# Patient Record
Sex: MALE | Race: WHITE | NOT HISPANIC OR LATINO | Employment: OTHER | URBAN - METROPOLITAN AREA
[De-identification: names, ages, dates, MRNs, and addresses within clinical notes are randomized per-mention and may not be internally consistent; named-entity substitution may affect disease eponyms.]

---

## 2017-05-12 ENCOUNTER — TELEPHONE (OUTPATIENT)
Dept: DERMATOLOGY | Facility: CLINIC | Age: 72
End: 2017-05-12

## 2017-05-12 NOTE — TELEPHONE ENCOUNTER
----- Message from Nola Ariza sent at 5/12/2017  2:03 PM CDT -----  Contact: PT  PT has a spot on his ear that he really needs to get checked out.  It's swollen and irritated.  PT has had this over a week.    PT callback: [phone]

## 2017-05-12 NOTE — TELEPHONE ENCOUNTER
Spoke to  Thomas himself and he stated he has already scheduled an appt outside [facility] to be seen for a spot on his ear.

## 2018-10-25 ENCOUNTER — TELEPHONE (OUTPATIENT)
Dept: FAMILY MEDICINE | Facility: CLINIC | Age: 73
End: 2018-10-25

## 2018-10-25 ENCOUNTER — OFFICE VISIT (OUTPATIENT)
Dept: FAMILY MEDICINE | Facility: CLINIC | Age: 73
End: 2018-10-25
Payer: MEDICARE

## 2018-10-25 VITALS
SYSTOLIC BLOOD PRESSURE: 125 MMHG | TEMPERATURE: 99 F | HEIGHT: 77 IN | WEIGHT: 206 LBS | RESPIRATION RATE: 20 BRPM | DIASTOLIC BLOOD PRESSURE: 65 MMHG | BODY MASS INDEX: 24.32 KG/M2 | HEART RATE: 86 BPM | OXYGEN SATURATION: 99 %

## 2018-10-25 DIAGNOSIS — I10 HYPERTENSION, UNSPECIFIED TYPE: ICD-10-CM

## 2018-10-25 DIAGNOSIS — K59.00 CONSTIPATION, UNSPECIFIED CONSTIPATION TYPE: ICD-10-CM

## 2018-10-25 DIAGNOSIS — Z85.038 PERSONAL HISTORY OF COLON CANCER, STAGE I: ICD-10-CM

## 2018-10-25 DIAGNOSIS — Z09 FOLLOW-UP EXAM: Primary | ICD-10-CM

## 2018-10-25 PROCEDURE — 99999 PR PBB SHADOW E&M-EST. PATIENT-LVL III: CPT | Mod: PBBFAC,,, | Performed by: NURSE PRACTITIONER

## 2018-10-25 PROCEDURE — 99213 OFFICE O/P EST LOW 20 MIN: CPT | Mod: PBBFAC,PN | Performed by: NURSE PRACTITIONER

## 2018-10-25 PROCEDURE — 99203 OFFICE O/P NEW LOW 30 MIN: CPT | Mod: S$PBB,,, | Performed by: NURSE PRACTITIONER

## 2018-10-25 NOTE — TELEPHONE ENCOUNTER
3rd attempt to call pt to reschedule due to provider being out, no answer, calls goes straight to voicemail, did leave message to call office to reschedule appointment.      2nd attempt to call pt to reschedule, no answer, left message provider is out, pt needs to reschedule.      Attempted to call pt to reschedule appointment scheduled for today (provider out), no answer, left message to call office to reschedule with call back number.

## 2018-10-25 NOTE — PROGRESS NOTES
"Subjective:       Patient ID: Akil Guzman is a 73 y.o. male.    Chief Complaint: Follow-up and Abdominal Pain    Mr. Becerra is 73 year old male who presents to the clinic today for follow up exam. He was seen on Tuesday morning @ urgent care in [place], [place] for constipation, severe abdominal cramps, slight fever, and achy joints. He reports they did an XRAY of his abdomen. He reports he took medication that they prescribed, which he is unsure of, and it has allow him to have multiple bowel movements. He reports he was also dehydrated Tuesday, but has been drinking at least 8 glasses of water daily. His PCP is Dr Hurley and he reports is making an appt for 11-17-18 for medicare wellness. He reports hx of colon cancer, and due for colonoscopy.              Review of Systems   Constitutional: Negative for appetite change, chills, diaphoresis, fatigue and fever.   HENT: Negative for congestion, ear discharge, ear pain, hearing loss, postnasal drip, sinus pressure, sinus pain, sneezing and sore throat.    Eyes: Negative for photophobia, pain, discharge, redness, itching and visual disturbance.   Respiratory: Negative for apnea, cough, choking, chest tightness, shortness of breath, wheezing and stridor.    Cardiovascular: Negative for chest pain, palpitations and leg swelling.   Gastrointestinal: Negative for abdominal distention, abdominal pain, constipation, diarrhea, nausea and vomiting.   Genitourinary: Negative for dysuria, flank pain, frequency and urgency.   Musculoskeletal: Negative for arthralgias, joint swelling, myalgias, neck pain and neck stiffness.   Skin: Negative for color change, pallor, rash and wound.   Neurological: Negative for dizziness, tremors, seizures, syncope, weakness, light-headedness, numbness and headaches.   Psychiatric/Behavioral: Positive for sleep disturbance (Patient reports "up all night using the restroom"). Negative for confusion, decreased concentration, hallucinations and " "suicidal ideas. The patient is not nervous/anxious.          Reviewed family, medical, surgical, and social history.    Objective:      /65 (BP Location: Left arm, Patient Position: Sitting)   Pulse 86   Temp 98.6 °F (37 °C)   Resp 20   Ht 6' 5" (1.956 m)   Wt 93.4 kg (206 lb)   SpO2 99%   BMI 24.43 kg/m²   Physical Exam   Constitutional: He is oriented to person, place, and time. He appears well-developed and well-nourished. No distress. He is not intubated.   HENT:   Head: Normocephalic.   Right Ear: Hearing, tympanic membrane, external ear and ear canal normal. Tympanic membrane is not erythematous.   Left Ear: Hearing, external ear and ear canal normal. Tympanic membrane is not erythematous.   Nose: Nose normal. No sinus tenderness. Right sinus exhibits no maxillary sinus tenderness and no frontal sinus tenderness. Left sinus exhibits no maxillary sinus tenderness and no frontal sinus tenderness.   Mouth/Throat: Uvula is midline, oropharynx is clear and moist and mucous membranes are normal. No uvula swelling. No oropharyngeal exudate.   Eyes: Conjunctivae and EOM are normal. Pupils are equal, round, and reactive to light.   Neck: Trachea normal and normal range of motion. Carotid bruit is not present. No neck rigidity. No edema and no erythema present.   Cardiovascular: Normal rate, regular rhythm, S1 normal, S2 normal, normal heart sounds and intact distal pulses. Exam reveals no gallop and no friction rub.   No murmur heard.  Pulmonary/Chest: Effort normal and breath sounds normal. No accessory muscle usage or stridor. No apnea, no tachypnea and no bradypnea. He is not intubated. No respiratory distress. He has no wheezes. He has no rales. He exhibits no tenderness.   Abdominal: Soft. Bowel sounds are normal. He exhibits no shifting dullness, no distension, no pulsatile liver, no abdominal bruit, no ascites and no mass. There is no tenderness. There is no rebound and no guarding. No hernia. "   Musculoskeletal: Normal range of motion.   Lymphadenopathy:        Head (right side): No submandibular adenopathy present.        Head (left side): No submandibular adenopathy present.     He has no cervical adenopathy.   Neurological: He is alert and oriented to person, place, and time. No cranial nerve deficit or sensory deficit.   Skin: Skin is warm and intact. Capillary refill takes less than 2 seconds. He is not diaphoretic.   Psychiatric: He has a normal mood and affect. His speech is normal and behavior is normal. Judgment and thought content normal.       Assessment:       1. Follow-up exam    2. Constipation, unspecified constipation type    3. Hypertension, unspecified type    4. Personal history of colon cancer, stage I        Plan:       Follow-up exam    Constipation, unspecified constipation type    Hypertension, unspecified type    Personal history of colon cancer, stage I          PLAN:  - Plan of care discussed with patient; patient reports feeling better with no symptoms present  - Hydration encouraged   - Hand washing discussed and encouraged  - Medications reviewed. Medication side effects discussed. Patient has no questions or concerns at this time. Informed patient to notify me regarding any concerns.   - Continue monitoring and documenting in log book   - Informed patient to please notify me with any questions or concerns at anytime  - Follow up ordered for Dr uHrley on 11-17-18 for wellness exam        Risks, benefits, and side effects were discussed with the patient. All questions were answered to the fullest satisfaction of the patient, and pt verbalized understanding and agreement to treatment plan. Pt was to call with any new or worsening symptoms, or present to the ER.

## 2018-10-26 DIAGNOSIS — N40.0 BPH (BENIGN PROSTATIC HYPERPLASIA): ICD-10-CM

## 2018-10-26 RX ORDER — FINASTERIDE 5 MG/1
5 TABLET, FILM COATED ORAL DAILY
Qty: 30 TABLET | Refills: 6 | Status: SHIPPED | OUTPATIENT
Start: 2018-10-26 | End: 2019-02-07 | Stop reason: SDUPTHER

## 2018-10-26 NOTE — TELEPHONE ENCOUNTER
----- Message from Phu Rushing sent at 10/26/2018 11:03 AM CDT -----  Contact: patient  Type:  RX Refill Request    Who Called:  patient  Refill or New Rx:  refill  RX Name and Strength:  finasteride (PROSCAR) 5 mg tablet  How is the patient currently taking it? (ex. 1XDay):    Is this a 30 day or 90 day RX:    Preferred Pharmacy with phone number:  [facility]  Local or Mail Order:  Local  Ordering Provider:    Best Call Back Number:  [phone]  Additional Information:  Requesting a call back when script has been sent

## 2018-10-31 ENCOUNTER — OFFICE VISIT (OUTPATIENT)
Dept: FAMILY MEDICINE | Facility: CLINIC | Age: 73
End: 2018-10-31
Payer: MEDICARE

## 2018-10-31 VITALS
HEART RATE: 83 BPM | RESPIRATION RATE: 18 BRPM | DIASTOLIC BLOOD PRESSURE: 79 MMHG | OXYGEN SATURATION: 98 % | BODY MASS INDEX: 24.49 KG/M2 | WEIGHT: 207.38 LBS | HEIGHT: 77 IN | SYSTOLIC BLOOD PRESSURE: 132 MMHG

## 2018-10-31 DIAGNOSIS — Z13.6 ISCHEMIC HEART DISEASE SCREEN: ICD-10-CM

## 2018-10-31 DIAGNOSIS — Z00.00 ANNUAL PHYSICAL EXAM: Primary | ICD-10-CM

## 2018-10-31 DIAGNOSIS — Z12.5 SCREENING FOR PROSTATE CANCER: ICD-10-CM

## 2018-10-31 PROCEDURE — 90714 TD VACC NO PRESV 7 YRS+ IM: CPT | Mod: PBBFAC,PN

## 2018-10-31 PROCEDURE — 99397 PER PM REEVAL EST PAT 65+ YR: CPT | Mod: S$PBB,,, | Performed by: FAMILY MEDICINE

## 2018-10-31 PROCEDURE — 99214 OFFICE O/P EST MOD 30 MIN: CPT | Mod: PBBFAC,PN | Performed by: FAMILY MEDICINE

## 2018-10-31 PROCEDURE — 99999 PR PBB SHADOW E&M-EST. PATIENT-LVL IV: CPT | Mod: PBBFAC,,, | Performed by: FAMILY MEDICINE

## 2018-10-31 NOTE — PROGRESS NOTES
"Subjective:       Patient ID: Akil Guzman is a 73 y.o. male.    Chief Complaint: Annual Exam (Wellness Visit)    Wellness Exam    Patient reports they are feeling well without complaints today. Pt reports adherence to generally healthy diet, exercise plan, and good sleep schedule. Anticipatory guidance was provided. Wellness labs and procedures for age were discussed.        Review of Systems   Constitutional: Negative for appetite change, chills, diaphoresis, fatigue and fever.   HENT: Negative for congestion, ear discharge, ear pain, hearing loss, postnasal drip, sinus pressure, sinus pain, sneezing and sore throat.    Eyes: Negative for photophobia, pain, discharge, redness, itching and visual disturbance.   Respiratory: Negative for apnea, cough, choking, chest tightness, shortness of breath, wheezing and stridor.    Cardiovascular: Negative for chest pain, palpitations and leg swelling.   Gastrointestinal: Negative for abdominal distention, abdominal pain, constipation, diarrhea, nausea and vomiting.   Genitourinary: Negative for dysuria, flank pain, frequency and urgency.   Musculoskeletal: Negative for arthralgias, joint swelling, myalgias, neck pain and neck stiffness.   Skin: Negative for color change, pallor, rash and wound.   Neurological: Negative for dizziness, tremors, seizures, syncope, weakness, light-headedness, numbness and headaches.   Psychiatric/Behavioral: Negative for confusion, decreased concentration, hallucinations, sleep disturbance (Patient reports "up all night using the restroom") and suicidal ideas. The patient is not nervous/anxious.          Reviewed family, medical, surgical, and social history.    Objective:      BP (!) 160/85 (BP Location: Right arm, Patient Position: Sitting, BP Method: Medium (Automatic))   Pulse 83   Resp 18   Ht 6' 5" (1.956 m)   Wt 94.1 kg (207 lb 6.4 oz)   SpO2 98%   BMI 24.59 kg/m²   Physical Exam   Constitutional: He is oriented to person, place, " and time. He appears well-developed and well-nourished. No distress.   HENT:   Head: Normocephalic and atraumatic.   Nose: Nose normal.   Mouth/Throat: Oropharynx is clear and moist. No oropharyngeal exudate.   Eyes: Conjunctivae and EOM are normal. Pupils are equal, round, and reactive to light.   Neck: Normal range of motion. No thyromegaly present.   Cardiovascular: Normal rate, regular rhythm, normal heart sounds and intact distal pulses. Exam reveals no gallop and no friction rub.   No murmur heard.  Pulmonary/Chest: Effort normal and breath sounds normal. No respiratory distress. He has no wheezes. He has no rales.   Abdominal: Soft. He exhibits no distension. There is no tenderness. There is no guarding.   Musculoskeletal: Normal range of motion. He exhibits no edema, tenderness or deformity.   Neurological: He is alert and oriented to person, place, and time. He displays normal reflexes. No cranial nerve deficit or sensory deficit. He exhibits normal muscle tone. Coordination normal.   Skin: Skin is warm and dry. No rash noted. He is not diaphoretic. No erythema. No pallor.   Psychiatric: He has a normal mood and affect. His behavior is normal. Judgment and thought content normal.   Nursing note and vitals reviewed.      Assessment:       1. Annual physical exam    2. Screening for prostate cancer    3. Ischemic heart disease screen        Plan:       Annual physical exam  -     Comprehensive metabolic panel; Future; Expected date: 10/31/2018  -     CBC auto differential; Future; Expected date: 10/31/2018  -     Lipid panel; Future; Expected date: 10/31/2018  -     PSA, Screening; Future; Expected date: 10/31/2018  -     (In Office Administered) Td Vaccine    Screening for prostate cancer  -     Comprehensive metabolic panel; Future; Expected date: 10/31/2018  -     CBC auto differential; Future; Expected date: 10/31/2018  -     Lipid panel; Future; Expected date: 10/31/2018  -     PSA, Screening; Future;  Expected date: 10/31/2018    Ischemic heart disease screen  -     Comprehensive metabolic panel; Future; Expected date: 10/31/2018  -     CBC auto differential; Future; Expected date: 10/31/2018  -     Lipid panel; Future; Expected date: 10/31/2018  -     PSA, Screening; Future; Expected date: 10/31/2018            Risks, benefits, and side effects were discussed with the patient. All questions were answered to the fullest satisfaction of the patient, and pt verbalized understanding and agreement to treatment plan. Pt was to call with any new or worsening symptoms, or present to the ER.

## 2018-11-02 ENCOUNTER — LAB VISIT (OUTPATIENT)
Dept: LAB | Facility: HOSPITAL | Age: 73
End: 2018-11-02
Attending: FAMILY MEDICINE
Payer: MEDICARE

## 2018-11-02 DIAGNOSIS — Z00.00 ANNUAL PHYSICAL EXAM: ICD-10-CM

## 2018-11-02 DIAGNOSIS — Z13.6 ISCHEMIC HEART DISEASE SCREEN: ICD-10-CM

## 2018-11-02 DIAGNOSIS — Z12.5 SCREENING FOR PROSTATE CANCER: ICD-10-CM

## 2018-11-02 LAB
ALBUMIN SERPL BCP-MCNC: 3.8 G/DL
ALP SERPL-CCNC: 58 U/L
ALT SERPL W/O P-5'-P-CCNC: 24 U/L
ANION GAP SERPL CALC-SCNC: 7 MMOL/L
AST SERPL-CCNC: 26 U/L
BASOPHILS # BLD AUTO: 0.04 K/UL
BASOPHILS NFR BLD: 0.7 %
BILIRUB SERPL-MCNC: 0.3 MG/DL
BUN SERPL-MCNC: 17 MG/DL
CALCIUM SERPL-MCNC: 9.8 MG/DL
CHLORIDE SERPL-SCNC: 102 MMOL/L
CHOLEST SERPL-MCNC: 118 MG/DL
CHOLEST/HDLC SERPL: 2.4 {RATIO}
CO2 SERPL-SCNC: 26 MMOL/L
COMPLEXED PSA SERPL-MCNC: 0.71 NG/ML
CREAT SERPL-MCNC: 0.9 MG/DL
DIFFERENTIAL METHOD: ABNORMAL
EOSINOPHIL # BLD AUTO: 0.2 K/UL
EOSINOPHIL NFR BLD: 3.2 %
ERYTHROCYTE [DISTWIDTH] IN BLOOD BY AUTOMATED COUNT: 11.9 %
EST. GFR  (AFRICAN AMERICAN): >60 ML/MIN/1.73 M^2
EST. GFR  (NON AFRICAN AMERICAN): >60 ML/MIN/1.73 M^2
GLUCOSE SERPL-MCNC: 84 MG/DL
HCT VFR BLD AUTO: 34.6 %
HDLC SERPL-MCNC: 49 MG/DL
HDLC SERPL: 41.5 %
HGB BLD-MCNC: 11.9 G/DL
IMM GRANULOCYTES # BLD AUTO: 0.06 K/UL
IMM GRANULOCYTES NFR BLD AUTO: 1 %
LDLC SERPL CALC-MCNC: 48.4 MG/DL
LYMPHOCYTES # BLD AUTO: 2.1 K/UL
LYMPHOCYTES NFR BLD: 36 %
MCH RBC QN AUTO: 33.4 PG
MCHC RBC AUTO-ENTMCNC: 34.4 G/DL
MCV RBC AUTO: 97 FL
MONOCYTES # BLD AUTO: 0.7 K/UL
MONOCYTES NFR BLD: 11.7 %
NEUTROPHILS # BLD AUTO: 2.8 K/UL
NEUTROPHILS NFR BLD: 47.4 %
NONHDLC SERPL-MCNC: 69 MG/DL
NRBC BLD-RTO: 0 /100 WBC
PLATELET # BLD AUTO: 238 K/UL
PMV BLD AUTO: 9.7 FL
POTASSIUM SERPL-SCNC: 4.2 MMOL/L
PROT SERPL-MCNC: 7.1 G/DL
RBC # BLD AUTO: 3.56 M/UL
SODIUM SERPL-SCNC: 135 MMOL/L
TRIGL SERPL-MCNC: 103 MG/DL
WBC # BLD AUTO: 5.92 K/UL

## 2018-11-02 PROCEDURE — 80061 LIPID PANEL: CPT

## 2018-11-02 PROCEDURE — 85025 COMPLETE CBC W/AUTO DIFF WBC: CPT

## 2018-11-02 PROCEDURE — 80053 COMPREHEN METABOLIC PANEL: CPT

## 2018-11-02 PROCEDURE — 84153 ASSAY OF PSA TOTAL: CPT

## 2018-11-02 PROCEDURE — 36415 COLL VENOUS BLD VENIPUNCTURE: CPT

## 2018-12-10 RX ORDER — SILDENAFIL CITRATE 20 MG/1
TABLET ORAL
Qty: 50 TABLET | Refills: 5 | Status: SHIPPED | OUTPATIENT
Start: 2018-12-10 | End: 2019-02-27

## 2018-12-10 NOTE — TELEPHONE ENCOUNTER
----- Message from Marilee Krueger sent at 12/10/2018 11:07 AM CST -----  Type:  RX Refill Request    Who Called: Paulette  Refill or New Rx:  refill  RX Name and Strength:  Sildenasol 20mg  How is the patient currently taking it? (ex. 1XDay):  As needed  Is this a 30 day or 90 day RX:  30  Preferred Pharmacy with phone number:    [facility]  Local or Mail Order:local  Ordering Provider:  Xavi  Best Call Back Number:  [phone]  Additional Information:

## 2019-02-06 ENCOUNTER — TELEPHONE (OUTPATIENT)
Dept: FAMILY MEDICINE | Facility: CLINIC | Age: 74
End: 2019-02-06

## 2019-02-06 NOTE — TELEPHONE ENCOUNTER
----- Message from Rhoda Reddy sent at 2/5/2019  5:11 PM CST -----  Contact: Patient   Patient needs to reschedule his appointment for something later than 02/06/2019 but before march    Callback: [phone]     Thank you

## 2019-02-07 ENCOUNTER — TELEPHONE (OUTPATIENT)
Dept: FAMILY MEDICINE | Facility: CLINIC | Age: 74
End: 2019-02-07

## 2019-02-07 DIAGNOSIS — N40.0 BPH (BENIGN PROSTATIC HYPERPLASIA): ICD-10-CM

## 2019-02-07 DIAGNOSIS — E78.5 HYPERLIPIDEMIA: ICD-10-CM

## 2019-02-07 RX ORDER — LISINOPRIL 40 MG/1
40 TABLET ORAL DAILY
Qty: 90 TABLET | Refills: 3 | Status: SHIPPED | OUTPATIENT
Start: 2019-02-07 | End: 2020-01-28

## 2019-02-07 RX ORDER — SIMVASTATIN 40 MG/1
40 TABLET, FILM COATED ORAL NIGHTLY
Qty: 90 TABLET | Refills: 3 | Status: SHIPPED | OUTPATIENT
Start: 2019-02-07 | End: 2020-02-11 | Stop reason: SDUPTHER

## 2019-02-07 RX ORDER — FINASTERIDE 5 MG/1
5 TABLET, FILM COATED ORAL DAILY
Qty: 30 TABLET | Refills: 6 | Status: SHIPPED | OUTPATIENT
Start: 2019-02-07 | End: 2019-02-27

## 2019-02-15 ENCOUNTER — LAB VISIT (OUTPATIENT)
Dept: LAB | Facility: HOSPITAL | Age: 74
End: 2019-02-15
Attending: FAMILY MEDICINE
Payer: MEDICARE

## 2019-02-15 ENCOUNTER — OFFICE VISIT (OUTPATIENT)
Dept: FAMILY MEDICINE | Facility: CLINIC | Age: 74
End: 2019-02-15
Payer: MEDICARE

## 2019-02-15 VITALS
BODY MASS INDEX: 24.21 KG/M2 | SYSTOLIC BLOOD PRESSURE: 128 MMHG | WEIGHT: 205 LBS | DIASTOLIC BLOOD PRESSURE: 75 MMHG | RESPIRATION RATE: 20 BRPM | OXYGEN SATURATION: 98 % | HEIGHT: 77 IN | HEART RATE: 71 BPM

## 2019-02-15 DIAGNOSIS — J30.1 NON-SEASONAL ALLERGIC RHINITIS DUE TO POLLEN: Primary | ICD-10-CM

## 2019-02-15 DIAGNOSIS — E78.5 HYPERLIPIDEMIA, UNSPECIFIED HYPERLIPIDEMIA TYPE: ICD-10-CM

## 2019-02-15 DIAGNOSIS — B18.2 CHRONIC HEPATITIS C WITHOUT HEPATIC COMA: ICD-10-CM

## 2019-02-15 DIAGNOSIS — I10 HYPERTENSION, UNSPECIFIED TYPE: ICD-10-CM

## 2019-02-15 DIAGNOSIS — M79.671 RIGHT FOOT PAIN: ICD-10-CM

## 2019-02-15 PROCEDURE — 99999 PR PBB SHADOW E&M-EST. PATIENT-LVL IV: CPT | Mod: PBBFAC,,, | Performed by: FAMILY MEDICINE

## 2019-02-15 PROCEDURE — 99999 PR PBB SHADOW E&M-EST. PATIENT-LVL IV: ICD-10-PCS | Mod: PBBFAC,,, | Performed by: FAMILY MEDICINE

## 2019-02-15 PROCEDURE — 99214 PR OFFICE/OUTPT VISIT, EST, LEVL IV, 30-39 MIN: ICD-10-PCS | Mod: S$PBB,,, | Performed by: FAMILY MEDICINE

## 2019-02-15 PROCEDURE — 36415 COLL VENOUS BLD VENIPUNCTURE: CPT

## 2019-02-15 PROCEDURE — 99214 OFFICE O/P EST MOD 30 MIN: CPT | Mod: S$PBB,,, | Performed by: FAMILY MEDICINE

## 2019-02-15 PROCEDURE — 99214 OFFICE O/P EST MOD 30 MIN: CPT | Mod: PBBFAC,PN | Performed by: FAMILY MEDICINE

## 2019-02-15 PROCEDURE — 87522 HEPATITIS C REVRS TRNSCRPJ: CPT

## 2019-02-15 RX ORDER — MONTELUKAST SODIUM 10 MG/1
10 TABLET ORAL NIGHTLY
Qty: 30 TABLET | Refills: 2 | Status: SHIPPED | OUTPATIENT
Start: 2019-02-15 | End: 2019-03-17

## 2019-02-15 NOTE — PROGRESS NOTES
"Subjective:       Patient ID: Akil Guzman is a 73 y.o. male.    Chief Complaint: Follow-up    Follow up    In regards to the patient's hypertension, patient denies chest pain/sob, and has been compliant with the medication regimen.     In regards to the patient's dyslipidemia, patient reports has been compliant with the medication regimen  without side effects.        Review of Systems   Constitutional: Negative for appetite change, chills, diaphoresis, fatigue and fever.   HENT: Negative for congestion, ear discharge, ear pain, hearing loss, postnasal drip, sinus pressure, sinus pain, sneezing and sore throat.    Eyes: Negative for photophobia, pain, discharge, redness, itching and visual disturbance.   Respiratory: Negative for apnea, cough, choking, chest tightness, shortness of breath, wheezing and stridor.    Cardiovascular: Negative for chest pain, palpitations and leg swelling.   Gastrointestinal: Negative for abdominal distention, abdominal pain, constipation, diarrhea, nausea and vomiting.   Genitourinary: Negative for dysuria, flank pain, frequency and urgency.   Musculoskeletal: Negative for arthralgias, joint swelling, myalgias, neck pain and neck stiffness.   Skin: Negative for color change, pallor, rash and wound.   Neurological: Negative for dizziness, tremors, seizures, syncope, weakness, light-headedness, numbness and headaches.   Psychiatric/Behavioral: Negative for confusion, decreased concentration, hallucinations, sleep disturbance (Patient reports "up all night using the restroom") and suicidal ideas. The patient is not nervous/anxious.          Reviewed family, medical, surgical, and social history.    Objective:      /75 (BP Location: Left arm, Patient Position: Sitting, BP Method: Medium (Automatic))   Pulse 71   Resp 20   Ht 6' 5" (1.956 m)   Wt 93 kg (205 lb)   SpO2 98%   BMI 24.31 kg/m²   Physical Exam   Constitutional: He is oriented to person, place, and time. He appears " well-developed and well-nourished. No distress.   HENT:   Head: Normocephalic and atraumatic.   Nose: Nose normal.   Mouth/Throat: Oropharynx is clear and moist. No oropharyngeal exudate.   Eyes: Conjunctivae and EOM are normal. Pupils are equal, round, and reactive to light.   Neck: Normal range of motion. No thyromegaly present.   Cardiovascular: Normal rate, regular rhythm, normal heart sounds and intact distal pulses. Exam reveals no gallop and no friction rub.   No murmur heard.  Pulmonary/Chest: Effort normal and breath sounds normal. No respiratory distress. He has no wheezes. He has no rales.   Abdominal: Soft. He exhibits no distension. There is no tenderness. There is no guarding.   Musculoskeletal: Normal range of motion. He exhibits no edema, tenderness or deformity.   Neurological: He is alert and oriented to person, place, and time. He displays normal reflexes. No cranial nerve deficit or sensory deficit. He exhibits normal muscle tone. Coordination normal.   Skin: Skin is warm and dry. No rash noted. He is not diaphoretic. No erythema. No pallor.   Psychiatric: He has a normal mood and affect. His behavior is normal. Judgment and thought content normal.   Nursing note and vitals reviewed.      Assessment:       1. Non-seasonal allergic rhinitis due to pollen    2. Right foot pain    3. Chronic hepatitis C without hepatic coma        Plan:       Non-seasonal allergic rhinitis due to pollen  -     montelukast (SINGULAIR) 10 mg tablet; Take 1 tablet (10 mg total) by mouth every evening.  Dispense: 30 tablet; Refill: 2    Right foot pain  -     Ambulatory referral to Podiatry    Chronic hepatitis C without hepatic coma  -     Hepatitis C RNA, quantitative, PCR; Future; Expected date: 02/15/2019            Risks, benefits, and side effects were discussed with the patient. All questions were answered to the fullest satisfaction of the patient, and pt verbalized understanding and agreement to treatment plan.  Pt was to call with any new or worsening symptoms, or present to the ER.

## 2019-02-18 LAB
HCV RNA SERPL NAA+PROBE-LOG IU: <1.08 LOG (10) IU/ML
HCV RNA SERPL QL NAA+PROBE: NOT DETECTED IU/ML
HCV RNA SPEC NAA+PROBE-ACNC: <12 IU/ML

## 2019-02-27 ENCOUNTER — OFFICE VISIT (OUTPATIENT)
Dept: PODIATRY | Facility: CLINIC | Age: 74
End: 2019-02-27
Payer: MEDICARE

## 2019-02-27 VITALS
TEMPERATURE: 98 F | BODY MASS INDEX: 23.62 KG/M2 | HEART RATE: 73 BPM | WEIGHT: 200 LBS | SYSTOLIC BLOOD PRESSURE: 115 MMHG | HEIGHT: 77 IN | DIASTOLIC BLOOD PRESSURE: 64 MMHG

## 2019-02-27 DIAGNOSIS — L03.031 PARONYCHIA OF THIRD TOE OF RIGHT FOOT: Primary | ICD-10-CM

## 2019-02-27 PROCEDURE — 99999 PR PBB SHADOW E&M-EST. PATIENT-LVL III: ICD-10-PCS | Mod: PBBFAC,,, | Performed by: PODIATRIST

## 2019-02-27 PROCEDURE — 99202 OFFICE O/P NEW SF 15 MIN: CPT | Mod: S$PBB,,, | Performed by: PODIATRIST

## 2019-02-27 PROCEDURE — 99999 PR PBB SHADOW E&M-EST. PATIENT-LVL III: CPT | Mod: PBBFAC,,, | Performed by: PODIATRIST

## 2019-02-27 PROCEDURE — 99202 PR OFFICE/OUTPT VISIT, NEW, LEVL II, 15-29 MIN: ICD-10-PCS | Mod: S$PBB,,, | Performed by: PODIATRIST

## 2019-02-27 PROCEDURE — 99213 OFFICE O/P EST LOW 20 MIN: CPT | Mod: PBBFAC | Performed by: PODIATRIST

## 2019-02-27 RX ORDER — FINASTERIDE 5 MG/1
5 TABLET, FILM COATED ORAL DAILY
COMMUNITY
End: 2019-12-30

## 2019-02-27 NOTE — LETTER
March 2, 2019      Iftikhar Hurley MD  [address]  [place] [address]           [facility]  [address] [place] [address]  Phone: [phone]  Fax: [phone]          Patient: Akil Guzman   MR Number: [id number]   YOB: 1945   Date of Visit: 2/27/2019       Dear Dr. Iftikhar Hurley:    Thank you for referring Akil Guzman to me for evaluation. Attached you will find relevant portions of my assessment and plan of care.    If you have questions, please do not hesitate to call me. I look forward to following Akil Guzman along with you.    Sincerely,    aGldino Borden, DPNICOLE    Enclosure  CC:  No Recipients    If you would like to receive this communication electronically, please contact [email or web address] or [phone] to request more information on [employer] access.    For providers and/or their staff who would like to refer a patient to [facility], please contact us through our one-stop-shop provider referral line, Critical access hospitalierge, at [phone].    If you feel you have received this communication in error or would no longer like to receive these types of communications, please e-mail [email or web address]

## 2019-03-02 PROBLEM — L03.031 PARONYCHIA OF THIRD TOE OF RIGHT FOOT: Status: ACTIVE | Noted: 2019-03-02

## 2019-03-03 NOTE — PROGRESS NOTES
Subjective:       Patient ID: Akil Guzman is a 73 y.o. male.    Chief Complaint: Foot Problem (3rd digit right foot ) and Nail Problem    HPI patient presents today with complaint of an infected ingrown toenail 3rd digit right foot.  Patient states this has been bothering him for about a year it is very painful he states he tried to changes shoes it did not help.  Review of Systems   All other systems reviewed and are negative.      Objective:      Physical Exam   Constitutional: He appears well-developed and well-nourished.   Cardiovascular:   Pulses:       Dorsalis pedis pulses are 1+ on the right side, and 1+ on the left side.        Posterior tibial pulses are 1+ on the right side, and 1+ on the left side.   Pulmonary/Chest: Effort normal.   Musculoskeletal: Normal range of motion. He exhibits tenderness.   Feet:   Right Foot:   Protective Sensation: 4 sites tested. 4 sites sensed.   Skin Integrity: Positive for erythema.   Left Foot:   Protective Sensation: 4 sites tested. 4 sites sensed.   Neurological: He is alert.   Skin: Capillary refill takes 2 to 3 seconds. There is erythema.   Psychiatric: He has a normal mood and affect. His behavior is normal. Judgment and thought content normal.       Assessment:       1. Paronychia of third toe of right foot        Plan:       Patient presents today with a complaint of an infected ingrown toenail 3rd digit right foot he states he has been bothering him for about a year on and off it has gotten progressively worse he states he tried to change shoes he is wearing to prevent rubbing this has not helped.  On evaluation I did not feel that the patient needed a nail avulsion today I was able to aggressively debride and remove the ingrowing toenail from the lateral border 3rd digit right.  The area was flushed and irrigated with copious amounts of sterile saline bacitracin ointment and a light dressing was applied patient advised he needs to monitor this carefully and I  have given him advice as to what he can do to prevent this from growing back the same way he is going to apply Vicks vapor rub to his toenails twice a day every day to help combat the fungal infection which the definitely is a contributing factor to the ingrown toenail I have advised him he needs to do this for 4-6 months I have also shown how to trim this patient did not need a nail avulsion at this time bacitracin ointment and a light dressing applied follow-up will be as needed.

## 2019-04-08 ENCOUNTER — TELEPHONE (OUTPATIENT)
Dept: FAMILY MEDICINE | Facility: CLINIC | Age: 74
End: 2019-04-08

## 2019-04-08 RX ORDER — IPRATROPIUM BROMIDE 42 UG/1
2 SPRAY, METERED NASAL 3 TIMES DAILY
Qty: 15 ML | Refills: 5 | Status: SHIPPED | OUTPATIENT
Start: 2019-04-08 | End: 2019-10-25 | Stop reason: SDUPTHER

## 2019-05-17 ENCOUNTER — OFFICE VISIT (OUTPATIENT)
Dept: FAMILY MEDICINE | Facility: CLINIC | Age: 74
End: 2019-05-17
Payer: MEDICARE

## 2019-05-17 VITALS
BODY MASS INDEX: 24.28 KG/M2 | DIASTOLIC BLOOD PRESSURE: 75 MMHG | WEIGHT: 205.63 LBS | OXYGEN SATURATION: 97 % | HEART RATE: 80 BPM | SYSTOLIC BLOOD PRESSURE: 137 MMHG | RESPIRATION RATE: 20 BRPM | HEIGHT: 77 IN

## 2019-05-17 DIAGNOSIS — E78.41 ELEVATED LIPOPROTEIN(A): Primary | ICD-10-CM

## 2019-05-17 PROCEDURE — 99999 PR PBB SHADOW E&M-EST. PATIENT-LVL III: ICD-10-PCS | Mod: PBBFAC,,, | Performed by: FAMILY MEDICINE

## 2019-05-17 PROCEDURE — 99213 PR OFFICE/OUTPT VISIT, EST, LEVL III, 20-29 MIN: ICD-10-PCS | Mod: S$PBB,,, | Performed by: FAMILY MEDICINE

## 2019-05-17 PROCEDURE — 99213 OFFICE O/P EST LOW 20 MIN: CPT | Mod: PBBFAC,PN | Performed by: FAMILY MEDICINE

## 2019-05-17 PROCEDURE — 99999 PR PBB SHADOW E&M-EST. PATIENT-LVL III: CPT | Mod: PBBFAC,,, | Performed by: FAMILY MEDICINE

## 2019-05-17 PROCEDURE — 99213 OFFICE O/P EST LOW 20 MIN: CPT | Mod: S$PBB,,, | Performed by: FAMILY MEDICINE

## 2019-05-17 RX ORDER — MONTELUKAST SODIUM 10 MG/1
TABLET ORAL
COMMUNITY
Start: 2019-03-22 | End: 2019-08-07 | Stop reason: SDUPTHER

## 2019-05-17 NOTE — PROGRESS NOTES
"Subjective:       Patient ID: Akil Guzman is a 73 y.o. male.    Chief Complaint: Follow-up (3 months)    Follow up    In regards to the patient's dyslipidemia, patient reports has been compliant with the medication regimen  without side effects.    Review of Systems   Constitutional: Negative for activity change, appetite change, fatigue and fever.   HENT: Negative for congestion, dental problem, ear discharge, hearing loss, postnasal drip, sinus pain, sneezing and trouble swallowing.    Eyes: Negative for photophobia and discharge.   Respiratory: Negative for apnea, cough and shortness of breath.    Cardiovascular: Negative for chest pain.   Gastrointestinal: Negative for abdominal distention, abdominal pain, blood in stool, diarrhea, nausea and vomiting.   Endocrine: Negative for cold intolerance.   Genitourinary: Negative for difficulty urinating, flank pain, frequency, hematuria and testicular pain.   Musculoskeletal: Negative for arthralgias and myalgias.   Skin: Negative for color change.   Allergic/Immunologic: Negative for environmental allergies, food allergies and immunocompromised state.   Neurological: Negative for dizziness, syncope, numbness and headaches.   Hematological: Negative for adenopathy. Does not bruise/bleed easily.   Psychiatric/Behavioral: Negative for agitation, confusion, decreased concentration, hallucinations, self-injury and sleep disturbance.   All other systems reviewed and are negative.        Reviewed family, medical, surgical, and social history.    Objective:      /75 (BP Location: Left arm, Patient Position: Sitting, BP Method: Medium (Automatic))   Pulse 80   Resp 20   Ht 6' 5" (1.956 m)   Wt 93.3 kg (205 lb 9.6 oz)   SpO2 97%   BMI 24.38 kg/m²   Physical Exam   Constitutional: He is oriented to person, place, and time. He appears well-developed and well-nourished. No distress.   HENT:   Head: Normocephalic and atraumatic.   Nose: Nose normal.   Mouth/Throat: " Oropharynx is clear and moist. No oropharyngeal exudate.   Eyes: Pupils are equal, round, and reactive to light. Conjunctivae and EOM are normal.   Neck: Normal range of motion. No thyromegaly present.   Cardiovascular: Normal rate, regular rhythm, normal heart sounds and intact distal pulses. Exam reveals no gallop and no friction rub.   No murmur heard.  Pulmonary/Chest: Effort normal and breath sounds normal. No respiratory distress. He has no wheezes. He has no rales.   Abdominal: Soft. He exhibits no distension. There is no tenderness. There is no guarding.   Musculoskeletal: Normal range of motion. He exhibits no edema, tenderness or deformity.   Neurological: He is alert and oriented to person, place, and time. He displays normal reflexes. No cranial nerve deficit or sensory deficit. He exhibits normal muscle tone. Coordination normal.   Skin: Skin is warm and dry. No rash noted. He is not diaphoretic. No erythema. No pallor.   Psychiatric: He has a normal mood and affect. His behavior is normal. Judgment and thought content normal.   Nursing note and vitals reviewed.      Assessment:       1. Elevated lipoprotein(a)        Plan:       Elevated lipoprotein(a)            Risks, benefits, and side effects were discussed with the patient. All questions were answered to the fullest satisfaction of the patient, and pt verbalized understanding and agreement to treatment plan. Pt was to call with any new or worsening symptoms, or present to the ER.

## 2019-08-07 RX ORDER — MONTELUKAST SODIUM 10 MG/1
10 TABLET ORAL NIGHTLY
Qty: 30 TABLET | Refills: 5 | Status: SHIPPED | OUTPATIENT
Start: 2019-08-07 | End: 2020-02-20

## 2019-10-25 RX ORDER — IPRATROPIUM BROMIDE 42 UG/1
2 SPRAY, METERED NASAL 3 TIMES DAILY
Qty: 15 ML | Refills: 5 | Status: SHIPPED | OUTPATIENT
Start: 2019-10-25 | End: 2021-01-25

## 2019-10-25 RX ORDER — IPRATROPIUM BROMIDE 42 UG/1
SPRAY, METERED NASAL
Qty: 15 ML | Refills: 2 | Status: SHIPPED | OUTPATIENT
Start: 2019-10-25 | End: 2020-06-24 | Stop reason: SDUPTHER

## 2019-12-30 RX ORDER — FINASTERIDE 5 MG/1
TABLET, FILM COATED ORAL
Qty: 90 TABLET | Refills: 3 | Status: SHIPPED | OUTPATIENT
Start: 2019-12-30 | End: 2020-07-29

## 2020-01-28 RX ORDER — LISINOPRIL 40 MG/1
TABLET ORAL
Qty: 90 TABLET | Refills: 3 | Status: SHIPPED | OUTPATIENT
Start: 2020-01-28 | End: 2022-09-07

## 2020-02-11 DIAGNOSIS — E78.5 HYPERLIPIDEMIA: ICD-10-CM

## 2020-02-11 RX ORDER — SIMVASTATIN 40 MG/1
40 TABLET, FILM COATED ORAL NIGHTLY
Qty: 90 TABLET | Refills: 3 | Status: SHIPPED | OUTPATIENT
Start: 2020-02-11 | End: 2020-10-30

## 2020-02-20 RX ORDER — MONTELUKAST SODIUM 10 MG/1
TABLET ORAL
Qty: 30 TABLET | Refills: 5 | Status: SHIPPED | OUTPATIENT
Start: 2020-02-20 | End: 2020-06-26

## 2020-03-02 ENCOUNTER — CLINICAL SUPPORT (OUTPATIENT)
Dept: FAMILY MEDICINE | Facility: CLINIC | Age: 75
End: 2020-03-02
Payer: MEDICARE

## 2020-03-02 PROCEDURE — 90662 IIV NO PRSV INCREASED AG IM: CPT | Mod: PBBFAC,PN

## 2020-03-02 NOTE — PROGRESS NOTES
Flu vaccine administered IM in the right deltoid. Pt tolerated well.  No other concerns at this time.

## 2020-05-05 ENCOUNTER — PATIENT MESSAGE (OUTPATIENT)
Dept: ADMINISTRATIVE | Facility: HOSPITAL | Age: 75
End: 2020-05-05

## 2020-05-20 ENCOUNTER — PATIENT MESSAGE (OUTPATIENT)
Dept: ADMINISTRATIVE | Facility: HOSPITAL | Age: 75
End: 2020-05-20

## 2020-06-15 ENCOUNTER — PATIENT MESSAGE (OUTPATIENT)
Dept: FAMILY MEDICINE | Facility: CLINIC | Age: 75
End: 2020-06-15

## 2020-06-15 RX ORDER — SILDENAFIL CITRATE 20 MG/1
20 TABLET ORAL
OUTPATIENT
Start: 2020-06-15 | End: 2021-06-15

## 2020-06-18 DIAGNOSIS — I10 HTN (HYPERTENSION): ICD-10-CM

## 2020-06-25 RX ORDER — IPRATROPIUM BROMIDE 42 UG/1
1 SPRAY, METERED NASAL 2 TIMES DAILY
Qty: 15 ML | Refills: 2 | Status: SHIPPED | OUTPATIENT
Start: 2020-06-25 | End: 2020-07-22 | Stop reason: SDUPTHER

## 2020-06-26 RX ORDER — MONTELUKAST SODIUM 10 MG/1
TABLET ORAL
Qty: 90 TABLET | Refills: 3 | Status: SHIPPED | OUTPATIENT
Start: 2020-06-26 | End: 2022-09-07

## 2020-07-15 ENCOUNTER — PATIENT OUTREACH (OUTPATIENT)
Dept: ADMINISTRATIVE | Facility: HOSPITAL | Age: 75
End: 2020-07-15

## 2020-07-22 RX ORDER — IPRATROPIUM BROMIDE 42 UG/1
1 SPRAY, METERED NASAL 2 TIMES DAILY
Qty: 15 ML | Refills: 2 | Status: SHIPPED | OUTPATIENT
Start: 2020-07-22 | End: 2020-07-29 | Stop reason: SDUPTHER

## 2020-07-29 ENCOUNTER — OFFICE VISIT (OUTPATIENT)
Dept: FAMILY MEDICINE | Facility: CLINIC | Age: 75
End: 2020-07-29
Payer: MEDICARE

## 2020-07-29 VITALS
RESPIRATION RATE: 18 BRPM | OXYGEN SATURATION: 97 % | HEART RATE: 83 BPM | DIASTOLIC BLOOD PRESSURE: 71 MMHG | HEIGHT: 77 IN | SYSTOLIC BLOOD PRESSURE: 130 MMHG | WEIGHT: 198.81 LBS | BODY MASS INDEX: 23.47 KG/M2 | TEMPERATURE: 99 F

## 2020-07-29 DIAGNOSIS — Z00.00 ANNUAL PHYSICAL EXAM: Primary | ICD-10-CM

## 2020-07-29 DIAGNOSIS — E78.41 ELEVATED LIPOPROTEIN(A): ICD-10-CM

## 2020-07-29 DIAGNOSIS — Z13.1 DIABETES MELLITUS SCREENING: ICD-10-CM

## 2020-07-29 DIAGNOSIS — Z13.6 ISCHEMIC HEART DISEASE SCREEN: ICD-10-CM

## 2020-07-29 DIAGNOSIS — Z12.11 COLON CANCER SCREENING: ICD-10-CM

## 2020-07-29 DIAGNOSIS — Z12.5 PROSTATE CANCER SCREENING: ICD-10-CM

## 2020-07-29 PROCEDURE — 99214 OFFICE O/P EST MOD 30 MIN: CPT | Mod: PBBFAC,PN | Performed by: FAMILY MEDICINE

## 2020-07-29 PROCEDURE — 99213 OFFICE O/P EST LOW 20 MIN: CPT | Mod: S$PBB,ICN,, | Performed by: FAMILY MEDICINE

## 2020-07-29 PROCEDURE — 99999 PR PBB SHADOW E&M-EST. PATIENT-LVL IV: ICD-10-PCS | Mod: PBBFAC,,, | Performed by: FAMILY MEDICINE

## 2020-07-29 PROCEDURE — 99213 PR OFFICE/OUTPT VISIT, EST, LEVL III, 20-29 MIN: ICD-10-PCS | Mod: S$PBB,ICN,, | Performed by: FAMILY MEDICINE

## 2020-07-29 PROCEDURE — 99999 PR PBB SHADOW E&M-EST. PATIENT-LVL IV: CPT | Mod: PBBFAC,,, | Performed by: FAMILY MEDICINE

## 2020-07-29 NOTE — PROGRESS NOTES
"Subjective:       Patient ID: Akil Guzman is a 75 y.o. male.    Chief Complaint: Annual Exam (Pt would like colonsocopy)    Patient reports they are feeling well without complaints today. Pt reports adherence to generally healthy diet, exercise plan, and good sleep schedule. Anticipatory guidance was provided. Wellness labs and procedures for age were discussed.      Review of Systems   Constitutional: Negative for activity change, appetite change, fatigue and fever.   HENT: Negative for congestion, dental problem, ear discharge, hearing loss, postnasal drip, sinus pain, sneezing and trouble swallowing.    Eyes: Negative for photophobia and discharge.   Respiratory: Negative for apnea, cough and shortness of breath.    Cardiovascular: Negative for chest pain.   Gastrointestinal: Negative for abdominal distention, abdominal pain, blood in stool, diarrhea, nausea and vomiting.   Endocrine: Negative for cold intolerance.   Genitourinary: Negative for difficulty urinating, flank pain, frequency, hematuria and testicular pain.   Musculoskeletal: Negative for arthralgias and myalgias.   Skin: Negative for color change.   Allergic/Immunologic: Negative for environmental allergies, food allergies and immunocompromised state.   Neurological: Negative for dizziness, syncope, numbness and headaches.   Hematological: Negative for adenopathy. Does not bruise/bleed easily.   Psychiatric/Behavioral: Negative for agitation, confusion, decreased concentration, hallucinations, self-injury and sleep disturbance.   All other systems reviewed and are negative.        Reviewed family, medical, surgical, and social history.    Objective:      /71 (BP Location: Left arm, Patient Position: Sitting, BP Method: Medium (Automatic))   Pulse 83   Temp 98.6 °F (37 °C) (Oral)   Resp 18   Ht 6' 5" (1.956 m)   Wt 90.2 kg (198 lb 12.8 oz)   SpO2 97%   BMI 23.57 kg/m²   Physical Exam  Vitals signs and nursing note reviewed. "   Constitutional:       General: He is not in acute distress.     Appearance: He is well-developed. He is not diaphoretic.   HENT:      Head: Normocephalic and atraumatic.      Nose: Nose normal.      Mouth/Throat:      Pharynx: No oropharyngeal exudate.   Eyes:      Conjunctiva/sclera: Conjunctivae normal.      Pupils: Pupils are equal, round, and reactive to light.   Neck:      Musculoskeletal: Normal range of motion.      Thyroid: No thyromegaly.   Cardiovascular:      Rate and Rhythm: Normal rate and regular rhythm.      Heart sounds: Normal heart sounds. No murmur. No friction rub. No gallop.    Pulmonary:      Effort: Pulmonary effort is normal. No respiratory distress.      Breath sounds: Normal breath sounds. No wheezing or rales.   Abdominal:      General: There is no distension.      Palpations: Abdomen is soft.      Tenderness: There is no abdominal tenderness. There is no guarding.   Musculoskeletal: Normal range of motion.         General: No tenderness or deformity.   Skin:     General: Skin is warm and dry.      Coloration: Skin is not pale.      Findings: No erythema or rash.   Neurological:      Mental Status: He is alert and oriented to person, place, and time.      Cranial Nerves: No cranial nerve deficit.      Sensory: No sensory deficit.      Motor: No abnormal muscle tone.      Coordination: Coordination normal.      Deep Tendon Reflexes: Reflexes normal.   Psychiatric:         Behavior: Behavior normal.         Thought Content: Thought content normal.         Judgment: Judgment normal.         Assessment:       1. Annual physical exam    2. Colon cancer screening    3. Diabetes mellitus screening    4. Ischemic heart disease screen    5. Prostate cancer screening    6. Elevated lipoprotein(a)        Plan:       Annual physical exam  -     Glucose, fasting; Future; Expected date: 07/29/2020  -     Lipid Panel; Future; Expected date: 07/29/2020  -     PSA, Screening; Future; Expected date:  07/29/2020  -     Comprehensive metabolic panel; Future; Expected date: 07/29/2020  -     CBC auto differential; Future; Expected date: 07/29/2020    Colon cancer screening  -     Ambulatory referral/consult to General Surgery; Future; Expected date: 08/05/2020  -     PSA, Screening; Future; Expected date: 07/29/2020  -     Comprehensive metabolic panel; Future; Expected date: 07/29/2020  -     CBC auto differential; Future; Expected date: 07/29/2020    Diabetes mellitus screening  -     Glucose, fasting; Future; Expected date: 07/29/2020  -     PSA, Screening; Future; Expected date: 07/29/2020  -     Comprehensive metabolic panel; Future; Expected date: 07/29/2020  -     CBC auto differential; Future; Expected date: 07/29/2020    Ischemic heart disease screen  -     Lipid Panel; Future; Expected date: 07/29/2020  -     PSA, Screening; Future; Expected date: 07/29/2020  -     Comprehensive metabolic panel; Future; Expected date: 07/29/2020  -     CBC auto differential; Future; Expected date: 07/29/2020    Prostate cancer screening  -     PSA, Screening; Future; Expected date: 07/29/2020  -     Comprehensive metabolic panel; Future; Expected date: 07/29/2020  -     CBC auto differential; Future; Expected date: 07/29/2020    Elevated lipoprotein(a)  -     PSA, Screening; Future; Expected date: 07/29/2020  -     Comprehensive metabolic panel; Future; Expected date: 07/29/2020  -     CBC auto differential; Future; Expected date: 07/29/2020            Risks, benefits, and side effects were discussed with the patient. All questions were answered to the fullest satisfaction of the patient, and pt verbalized understanding and agreement to treatment plan. Pt was to call with any new or worsening symptoms, or present to the ER.

## 2020-08-10 ENCOUNTER — LAB VISIT (OUTPATIENT)
Dept: LAB | Facility: HOSPITAL | Age: 75
End: 2020-08-10
Attending: FAMILY MEDICINE
Payer: MEDICARE

## 2020-08-10 DIAGNOSIS — Z12.11 COLON CANCER SCREENING: ICD-10-CM

## 2020-08-10 DIAGNOSIS — Z12.5 PROSTATE CANCER SCREENING: ICD-10-CM

## 2020-08-10 DIAGNOSIS — Z13.6 ISCHEMIC HEART DISEASE SCREEN: ICD-10-CM

## 2020-08-10 DIAGNOSIS — E78.41 ELEVATED LIPOPROTEIN(A): ICD-10-CM

## 2020-08-10 DIAGNOSIS — Z00.00 ANNUAL PHYSICAL EXAM: ICD-10-CM

## 2020-08-10 DIAGNOSIS — Z13.1 DIABETES MELLITUS SCREENING: ICD-10-CM

## 2020-08-10 LAB
ALBUMIN SERPL BCP-MCNC: 4.2 G/DL (ref 3.5–5.2)
ALP SERPL-CCNC: 51 U/L (ref 55–135)
ALT SERPL W/O P-5'-P-CCNC: 52 U/L (ref 10–44)
ANION GAP SERPL CALC-SCNC: 10 MMOL/L (ref 8–16)
AST SERPL-CCNC: 67 U/L (ref 10–40)
BASOPHILS # BLD AUTO: 0.02 K/UL (ref 0–0.2)
BASOPHILS NFR BLD: 0.4 % (ref 0–1.9)
BILIRUB SERPL-MCNC: 0.7 MG/DL (ref 0.1–1)
BUN SERPL-MCNC: 14 MG/DL (ref 8–23)
CALCIUM SERPL-MCNC: 9.6 MG/DL (ref 8.7–10.5)
CHLORIDE SERPL-SCNC: 94 MMOL/L (ref 95–110)
CHOLEST SERPL-MCNC: 167 MG/DL (ref 120–199)
CHOLEST/HDLC SERPL: 1.3 {RATIO} (ref 2–5)
CO2 SERPL-SCNC: 23 MMOL/L (ref 23–29)
COMPLEXED PSA SERPL-MCNC: 1.3 NG/ML (ref 0–4)
CREAT SERPL-MCNC: 1 MG/DL (ref 0.5–1.4)
DIFFERENTIAL METHOD: ABNORMAL
EOSINOPHIL # BLD AUTO: 0 K/UL (ref 0–0.5)
EOSINOPHIL NFR BLD: 0.4 % (ref 0–8)
ERYTHROCYTE [DISTWIDTH] IN BLOOD BY AUTOMATED COUNT: 12.8 % (ref 11.5–14.5)
EST. GFR  (AFRICAN AMERICAN): >60 ML/MIN/1.73 M^2
EST. GFR  (NON AFRICAN AMERICAN): >60 ML/MIN/1.73 M^2
GLUCOSE SERPL-MCNC: 69 MG/DL (ref 70–110)
GLUCOSE SERPL-MCNC: 69 MG/DL (ref 70–110)
HCT VFR BLD AUTO: 34.5 % (ref 40–54)
HDLC SERPL-MCNC: 131 MG/DL (ref 40–75)
HDLC SERPL: 78.4 % (ref 20–50)
HGB BLD-MCNC: 12.3 G/DL (ref 14–18)
IMM GRANULOCYTES # BLD AUTO: 0.01 K/UL (ref 0–0.04)
IMM GRANULOCYTES NFR BLD AUTO: 0.2 % (ref 0–0.5)
LDLC SERPL CALC-MCNC: 32 MG/DL (ref 63–159)
LYMPHOCYTES # BLD AUTO: 1.2 K/UL (ref 1–4.8)
LYMPHOCYTES NFR BLD: 27.3 % (ref 18–48)
MCH RBC QN AUTO: 36 PG (ref 27–31)
MCHC RBC AUTO-ENTMCNC: 35.7 G/DL (ref 32–36)
MCV RBC AUTO: 101 FL (ref 82–98)
MONOCYTES # BLD AUTO: 0.6 K/UL (ref 0.3–1)
MONOCYTES NFR BLD: 13.8 % (ref 4–15)
NEUTROPHILS # BLD AUTO: 2.6 K/UL (ref 1.8–7.7)
NEUTROPHILS NFR BLD: 57.9 % (ref 38–73)
NONHDLC SERPL-MCNC: 36 MG/DL
NRBC BLD-RTO: 0 /100 WBC
PLATELET # BLD AUTO: 170 K/UL (ref 150–350)
PMV BLD AUTO: 10 FL (ref 9.2–12.9)
POTASSIUM SERPL-SCNC: 4.7 MMOL/L (ref 3.5–5.1)
PROT SERPL-MCNC: 7.1 G/DL (ref 6–8.4)
RBC # BLD AUTO: 3.42 M/UL (ref 4.6–6.2)
SODIUM SERPL-SCNC: 127 MMOL/L (ref 136–145)
TRIGL SERPL-MCNC: 20 MG/DL (ref 30–150)
WBC # BLD AUTO: 4.55 K/UL (ref 3.9–12.7)

## 2020-08-10 PROCEDURE — 85025 COMPLETE CBC W/AUTO DIFF WBC: CPT

## 2020-08-10 PROCEDURE — 80053 COMPREHEN METABOLIC PANEL: CPT

## 2020-08-10 PROCEDURE — 84153 ASSAY OF PSA TOTAL: CPT

## 2020-08-10 PROCEDURE — 82947 ASSAY GLUCOSE BLOOD QUANT: CPT | Mod: 91

## 2020-08-10 PROCEDURE — 80061 LIPID PANEL: CPT

## 2020-08-10 PROCEDURE — 36415 COLL VENOUS BLD VENIPUNCTURE: CPT

## 2020-08-17 ENCOUNTER — PATIENT OUTREACH (OUTPATIENT)
Dept: ADMINISTRATIVE | Facility: OTHER | Age: 75
End: 2020-08-17

## 2020-08-18 ENCOUNTER — OFFICE VISIT (OUTPATIENT)
Dept: SURGERY | Facility: CLINIC | Age: 75
End: 2020-08-18
Payer: MEDICARE

## 2020-08-18 VITALS
DIASTOLIC BLOOD PRESSURE: 75 MMHG | RESPIRATION RATE: 14 BRPM | OXYGEN SATURATION: 97 % | WEIGHT: 193.44 LBS | HEART RATE: 86 BPM | BODY MASS INDEX: 22.84 KG/M2 | SYSTOLIC BLOOD PRESSURE: 137 MMHG | HEIGHT: 77 IN | TEMPERATURE: 97 F

## 2020-08-18 DIAGNOSIS — Z12.11 COLON CANCER SCREENING: ICD-10-CM

## 2020-08-18 DIAGNOSIS — E53.8 B12 DEFICIENCY: ICD-10-CM

## 2020-08-18 DIAGNOSIS — Z85.038 HISTORY OF COLON CANCER: Primary | ICD-10-CM

## 2020-08-18 DIAGNOSIS — Z01.818 PRE-OP TESTING: ICD-10-CM

## 2020-08-18 DIAGNOSIS — D64.9 ANEMIA, UNSPECIFIED TYPE: ICD-10-CM

## 2020-08-18 PROCEDURE — 99203 OFFICE O/P NEW LOW 30 MIN: CPT | Mod: S$GLB,,, | Performed by: SURGERY

## 2020-08-18 PROCEDURE — 99203 PR OFFICE/OUTPT VISIT, NEW, LEVL III, 30-44 MIN: ICD-10-PCS | Mod: S$GLB,,, | Performed by: SURGERY

## 2020-08-18 RX ORDER — SODIUM CHLORIDE 9 MG/ML
INJECTION, SOLUTION INTRAVENOUS CONTINUOUS
Status: CANCELLED | OUTPATIENT
Start: 2020-08-18

## 2020-08-18 NOTE — LETTER
August 18, 2020      Iftikhar Hurley MD  [address]  [place] [address]           [facility] - General Surgery  [address] [facility] [place] [address]  Phone: [phone]  Fax: [phone]          Patient: Akil Guzman   MR Number: [id number]   YOB: 1945   Date of Visit: 8/18/2020       Dear Dr. Iftikhar Hurley:    Thank you for referring Akil Guzman to me for evaluation. Attached you will find relevant portions of my assessment and plan of care.    If you have questions, please do not hesitate to call me. I look forward to following Akil Guzman along with you.    Sincerely,    Ty Pollock MD    Enclosure  CC:  No Recipients    If you would like to receive this communication electronically, please contact [email or web address] or [phone] to request more information on [employer] access.    For providers and/or their staff who would like to refer a patient to [facility], please contact us through our one-stop-shop provider referral line, [facility], at [phone].    If you feel you have received this communication in error or would no longer like to receive these types of communications, please e-mail [email or web address]

## 2020-08-18 NOTE — H&P
Colonoscopy Operative Report  Brantley Dancer  1942  [id number]      Procedure Type:   Colonoscopy with polypectomy (snare cautery), Saline SQ injection     Indications:     Personal history of colon polyps (screening only)    Pre-operative Diagnosis: see indication above    Post-operative Diagnosis:  See findings below    : Elinor Opitz, MD    Referring Provider: Genevieve Muller    [facility] H&P Note    Subjective:       Patient ID: Akil Guzman is a 75 y.o. male.    Chief Complaint:  I need a colonoscopy  HPI:  Akil Guzman is a 75 y.o. male history of hyperlipidemia hypertension presents today as a new patient referral from Dr. Hurley for evaluation of a personal history of colon cancer.  Patient stated that he was diagnosed with early stage colon cancer December 1999 he underwent sigmoid colectomy with low colorectal anastomosis in early 2000.  Since that time he has done well.  No chemotherapy necessary.  Last colonoscopy 6 years ago.  Negative.  Repeat colonoscopy indicated 5 years later.  Patient now due.  No blood in the stool since last colonoscopy.  No unexplained weight loss or bowel habit changes.  No other family history of colon or rectal cancers.  Patient now presents today as a new patient referral for evaluation the above.    Past Medical History:   Diagnosis Date    Allergy     Cancer     Hx colon     Hyperlipidemia     Hypertension      Past Surgical History:   Procedure Laterality Date    COLON SURGERY  2000    1ft. sigmoid removed     Family History   Problem Relation Age of Onset    Cancer Mother     Cancer Brother     Macular degeneration Brother      Social History     Socioeconomic History    Marital status:      Spouse name: Not on file    Number of children: Not on file    Years of education: Not on file    Highest education level: Not on file   Occupational History    Not on file   Social Needs    Financial resource strain: Not on file    Food insecurity     Worry: Not on file     Inability: Not on file    Transportation needs     Medical: Not on file     Non-medical: Not on file   Tobacco Use    Smoking status: Former Smoker     Packs/day: 1.00    Smokeless tobacco: Never Used   Substance and Sexual Activity    Alcohol use: Yes     Frequency: Monthly or less     Comment: 2 mixed drinks daily    Drug use: No    Sexual  Disposition:  Home in the company of a  when able to ambulate. activity: Yes     Partners: Female   Lifestyle    Physical activity     Days per week: Not on file     Minutes per session: Not on file    Stress: Not on file   Relationships    Social connections     Talks on phone: Not on file     Gets together: Not on file     Attends Sikh service: Not on file     Active member of club or organization: Not on file     Attends meetings of clubs or organizations: Not on file     Relationship status: Not on file   Other Topics Concern    Not on file   Social History Narrative    Not on file       Current Outpatient Medications   Medication Sig Dispense Refill    CALCIUM CARBONATE (CALCIUM 500 ORAL) Take by mouth. 2  By mouth Every day      finasteride (PROSCAR) 5 mg tablet TAKE 1 TABLET BY MOUTH ONCE DAILY FOR PROSTATE 90 tablet 3    ipratropium (ATROVENT) 42 mcg (0.06 %) nasal spray 2 sprays by Nasal route 3 (three) times daily. 15 mL 5    lisinopril (PRINIVIL,ZESTRIL) 40 MG tablet TAKE 1 TABLET BY MOUTH ONCE DAILY FOR BLOOD PRESSURE 90 tablet 3    montelukast (SINGULAIR) 10 mg tablet TAKE 1 TABLET BY MOUTH ONCE DAILY 90 tablet 3    sildenafil citrate (SILDENAFIL ORAL) Take 20 mg by mouth.      simvastatin (ZOCOR) 40 MG tablet Take 1 tablet (40 mg total) by mouth every evening. 90 tablet 3     No current facility-administered medications for this visit.      Review of patient's allergies indicates:   Allergen Reactions    Penicillins      Other reaction(s): Rash       Review of Systems   Constitutional: Negative for appetite change, chills and fever.   HENT: Negative for congestion, dental problem and drooling.    Eyes: Negative for photophobia, discharge and itching.   Respiratory: Negative for apnea and chest tightness.    Cardiovascular: Negative for chest pain, palpitations and leg swelling.   Gastrointestinal: Negative for abdominal distention and abdominal pain.   Endocrine: Negative for cold intolerance and heat intolerance.   Genitourinary: Negative for  "difficulty urinating and dysuria.   Musculoskeletal: Negative for arthralgias and back pain.   Skin: Negative for color change and pallor.   Neurological: Negative for dizziness, facial asymmetry and headaches.   Hematological: Negative for adenopathy. Does not bruise/bleed easily.   Psychiatric/Behavioral: Negative for agitation, behavioral problems and confusion.       Objective:      Vitals:    08/18/20 1345   BP: 137/75   BP Location: Left arm   Patient Position: Sitting   BP Method: Large (Automatic)   Pulse: 86   Resp: 14   Temp: 97.4 °F (36.3 °C)   SpO2: 97%   Weight: 87.8 kg (193 lb 7.3 oz)   Height: 6' 5" (1.956 m)     Physical Exam  Constitutional:       Appearance: He is well-developed. He is not diaphoretic.   HENT:      Head: Normocephalic and atraumatic.   Eyes:      Pupils: Pupils are equal, round, and reactive to light.   Neck:      Musculoskeletal: Normal range of motion and neck supple.      Thyroid: No thyromegaly.   Cardiovascular:      Rate and Rhythm: Normal rate and regular rhythm.      Heart sounds: No murmur.   Pulmonary:      Effort: Pulmonary effort is normal. No respiratory distress.      Breath sounds: Normal breath sounds.   Abdominal:      General: Bowel sounds are normal. There is no distension.      Palpations: Abdomen is soft.      Tenderness: There is no abdominal tenderness.   Musculoskeletal: Normal range of motion.   Skin:     General: Skin is warm.      Capillary Refill: Capillary refill takes less than 2 seconds.      Findings: No erythema or rash.   Neurological:      Mental Status: He is alert and oriented to person, place, and time.      Cranial Nerves: No cranial nerve deficit.         Assessment:       1. History of colon cancer    2. Pre-op testing    3. Colon cancer screening    4. Anemia, unspecified type    5. B12 deficiency        Plan:   History of colon cancer  -     Case Request Operating Room: COLONOSCOPY    Pre-op testing    Colon cancer screening  -     " Ambulatory referral/consult to General Surgery    Anemia, unspecified type  -     Vitamin B12; Future; Expected date: 08/18/2020  -     Iron and TIBC; Future; Expected date: 09/18/2020    B12 deficiency  -     Vitamin B12; Future; Expected date: 08/18/2020  -     Iron and TIBC; Future; Expected date: 09/18/2020    Other orders  -     CBC auto differential; Future; Expected date: 11/18/2020  -     Comprehensive metabolic panel; Future; Expected date: 11/18/2020  -     EKG 12-lead; Future; Expected date: 11/18/2020  -     COVID-19 Routine Screening; Future; Expected date: 08/18/2020  -     Progressive Mobility Protocol (mobilize patient to their highest level of functioning at least twice daily); Standing  -     Insert peripheral IV; Standing  -     Full code; Standing        Medical Decision Making/Counseling:  Patient with history of colon cancer now due for repeat high risk screening colonoscopy.  Risk benefits of colonoscopy were discussed in detail with patient clinic today.  After risk benefits discussion, patient voiced understanding risk benefits wished proceed near future.    Will obtain preoperative lab test to include CBC, CMP and EKG for review by the Anesthesiologist the day of the procedure prior to induction of the anesthetic agent of choice.    Risks and benefits of endoscopy were discussed in depth in clinic.  From a procedural standpoint, we discuss the benefits of colonoscopy to be finding colon cancers at early stages, including polyps which can be endoscopically resectable, to finding early stage colon cancers which can be better treated with current medical and surgical therapies in order to give patients a longer survival, if found in these early stages.  From a standpoint of risks, the risk of bleeding and perforation of the colon were discussed.  I personally discussed that if complications of bleeding or perforation were to occur, the patient could need as little as a blood transfusion and as  much as possible hospital admission, repeat procedure, or even surgery.  During today's discussion of the procedure of colonoscopy with the patient, I personally juan the patient a picture to assist with counseling.  Total clinic time spent today 30 minutes with greater than half of the time spent in face to face counseling.    Patient instructed that best way to communicate with my office staff is for patient to get on the [facility] epic patient portal to expedite communication and communication issues that may occur.  Patient was given instructions on how to get on the portal.  I encouraged patient to obtain portal access as well.  Ultimately it is up to the patient to obtain access.  Patient voiced understanding.

## 2020-08-18 NOTE — PROGRESS NOTES
Health Maintenance Due   Topic Date Due    Shingles Vaccine (1 of 2) 06/25/1996    Pneumococcal Vaccine (65+ Low/Medium Risk) (2 of 2 - PCV13) 05/16/2015    Colorectal Cancer Screening  07/25/2019     Updates were requested from care everywhere.  Chart was reviewed for overdue Proactive [facility] Encounters ([facility]) topics (CRS, Breast Cancer Screening, Eye exam)  Health Maintenance has been updated.  LINKS immunization registry triggered.  Immunizations were reconciled.

## 2020-08-25 ENCOUNTER — PATIENT OUTREACH (OUTPATIENT)
Dept: ADMINISTRATIVE | Facility: OTHER | Age: 75
End: 2020-08-25

## 2020-08-25 NOTE — PROGRESS NOTES
Patient overdue for colorectal screening.   Patient agreed to completing colonoscopy.   Colonoscopy scheduled for 9/28/2020.

## 2020-09-25 ENCOUNTER — TELEPHONE (OUTPATIENT)
Dept: SURGERY | Facility: CLINIC | Age: 75
End: 2020-09-25

## 2020-09-25 ENCOUNTER — HOSPITAL ENCOUNTER (OUTPATIENT)
Dept: CARDIOLOGY | Facility: HOSPITAL | Age: 75
Discharge: HOME | End: 2020-09-25
Attending: SURGERY
Payer: MEDICARE

## 2020-09-25 PROCEDURE — 93005 ELECTROCARDIOGRAM TRACING: CPT

## 2020-09-25 NOTE — TELEPHONE ENCOUNTER
Returned call.  Patient given the OR number to receive the time of the procedure.    ----- Message from Jaya Ortiz sent at 9/25/2020 12:41 PM CDT -----  Regarding: advice  Contact: self  Type: Needs Medical Advice  Who Called:    Symptoms (please be specific):   How long has patient had these symptoms:    Pharmacy name and phone #:    Best Call Back Number: [phone]  Additional Information: Patient asking for the arrival time for scheduled procedure.

## 2020-09-28 ENCOUNTER — ANESTHESIA EVENT (OUTPATIENT)
Dept: SURGERY | Facility: HOSPITAL | Age: 75
End: 2020-09-28
Payer: MEDICARE

## 2020-09-28 ENCOUNTER — HOSPITAL ENCOUNTER (OUTPATIENT)
Facility: HOSPITAL | Age: 75
Discharge: HOME | End: 2020-09-28
Attending: SURGERY | Admitting: SURGERY
Payer: MEDICARE

## 2020-09-28 ENCOUNTER — ANESTHESIA (OUTPATIENT)
Dept: SURGERY | Facility: HOSPITAL | Age: 75
End: 2020-09-28
Payer: MEDICARE

## 2020-09-28 VITALS
HEIGHT: 77 IN | WEIGHT: 193 LBS | RESPIRATION RATE: 11 BRPM | OXYGEN SATURATION: 97 % | DIASTOLIC BLOOD PRESSURE: 63 MMHG | SYSTOLIC BLOOD PRESSURE: 120 MMHG | BODY MASS INDEX: 22.79 KG/M2 | HEART RATE: 97 BPM | TEMPERATURE: 98 F

## 2020-09-28 DIAGNOSIS — Z85.038 HISTORY OF COLON CANCER: ICD-10-CM

## 2020-09-28 PROCEDURE — 45385 COLONOSCOPY W/LESION REMOVAL: CPT | Performed by: SURGERY

## 2020-09-28 PROCEDURE — D9220A PRA ANESTHESIA: ICD-10-PCS | Mod: PT,CRNA,, | Performed by: NURSE ANESTHETIST, CERTIFIED REGISTERED

## 2020-09-28 PROCEDURE — 45384 COLONOSCOPY W/LESION REMOVAL: CPT | Mod: 59,,, | Performed by: SURGERY

## 2020-09-28 PROCEDURE — 88305 TISSUE EXAM BY PATHOLOGIST: ICD-10-PCS | Mod: 26,,, | Performed by: PATHOLOGY

## 2020-09-28 PROCEDURE — 37000009 HC ANESTHESIA EA ADD 15 MINS: Performed by: SURGERY

## 2020-09-28 PROCEDURE — 45384 COLONOSCOPY W/LESION REMOVAL: CPT | Performed by: SURGERY

## 2020-09-28 PROCEDURE — D9220A PRA ANESTHESIA: Mod: PT,CRNA,, | Performed by: NURSE ANESTHETIST, CERTIFIED REGISTERED

## 2020-09-28 PROCEDURE — 25000003 PHARM REV CODE 250: Performed by: SURGERY

## 2020-09-28 PROCEDURE — 27201423 OPTIME MED/SURG SUP & DEVICES STERILE SUPPLY: Performed by: SURGERY

## 2020-09-28 PROCEDURE — 45384 PR COLONOSCOPY,REMV LESN,FORCEP/CAUTERY: ICD-10-PCS | Mod: 59,,, | Performed by: SURGERY

## 2020-09-28 PROCEDURE — 88305 TISSUE EXAM BY PATHOLOGIST: CPT | Mod: 26,,, | Performed by: PATHOLOGY

## 2020-09-28 PROCEDURE — 45385 COLONOSCOPY W/LESION REMOVAL: CPT | Mod: PT,,, | Performed by: SURGERY

## 2020-09-28 PROCEDURE — 88305 TISSUE EXAM BY PATHOLOGIST: CPT | Performed by: PATHOLOGY

## 2020-09-28 PROCEDURE — 37000008 HC ANESTHESIA 1ST 15 MINUTES: Performed by: SURGERY

## 2020-09-28 PROCEDURE — 25000003 PHARM REV CODE 250: Performed by: NURSE ANESTHETIST, CERTIFIED REGISTERED

## 2020-09-28 PROCEDURE — 45385 PR COLONOSCOPY,REMV LESN,SNARE: ICD-10-PCS | Mod: PT,,, | Performed by: SURGERY

## 2020-09-28 PROCEDURE — 63600175 PHARM REV CODE 636 W HCPCS: Performed by: NURSE ANESTHETIST, CERTIFIED REGISTERED

## 2020-09-28 PROCEDURE — D9220A PRA ANESTHESIA: Mod: PT,ANES,, | Performed by: ANESTHESIOLOGY

## 2020-09-28 PROCEDURE — D9220A PRA ANESTHESIA: ICD-10-PCS | Mod: PT,ANES,, | Performed by: ANESTHESIOLOGY

## 2020-09-28 RX ORDER — ONDANSETRON 2 MG/ML
INJECTION INTRAMUSCULAR; INTRAVENOUS
Status: DISCONTINUED | OUTPATIENT
Start: 2020-09-28 | End: 2020-09-28

## 2020-09-28 RX ORDER — SODIUM CHLORIDE 9 MG/ML
INJECTION, SOLUTION INTRAVENOUS CONTINUOUS
Status: DISCONTINUED | OUTPATIENT
Start: 2020-09-28 | End: 2020-09-28 | Stop reason: HOSPADM

## 2020-09-28 RX ORDER — LIDOCAINE HYDROCHLORIDE 10 MG/ML
1 INJECTION, SOLUTION EPIDURAL; INFILTRATION; INTRACAUDAL; PERINEURAL ONCE
Status: CANCELLED | OUTPATIENT
Start: 2020-09-28 | End: 2020-09-28

## 2020-09-28 RX ORDER — SODIUM CHLORIDE, SODIUM LACTATE, POTASSIUM CHLORIDE, CALCIUM CHLORIDE 600; 310; 30; 20 MG/100ML; MG/100ML; MG/100ML; MG/100ML
125 INJECTION, SOLUTION INTRAVENOUS CONTINUOUS
Status: DISCONTINUED | OUTPATIENT
Start: 2020-09-28 | End: 2020-09-28 | Stop reason: HOSPADM

## 2020-09-28 RX ORDER — LIDOCAINE HYDROCHLORIDE 20 MG/ML
INJECTION, SOLUTION EPIDURAL; INFILTRATION; INTRACAUDAL; PERINEURAL
Status: DISCONTINUED | OUTPATIENT
Start: 2020-09-28 | End: 2020-09-28

## 2020-09-28 RX ORDER — PROPOFOL 10 MG/ML
VIAL (ML) INTRAVENOUS
Status: DISCONTINUED | OUTPATIENT
Start: 2020-09-28 | End: 2020-09-28

## 2020-09-28 RX ORDER — SODIUM CHLORIDE, SODIUM LACTATE, POTASSIUM CHLORIDE, CALCIUM CHLORIDE 600; 310; 30; 20 MG/100ML; MG/100ML; MG/100ML; MG/100ML
INJECTION, SOLUTION INTRAVENOUS CONTINUOUS
Status: CANCELLED | OUTPATIENT
Start: 2020-09-28

## 2020-09-28 RX ADMIN — PROPOFOL 30 MG: 10 INJECTION, EMULSION INTRAVENOUS at 08:09

## 2020-09-28 RX ADMIN — PROPOFOL 20 MG: 10 INJECTION, EMULSION INTRAVENOUS at 08:09

## 2020-09-28 RX ADMIN — SODIUM CHLORIDE: 0.9 INJECTION, SOLUTION INTRAVENOUS at 09:09

## 2020-09-28 RX ADMIN — SODIUM CHLORIDE: 0.9 INJECTION, SOLUTION INTRAVENOUS at 08:09

## 2020-09-28 RX ADMIN — LIDOCAINE HYDROCHLORIDE 50 MG: 20 INJECTION, SOLUTION EPIDURAL; INFILTRATION; INTRACAUDAL; PERINEURAL at 08:09

## 2020-09-28 RX ADMIN — PROPOFOL 20 MG: 10 INJECTION, EMULSION INTRAVENOUS at 09:09

## 2020-09-28 RX ADMIN — ONDANSETRON 4 MG: 2 INJECTION INTRAMUSCULAR; INTRAVENOUS at 09:09

## 2020-09-28 NOTE — TRANSFER OF CARE
"Anesthesia Transfer of Care Note    Patient: Akil Guzman    Procedure(s) Performed: Procedure(s) (LRB):  COLONOSCOPY (N/A)    Patient location: PACU    Anesthesia Type: general    Transport from OR: Transported from OR on room air with adequate spontaneous ventilation    Post pain: adequate analgesia    Post assessment: no apparent anesthetic complications and tolerated procedure well    Post vital signs: stable    Level of consciousness: awake, alert and oriented    Nausea/Vomiting: no nausea/vomiting    Complications: none    Transfer of care protocol was followed      Last vitals:   Visit Vitals  /67 (BP Location: Right arm, Patient Position: Lying)   Pulse 78   Temp 36.7 °C (98 °F) (Oral)   Resp 15   Ht 6' 5" (1.956 m)   Wt 87.5 kg (193 lb)   SpO2 98%   BMI 22.89 kg/m²     "

## 2020-09-28 NOTE — PLAN OF CARE
PACU monitors removed. Personal items removed. Changing clothing at bedside. Denies need for assistance. Called pts daughter and updated on pts status.

## 2020-09-28 NOTE — ANESTHESIA PREPROCEDURE EVALUATION
09/28/2020  Akil Guzman is a 75 y.o., male.    Anesthesia Evaluation    I have reviewed the Patient Summary Reports.    I have reviewed the Nursing Notes.    I have reviewed the Medications.     Review of Systems  Anesthesia Hx:  No problems with previous Anesthesia  Neg history of prior surgery. Denies Family Hx of Anesthesia complications.   Denies Personal Hx of Anesthesia complications.   Social:  Non-Smoker    Hematology/Oncology:  Hematology Normal   Oncology Normal     EENT/Dental:EENT/Dental Normal   Cardiovascular:   Hypertension, well controlled    Pulmonary:  Pulmonary Normal    Renal/:  Renal/ Normal     Hepatic/GI:  Hepatic/GI Normal    Musculoskeletal:  Musculoskeletal Normal    Neurological:  Neurology Normal    Endocrine:  Endocrine Normal    Dermatological:  Skin Normal    Psych:  Psychiatric Normal           Physical Exam  General:  Well nourished    Airway/Jaw/Neck:  Airway Findings: Mouth Opening: Normal Tongue: Normal  General Airway Assessment: Adult  Mallampati: II  TM Distance: 4 - 6 cm        Eyes/Ears/Nose:  EYES/EARS/NOSE FINDINGS: Normal   Dental:  DENTAL FINDINGS: Normal   Chest/Lungs:  Chest/Lungs Clear    Heart/Vascular:  Heart Findings: Normal Heart murmur: negative Vascular Findings: Normal    Abdomen:  Abdomen Findings: Normal    Musculoskeletal:  Musculoskeletal Findings: Normal   Skin:  Skin Findings: Normal    Mental Status:  Mental Status Findings: Normal        Anesthesia Plan  Type of Anesthesia, risks & benefits discussed:  Anesthesia Type:  general  Patient's Preference:   Intra-op Monitoring Plan: standard ASA monitors  Intra-op Monitoring Plan Comments:   Post Op Pain Control Plan:   Post Op Pain Control Plan Comments:   Induction:   IV  Beta Blocker:  Patient is not currently on a Beta-Blocker (No further documentation required).       Informed Consent:  Patient understands risks and agrees with Anesthesia plan.  Questions answered. Anesthesia consent signed with patient.  ASA Score: 2     Day of Surgery Review of History & Physical: I have interviewed and examined the patient. I have reviewed the patient's H&P dated:    H&P update referred to the provider.         Ready For Surgery From Anesthesia Perspective.

## 2020-09-28 NOTE — DISCHARGE SUMMARY
Discharge Note        SUMMARY     Admit Date: 9/28/2020    Attending Physician: Ty Pollock MD     Discharge Physician: Ty Pollock MD    Discharge Date: 9/28/2020 9:15 AM      Hospital Course: Patient tolerated procedure well.     Disposition: Home or Self Care    Patient Instructions:   Current Discharge Medication List      CONTINUE these medications which have NOT CHANGED    Details   CALCIUM CARBONATE (CALCIUM 500 ORAL) Take by mouth. 2  By mouth Every day      finasteride (PROSCAR) 5 mg tablet TAKE 1 TABLET BY MOUTH ONCE DAILY FOR PROSTATE  Qty: 90 tablet, Refills: 3      lisinopril (PRINIVIL,ZESTRIL) 40 MG tablet TAKE 1 TABLET BY MOUTH ONCE DAILY FOR BLOOD PRESSURE  Qty: 90 tablet, Refills: 3      montelukast (SINGULAIR) 10 mg tablet TAKE 1 TABLET BY MOUTH ONCE DAILY  Qty: 90 tablet, Refills: 3      simvastatin (ZOCOR) 40 MG tablet Take 1 tablet (40 mg total) by mouth every evening.  Qty: 90 tablet, Refills: 3    Associated Diagnoses: Hyperlipidemia      ipratropium (ATROVENT) 42 mcg (0.06 %) nasal spray 2 sprays by Nasal route 3 (three) times daily.  Qty: 15 mL, Refills: 5      sildenafil citrate (SILDENAFIL ORAL) Take 20 mg by mouth.             Discharge Procedure Orders (must include Diet, Follow-up, Activity):   Discharge Procedure Orders (must include Diet, Follow-up, Activity)   Diet general     Call MD for:  temperature >100.4     Call MD for:  persistent nausea and vomiting     Call MD for:  severe uncontrolled pain     Call MD for:  difficulty breathing, headache or visual disturbances     Call MD for:  redness, tenderness, or signs of infection (pain, swelling, redness, odor or green/yellow discharge around incision site)     Call MD for:  persistent dizziness or light-headedness        Follow Up:  Follow up as scheduled.  Resume routine diet.  Activity as tolerated.    No driving day of procedure.

## 2020-09-28 NOTE — H&P
[facility] H&P Note    Subjective:       Patient ID: Akil Guzman is a 75 y.o. male.    Chief Complaint:  I need a colonoscopy  HPI:  Akil Guzman is a 75 y.o. male history of hyperlipidemia hypertension presents today as a new patient referral from Dr. Hurley for evaluation of a personal history of colon cancer.  Patient stated that he was diagnosed with early stage colon cancer December 1999 he underwent sigmoid colectomy with low colorectal anastomosis in early 2000.  Since that time he has done well.  No chemotherapy necessary.  Last colonoscopy 6 years ago.  Negative.  Repeat colonoscopy indicated 5 years later.  Patient now due.  No blood in the stool since last colonoscopy.  No unexplained weight loss or bowel habit changes.  No other family history of colon or rectal cancers.  Patient now presents today as a new patient referral for evaluation the above.    Past Medical History:   Diagnosis Date    Allergy     Cancer     Hx colon     Hyperlipidemia     Hypertension      Past Surgical History:   Procedure Laterality Date    COLON SURGERY  2000    1ft. sigmoid removed     Family History   Problem Relation Age of Onset    Cancer Mother     Cancer Brother     Macular degeneration Brother      Social History     Socioeconomic History    Marital status:      Spouse name: Not on file    Number of children: Not on file    Years of education: Not on file    Highest education level: Not on file   Occupational History    Not on file   Social Needs    Financial resource strain: Not hard at all    Food insecurity     Worry: Never true     Inability: Never true    Transportation needs     Medical: No     Non-medical: No   Tobacco Use    Smoking status: Former Smoker     Packs/day: 1.00    Smokeless tobacco: Never Used   Substance and Sexual Activity    Alcohol use: Yes     Frequency: 2-3 times a week     Drinks per session: 1 or 2     Binge frequency: Never     Comment: 2 mixed  drinks daily    Drug use: No    Sexual activity: Yes     Partners: Female   Lifestyle    Physical activity     Days per week: 2 days     Minutes per session: 30 min    Stress: Not at all   Relationships    Social connections     Talks on phone: Three times a week     Gets together: Once a week     Attends Yazidism service: Not on file     Active member of club or organization: No     Attends meetings of clubs or organizations: Never     Relationship status: Living with partner   Other Topics Concern    Not on file   Social History Narrative    Not on file       Current Facility-Administered Medications   Medication Dose Route Frequency Provider Last Rate Last Dose    0.9%  NaCl infusion   Intravenous Continuous Ty Pollock MD         Review of patient's allergies indicates:   Allergen Reactions    Penicillins      Other reaction(s): Rash       Review of Systems   Constitutional: Negative for appetite change, chills and fever.   HENT: Negative for congestion, dental problem and drooling.    Eyes: Negative for photophobia, discharge and itching.   Respiratory: Negative for apnea and chest tightness.    Cardiovascular: Negative for chest pain, palpitations and leg swelling.   Gastrointestinal: Negative for abdominal distention and abdominal pain.   Endocrine: Negative for cold intolerance and heat intolerance.   Genitourinary: Negative for difficulty urinating and dysuria.   Musculoskeletal: Negative for arthralgias and back pain.   Skin: Negative for color change and pallor.   Neurological: Negative for dizziness, facial asymmetry and headaches.   Hematological: Negative for adenopathy. Does not bruise/bleed easily.   Psychiatric/Behavioral: Negative for agitation, behavioral problems and confusion.       Objective:      Vitals:    09/28/20 0705   BP: 120/67   BP Location: Right arm   Patient Position: Lying   Pulse: 78   Resp: 15   Temp: 98 °F (36.7 °C)   TempSrc: Oral   SpO2: 98%   Weight: 87.5  "kg (193 lb)   Height: 6' 5" (1.956 m)     Physical Exam  Constitutional:       Appearance: He is well-developed. He is not diaphoretic.   HENT:      Head: Normocephalic and atraumatic.   Eyes:      Pupils: Pupils are equal, round, and reactive to light.   Neck:      Musculoskeletal: Normal range of motion and neck supple.      Thyroid: No thyromegaly.   Cardiovascular:      Rate and Rhythm: Normal rate and regular rhythm.      Heart sounds: No murmur.   Pulmonary:      Effort: Pulmonary effort is normal. No respiratory distress.      Breath sounds: Normal breath sounds.   Abdominal:      General: Bowel sounds are normal. There is no distension.      Palpations: Abdomen is soft.      Tenderness: There is no abdominal tenderness.   Musculoskeletal: Normal range of motion.   Skin:     General: Skin is warm.      Capillary Refill: Capillary refill takes less than 2 seconds.      Findings: No erythema or rash.   Neurological:      Mental Status: He is alert and oriented to person, place, and time.      Cranial Nerves: No cranial nerve deficit.         Assessment:       1. History of colon cancer        Plan:   History of colon cancer  -     Case Request Operating Room: COLONOSCOPY    Other orders  -     Place in Outpatient; Standing  -     Diet NPO; Standing  -     0.9%  NaCl infusion  -     Notify Physician; Standing  -     Vital signs; Standing  -     Place sequential compression device; Standing  -     Place MANUEL hose; Standing        Medical Decision Making/Counseling:  Patient with history of colon cancer now due for repeat high risk screening colonoscopy.  Risk benefits of colonoscopy were discussed in detail with patient clinic today.  After risk benefits discussion, patient voiced understanding risk benefits wished proceed near future.    Will obtain preoperative lab test to include CBC, CMP and EKG for review by the Anesthesiologist the day of the procedure prior to induction of the anesthetic agent of " choice.    Risks and benefits of endoscopy were discussed in depth in clinic.  From a procedural standpoint, we discuss the benefits of colonoscopy to be finding colon cancers at early stages, including polyps which can be endoscopically resectable, to finding early stage colon cancers which can be better treated with current medical and surgical therapies in order to give patients a longer survival, if found in these early stages.  From a standpoint of risks, the risk of bleeding and perforation of the colon were discussed.  I personally discussed that if complications of bleeding or perforation were to occur, the patient could need as little as a blood transfusion and as much as possible hospital admission, repeat procedure, or even surgery.  During today's discussion of the procedure of colonoscopy with the patient, I personally juan the patient a picture to assist with counseling.  Total clinic time spent today 30 minutes with greater than half of the time spent in face to face counseling.    Patient instructed that best way to communicate with my office staff is for patient to get on the [employer] [facility] patient portal to expedite communication and communication issues that may occur.  Patient was given instructions on how to get on the portal.  I encouraged patient to obtain portal access as well.  Ultimately it is up to the patient to obtain access.  Patient voiced understanding.

## 2020-09-28 NOTE — PLAN OF CARE
Has met unit/department guidelines for discharge from each phase of the post procedure continuum. Leaving floor per w/c with RN. ALVIN x3. Resp even and unlabored room air. No distress noted. All personal belongings returned to pt. DC instructions explained to pts daughter. V/u. DC paperwork handed to pts daughter.

## 2020-09-28 NOTE — [employer] PATIENT INSTRUCTIONS
Discharge Summary/Instructions after an Endoscopic Procedure  Patient Name: Akil Guzman  Patient MRN: [id number]  Patient YOB: 1945 Monday, September 28, 2020  Ty Pollock MD  RESTRICTIONS:  During your procedure today, you received medications for sedation.  These   medications may affect your judgment, balance and coordination.  Therefore,   for 24 hours, you have the following restrictions:   - DO NOT drive a car, operate machinery, make legal/financial decisions,   sign important papers or drink alcohol.    ACTIVITY:  Today: no heavy lifting, straining or running due to procedural   sedation/anesthesia.  The following day: return to full activity including work.  DIET:  Eat and drink normally unless instructed otherwise.     TREATMENT FOR COMMON SIDE EFFECTS:  - Mild abdominal pain, nausea, belching, bloating or excessive gas:  rest,   eat lightly and use a heating pad.  - Sore Throat: treat with throat lozenges and/or gargle with warm salt   water.  - Because air was used during the procedure, expelling large amounts of air   from your rectum or belching is normal.  - If a bowel prep was taken, you may not have a bowel movement for 1-3 days.    This is normal.  SYMPTOMS TO WATCH FOR AND REPORT TO YOUR PHYSICIAN:  1. Abdominal pain or bloating, other than gas cramps.  2. Chest pain.  3. Back pain.  4. Signs of infection such as: chills or fever occurring within 24 hours   after the procedure.  5. Rectal bleeding, which would show as bright red, maroon, or black stools.   (A tablespoon of blood from the rectum is not serious, especially if   hemorrhoids are present.)  6. Vomiting.  7. Weakness or dizziness.  GO DIRECTLY TO THE NEAREST EMERGENCY ROOM IF YOU HAVE ANY OF THE FOLLOWING:      Difficulty breathing              Chills and/or fever over 101 F   Persistent vomiting and/or vomiting blood   Severe abdominal pain   Severe chest pain   Black, tarry stools   Bleeding- more than one  tablespoon   Any other symptom or condition that you feel may need urgent attention  Your doctor recommends these additional instructions:  If any biopsies were taken, your doctors clinic will contact you in 1 to 2   weeks with any results.  - Discharge patient to home (ambulatory).   - Resume previous diet.   - Continue present medications.   - Await pathology results.   - Repeat colonoscopy in 3 years for surveillance.   - Return to my office in 2 weeks.  For questions, problems or results please call your physician - Ty Pollock MD at Work:  [phone].  [facility] EMERGENCY ROOM PHONE NUMBER: [phone]  IF A COMPLICATION OR EMERGENCY SITUATION ARISES AND YOU ARE UNABLE TO REACH   YOUR PHYSICIAN - GO DIRECTLY TO THE EMERGENCY ROOM.  MD Ty Hurtado MD  9/28/2020 9:13:09 AM  This report has been verified and signed electronically.  [employer]

## 2020-09-28 NOTE — ANESTHESIA POSTPROCEDURE EVALUATION
Anesthesia Post Evaluation    Patient: Akil Guzman    Procedure(s) Performed: Procedure(s) (LRB):  COLONOSCOPY (N/A)    Final Anesthesia Type: general    Patient location during evaluation: PACU  Patient participation: Yes- Able to Participate  Level of consciousness: awake and awake and alert  Post-procedure vital signs: reviewed and stable  Pain management: adequate  Airway patency: patent    PONV status at discharge: No PONV  Anesthetic complications: no      Cardiovascular status: blood pressure returned to baseline  Respiratory status: unassisted and spontaneous ventilation  Hydration status: euvolemic  Follow-up not needed.          Vitals Value Taken Time   /63 09/28/20 0932   Temp 98 09/28/20 1523   Pulse 101 09/28/20 0940   Resp 11 09/28/20 0940   SpO2 97 % 09/28/20 0940   Vitals shown include unvalidated device data.      Event Time   Out of Recovery 09:35:00         Pain/Monisha Score: Monisha Score: 9 (9/28/2020  9:25 AM)  Modified Monisha Score: 19 (9/28/2020  9:52 AM)

## 2020-10-01 ENCOUNTER — PATIENT MESSAGE (OUTPATIENT)
Dept: OTHER | Facility: OTHER | Age: 75
End: 2020-10-01

## 2020-10-01 LAB
FINAL PATHOLOGIC DIAGNOSIS: NORMAL
GROSS: NORMAL

## 2020-10-12 ENCOUNTER — PATIENT OUTREACH (OUTPATIENT)
Dept: ADMINISTRATIVE | Facility: OTHER | Age: 75
End: 2020-10-12

## 2020-10-12 NOTE — PROGRESS NOTES
Chart was reviewed for overdue Proactive [facility] Encounters ([facility])  topics  Updates were requested from care everywhere  Health Maintenance has been updated  LINKS immunization registry triggered

## 2020-10-14 ENCOUNTER — OFFICE VISIT (OUTPATIENT)
Dept: SURGERY | Facility: CLINIC | Age: 75
End: 2020-10-14
Payer: MEDICARE

## 2020-10-14 VITALS
BODY MASS INDEX: 22.19 KG/M2 | SYSTOLIC BLOOD PRESSURE: 140 MMHG | HEART RATE: 94 BPM | DIASTOLIC BLOOD PRESSURE: 84 MMHG | HEIGHT: 77 IN | WEIGHT: 187.94 LBS | RESPIRATION RATE: 13 BRPM | OXYGEN SATURATION: 98 % | TEMPERATURE: 97 F

## 2020-10-14 DIAGNOSIS — D12.6 TUBULAR ADENOMA OF COLON: Primary | ICD-10-CM

## 2020-10-14 PROCEDURE — 99213 OFFICE O/P EST LOW 20 MIN: CPT | Mod: S$GLB,,, | Performed by: SURGERY

## 2020-10-14 PROCEDURE — 99213 PR OFFICE/OUTPT VISIT, EST, LEVL III, 20-29 MIN: ICD-10-PCS | Mod: S$GLB,,, | Performed by: SURGERY

## 2020-10-14 NOTE — LETTER
October 14, 2020      Iftikhar Hurley MD  [address]  [place] [address]           [facility]  [address] [place] [address]  Phone: [phone]  Fax: [phone]          Patient: Akil Guzman   MR Number: [id number]   YOB: 1945   Date of Visit: 10/14/2020       Dear Dr. Iftikhar Hurley:    Thank you for referring Akil Guzman to me for evaluation. Attached you will find relevant portions of my assessment and plan of care.    If you have questions, please do not hesitate to call me. I look forward to following Akil Guzman along with you.    Sincerely,    Ty Pollock MD    Enclosure  CC:  No Recipients    If you would like to receive this communication electronically, please contact [email or web address] or [phone] to request more information on [employer] access.    For providers and/or their staff who would like to refer a patient to [facility], please contact us through our one-stop-shop provider referral line, [facility] [place], at [phone].    If you feel you have received this communication in error or would no longer like to receive these types of communications, please e-mail [email or web address]

## 2020-10-14 NOTE — PROGRESS NOTES
"[facility]  Follow-up    Subjective:     Chief Complaint:  Follow-up after colonoscopy.    HPI:  Akil Guzman is a 75 y.o. male presents today for follow-up examination after colonoscopy.  Patient since colonoscopy is done very well.  No apparent post colonoscopic issues.  Patient with personal history of colon cancer.  Three colon polyps found during colonoscopy.  All resected.  All consistent with tubular adenomas no evidence of dysplasia significant.  No issues or complaints today.    Review of Systems   Constitutional: Negative for appetite change, chills and fever.   HENT: Negative for congestion, dental problem and drooling.    Eyes: Negative for photophobia, discharge and itching.   Respiratory: Negative for apnea and chest tightness.    Cardiovascular: Negative for chest pain, palpitations and leg swelling.   Gastrointestinal: Negative for abdominal distention and abdominal pain.   Endocrine: Negative for cold intolerance and heat intolerance.   Genitourinary: Negative for difficulty urinating and dysuria.   Musculoskeletal: Negative for arthralgias and back pain.   Skin: Negative for color change and pallor.   Neurological: Negative for dizziness, facial asymmetry and headaches.   Hematological: Negative for adenopathy. Does not bruise/bleed easily.   Psychiatric/Behavioral: Negative for agitation, behavioral problems and confusion.       Objective:      Vitals:    10/14/20 1103   BP: (!) 140/84   BP Location: Left arm   Patient Position: Sitting   BP Method: Large (Automatic)   Pulse: 94   Resp: 13   Temp: 96.9 °F (36.1 °C)   SpO2: 98%   Weight: 85.2 kg (187 lb 15.1 oz)   Height: 6' 5" (1.956 m)     Physical Exam  Constitutional:       Appearance: He is well-developed. He is not diaphoretic.   HENT:      Head: Normocephalic and atraumatic.   Eyes:      Pupils: Pupils are equal, round, and reactive to light.   Neck:      Musculoskeletal: Normal range of motion and neck supple.      " Thyroid: No thyromegaly.   Cardiovascular:      Rate and Rhythm: Normal rate and regular rhythm.      Heart sounds: No murmur.   Pulmonary:      Effort: Pulmonary effort is normal. No respiratory distress.      Breath sounds: Normal breath sounds.   Abdominal:      General: Bowel sounds are normal. There is no distension.      Palpations: Abdomen is soft.      Tenderness: There is no abdominal tenderness.   Musculoskeletal: Normal range of motion.   Skin:     General: Skin is warm.      Capillary Refill: Capillary refill takes less than 2 seconds.      Findings: No erythema or rash.   Neurological:      Mental Status: He is alert and oriented to person, place, and time.      Cranial Nerves: No cranial nerve deficit.          Assessment:       1. Tubular adenoma of colon        Plan:   Tubular adenoma of colon      Medical Decision Making/Counseling:  Patient with tubular adenoma colon x3.  No evidence of significant dysplasia.  Previous history of colon cancer.  Recommendation will be repeat colonoscopy in 2-3 years.  Patient voiced understanding, and agreement with plan of care.  The adenomatous nature of these polyps were discussed.  Pre cancers nature was discussed.  Patient voiced understanding.    Follow up:  2-3 years for repeat colonoscopy.    Patient instructed that best way to communicate with my office staff is for patient to get on the [facility] patient portal to expedite communication and communication issues that may occur.  Patient was given instructions on how to get on the portal.  I encouraged patient to obtain portal access as well.  Ultimately it is up to the patient to obtain access.  Patient voiced understanding.

## 2020-10-21 ENCOUNTER — PATIENT MESSAGE (OUTPATIENT)
Dept: FAMILY MEDICINE | Facility: CLINIC | Age: 75
End: 2020-10-21

## 2020-10-21 ENCOUNTER — CLINICAL SUPPORT (OUTPATIENT)
Dept: FAMILY MEDICINE | Facility: CLINIC | Age: 75
End: 2020-10-21
Payer: MEDICARE

## 2020-10-21 DIAGNOSIS — Z71.6 ENCOUNTER FOR SMOKING CESSATION COUNSELING: ICD-10-CM

## 2020-10-21 DIAGNOSIS — Z23 NEEDS FLU SHOT: Primary | ICD-10-CM

## 2020-10-21 PROCEDURE — 90694 VACC AIIV4 NO PRSRV 0.5ML IM: CPT | Mod: PBBFAC,PN

## 2020-10-21 PROCEDURE — G0008 ADMIN INFLUENZA VIRUS VAC: HCPCS | Mod: PBBFAC,PN

## 2020-10-22 RX ORDER — BUPROPION HYDROCHLORIDE 150 MG/1
TABLET ORAL
Qty: 60 TABLET | Refills: 2 | Status: SHIPPED | OUTPATIENT
Start: 2020-10-22 | End: 2022-09-07

## 2020-10-22 NOTE — PROGRESS NOTES
Patient presents to clinic for Flu Fair. Influenza Vaccine administered IM in the right deltoid. Pt tolerated well, no other concerns at this time.

## 2020-12-11 ENCOUNTER — PATIENT MESSAGE (OUTPATIENT)
Dept: OTHER | Facility: OTHER | Age: 75
End: 2020-12-11

## 2020-12-18 ENCOUNTER — OFFICE VISIT (OUTPATIENT)
Dept: DERMATOLOGY | Facility: CLINIC | Age: 75
End: 2020-12-18
Payer: MEDICARE

## 2020-12-18 ENCOUNTER — PATIENT MESSAGE (OUTPATIENT)
Dept: ADMINISTRATIVE | Facility: OTHER | Age: 75
End: 2020-12-18

## 2020-12-18 ENCOUNTER — PATIENT OUTREACH (OUTPATIENT)
Dept: ADMINISTRATIVE | Facility: OTHER | Age: 75
End: 2020-12-18

## 2020-12-18 DIAGNOSIS — D18.01 CHERRY ANGIOMA: ICD-10-CM

## 2020-12-18 DIAGNOSIS — L81.4 SOLAR LENTIGO: ICD-10-CM

## 2020-12-18 DIAGNOSIS — D22.9 MULTIPLE BENIGN NEVI: ICD-10-CM

## 2020-12-18 DIAGNOSIS — L57.0 ACTINIC KERATOSIS: ICD-10-CM

## 2020-12-18 DIAGNOSIS — D48.5 NEOPLASM OF UNCERTAIN BEHAVIOR OF SKIN: Primary | ICD-10-CM

## 2020-12-18 DIAGNOSIS — L82.1 SEBORRHEIC KERATOSES: ICD-10-CM

## 2020-12-18 PROCEDURE — 17000 DESTRUCT PREMALG LESION: CPT | Mod: 59,S$PBB,, | Performed by: DERMATOLOGY

## 2020-12-18 PROCEDURE — 88305 TISSUE EXAM BY PATHOLOGIST: CPT | Mod: 26,,, | Performed by: PATHOLOGY

## 2020-12-18 PROCEDURE — 88305 TISSUE EXAM BY PATHOLOGIST: ICD-10-PCS | Mod: 26,,, | Performed by: PATHOLOGY

## 2020-12-18 PROCEDURE — 11102 PR TANGENTIAL BIOPSY, SKIN, SINGLE LESION: ICD-10-PCS | Mod: S$PBB,,, | Performed by: DERMATOLOGY

## 2020-12-18 PROCEDURE — 99999 PR PBB SHADOW E&M-EST. PATIENT-LVL III: ICD-10-PCS | Mod: PBBFAC,,, | Performed by: DERMATOLOGY

## 2020-12-18 PROCEDURE — 17003 DESTRUCTION, PREMALIGNANT LESIONS; SECOND THROUGH 14 LESIONS: ICD-10-PCS | Mod: 59,S$PBB,, | Performed by: DERMATOLOGY

## 2020-12-18 PROCEDURE — 99999 PR PBB SHADOW E&M-EST. PATIENT-LVL III: CPT | Mod: PBBFAC,,, | Performed by: DERMATOLOGY

## 2020-12-18 PROCEDURE — 17003 DESTRUCT PREMALG LES 2-14: CPT | Mod: 59,PBBFAC,PO | Performed by: DERMATOLOGY

## 2020-12-18 PROCEDURE — 88305 TISSUE EXAM BY PATHOLOGIST: CPT | Performed by: PATHOLOGY

## 2020-12-18 PROCEDURE — 17000 PR DESTRUCTION(LASER SURGERY,CRYOSURGERY,CHEMOSURGERY),PREMALIGNANT LESIONS,FIRST LESION: ICD-10-PCS | Mod: 59,S$PBB,, | Performed by: DERMATOLOGY

## 2020-12-18 PROCEDURE — 17000 DESTRUCT PREMALG LESION: CPT | Mod: 59,PBBFAC,PO | Performed by: DERMATOLOGY

## 2020-12-18 PROCEDURE — 99202 OFFICE O/P NEW SF 15 MIN: CPT | Mod: 25,S$PBB,, | Performed by: DERMATOLOGY

## 2020-12-18 PROCEDURE — 99202 PR OFFICE/OUTPT VISIT, NEW, LEVL II, 15-29 MIN: ICD-10-PCS | Mod: 25,S$PBB,, | Performed by: DERMATOLOGY

## 2020-12-18 PROCEDURE — 99213 OFFICE O/P EST LOW 20 MIN: CPT | Mod: PBBFAC,PO | Performed by: DERMATOLOGY

## 2020-12-18 PROCEDURE — 17003 DESTRUCT PREMALG LES 2-14: CPT | Mod: 59,S$PBB,, | Performed by: DERMATOLOGY

## 2020-12-18 PROCEDURE — 11102 TANGNTL BX SKIN SINGLE LES: CPT | Mod: S$PBB,,, | Performed by: DERMATOLOGY

## 2020-12-18 PROCEDURE — 11102 TANGNTL BX SKIN SINGLE LES: CPT | Mod: PBBFAC,PO | Performed by: DERMATOLOGY

## 2020-12-18 NOTE — PROGRESS NOTES
"  Subjective:       Patient ID:  Akil Guzman is a 75 y.o. male who presents for   Chief Complaint   Patient presents with    Spot     L ear     New patient   Previously under care of Dr Hummel     Here today with c/o spot on L ear, scaly, site of previous nmsc ~ 2009  Denies flaking, itching, bleeding, etc., no tx    Derm hx  nmsc 2009 L ear, removed by physician in [place] "no stitches"  Denies fhx nmsc/mm  Avoids excessive sun exposure, denies usage of protective clothing/hats   H/o intense exposure in youth               Review of Systems   Constitutional: Negative for fever and chills.   Respiratory: Negative for cough and shortness of breath.    Skin: Negative for itching, rash, daily sunscreen use, activity-related sunscreen use and wears hat.   Hematologic/Lymphatic: Does not bruise/bleed easily.        Objective:    Physical Exam   Constitutional: He appears well-developed and well-nourished. No distress.   Neurological: He is alert and oriented to person, place, and time. He is not disoriented.   Psychiatric: He has a normal mood and affect.   Skin:   Areas Examined (abnormalities noted in diagram):   Head / Face Inspection Performed                   Diagram Legend     Erythematous scaling macule/papule c/w actinic keratosis       Vascular papule c/w angioma      Pigmented verrucoid papule/plaque c/w seborrheic keratosis      Yellow umbilicated papule c/w sebaceous hyperplasia      Irregularly shaped tan macule c/w lentigo     1-2 mm smooth white papules consistent with Milia      Movable subcutaneous cyst with punctum c/w epidermal inclusion cyst      Subcutaneous movable cyst c/w pilar cyst      Firm pink to brown papule c/w dermatofibroma      Pedunculated fleshy papule(s) c/w skin tag(s)      Evenly pigmented macule c/w junctional nevus     Mildly variegated pigmented, slightly irregular-bordered macule c/w mildly atypical nevus      Flesh colored to evenly pigmented papule c/w intradermal " nevus       Pink pearly papule/plaque c/w basal cell carcinoma      Erythematous hyperkeratotic cursted plaque c/w SCC      Surgical scar with no sign of skin cancer recurrence      Open and closed comedones      Inflammatory papules and pustules      Verrucoid papule consistent consistent with wart     Erythematous eczematous patches and plaques     Dystrophic onycholytic nail with subungual debris c/w onychomycosis     Umbilicated papule    Erythematous-base heme-crusted tan verrucoid plaque consistent with inflamed seborrheic keratosis     Erythematous Silvery Scaling Plaque c/w Psoriasis     See annotation          Assessment / Plan:      Pathology Orders:     Normal Orders This Visit    Specimen to Pathology, Dermatology     Questions:    Procedure Type: Dermatology and skin neoplasms    Number of Specimens: 1    ------------------------: -------------------------    Spec 1 Procedure: Biopsy    Spec 1 Clinical Impression: Erythematous scaly plaque, overlying potential surgical scar, r/o BCC vs SCC vs inflammatory    Spec 1 Source: left lobule        Neoplasm of uncertain behavior of skin  -     Specimen to Pathology, Dermatology  Shave biopsy procedure note:    Shave biopsy performed after verbal consent including risk of infection, scar, recurrence, need for additional treatment of site. Area prepped with alcohol, anesthetized with approximately 1.0cc of 1% lidocaine with epinephrine. Lesional tissue shaved with razor blade. Hemostasis achieved with application of aluminum chloride followed by hyfrecation. No complications. Dressing applied. Wound care explained.    Actinic keratoses  Cryosurgery Procedure Note    Verbal consent from the patient is obtained and the patient is aware of the precancerous quality and need for treatment of these lesions. Liquid nitrogen cryosurgery is applied to the 2 actinic keratoses, as detailed in the physical exam, to produce a freeze injury. The patient is aware that blisters  may form and is instructed on wound care with gentle cleansing and use of vaseline ointment to keep moist until healed. The patient is supplied a handout on cryosurgery and is instructed to call if lesions do not completely resolve.    Multiple benign nevi  Monitor for new mole or moles that are becoming bigger, darker, irritated, or developing irregular borders.     Solar lentigo  This is a benign hyperpigmented sun induced lesion. Daily sun protection will reduce the number of new lesions. Treatment of these benign lesions are considered cosmetic.    Seborrheic keratoses  These are benign inherited growths without a malignant potential. Reassurance given to patient. No treatment is necessary.     Cherry angioma  This is a benign vascular lesion. Reassurance given. No treatment required.     Patient instructed in importance in daily sun protection of at least spf 30. Mineral sunscreen ingredients preferred (Zinc +/- Titanium).   Recommend Elta MD for daily use on face and neck.  Patient encouraged to wear hat for all outdoor exposure.   Also discussed sun avoidance and use of protective clothing.           Follow up if symptoms worsen or fail to improve.

## 2020-12-18 NOTE — PROGRESS NOTES
Chart was reviewed for overdue Proactive [facility] Encounters ([facility])  topics  Updates were requested from care everywhere  Health Maintenance was unable to be updated  LINKS immunization registry triggered

## 2020-12-18 NOTE — PATIENT INSTRUCTIONS
Shave Biopsy Wound Care    Your doctor has performed a shave biopsy today.  A band aid and vaseline ointment has been placed over the site.  This should remain in place for 24 hours.  It is recommended that you keep the area dry for the first 24 hours.  After 24 hours, you may remove the band aid and wash the area with warm soap and water and apply Vaseline jelly.  Many patients prefer to use Neosporin or Bacitracin ointment.  This is acceptable; however, know that you can develop an allergy to this medication even if you have used it safely for years.  It is important to keep the area moist.  Letting it dry out and get air slows healing time, and will worsen the scar.  Band aid is optional after first 24 hours.      If you notice increasing redness, tenderness, pain, or yellow drainage at the biopsy site, please notify your doctor.  These are signs of an infection.    If your biopsy site is bleeding, apply firm pressure for 15 minutes straight.  Repeat for another 15 minutes, if it is still bleeding.   If the surgical site continues to bleed, then please contact your doctor.       [facility] DERMATOLOGY  [address] [place] [address], [address] [address]  Dept: [phone]       CRYOSURGERY      Your doctor has used a method called cryosurgery to treat your skin condition. Cryosurgery refers to the use of very cold substances to treat a variety of skin conditions such as warts, pre-skin cancers, molluscum contagiosum, sun spots, and several benign growths. The substance we use in cryosurgery is liquid nitrogen and is so cold (-195 degrees Celsius) that is burns when administered.     Following treatment in the office, the skin may immediately burn and become red. You may find the area around the lesion is affected as well. It is sometimes necessary to treat not only the lesion, but a small area of the surrounding normal skin to achieve a good response.     A blister, and even a blood  filled blister, may form after treatment.   This is a normal response. If the blister is painful, it is acceptable to sterilize a needle and with rubbing alcohol and gently pop the blister. It is important that you gently wash the area with soap and warm water as the blister fluid may contain wart virus if a wart was treated. Do no remove the roof of the blister.     The area treated can take anywhere from 1-3 weeks to heal. Healing time depends on the kind skin lesion treated, the location, and how aggressively the lesion was treated. It is recommended that the areas treated are covered with Vaseline or bacitracin ointment and a band-aid. If a band-aid is not practical, just ointment applied several times per day will do. Keeping these areas moist will speed the healing time.    Treatment with liquid nitrogen can leave a scar. In dark skin, it may be a light or dark scar, in light skin it may be a white or pink scar. These will generally fade with time, but may never go away completely.     If you have any concerns after your treatment, please feel free to call the office.         [facility] - DERMATOLOGY  [address] [place] [id number]  Dept: [phone]

## 2020-12-23 LAB
FINAL PATHOLOGIC DIAGNOSIS: NORMAL
GROSS: NORMAL
MICROSCOPIC EXAM: NORMAL

## 2020-12-30 ENCOUNTER — PATIENT MESSAGE (OUTPATIENT)
Dept: DERMATOLOGY | Facility: CLINIC | Age: 75
End: 2020-12-30

## 2021-01-04 DIAGNOSIS — L57.0 ACTINIC KERATOSIS: Primary | ICD-10-CM

## 2021-01-04 RX ORDER — FLUOROURACIL 50 MG/G
CREAM TOPICAL 2 TIMES DAILY
Qty: 40 G | Refills: 1 | Status: CANCELLED | OUTPATIENT
Start: 2021-01-04

## 2021-01-05 ENCOUNTER — PATIENT MESSAGE (OUTPATIENT)
Dept: DERMATOLOGY | Facility: CLINIC | Age: 76
End: 2021-01-05

## 2021-01-07 DIAGNOSIS — L57.0 ACTINIC KERATOSIS: Primary | ICD-10-CM

## 2021-01-07 RX ORDER — FLUOROURACIL 50 MG/G
CREAM TOPICAL 2 TIMES DAILY
Qty: 40 G | Refills: 2 | Status: SHIPPED | OUTPATIENT
Start: 2021-01-07 | End: 2021-01-12

## 2021-01-08 ENCOUNTER — PATIENT MESSAGE (OUTPATIENT)
Dept: DERMATOLOGY | Facility: CLINIC | Age: 76
End: 2021-01-08

## 2021-01-08 DIAGNOSIS — L57.0 ACTINIC KERATOSIS: Primary | ICD-10-CM

## 2021-01-12 RX ORDER — FLUOROURACIL 0.04 G/G
CREAM TOPICAL
Qty: 40 G | Refills: 1 | Status: SHIPPED | OUTPATIENT
Start: 2021-01-12 | End: 2022-10-06

## 2021-02-08 ENCOUNTER — PATIENT OUTREACH (OUTPATIENT)
Dept: ADMINISTRATIVE | Facility: OTHER | Age: 76
End: 2021-02-08

## 2021-02-09 ENCOUNTER — OFFICE VISIT (OUTPATIENT)
Dept: UROLOGY | Facility: CLINIC | Age: 76
End: 2021-02-09
Payer: MEDICARE

## 2021-02-09 VITALS
HEIGHT: 77 IN | SYSTOLIC BLOOD PRESSURE: 137 MMHG | HEART RATE: 87 BPM | DIASTOLIC BLOOD PRESSURE: 77 MMHG | BODY MASS INDEX: 22.08 KG/M2 | WEIGHT: 187 LBS

## 2021-02-09 DIAGNOSIS — N52.01 ERECTILE DYSFUNCTION DUE TO ARTERIAL INSUFFICIENCY: ICD-10-CM

## 2021-02-09 DIAGNOSIS — R39.198 SLOW URINARY STREAM: Primary | ICD-10-CM

## 2021-02-09 DIAGNOSIS — R35.0 URINARY FREQUENCY: ICD-10-CM

## 2021-02-09 LAB — POC RESIDUAL URINE VOLUME: 12 ML (ref 0–100)

## 2021-02-09 PROCEDURE — 99204 OFFICE O/P NEW MOD 45 MIN: CPT | Mod: 25,S$GLB,, | Performed by: NURSE PRACTITIONER

## 2021-02-09 PROCEDURE — 81002 URINALYSIS NONAUTO W/O SCOPE: CPT | Mod: S$GLB,,, | Performed by: NURSE PRACTITIONER

## 2021-02-09 PROCEDURE — 51798 POCT BLADDER SCAN: ICD-10-PCS | Mod: S$GLB,,, | Performed by: NURSE PRACTITIONER

## 2021-02-09 PROCEDURE — 81002 POCT URINE DIPSTICK WITHOUT MICROSCOPE: ICD-10-PCS | Mod: S$GLB,,, | Performed by: NURSE PRACTITIONER

## 2021-02-09 PROCEDURE — 99204 PR OFFICE/OUTPT VISIT, NEW, LEVL IV, 45-59 MIN: ICD-10-PCS | Mod: 25,S$GLB,, | Performed by: NURSE PRACTITIONER

## 2021-02-09 PROCEDURE — 51798 US URINE CAPACITY MEASURE: CPT | Mod: S$GLB,,, | Performed by: NURSE PRACTITIONER

## 2021-02-09 RX ORDER — SILDENAFIL 100 MG/1
100 TABLET, FILM COATED ORAL DAILY PRN
Qty: 30 TABLET | Refills: 11 | Status: SHIPPED | OUTPATIENT
Start: 2021-02-09 | End: 2022-03-09

## 2021-02-09 RX ORDER — TAMSULOSIN HYDROCHLORIDE 0.4 MG/1
0.4 CAPSULE ORAL DAILY
Qty: 30 CAPSULE | Refills: 11 | Status: SHIPPED | OUTPATIENT
Start: 2021-02-09 | End: 2022-03-14

## 2021-02-10 LAB
BILIRUB SERPL-MCNC: NORMAL MG/DL
BLOOD URINE, POC: NORMAL
CLARITY, POC UA: CLEAR
COLOR, POC UA: YELLOW
GLUCOSE UR QL STRIP: NORMAL
KETONES UR QL STRIP: NORMAL
LEUKOCYTE ESTERASE URINE, POC: NORMAL
NITRITE, POC UA: NORMAL
PH, POC UA: 5
PROTEIN, POC: NORMAL
SPECIFIC GRAVITY, POC UA: 1.02
UROBILINOGEN, POC UA: NORMAL

## 2021-03-01 ENCOUNTER — OFFICE VISIT (OUTPATIENT)
Dept: DERMATOLOGY | Facility: CLINIC | Age: 76
End: 2021-03-01
Payer: MEDICARE

## 2021-03-01 DIAGNOSIS — L57.0 ACTINIC KERATOSES: Primary | ICD-10-CM

## 2021-03-01 PROCEDURE — 99999 PR PBB SHADOW E&M-EST. PATIENT-LVL III: ICD-10-PCS | Mod: PBBFAC,,, | Performed by: DERMATOLOGY

## 2021-03-01 PROCEDURE — 99999 PR PBB SHADOW E&M-EST. PATIENT-LVL III: CPT | Mod: PBBFAC,,, | Performed by: DERMATOLOGY

## 2021-03-01 PROCEDURE — 99212 PR OFFICE/OUTPT VISIT, EST, LEVL II, 10-19 MIN: ICD-10-PCS | Mod: S$PBB,,, | Performed by: DERMATOLOGY

## 2021-03-01 PROCEDURE — 99213 OFFICE O/P EST LOW 20 MIN: CPT | Mod: PBBFAC,PO | Performed by: DERMATOLOGY

## 2021-03-01 PROCEDURE — 99212 OFFICE O/P EST SF 10 MIN: CPT | Mod: S$PBB,,, | Performed by: DERMATOLOGY

## 2021-05-31 ENCOUNTER — PATIENT OUTREACH (OUTPATIENT)
Dept: ADMINISTRATIVE | Facility: OTHER | Age: 76
End: 2021-05-31

## 2021-06-01 ENCOUNTER — OFFICE VISIT (OUTPATIENT)
Dept: DERMATOLOGY | Facility: CLINIC | Age: 76
End: 2021-06-01
Payer: MEDICARE

## 2021-06-01 DIAGNOSIS — D22.9 MULTIPLE BENIGN NEVI: ICD-10-CM

## 2021-06-01 DIAGNOSIS — L81.4 SOLAR LENTIGO: ICD-10-CM

## 2021-06-01 DIAGNOSIS — L82.1 SEBORRHEIC KERATOSES: Primary | ICD-10-CM

## 2021-06-01 DIAGNOSIS — L90.5 SCAR: ICD-10-CM

## 2021-06-01 DIAGNOSIS — D18.01 CHERRY ANGIOMA: ICD-10-CM

## 2021-06-01 DIAGNOSIS — Z85.828 HISTORY OF NONMELANOMA SKIN CANCER: ICD-10-CM

## 2021-06-01 DIAGNOSIS — L73.8 SEBACEOUS HYPERPLASIA OF FACE: ICD-10-CM

## 2021-06-01 PROCEDURE — 99213 OFFICE O/P EST LOW 20 MIN: CPT | Mod: S$PBB,,, | Performed by: DERMATOLOGY

## 2021-06-01 PROCEDURE — 99999 PR PBB SHADOW E&M-EST. PATIENT-LVL III: CPT | Mod: PBBFAC,,, | Performed by: DERMATOLOGY

## 2021-06-01 PROCEDURE — 99213 PR OFFICE/OUTPT VISIT, EST, LEVL III, 20-29 MIN: ICD-10-PCS | Mod: S$PBB,,, | Performed by: DERMATOLOGY

## 2021-06-01 PROCEDURE — 99999 PR PBB SHADOW E&M-EST. PATIENT-LVL III: ICD-10-PCS | Mod: PBBFAC,,, | Performed by: DERMATOLOGY

## 2021-06-01 PROCEDURE — 99213 OFFICE O/P EST LOW 20 MIN: CPT | Mod: PBBFAC,PO | Performed by: DERMATOLOGY

## 2021-06-02 RX ORDER — IPRATROPIUM BROMIDE 42 UG/1
SPRAY, METERED NASAL
Qty: 15 ML | Refills: 2 | Status: SHIPPED | OUTPATIENT
Start: 2021-06-02 | End: 2021-09-08

## 2021-08-23 ENCOUNTER — PATIENT MESSAGE (OUTPATIENT)
Dept: FAMILY MEDICINE | Facility: CLINIC | Age: 76
End: 2021-08-23

## 2021-10-14 DIAGNOSIS — I10 HTN (HYPERTENSION): ICD-10-CM

## 2021-10-26 ENCOUNTER — OFFICE VISIT (OUTPATIENT)
Dept: FAMILY MEDICINE | Facility: CLINIC | Age: 76
End: 2021-10-26
Payer: MEDICARE

## 2021-10-26 VITALS
OXYGEN SATURATION: 99 % | RESPIRATION RATE: 15 BRPM | HEART RATE: 78 BPM | SYSTOLIC BLOOD PRESSURE: 134 MMHG | BODY MASS INDEX: 24.62 KG/M2 | WEIGHT: 208.5 LBS | DIASTOLIC BLOOD PRESSURE: 68 MMHG | HEIGHT: 77 IN

## 2021-10-26 DIAGNOSIS — E78.41 ELEVATED LIPOPROTEIN(A): ICD-10-CM

## 2021-10-26 DIAGNOSIS — Z00.00 ENCOUNTER FOR MEDICARE ANNUAL WELLNESS EXAM: Primary | ICD-10-CM

## 2021-10-26 DIAGNOSIS — I10 HYPERTENSION, UNSPECIFIED TYPE: ICD-10-CM

## 2021-10-26 DIAGNOSIS — Z12.5 SCREENING FOR MALIGNANT NEOPLASM OF PROSTATE: ICD-10-CM

## 2021-10-26 DIAGNOSIS — Z79.899 ENCOUNTER FOR LONG-TERM CURRENT USE OF MEDICATION: ICD-10-CM

## 2021-10-26 PROCEDURE — 99397 PR PREVENTIVE VISIT,EST,65 & OVER: ICD-10-PCS | Mod: S$PBB,,, | Performed by: NURSE PRACTITIONER

## 2021-10-26 PROCEDURE — 99213 OFFICE O/P EST LOW 20 MIN: CPT | Mod: PBBFAC,PN | Performed by: NURSE PRACTITIONER

## 2021-10-26 PROCEDURE — 99999 PR PBB SHADOW E&M-EST. PATIENT-LVL III: ICD-10-PCS | Mod: PBBFAC,,, | Performed by: NURSE PRACTITIONER

## 2021-10-26 PROCEDURE — 99397 PER PM REEVAL EST PAT 65+ YR: CPT | Mod: S$PBB,,, | Performed by: NURSE PRACTITIONER

## 2021-10-26 PROCEDURE — G0008 ADMIN INFLUENZA VIRUS VAC: HCPCS | Mod: PBBFAC,PN

## 2021-10-26 PROCEDURE — 90694 VACC AIIV4 NO PRSRV 0.5ML IM: CPT | Mod: PBBFAC,PN

## 2021-10-26 PROCEDURE — 99999 PR PBB SHADOW E&M-EST. PATIENT-LVL III: CPT | Mod: PBBFAC,,, | Performed by: NURSE PRACTITIONER

## 2021-11-02 ENCOUNTER — TELEPHONE (OUTPATIENT)
Dept: FAMILY MEDICINE | Facility: CLINIC | Age: 76
End: 2021-11-02
Payer: COMMERCIAL

## 2021-11-02 ENCOUNTER — LAB VISIT (OUTPATIENT)
Dept: LAB | Facility: HOSPITAL | Age: 76
End: 2021-11-02
Attending: NURSE PRACTITIONER
Payer: MEDICARE

## 2021-11-02 DIAGNOSIS — I10 HYPERTENSION, UNSPECIFIED TYPE: ICD-10-CM

## 2021-11-02 DIAGNOSIS — Z00.00 ENCOUNTER FOR MEDICARE ANNUAL WELLNESS EXAM: ICD-10-CM

## 2021-11-02 DIAGNOSIS — E78.41 ELEVATED LIPOPROTEIN(A): ICD-10-CM

## 2021-11-02 DIAGNOSIS — Z12.5 SCREENING FOR MALIGNANT NEOPLASM OF PROSTATE: ICD-10-CM

## 2021-11-02 DIAGNOSIS — Z79.899 ENCOUNTER FOR LONG-TERM CURRENT USE OF MEDICATION: ICD-10-CM

## 2021-11-02 LAB
ALBUMIN SERPL BCP-MCNC: 4 G/DL (ref 3.5–5.2)
ALP SERPL-CCNC: 63 U/L (ref 55–135)
ALT SERPL W/O P-5'-P-CCNC: 19 U/L (ref 10–44)
ANION GAP SERPL CALC-SCNC: 11 MMOL/L (ref 8–16)
AST SERPL-CCNC: 25 U/L (ref 10–40)
BASOPHILS # BLD AUTO: 0.03 K/UL (ref 0–0.2)
BASOPHILS NFR BLD: 0.8 % (ref 0–1.9)
BILIRUB SERPL-MCNC: 0.4 MG/DL (ref 0.1–1)
BUN SERPL-MCNC: 23 MG/DL (ref 8–23)
CALCIUM SERPL-MCNC: 9.5 MG/DL (ref 8.7–10.5)
CHLORIDE SERPL-SCNC: 105 MMOL/L (ref 95–110)
CHOLEST SERPL-MCNC: 195 MG/DL (ref 120–199)
CHOLEST/HDLC SERPL: 2.4 {RATIO} (ref 2–5)
CO2 SERPL-SCNC: 23 MMOL/L (ref 23–29)
COMPLEXED PSA SERPL-MCNC: 1.2 NG/ML (ref 0–4)
CREAT SERPL-MCNC: 1.4 MG/DL (ref 0.5–1.4)
DIFFERENTIAL METHOD: ABNORMAL
EOSINOPHIL # BLD AUTO: 0.1 K/UL (ref 0–0.5)
EOSINOPHIL NFR BLD: 1.8 % (ref 0–8)
ERYTHROCYTE [DISTWIDTH] IN BLOOD BY AUTOMATED COUNT: 13.5 % (ref 11.5–14.5)
EST. GFR  (AFRICAN AMERICAN): 56.4 ML/MIN/1.73 M^2
EST. GFR  (NON AFRICAN AMERICAN): 48.8 ML/MIN/1.73 M^2
GLUCOSE SERPL-MCNC: 87 MG/DL (ref 70–110)
HCT VFR BLD AUTO: 36.1 % (ref 40–54)
HDLC SERPL-MCNC: 81 MG/DL (ref 40–75)
HDLC SERPL: 41.5 % (ref 20–50)
HGB BLD-MCNC: 12.2 G/DL (ref 14–18)
IMM GRANULOCYTES # BLD AUTO: 0.01 K/UL (ref 0–0.04)
IMM GRANULOCYTES NFR BLD AUTO: 0.3 % (ref 0–0.5)
LDLC SERPL CALC-MCNC: 100.4 MG/DL (ref 63–159)
LYMPHOCYTES # BLD AUTO: 1.6 K/UL (ref 1–4.8)
LYMPHOCYTES NFR BLD: 39.2 % (ref 18–48)
MCH RBC QN AUTO: 33.1 PG (ref 27–31)
MCHC RBC AUTO-ENTMCNC: 33.8 G/DL (ref 32–36)
MCV RBC AUTO: 98 FL (ref 82–98)
MONOCYTES # BLD AUTO: 0.5 K/UL (ref 0.3–1)
MONOCYTES NFR BLD: 11.4 % (ref 4–15)
NEUTROPHILS # BLD AUTO: 1.8 K/UL (ref 1.8–7.7)
NEUTROPHILS NFR BLD: 46.5 % (ref 38–73)
NONHDLC SERPL-MCNC: 114 MG/DL
NRBC BLD-RTO: 0 /100 WBC
PLATELET # BLD AUTO: 195 K/UL (ref 150–450)
PMV BLD AUTO: 9.4 FL (ref 9.2–12.9)
POTASSIUM SERPL-SCNC: 4.3 MMOL/L (ref 3.5–5.1)
PROT SERPL-MCNC: 7.2 G/DL (ref 6–8.4)
RBC # BLD AUTO: 3.69 M/UL (ref 4.6–6.2)
SODIUM SERPL-SCNC: 139 MMOL/L (ref 136–145)
TRIGL SERPL-MCNC: 68 MG/DL (ref 30–150)
TSH SERPL DL<=0.005 MIU/L-ACNC: 1.1 UIU/ML (ref 0.4–4)
WBC # BLD AUTO: 3.95 K/UL (ref 3.9–12.7)

## 2021-11-02 PROCEDURE — 80061 LIPID PANEL: CPT | Performed by: NURSE PRACTITIONER

## 2021-11-02 PROCEDURE — 36415 COLL VENOUS BLD VENIPUNCTURE: CPT | Performed by: NURSE PRACTITIONER

## 2021-11-02 PROCEDURE — 84443 ASSAY THYROID STIM HORMONE: CPT | Performed by: NURSE PRACTITIONER

## 2021-11-02 PROCEDURE — 85025 COMPLETE CBC W/AUTO DIFF WBC: CPT | Performed by: NURSE PRACTITIONER

## 2021-11-02 PROCEDURE — 84153 ASSAY OF PSA TOTAL: CPT | Performed by: NURSE PRACTITIONER

## 2021-11-02 PROCEDURE — 80053 COMPREHEN METABOLIC PANEL: CPT | Performed by: NURSE PRACTITIONER

## 2022-01-18 RX ORDER — IPRATROPIUM BROMIDE 42 UG/1
SPRAY, METERED NASAL
Qty: 15 ML | Refills: 11 | Status: SHIPPED | OUTPATIENT
Start: 2022-01-18 | End: 2023-02-07 | Stop reason: SDUPTHER

## 2022-01-19 ENCOUNTER — PATIENT MESSAGE (OUTPATIENT)
Dept: FAMILY MEDICINE | Facility: CLINIC | Age: 77
End: 2022-01-19
Payer: COMMERCIAL

## 2022-01-19 DIAGNOSIS — M25.562 ACUTE PAIN OF BOTH KNEES: Primary | ICD-10-CM

## 2022-01-19 DIAGNOSIS — M25.561 ACUTE PAIN OF BOTH KNEES: Primary | ICD-10-CM

## 2022-01-21 ENCOUNTER — HOSPITAL ENCOUNTER (OUTPATIENT)
Dept: RADIOLOGY | Facility: HOSPITAL | Age: 77
Discharge: HOME | End: 2022-01-21
Attending: FAMILY MEDICINE
Payer: MEDICARE

## 2022-01-21 DIAGNOSIS — M25.561 ACUTE PAIN OF BOTH KNEES: ICD-10-CM

## 2022-01-21 DIAGNOSIS — M25.562 ACUTE PAIN OF BOTH KNEES: ICD-10-CM

## 2022-01-21 PROCEDURE — 73562 X-RAY EXAM OF KNEE 3: CPT | Mod: TC,50,PN

## 2022-01-21 PROCEDURE — 73562 XR KNEE 3 VIEW BILATERAL: ICD-10-PCS | Mod: 26,50,, | Performed by: RADIOLOGY

## 2022-01-21 PROCEDURE — 73562 X-RAY EXAM OF KNEE 3: CPT | Mod: 26,50,, | Performed by: RADIOLOGY

## 2022-01-28 NOTE — PROGRESS NOTES
CC:   Left knee pain    76 y.o. Male who presents as a new patient to me. He is retired. Complaint is bilateral knee pain x 2 weeks, L>>R.  The left is really his primary symptomatic issue. He states that he was painting a mayen at home and the following morning he woke up with significant pain. Pain localizes over the medial joint line. He does feel  Intermittent catching symptoms over the medial joint line. Mild subjective swelling. Worse with getting up in the morning, walking, sitting for long periods of time and standing up. Better with rest. Treatment thus far has included rest, activity modifications and Ibuprofen BID. He does report that his pain has improved recently. He has had pain in the past.  Nothing to this extent.  Right knee pain present but not particularly significant. Here today to discuss diagnosis and treatment options.        No back or radicular symptoms.  No ipsilateral hip pain.    REVIEW OF SYSTEMS:   Constitution: Negative. Negative for chills, fever and night sweats.    Hematologic/Lymphatic: Negative for bleeding problem. Does not bruise/bleed easily.   Skin: Negative for dry skin, itching and rash.   Musculoskeletal: Negative for falls. Positive for left knee pain and muscle weakness.     All other review of symptoms were reviewed and found to be noncontributory.     PAST MEDICAL HISTORY:   Past Medical History:   Diagnosis Date    Allergy     Basal cell carcinoma 2009    L ear    Cancer     Hx colon     Hyperlipidemia     Hypertension        PAST SURGICAL HISTORY:   Past Surgical History:   Procedure Laterality Date    COLON SURGERY  2000    1ft. sigmoid removed    COLONOSCOPY N/A 9/28/2020    Procedure: COLONOSCOPY;  Surgeon: Ty Pollcok MD;  Location: [facility];  Service: General;  Laterality: N/A;       FAMILY HISTORY:   Family History   Problem Relation Age of Onset    Cancer Mother     Cancer Brother     Macular degeneration Brother     Melanoma Neg Hx         SOCIAL HISTORY:   Social History     Socioeconomic History    Marital status:    Tobacco Use    Smoking status: Former Smoker     Packs/day: 1.00    Smokeless tobacco: Never Used   Substance and Sexual Activity    Alcohol use: Yes     Comment: 2 mixed drinks daily    Drug use: No    Sexual activity: Yes     Partners: Female       MEDICATIONS:     Current Outpatient Medications:     blood pressure monitor ([employer] EASE) ST size, by Other route as needed for High Blood Pressure., Disp: 1 each, Rfl: 0    buPROPion (WELLBUTRIN XL) 150 MG TB24 tablet, One table po x 3days; One tab po BID thereafter for 12 weeks, Disp: 60 tablet, Rfl: 2    CALCIUM CARBONATE (CALCIUM 500 ORAL), Take by mouth. 2  By mouth Every day, Disp: , Rfl:     finasteride (PROSCAR) 5 mg tablet, TAKE 1 TABLET BY MOUTH ONCE DAILY FOR PROSTATE, Disp: 90 tablet, Rfl: 3    fluorouraciL (TOLAK) 4 % Crea, AAA daily for 4 weeks, Disp: 40 g, Rfl: 1    ipratropium (ATROVENT) 42 mcg (0.06 %) nasal spray, INSTILL 1 SPRAY IN EACH NOSTRIL TWICE DAILY, Disp: 15 mL, Rfl: 11    lisinopril (PRINIVIL,ZESTRIL) 40 MG tablet, TAKE 1 TABLET BY MOUTH ONCE DAILY FOR BLOOD PRESSURE, Disp: 90 tablet, Rfl: 3    montelukast (SINGULAIR) 10 mg tablet, TAKE 1 TABLET BY MOUTH ONCE DAILY, Disp: 90 tablet, Rfl: 3    sildenafiL (VIAGRA) 100 MG tablet, Take 1 tablet (100 mg total) by mouth daily as needed for Erectile Dysfunction., Disp: 30 tablet, Rfl: 11    sildenafil citrate (SILDENAFIL ORAL), Take 20 mg by mouth., Disp: , Rfl:     simvastatin (ZOCOR) 40 MG tablet, TAKE 1 TABLET BY MOUTH EACH EVENING FOR CHOLESTEROL , Disp: 90 tablet, Rfl: 3    tamsulosin (FLOMAX) 0.4 mg Cap, Take 1 capsule (0.4 mg total) by mouth once daily., Disp: 30 capsule, Rfl: 11    ALLERGIES:   Review of patient's allergies indicates:   Allergen Reactions    Penicillins      Other reaction(s): Rash        PHYSICAL EXAMINATION:  There were no vitals taken for this visit.  General:  Well-developed well-nourished 76 y.o. malein no acute distress   Cardiovascular: Regular rhythm by palpation of distal pulse, normal color and temperature, no concerning varicosities on symptomatic side   Lungs: No labored breathing or wheezing appreciated   Neuro: Alert and oriented ×3   Psychiatric: well oriented to person, place and time, demonstrates normal mood and affect   Skin: No rashes, lesions or ulcers, normal temperature, turgor, and texture on involved extremity    Ortho/SPM Exam  Examination of the left knee demonstrates intact extensor mechanism. No effusion or prepatellar swelling. Central patellar tracking. Patellar hypomobility. Full extension. Flexion to 130.   Pain medially with forced flexion and extension. Prominent tenderness along the medial joint line. Positive Jessica's for typical pain medially. Negative Lachman. Stable to varus/valgus stress testing at 0 and 30 deg. Negative posterior drawer. Ligamentously stable.    IMAGING:  X-rays including standing, weight bearing AP and flexion bilateral knees, LEFT knee lateral and sunrise views ordered and images reviewed by me show:     Mild degenerative changes.  Mostly in the patellofemoral compartment.  Well-maintained joint spaces otherwise.     ASSESSMENT:      ICD-10-CM ICD-9-CM   1. Primary osteoarthritis of left knee  M17.12 715.16   2. Chronic pain of left knee  M25.562 719.46    G89.29 338.29     PLAN:     -Findings and treatment options were discussed with the patient.   Symptomatic mild  DJD with likely a medial meniscus tear.  Initial conservative treatment recommended.  -Proceeded with left knee CSI.  -PT referral sent to [facility], [place] location.  -RTC 6 weeks PRN.  If pain is persistent or recurrent with mechanical complaints, next step would be MRI  -All questions answered    Large Joint Aspiration/Injection: L knee    Date/Time: 2/1/2022 1:30 PM  Performed by: OLGA LIDIA Schultz MD  Authorized by: OLGA LIDIA Schultz MD      Consent Done?:  Yes (Verbal)  Indications:  Pain  Site marked: the procedure site was marked    Timeout: prior to procedure the correct patient, procedure, and site was verified    Prep: patient was prepped and draped in usual sterile fashion      Local anesthesia used?: Yes    Local anesthetic:  Co-phenylcaine spray (0.2% Naropin)  Anesthetic total (ml):  4      Details:  Needle Size:  22 G  Ultrasonic Guidance for needle placement?: No    Approach:  Lateral  Location:  Knee  Site:  L knee  Medications:  40 mg triamcinolone acetonide 40 mg/mL  Patient tolerance:  Patient tolerated the procedure well with no immediate complications

## 2022-01-31 ENCOUNTER — PATIENT OUTREACH (OUTPATIENT)
Dept: ADMINISTRATIVE | Facility: OTHER | Age: 77
End: 2022-01-31
Payer: COMMERCIAL

## 2022-02-01 ENCOUNTER — HOSPITAL ENCOUNTER (OUTPATIENT)
Dept: RADIOLOGY | Facility: HOSPITAL | Age: 77
Discharge: HOME | End: 2022-02-01
Attending: ORTHOPAEDIC SURGERY
Payer: MEDICARE

## 2022-02-01 ENCOUNTER — OFFICE VISIT (OUTPATIENT)
Dept: SPORTS MEDICINE | Facility: CLINIC | Age: 77
End: 2022-02-01
Payer: MEDICARE

## 2022-02-01 VITALS
HEART RATE: 65 BPM | DIASTOLIC BLOOD PRESSURE: 73 MMHG | SYSTOLIC BLOOD PRESSURE: 136 MMHG | WEIGHT: 211 LBS | HEIGHT: 77 IN | BODY MASS INDEX: 24.91 KG/M2

## 2022-02-01 DIAGNOSIS — G89.29 CHRONIC PAIN OF LEFT KNEE: ICD-10-CM

## 2022-02-01 DIAGNOSIS — M25.561 PAIN IN BOTH KNEES, UNSPECIFIED CHRONICITY: ICD-10-CM

## 2022-02-01 DIAGNOSIS — M17.12 PRIMARY OSTEOARTHRITIS OF LEFT KNEE: Primary | ICD-10-CM

## 2022-02-01 DIAGNOSIS — M25.562 CHRONIC PAIN OF LEFT KNEE: ICD-10-CM

## 2022-02-01 DIAGNOSIS — M25.562 PAIN IN BOTH KNEES, UNSPECIFIED CHRONICITY: ICD-10-CM

## 2022-02-01 PROCEDURE — 99204 OFFICE O/P NEW MOD 45 MIN: CPT | Mod: 25,S$PBB,, | Performed by: ORTHOPAEDIC SURGERY

## 2022-02-01 PROCEDURE — 20610 LARGE JOINT ASPIRATION/INJECTION: L KNEE: ICD-10-PCS | Mod: S$PBB,LT,, | Performed by: ORTHOPAEDIC SURGERY

## 2022-02-01 PROCEDURE — 99999 PR PBB SHADOW E&M-EST. PATIENT-LVL III: ICD-10-PCS | Mod: PBBFAC,,, | Performed by: ORTHOPAEDIC SURGERY

## 2022-02-01 PROCEDURE — 99999 PR PBB SHADOW E&M-EST. PATIENT-LVL III: CPT | Mod: PBBFAC,,, | Performed by: ORTHOPAEDIC SURGERY

## 2022-02-01 PROCEDURE — 73564 X-RAY EXAM KNEE 4 OR MORE: CPT | Mod: TC,50

## 2022-02-01 PROCEDURE — 99213 OFFICE O/P EST LOW 20 MIN: CPT | Mod: PBBFAC,25 | Performed by: ORTHOPAEDIC SURGERY

## 2022-02-01 PROCEDURE — 73564 X-RAY EXAM KNEE 4 OR MORE: CPT | Mod: 26,50,, | Performed by: RADIOLOGY

## 2022-02-01 PROCEDURE — 99204 PR OFFICE/OUTPT VISIT, NEW, LEVL IV, 45-59 MIN: ICD-10-PCS | Mod: 25,S$PBB,, | Performed by: ORTHOPAEDIC SURGERY

## 2022-02-01 PROCEDURE — 20610 DRAIN/INJ JOINT/BURSA W/O US: CPT | Mod: PBBFAC | Performed by: ORTHOPAEDIC SURGERY

## 2022-02-01 PROCEDURE — 73564 XR KNEE ORTHO BILAT WITH FLEXION: ICD-10-PCS | Mod: 26,50,, | Performed by: RADIOLOGY

## 2022-02-01 RX ADMIN — TRIAMCINOLONE ACETONIDE 40 MG: 40 INJECTION, SUSPENSION INTRA-ARTICULAR; INTRAMUSCULAR at 01:02

## 2022-02-02 RX ORDER — TRIAMCINOLONE ACETONIDE 40 MG/ML
40 INJECTION, SUSPENSION INTRA-ARTICULAR; INTRAMUSCULAR
Status: DISCONTINUED | OUTPATIENT
Start: 2022-02-01 | End: 2022-02-02 | Stop reason: HOSPADM

## 2022-02-09 ENCOUNTER — PATIENT MESSAGE (OUTPATIENT)
Dept: SPORTS MEDICINE | Facility: CLINIC | Age: 77
End: 2022-02-09
Payer: COMMERCIAL

## 2022-02-09 DIAGNOSIS — M17.12 PRIMARY OSTEOARTHRITIS OF LEFT KNEE: Primary | ICD-10-CM

## 2022-02-10 ENCOUNTER — CLINICAL SUPPORT (OUTPATIENT)
Dept: REHABILITATION | Facility: HOSPITAL | Age: 77
End: 2022-02-10
Payer: MEDICARE

## 2022-02-10 DIAGNOSIS — M17.12 PRIMARY OSTEOARTHRITIS OF LEFT KNEE: ICD-10-CM

## 2022-02-10 PROCEDURE — 97161 PT EVAL LOW COMPLEX 20 MIN: CPT | Mod: PN

## 2022-02-10 NOTE — PLAN OF CARE
[facility]  Physical Therapy Initial Evaluation / Plan Of Care    Name: Akil Guzman  Clinic Number: [id number]    Therapy Diagnosis:   Encounter Diagnosis   Name Primary?    Primary osteoarthritis of left knee      Physician: OLGA LIDIA Schultz MD    Physician Orders: PT Eval and Treat   Medical Diagnosis: Primary OA left knee   Evaluation Date: 2/10/2022  Authorization period Expiration: 12/31/2022  Plan of Care Certification Period: 5/10/2022    Visit #: 1/ Visits authorized: 12  Time In: 1:50 pm   Time Out: 2:35 pm   Total Billable Time: 45 minutes    Precautions: Standard      Subjective   Date of onset: several weeks ago   Date of Surgery: n/a       Past Medical History:   Diagnosis Date    Allergy     Basal cell carcinoma 2009    L ear    Cancer     Hx colon     Hyperlipidemia     Hypertension      Akil Guzman  has a past surgical history that includes Colon surgery (2000) and Colonoscopy (N/A, 9/28/2020).    Akil has a current medication list which includes the following prescription(s): blood pressure monitor, bupropion, calcium carbonate, finasteride, tolak, ipratropium, lisinopril, montelukast, sildenafil citrate, simvastatin, tamsulosin, and [DISCONTINUED] dutasteride.    Review of patient's allergies indicates:   Allergen Reactions    Penicillins      Other reaction(s): Rash        Imaging, : Xray bilateral knees   FINDINGS:  There is no significant change from prior. No evidence for acute fracture or dislocation knees bilaterally.  Continued joint space loss medial femoral tibial compartments bilaterally. There is no significant genu deformity. No focal bony erosion. Continued left superior prepatellar paracentral calcification which may represent sequela of calcific tendinopathy.  Scattered vascular calcifications bilaterally.  No focal bony erosion or significant joint effusion.  Further evaluation as warranted clinically.       Prior Therapy: No physical therapy  for current condition  Social History: Patient lives in a single level home with 0 steps to enter ; lives with spouse;   Occupation: retired   Prior Level of Function: Independent; went to gym several times per week;   Current Level of Function: quit going to gym when knee started to hurt; Independent with ADL's as well as household activities; Independent ambulatory without use of  AD.     Pain:  Current 2/10, worst 5/10, best 0/10   Location: joint and knee  left  Description: Aching and Throbbing  Aggravating Factors: Walking  Easing Factors: rest      Onset/ROBERTO: insidious    Primary concern/ Chief complaints:  History of current condition:   76 y.o. Male who presents as a new patient to me. He is retired. Complaint is bilateral knee pain x 3 weeks, L>>R. Quit going to the gym when knee pain started.  The left is really his primary symptomatic issue. He states that he was painting a mayen at home, used a james to sit on - propelled it with his feet to manage painting the baseboards; and the following morning he woke up with significant pain. Pain localizes over the medial joint line. He does feel  Intermittent catching symptoms over the medial joint line. Mild subjective swelling. Initially the pain was worse with getting up in the morning, walking, sitting for long periods of time and standing up. Better with rest. Treatment thus far has included rest, activity modifications and Ibuprofen BID. He received a steroid injection left knee on 2/1/2022 at MD office visit. He does report that his pain has improved recently. He has had pain in the past.  Nothing to this extent.  Right knee pain present but not particularly significant.  Akil stated that he wants to learn what to do as far as his knees are concerned, then get started back at the gym.        Pts goals: To relieve his knee pain and go back to the gym     Objective     Observation: Akil ambulated without use of any AD into clinic; drove himself; He is  "in no acute distress, reports that knee pain has been better since receiving the steroid injection a couple of weeks ago.  Alin is alert/oriented x 4. He is very cooperative, pleasant with examination.     Posture: slight forward head, rounded shoulders; valgus knees; lumbar lordosis adequate     Gait: normal gait pattern noted; able to ascend/descend steps with right hand railing; one foot per step, no LOB     Edema: no edema noted; left knee larger at joint line than right secondary to arthritic changes.     Girth Measurement Joint line 5 cm below 10 cm above   Left 42 cm 34.5 cm 42.5 cm   Right 41.cm 32.5 cm 42 cm     Range of Motion:   Knee Left active Left Passive Right Active R passive    0- 143 0-143 0 - 140 0 - 141       Lower Extremity Strength   Left Right   Knee extension: 4+/5 5/5   Knee flexion: 4-/5 4+/5   Hip flexion: 5/5 5/5   Hip extension:  4+/5 5/5   Hip abduction: 4+/5 5/5   Hip adduction: 4+/5 4+/5   Hip IR 5/5 5/5   Hip ER 5/5 5/5   Ankle dorsiflexion: 5/5 5/5   Ankle plantarflexion: 5/5 5/5       Special Tests:   Left Right   Valgus Stress Test Negative Negative   Varus Stress test Negative    Negative      Lachman's test Negative    Negative      Posterior Lachman Negative    Negative      Julia's Test Positive    Negative        Step down test: from 6" step: hip abduction and valgus collapse on left noted;  Right knee - able to perform without compensation or pain.     Function:    - SLS R: 30 sec - fingertip on bar for balance  - SLS L: 30 sec - fingertip on bar for balance   - Squat: able to perform - poor technique initially with decreased hip hinge, knees over toes - following verbal/tactile cueing - improved ability to perform squat with better technique - no pain   - Sit <--> Stand: 13 reps in 30 secs   -Recumbent bike x 10 mins with level 5 resistance - no increase in knee pain.     Joint Mobility: Patellar restricted, - left   Tibiofemoral unrestricted,     Palpation: moderate " tenderness to palpation at left knee medial joint line       Sensation: intact to light touch     Flexibility:    90/90 SLR = R moderate restriction, L moderate restriction   Ely's test: R minimal restriction, L minimal restriction   Livia's test: R no restriction, L no restriction   Gabriel test: R minimal restriction, L minimal restriction    PT Evaluation Completed: Yes  Discussed Plan of Care with patient: Yes      Home Exercises and Patient Education Provided    Education provided re:   - progress towards goals   - role of therapy in multi - disciplinary team, goals for therapy  Pt educated on condition, POC, and expectations in therapy.  No spiritual or educational barriers to learning provided    Home exercises:  Pt will be provided HEP during course of treatment with progressions as appropriate. Pt was advised to perform these exercises free of pain, and to stop performing them if pain occurs.  Discussed return to gym with Alin. Advised recumbent bike, upper body circuit weights for now.  Will put together a HEP for home/gm at next visit.   Akil demonstrated good  understanding of the education provided.     Limitation/Restriction for FOTO Knee  Survey    Therapist reviewed FOTO scores for Akil Guzman on 2/10/2022.   FOTO documents entered into [employer] - see Media section.    Limitation Score: 46%         Assessment   Akil is a 76 y.o. male referred to outpatient physical therapy with a medical diagnosis of Primary OA left knee, and presents to PT with mild knee pain at present, valgus deformity with medial joint line pain, weakness in quads/hamstrings left versus right., mild lateral hip weakness. Patient demonstrates limitations as described in the problem list. Pt will benefit from physcial therapy services in order to maximize pain free and/or functional use of left LE. The following goals were discussed with the patient and patient is in agreement with them as to be addressed in the treatment plan.    Pt prognosis is Good.   Pt will benefit from skilled outpatient Physical Therapy to address the deficits stated above and in the chart below, provide pt/family education, and to maximize pt's level of independence.     Plan of care discussed with patient: Yes  Pt's spiritual, cultural and educational needs considered and pt agreeable to plan of care and goals as stated below:     Anticipated Barriers for therapy: none    Medical necessity is demonstrated by the following IMPAIRMENTS/PROBLEM LIST:    weakness, impaired functional mobility, decreased lower extremity function, pain and decreased ROM      Medical Necessity is demonstrated by the following  History  Co-morbidities and personal factors that may impact the plan of care Co-morbidities:   HTN and hx of skin cancer    Personal Factors:   no deficits  0= low, 1-2 = mod, 3+ = high   low   Examination  Body Structures and Functions, activity limitations and participation restrictions that may impact the plan of care Body Regions:   lower extremities    Body Systems:    ROM  strength  balance  gait    Participation Restrictions:   none    Activity limitations:   Mobility  walking    Self care  no deficits    Domestic Life  no deficits    Community and Social Life  community life  recreation and leisure  1-2 = low, 3+ = mod, 4+ = high       low   Clinical Presentation stable and uncomplicated  Stable/uncomplicated = low, evolving/changing = mod,  Unstable/unpredictable = high low   Decision Making/ Complexity Score: low           Goals     Short Term Goals: 3 weeks  Pain: decrease pain to no more than 2/10 at most with daily activities.   Demonstrate compliance with initial exercise program    Long Term Goals: 6 weeks    Pain: Decrease pain to 0/10 to allow for improved ability to perform daily/recreational activities   Strength: Improve strength in bilateral Knee to 5/5 for improved LE stability  ROM:  Maintain full AROM in bilateral knees   Functional scale:  Improve score on FOTO  to 28% limitation   Walking: Increase walking duration to 1/2 mile  without pain  Postures: Increase sitting and/or standing duration to > 1 hour  without pain   ADL's: able to perform usual household chores without pain/compensation   Exercise: demonstrate independence with home exercise program to maintain gains made in therapy.          Plan   Certification Period: 2/10/2022 to 03/11/2022.    Outpatient Physical Therapy 2 times weekly for 6 weeks to include the following interventions: patient education, Manual Therapy, Neuromuscular Re-ed, Patient Education and Therapeutic Exercise.   Pt may be seen by PTA as part of the rehabilitation team.     I certify the need for these services furnished under this plan of treatment and while under my care.    Stephanie Hameed, PT          Attestation:   I have seen the patient, reviewed the therapist's plan of care, and I agree with the plan of care.   I certify the need for these services furnished under this plan of treatment and while under my care.         _______________            ________                                               _____________________  Physician/Referring Practitioner                                                            Date of Signature

## 2022-02-10 NOTE — PLAN OF CARE
PT met face to face with Esau Walden PTA to discuss patient's treatment plan and progress towards established goals.  Treatment will be continued as described in initial report/eval and progress notes.  Patient will be seen by physical therapist every sixth visit and minimally once per month.    Additional information: Primarily left knee OA - medial joint line pain;  No ROM deficits; mild quad/hamstring weakness; wants to learn program to take back to gym

## 2022-02-14 ENCOUNTER — CLINICAL SUPPORT (OUTPATIENT)
Dept: REHABILITATION | Facility: HOSPITAL | Age: 77
End: 2022-02-14
Payer: MEDICARE

## 2022-02-14 DIAGNOSIS — M17.12 ARTHRITIS OF LEFT KNEE: Primary | ICD-10-CM

## 2022-02-14 DIAGNOSIS — M62.81 MUSCLE WEAKNESS: ICD-10-CM

## 2022-02-14 PROCEDURE — 97110 THERAPEUTIC EXERCISES: CPT | Mod: PN,CQ

## 2022-02-14 NOTE — PROGRESS NOTES
Physical Therapy Daily Treatment Note   Name: Akil Guzman 1945  MRN: [id number]    Visit Date: 2/14/2022  Visit #: 2 / 12  Authorization period Expiration: 12/31/22    Plan of Care Expiration: 5/10/22  Precautions: standard    Time In: 1:30 pm  Time Out: 2:15 pm  Total 1:1 Treatment Time: 45 min    Treatment Diagnosis:   Encounter Diagnoses   Name Primary?    Arthritis of left knee Yes    Muscle weakness      Physician: OLGA LIDIA Schultz MD    Subjective   Pt reports: doing fine today and is having no pain.  He said, he was sore after the evaluation.  He is compliant with home exercise program.     Pain Scale:  0/10 on VAS currently  Pain Location: left knee    Objective   Akil received therapeutic exercises to develop strength and range of motion for 40 minutes including:    R. Bike level 6 x 10 min    Mat    Hamstring stretch 3 x 30 sec each   Quad sets x 1 min each   SAQ over grey bolster 1 min each   SLR x 1 min each   Sideline bilateral hip straight leg raise 2 x 10    Para bars   Toe taps x 2 min   3 way hip 2 x 10 each    Quantum    Knee extension 40# 2 x 10   Hamstring curls 40# 2 x 10   Leg press 80# 2 x 10      Home Exercises and Education Provided     Education provided re:   - progress towards goals   - role of therapy in multi - disciplinary team, goals for therapy  Pt educated on condition, POC, and expectations in therapy.  No spiritual or educational barriers to learning provided    Home exercises:  Pt will be provided HEP during course of treatment with progressions as appropriate. Pt was advised to perform these exercises free of pain, and to stop performing them if pain occurs.   Akil demonstrated good understanding of the education provided.     Assessment   Akil is progressing well towards his goals and no updates to goals at this time.     Akil is a 76 y.o. male referred to outpatient physical therapy with a medical diagnosis of Primary OA left knee,  and presents to PT with mild knee pain at present, valgus deformity with medial joint line pain, weakness in quads/hamstrings left versus right., mild lateral hip weakness.    Pt prognosis is good. Pt will continue to benefit from skilled outpatient physical therapy to address the deficits listed in the problem list chart on initial evaluation, provide pt/family education and to maximize pt's level of independence in the home and community environment.     Medical necessity is demonstrated by the impairments and functional limitations listed on the Initial Evaluation.     Anticipated barriers to physical therapy: none  Pt's spiritual, cultural and educational needs considered and pt agreeable to plan of care and goals.    Goals   Short Term Goals: 3 weeks  Pain: decrease pain to no more than 2/10 at most with daily activities.   Demonstrate compliance with initial exercise program     Long Term Goals: 6 weeks     Pain: Decrease pain to 0/10 to allow for improved ability to perform daily/recreational activities   Strength: Improve strength in bilateral Knee to 5/5 for improved LE stability  ROM:  Maintain full AROM in bilateral knees   Functional scale: Improve score on FOTO  to 28% limitation   Walking: Increase walking duration to 1/2 mile  without pain  Postures: Increase sitting and/or standing duration to > 1 hour  without pain   ADL's: able to perform usual household chores without pain/compensation   Exercise: demonstrate independence with home exercise program to maintain gains made in therapy.        Plan   Continue with established Plan of Care towards Physical Therapy goals.   Discussed Plan of Care with patient: Allison Walden, PTA  2/14/2022

## 2022-02-17 ENCOUNTER — CLINICAL SUPPORT (OUTPATIENT)
Dept: REHABILITATION | Facility: HOSPITAL | Age: 77
End: 2022-02-17
Payer: MEDICARE

## 2022-02-17 DIAGNOSIS — M62.81 MUSCLE WEAKNESS: ICD-10-CM

## 2022-02-17 DIAGNOSIS — M17.12 ARTHRITIS OF LEFT KNEE: Primary | ICD-10-CM

## 2022-02-17 PROCEDURE — 97110 THERAPEUTIC EXERCISES: CPT | Mod: PN,CQ

## 2022-02-17 NOTE — PROGRESS NOTES
"                            Physical Therapy Daily Treatment Note   Name: Akil Guzman 1945  MRN: [id number]    Visit Date: 2/17/2022  Visit #: 3 / 12  Authorization period Expiration: 12/31/22    Plan of Care Expiration: 5/10/22  Precautions: standard    Time In: 1:15 PM  Time Out: 2:10 PM  Total 1:1 Treatment Time: 45 min    Treatment Diagnosis:   Encounter Diagnoses   Name Primary?    Arthritis of left knee Yes    Muscle weakness      Physician: OLGA LIDIA Schultz MD    Subjective   Pt reports: No new c/o's.   He is compliant with home exercise program.     Pain Scale:  1-2/10 on VAS currently  Pain Location: left knee    Objective   Akil received therapeutic exercises to develop strength and range of motion for 40 minutes including:    Recumbent Bike level 6 x 12 min  Mat    Hamstring stretch 3 x 30 sec each              Bridges x 15   Quad sets x 2 min each   SAQ over grey bolster 2 min    SLR 2 x 10   S/L hip abd 2 x 10  Para bars              Heel Raises x 20              Toe taps 6" step x 2 min              Step-ups x 15 on 6" step    3 way hip x 15 each               Heel Cord stretch on wedge 3 x 30 sec  Quantum    Knee extension 40# 2 x 10   Hamstring curls 40# 2 x 10   Leg press 80# 2 x 10      Home Exercises and Education Provided     Education provided re:   - progress towards goals   - role of therapy in multi - disciplinary team, goals for therapy  Pt educated on condition, POC, and expectations in therapy.  No spiritual or educational barriers to learning provided    Home exercises:  Pt will be provided HEP during course of treatment with progressions as appropriate. Pt was advised to perform these exercises free of pain, and to stop performing them if pain occurs.   Akil demonstrated good understanding of the education provided.     Assessment   Akil is progressing well towards his goals and no updates to goals at this time.     Akil is a 76 y.o. male referred to outpatient physical " therapy with a medical diagnosis of Primary OA left knee, and presents to PT with mild knee pain at present, valgus deformity with medial joint line pain, weakness in quads/hamstrings left versus right., mild lateral hip weakness.    Pt prognosis is good. Pt will continue to benefit from skilled outpatient physical therapy to address the deficits listed in the problem list chart on initial evaluation, provide pt/family education and to maximize pt's level of independence in the home and community environment.     Medical necessity is demonstrated by the impairments and functional limitations listed on the Initial Evaluation.     Anticipated barriers to physical therapy: none  Pt's spiritual, cultural and educational needs considered and pt agreeable to plan of care and goals.    Goals   Short Term Goals: 3 weeks  Pain: decrease pain to no more than 2/10 at most with daily activities.   Demonstrate compliance with initial exercise program     Long Term Goals: 6 weeks     Pain: Decrease pain to 0/10 to allow for improved ability to perform daily/recreational activities   Strength: Improve strength in bilateral Knee to 5/5 for improved LE stability  ROM:  Maintain full AROM in bilateral knees   Functional scale: Improve score on FOTO  to 28% limitation   Walking: Increase walking duration to 1/2 mile  without pain  Postures: Increase sitting and/or standing duration to > 1 hour  without pain   ADL's: able to perform usual household chores without pain/compensation   Exercise: demonstrate independence with home exercise program to maintain gains made in therapy.        Plan   Continue with established Plan of Care towards Physical Therapy goals.   Discussed Plan of Care with patient: Allison Harrellhan Favre, PTA  2/17/2022

## 2022-04-08 ENCOUNTER — TELEPHONE (OUTPATIENT)
Dept: UROLOGY | Facility: CLINIC | Age: 77
End: 2022-04-08
Payer: COMMERCIAL

## 2022-04-08 ENCOUNTER — PATIENT OUTREACH (OUTPATIENT)
Dept: ADMINISTRATIVE | Facility: OTHER | Age: 77
End: 2022-04-08
Payer: COMMERCIAL

## 2022-04-11 ENCOUNTER — OFFICE VISIT (OUTPATIENT)
Dept: UROLOGY | Facility: CLINIC | Age: 77
End: 2022-04-11
Payer: MEDICARE

## 2022-04-11 VITALS
BODY MASS INDEX: 24.7 KG/M2 | SYSTOLIC BLOOD PRESSURE: 130 MMHG | DIASTOLIC BLOOD PRESSURE: 58 MMHG | HEART RATE: 80 BPM | WEIGHT: 208.31 LBS

## 2022-04-11 DIAGNOSIS — N52.01 ERECTILE DYSFUNCTION DUE TO ARTERIAL INSUFFICIENCY: ICD-10-CM

## 2022-04-11 DIAGNOSIS — N40.1 BPH WITH OBSTRUCTION/LOWER URINARY TRACT SYMPTOMS: Primary | ICD-10-CM

## 2022-04-11 DIAGNOSIS — N13.8 BPH WITH OBSTRUCTION/LOWER URINARY TRACT SYMPTOMS: Primary | ICD-10-CM

## 2022-04-11 LAB
BILIRUB SERPL-MCNC: ABNORMAL MG/DL
BLOOD URINE, POC: ABNORMAL
CLARITY, POC UA: CLEAR
COLOR, POC UA: YELLOW
GLUCOSE UR QL STRIP: ABNORMAL
KETONES UR QL STRIP: ABNORMAL
LEUKOCYTE ESTERASE URINE, POC: ABNORMAL
NITRITE, POC UA: ABNORMAL
PH, POC UA: 5
PROTEIN, POC: ABNORMAL
SPECIFIC GRAVITY, POC UA: 1.02
UROBILINOGEN, POC UA: ABNORMAL

## 2022-04-11 PROCEDURE — 81002 POCT URINE DIPSTICK WITHOUT MICROSCOPE: ICD-10-PCS | Mod: S$GLB,,, | Performed by: NURSE PRACTITIONER

## 2022-04-11 PROCEDURE — 81002 URINALYSIS NONAUTO W/O SCOPE: CPT | Mod: S$GLB,,, | Performed by: NURSE PRACTITIONER

## 2022-04-11 PROCEDURE — 99213 OFFICE O/P EST LOW 20 MIN: CPT | Mod: S$GLB,,, | Performed by: NURSE PRACTITIONER

## 2022-04-11 PROCEDURE — 99213 PR OFFICE/OUTPT VISIT, EST, LEVL III, 20-29 MIN: ICD-10-PCS | Mod: S$GLB,,, | Performed by: NURSE PRACTITIONER

## 2022-04-11 RX ORDER — TAMSULOSIN HYDROCHLORIDE 0.4 MG/1
0.4 CAPSULE ORAL DAILY
Qty: 90 CAPSULE | Refills: 3 | Status: SHIPPED | OUTPATIENT
Start: 2022-04-11 | End: 2023-05-01 | Stop reason: SDUPTHER

## 2022-04-11 RX ORDER — SILDENAFIL 100 MG/1
100 TABLET, FILM COATED ORAL DAILY PRN
Qty: 30 TABLET | Refills: 11 | Status: SHIPPED | OUTPATIENT
Start: 2022-04-11 | End: 2023-12-28

## 2022-04-11 RX ORDER — FINASTERIDE 5 MG/1
5 TABLET, FILM COATED ORAL DAILY
Qty: 90 TABLET | Refills: 3 | Status: SHIPPED | OUTPATIENT
Start: 2022-04-11 | End: 2023-01-09

## 2022-04-11 NOTE — PROGRESS NOTES
Subjective:      Akil Guzman is a 76 y.o. male who returns today regarding his urinary symptoms and ED.      The patient has taken Proscar for years for his BPH. He presented to the clinic in February 2021 with slow stream, frequency and dribbling of urine.    Now on Flomax as well. This is working well. Denies adverse SE of the medication.     Denies a family history of prostate cancer.     Also with ED that is managed with viagra. Denies adverse SE. Not on nitro.     The following portions of the patient's history were reviewed and updated as appropriate: allergies, current medications, past family history, past medical history, past social history, past surgical history and problem list.    Review of Systems  Constitutional: no fever or chills  ENT: no nasal congestion or sore throat  Respiratory: no cough or shortness of breath  Cardiovascular: no chest pain or palpitations  Gastrointestinal: no nausea or vomiting, tolerating diet  Genitourinary: as per HPI  Hematologic/Lymphatic: no easy bruising or lymphadenopathy  Musculoskeletal: no arthralgias or myalgias  Neurological: no seizures or tremors  Behavioral/Psych: no auditory or visual hallucinations     Objective:   Vitals:   Vitals:    04/11/22 1426   BP: (!) 130/58   Pulse: 80     Physical Exam   General: alert and oriented, no acute distress  Head: normocephalic, atraumatic  Neck: supple, normal ROM  Respiratory: Symmetric expansion, non-labored breathing  Cardiovascular: regular rate and rhythm  Abdomen: soft, non tender, non distended  Genitourinary:   Prostate: normal for age, normal consistency, non tender, no specific nodules, size: 30 gm; seminal vesicles not palpated  Rectum: normal rectal tone, no rectal mass, normal perineum  Skin: normal coloration and turgor, no rashes, no suspicious skin lesions noted  Neuro: alert and oriented x3, no gross deficits  Psych: normal judgment and insight, normal mood/affect and non-anxious    Lab Review    Urinalysis demonstrates negative for all components  Lab Results   Component Value Date    WBC 3.95 11/02/2021    HGB 12.2 (L) 11/02/2021    HCT 36.1 (L) 11/02/2021    MCV 98 11/02/2021     11/02/2021     Lab Results   Component Value Date    CREATININE 1.4 11/02/2021    BUN 23 11/02/2021     Lab Results   Component Value Date    PSA 1.2 11/02/2021     Imaging   None    Assessment:   BPH with obstruction/lower urinary tract symptoms  Erectile dysfunction due to arterial insufficiency    Plan:   Akil was seen today for annual exam.    Diagnoses and all orders for this visit:    BPH with obstruction/lower urinary tract symptoms  -     POCT URINE DIPSTICK WITHOUT MICROSCOPE  -     tamsulosin (FLOMAX) 0.4 mg Cap; Take 1 capsule (0.4 mg total) by mouth once daily.  -     finasteride (PROSCAR) 5 mg tablet; Take 1 tablet (5 mg total) by mouth once daily.    Erectile dysfunction due to arterial insufficiency  -     sildenafiL (VIAGRA) 100 MG tablet; Take 1 tablet (100 mg total) by mouth daily as needed.    Plan:  --Continue flomax and proscar  --Viagra PRN  --Follow up annually for TAJ

## 2022-09-07 ENCOUNTER — OFFICE VISIT (OUTPATIENT)
Dept: FAMILY MEDICINE | Facility: CLINIC | Age: 77
End: 2022-09-07
Payer: MEDICARE

## 2022-09-07 ENCOUNTER — LAB VISIT (OUTPATIENT)
Dept: LAB | Facility: HOSPITAL | Age: 77
End: 2022-09-07
Attending: FAMILY MEDICINE
Payer: MEDICARE

## 2022-09-07 VITALS
HEIGHT: 76 IN | DIASTOLIC BLOOD PRESSURE: 72 MMHG | WEIGHT: 203 LBS | SYSTOLIC BLOOD PRESSURE: 110 MMHG | OXYGEN SATURATION: 97 % | HEART RATE: 68 BPM | RESPIRATION RATE: 18 BRPM | BODY MASS INDEX: 24.72 KG/M2

## 2022-09-07 DIAGNOSIS — Z01.818 PRE-OPERATIVE CLEARANCE: Primary | ICD-10-CM

## 2022-09-07 DIAGNOSIS — Z01.818 PRE-OPERATIVE CLEARANCE: ICD-10-CM

## 2022-09-07 LAB
ALBUMIN SERPL BCP-MCNC: 3.9 G/DL (ref 3.5–5.2)
ALP SERPL-CCNC: 69 U/L (ref 55–135)
ALT SERPL W/O P-5'-P-CCNC: 13 U/L (ref 10–44)
ANION GAP SERPL CALC-SCNC: 12 MMOL/L (ref 8–16)
AST SERPL-CCNC: 20 U/L (ref 10–40)
BASOPHILS # BLD AUTO: 0.04 K/UL (ref 0–0.2)
BASOPHILS NFR BLD: 0.8 % (ref 0–1.9)
BILIRUB SERPL-MCNC: 0.7 MG/DL (ref 0.1–1)
BUN SERPL-MCNC: 16 MG/DL (ref 8–23)
CALCIUM SERPL-MCNC: 9.8 MG/DL (ref 8.7–10.5)
CHLORIDE SERPL-SCNC: 102 MMOL/L (ref 95–110)
CO2 SERPL-SCNC: 25 MMOL/L (ref 23–29)
CREAT SERPL-MCNC: 1 MG/DL (ref 0.5–1.4)
DIFFERENTIAL METHOD: ABNORMAL
EOSINOPHIL # BLD AUTO: 0.2 K/UL (ref 0–0.5)
EOSINOPHIL NFR BLD: 3.3 % (ref 0–8)
ERYTHROCYTE [DISTWIDTH] IN BLOOD BY AUTOMATED COUNT: 13.2 % (ref 11.5–14.5)
EST. GFR  (NO RACE VARIABLE): >60 ML/MIN/1.73 M^2
GLUCOSE SERPL-MCNC: 106 MG/DL (ref 70–110)
HCT VFR BLD AUTO: 42.2 % (ref 40–54)
HGB BLD-MCNC: 14.4 G/DL (ref 14–18)
IMM GRANULOCYTES # BLD AUTO: 0.01 K/UL (ref 0–0.04)
IMM GRANULOCYTES NFR BLD AUTO: 0.2 % (ref 0–0.5)
LYMPHOCYTES # BLD AUTO: 1.4 K/UL (ref 1–4.8)
LYMPHOCYTES NFR BLD: 27.1 % (ref 18–48)
MCH RBC QN AUTO: 34 PG (ref 27–31)
MCHC RBC AUTO-ENTMCNC: 34.1 G/DL (ref 32–36)
MCV RBC AUTO: 100 FL (ref 82–98)
MONOCYTES # BLD AUTO: 0.7 K/UL (ref 0.3–1)
MONOCYTES NFR BLD: 12.9 % (ref 4–15)
NEUTROPHILS # BLD AUTO: 2.8 K/UL (ref 1.8–7.7)
NEUTROPHILS NFR BLD: 55.7 % (ref 38–73)
NRBC BLD-RTO: 0 /100 WBC
PLATELET # BLD AUTO: 217 K/UL (ref 150–450)
PMV BLD AUTO: 10.4 FL (ref 9.2–12.9)
POTASSIUM SERPL-SCNC: 4.6 MMOL/L (ref 3.5–5.1)
PROT SERPL-MCNC: 7.5 G/DL (ref 6–8.4)
RBC # BLD AUTO: 4.24 M/UL (ref 4.6–6.2)
SODIUM SERPL-SCNC: 139 MMOL/L (ref 136–145)
WBC # BLD AUTO: 5.1 K/UL (ref 3.9–12.7)

## 2022-09-07 PROCEDURE — 99213 OFFICE O/P EST LOW 20 MIN: CPT | Mod: PBBFAC,PN

## 2022-09-07 PROCEDURE — 99213 PR OFFICE/OUTPT VISIT, EST, LEVL III, 20-29 MIN: ICD-10-PCS | Mod: S$PBB,,,

## 2022-09-07 PROCEDURE — 80053 COMPREHEN METABOLIC PANEL: CPT

## 2022-09-07 PROCEDURE — 36415 COLL VENOUS BLD VENIPUNCTURE: CPT

## 2022-09-07 PROCEDURE — 85025 COMPLETE CBC W/AUTO DIFF WBC: CPT

## 2022-09-07 PROCEDURE — 93010 EKG 12-LEAD: ICD-10-PCS | Mod: S$PBB,,, | Performed by: INTERNAL MEDICINE

## 2022-09-07 PROCEDURE — 93010 ELECTROCARDIOGRAM REPORT: CPT | Mod: S$PBB,,, | Performed by: INTERNAL MEDICINE

## 2022-09-07 PROCEDURE — 99999 PR PBB SHADOW E&M-EST. PATIENT-LVL III: CPT | Mod: PBBFAC,,,

## 2022-09-07 PROCEDURE — 99213 OFFICE O/P EST LOW 20 MIN: CPT | Mod: S$PBB,,,

## 2022-09-07 PROCEDURE — 93005 ELECTROCARDIOGRAM TRACING: CPT | Mod: PBBFAC,PN | Performed by: INTERNAL MEDICINE

## 2022-09-07 PROCEDURE — 99999 PR PBB SHADOW E&M-EST. PATIENT-LVL III: ICD-10-PCS | Mod: PBBFAC,,,

## 2022-09-07 NOTE — PROGRESS NOTES
"[facility] - Surgical Clearance Note    Subjective:       Patient ID: Akil Guzman is a 77 y.o. male.    Chief Complaint: Procedure (Surgery clearance : Cataract Surgery- right eye by Dr. [place]) and surgery clearance      Akil Guzman is a 77 y.o. male who presents to the clinic today for surgical clearance. He is scheduled for Cataract extraction with intraocular lens placement to be performed by Dr. Valles under systemic on September 13. He has a history of HTN,  controlled wit diet, no longer on medications. Does not have any new complaints today. He stopped taking Tamsulosin 8/31/2022.    The patient has the following known anesthesia issues:  He reports feeling SOB after colonoscopy, went to ER and was told it was due to Asthma, never been diagnosed with Asthma. He reports surgeon is aware of this, instructed to let Anesthesiologist know am of surgery .     The patient does not take aspirin or aspirin containing products. The patient does not take herbal medications. The patient has not taken any prolonged courses of oral steroids in the past year. The patient reports good exercise tolerance.    Cardiographics: In office, sinus rochelle, otherwise NORMAL ekg    Imaging: n/a    Lab Review: reviewed, all within acceptable range    Review of Systems   Constitutional:  Negative for chills and fever.   HENT:  Negative for congestion.    Respiratory:  Negative for cough and shortness of breath.    Cardiovascular:  Negative for chest pain and leg swelling.   Gastrointestinal:  Negative for abdominal pain, constipation, diarrhea, nausea and vomiting.   Genitourinary:  Negative for difficulty urinating.   Neurological:  Negative for dizziness and headaches.   All other systems reviewed and are negative.        Objective:      /72 (BP Location: Left arm, Patient Position: Sitting, BP Method: Large (Manual))   Pulse 68   Resp 18   Ht 6' 4" (1.93 m)   Wt 92.1 kg (203 lb)   SpO2 97%  "  BMI 24.71 kg/m²   Physical Exam  Vitals and nursing note reviewed.   Constitutional:       General: He is not in acute distress.     Appearance: Normal appearance. He is normal weight. He is not ill-appearing.   HENT:      Head: Normocephalic and atraumatic.      Nose: Nose normal.   Cardiovascular:      Rate and Rhythm: Normal rate and regular rhythm.      Heart sounds: Normal heart sounds.   Pulmonary:      Effort: Pulmonary effort is normal.      Breath sounds: Normal breath sounds.   Abdominal:      General: Abdomen is flat.   Musculoskeletal:         General: Normal range of motion.      Cervical back: Normal range of motion.   Skin:     General: Skin is warm and dry.   Neurological:      Mental Status: He is alert and oriented to person, place, and time.   Psychiatric:         Mood and Affect: Mood normal.         Behavior: Behavior normal.         Thought Content: Thought content normal.         Judgment: Judgment normal.       Assessment and Plan:       Akil was seen today for procedure and surgery clearance.    Diagnoses and all orders for this visit:    Pre-operative clearance  -     CBC Auto Differential; Future  -     Comprehensive Metabolic Panel; Future  -     IN OFFICE EKG 12-LEAD (to Muse)      Known risk factors for perioperative complications: None    Difficulty with intubation is not anticipated.  Current medications which may produce withdrawal symptoms if withheld perioperatively: None  Recommend routine perioperative GI prophylaxis.  Recommend routine perioperative DVT prophylaxis - regimen to be chosen by surgical team.    After performing a physical exam, and reviewing his blood work and EKG, Akil Guzman is considered low risk for surgery and is cleared for surgery.    Thank you,  CLEOPATRA Solomon  Family Medicine [facility]

## 2022-10-06 ENCOUNTER — OFFICE VISIT (OUTPATIENT)
Dept: FAMILY MEDICINE | Facility: CLINIC | Age: 77
End: 2022-10-06
Payer: MEDICARE

## 2022-10-06 VITALS
SYSTOLIC BLOOD PRESSURE: 124 MMHG | OXYGEN SATURATION: 97 % | WEIGHT: 201.81 LBS | BODY MASS INDEX: 24.56 KG/M2 | DIASTOLIC BLOOD PRESSURE: 82 MMHG | HEART RATE: 88 BPM

## 2022-10-06 DIAGNOSIS — I10 HYPERTENSION, UNSPECIFIED TYPE: ICD-10-CM

## 2022-10-06 DIAGNOSIS — H26.9 CATARACT, UNSPECIFIED CATARACT TYPE, UNSPECIFIED LATERALITY: ICD-10-CM

## 2022-10-06 DIAGNOSIS — Z01.818 PRE-OPERATIVE CLEARANCE: Primary | ICD-10-CM

## 2022-10-06 PROCEDURE — 99999 PR PBB SHADOW E&M-EST. PATIENT-LVL III: ICD-10-PCS | Mod: PBBFAC,,,

## 2022-10-06 PROCEDURE — 99213 PR OFFICE/OUTPT VISIT, EST, LEVL III, 20-29 MIN: ICD-10-PCS | Mod: S$PBB,,,

## 2022-10-06 PROCEDURE — 99999 PR PBB SHADOW E&M-EST. PATIENT-LVL III: CPT | Mod: PBBFAC,,,

## 2022-10-06 PROCEDURE — 99213 OFFICE O/P EST LOW 20 MIN: CPT | Mod: PBBFAC,PN

## 2022-10-06 PROCEDURE — 99213 OFFICE O/P EST LOW 20 MIN: CPT | Mod: S$PBB,,,

## 2022-10-06 NOTE — PROGRESS NOTES
[facility] - Surgical Clearance Note    Subjective:       Patient ID: Akil Guzman is a 77 y.o. male.    Chief Complaint: Procedure (Surgery clearance : Cataract Surgery- right eye by Dr. Valles- [facility]) and surgery clearance      Akil Guzman is a 77 y.o. male who presents to the clinic today for surgical clearance. He is scheduled for Cataract extraction with intraocular lens placement to be performed by Dr. Valles under systemic on October 31. He has a history of HTN,  controlled wit diet, no longer on medications. Does not have any new complaints today. Instructed to stop Flowmax 2 weeks prior to procedure.    The patient has the following known anesthesia issues:  He reports feeling SOB after colonoscopy, went to ER and was told it was due to Asthma, never been diagnosed with Asthma. He reports surgeon is aware of this, instructed to let Anesthesiologist know am of surgery .     The patient does not take aspirin or aspirin containing products. The patient does not take herbal medications. The patient has not taken any prolonged courses of oral steroids in the past year. The patient reports good exercise tolerance.    Cardiographics: In office, sinus rochelle, otherwise NORMAL ekg    Imaging: n/a    Lab Review: reviewed, all within acceptable range    Review of Systems   Constitutional:  Negative for chills and fever.   HENT:  Negative for congestion.    Respiratory:  Negative for cough and shortness of breath.    Cardiovascular:  Negative for chest pain and leg swelling.   Gastrointestinal:  Negative for abdominal pain, constipation, diarrhea, nausea and vomiting.   Genitourinary:  Negative for difficulty urinating.   Neurological:  Negative for dizziness and headaches.   All other systems reviewed and are negative.        Objective:      /82 (BP Location: Left arm, Patient Position: Sitting, BP Method: Medium (Manual))   Pulse 88   Wt 91.5 kg (201 lb 12.8 oz)   SpO2 97%   BMI  24.56 kg/m²   Physical Exam  Vitals and nursing note reviewed.   Constitutional:       General: He is not in acute distress.     Appearance: Normal appearance. He is normal weight. He is not ill-appearing.   HENT:      Head: Normocephalic and atraumatic.      Nose: Nose normal.   Cardiovascular:      Rate and Rhythm: Normal rate and regular rhythm.      Heart sounds: Normal heart sounds.   Pulmonary:      Effort: Pulmonary effort is normal.      Breath sounds: Normal breath sounds.   Abdominal:      General: Abdomen is flat.   Musculoskeletal:         General: Normal range of motion.      Cervical back: Normal range of motion.   Skin:     General: Skin is warm and dry.   Neurological:      Mental Status: He is alert and oriented to person, place, and time.   Psychiatric:         Mood and Affect: Mood normal.         Behavior: Behavior normal.         Thought Content: Thought content normal.         Judgment: Judgment normal.       Assessment and Plan:       Akil was seen today for cataract surgery clearance.    Diagnoses and all orders for this visit:    Pre-operative clearance    Hypertension, unspecified type    Cataract, unspecified cataract type, unspecified laterality        Known risk factors for perioperative complications: None    Difficulty with intubation is not anticipated.  Current medications which may produce withdrawal symptoms if withheld perioperatively: None  Recommend routine perioperative GI prophylaxis.  Recommend routine perioperative DVT prophylaxis - regimen to be chosen by surgical team.    After performing a physical exam, and reviewing his blood work and EKG, Akil Guzman is considered low risk for surgery and is cleared for surgery.    Thank you,  Andree Zaidi, MARTYP-C  Family Medicine  [facility]

## 2023-05-01 ENCOUNTER — PATIENT MESSAGE (OUTPATIENT)
Dept: UROLOGY | Facility: CLINIC | Age: 78
End: 2023-05-01
Payer: MEDICARE

## 2023-05-01 DIAGNOSIS — N13.8 BPH WITH OBSTRUCTION/LOWER URINARY TRACT SYMPTOMS: ICD-10-CM

## 2023-05-01 DIAGNOSIS — N40.1 BPH WITH OBSTRUCTION/LOWER URINARY TRACT SYMPTOMS: ICD-10-CM

## 2023-05-01 RX ORDER — TAMSULOSIN HYDROCHLORIDE 0.4 MG/1
0.4 CAPSULE ORAL DAILY
Qty: 90 CAPSULE | Refills: 3 | Status: SHIPPED | OUTPATIENT
Start: 2023-05-01 | End: 2024-04-30

## 2023-05-18 ENCOUNTER — PATIENT OUTREACH (OUTPATIENT)
Dept: ADMINISTRATIVE | Facility: HOSPITAL | Age: 78
End: 2023-05-18
Payer: MEDICARE

## 2023-05-18 ENCOUNTER — PATIENT MESSAGE (OUTPATIENT)
Dept: ADMINISTRATIVE | Facility: HOSPITAL | Age: 78
End: 2023-05-18
Payer: MEDICARE

## 2023-05-18 NOTE — PROGRESS NOTES
Non-compliant report chart audits-Attributed Patients Not Seen PCP in 12 Months. Chart review completed for HM test overdue (mammograms, Colonoscopies, pap smears, DM labs, and/or EYE EXAMs)     RE:  Needs to schedule appointment with PCP.    Attempted to call patient, could not get through.  Kept getting a fast busy signal.    Message sent to patient's portal.

## 2023-06-19 ENCOUNTER — TELEPHONE (OUTPATIENT)
Dept: FAMILY MEDICINE | Facility: CLINIC | Age: 78
End: 2023-06-19
Payer: MEDICARE

## 2023-06-19 ENCOUNTER — PATIENT MESSAGE (OUTPATIENT)
Dept: FAMILY MEDICINE | Facility: CLINIC | Age: 78
End: 2023-06-19
Payer: MEDICARE

## 2023-06-19 DIAGNOSIS — E78.41 ELEVATED LIPOPROTEIN(A): ICD-10-CM

## 2023-06-19 DIAGNOSIS — I10 PRIMARY HYPERTENSION: Primary | ICD-10-CM

## 2023-06-19 DIAGNOSIS — Z12.5 SCREENING FOR MALIGNANT NEOPLASM OF PROSTATE: ICD-10-CM

## 2023-06-21 ENCOUNTER — LAB VISIT (OUTPATIENT)
Dept: LAB | Facility: HOSPITAL | Age: 78
End: 2023-06-21
Attending: STUDENT IN AN ORGANIZED HEALTH CARE EDUCATION/TRAINING PROGRAM
Payer: MEDICARE

## 2023-06-21 DIAGNOSIS — Z12.5 SCREENING FOR MALIGNANT NEOPLASM OF PROSTATE: ICD-10-CM

## 2023-06-21 DIAGNOSIS — I10 PRIMARY HYPERTENSION: ICD-10-CM

## 2023-06-21 DIAGNOSIS — E78.41 ELEVATED LIPOPROTEIN(A): ICD-10-CM

## 2023-06-21 LAB
ALBUMIN SERPL BCP-MCNC: 3.6 G/DL (ref 3.5–5.2)
ALP SERPL-CCNC: 89 U/L (ref 55–135)
ALT SERPL W/O P-5'-P-CCNC: 14 U/L (ref 10–44)
ANION GAP SERPL CALC-SCNC: 10 MMOL/L (ref 8–16)
AST SERPL-CCNC: 16 U/L (ref 10–40)
BASOPHILS # BLD AUTO: 0.04 K/UL (ref 0–0.2)
BASOPHILS NFR BLD: 0.3 % (ref 0–1.9)
BILIRUB SERPL-MCNC: 0.6 MG/DL (ref 0.1–1)
BUN SERPL-MCNC: 13 MG/DL (ref 8–23)
CALCIUM SERPL-MCNC: 9.9 MG/DL (ref 8.7–10.5)
CHLORIDE SERPL-SCNC: 102 MMOL/L (ref 95–110)
CHOLEST SERPL-MCNC: 163 MG/DL (ref 120–199)
CHOLEST/HDLC SERPL: 3.1 {RATIO} (ref 2–5)
CO2 SERPL-SCNC: 24 MMOL/L (ref 23–29)
COMPLEXED PSA SERPL-MCNC: 1 NG/ML (ref 0–4)
CREAT SERPL-MCNC: 1 MG/DL (ref 0.5–1.4)
DIFFERENTIAL METHOD: ABNORMAL
EOSINOPHIL # BLD AUTO: 0.2 K/UL (ref 0–0.5)
EOSINOPHIL NFR BLD: 1.6 % (ref 0–8)
ERYTHROCYTE [DISTWIDTH] IN BLOOD BY AUTOMATED COUNT: 12.2 % (ref 11.5–14.5)
EST. GFR  (NO RACE VARIABLE): >60 ML/MIN/1.73 M^2
GLUCOSE SERPL-MCNC: 110 MG/DL (ref 70–110)
HCT VFR BLD AUTO: 41.2 % (ref 40–54)
HDLC SERPL-MCNC: 52 MG/DL (ref 40–75)
HDLC SERPL: 31.9 % (ref 20–50)
HGB BLD-MCNC: 13.9 G/DL (ref 14–18)
IMM GRANULOCYTES # BLD AUTO: 0.03 K/UL (ref 0–0.04)
IMM GRANULOCYTES NFR BLD AUTO: 0.3 % (ref 0–0.5)
LDLC SERPL CALC-MCNC: 94 MG/DL (ref 63–159)
LYMPHOCYTES # BLD AUTO: 1.9 K/UL (ref 1–4.8)
LYMPHOCYTES NFR BLD: 16.2 % (ref 18–48)
MCH RBC QN AUTO: 33.5 PG (ref 27–31)
MCHC RBC AUTO-ENTMCNC: 33.7 G/DL (ref 32–36)
MCV RBC AUTO: 99 FL (ref 82–98)
MONOCYTES # BLD AUTO: 1 K/UL (ref 0.3–1)
MONOCYTES NFR BLD: 8.5 % (ref 4–15)
NEUTROPHILS # BLD AUTO: 8.4 K/UL (ref 1.8–7.7)
NEUTROPHILS NFR BLD: 73.1 % (ref 38–73)
NONHDLC SERPL-MCNC: 111 MG/DL
NRBC BLD-RTO: 0 /100 WBC
PLATELET # BLD AUTO: 279 K/UL (ref 150–450)
PMV BLD AUTO: 8.9 FL (ref 9.2–12.9)
POTASSIUM SERPL-SCNC: 4.3 MMOL/L (ref 3.5–5.1)
PROT SERPL-MCNC: 7.4 G/DL (ref 6–8.4)
RBC # BLD AUTO: 4.15 M/UL (ref 4.6–6.2)
SODIUM SERPL-SCNC: 136 MMOL/L (ref 136–145)
TRIGL SERPL-MCNC: 85 MG/DL (ref 30–150)
TSH SERPL DL<=0.005 MIU/L-ACNC: 1.55 UIU/ML (ref 0.4–4)
WBC # BLD AUTO: 11.54 K/UL (ref 3.9–12.7)

## 2023-06-21 PROCEDURE — 80061 LIPID PANEL: CPT | Performed by: STUDENT IN AN ORGANIZED HEALTH CARE EDUCATION/TRAINING PROGRAM

## 2023-06-21 PROCEDURE — 84443 ASSAY THYROID STIM HORMONE: CPT | Performed by: STUDENT IN AN ORGANIZED HEALTH CARE EDUCATION/TRAINING PROGRAM

## 2023-06-21 PROCEDURE — 84153 ASSAY OF PSA TOTAL: CPT | Performed by: STUDENT IN AN ORGANIZED HEALTH CARE EDUCATION/TRAINING PROGRAM

## 2023-06-21 PROCEDURE — 85025 COMPLETE CBC W/AUTO DIFF WBC: CPT | Performed by: STUDENT IN AN ORGANIZED HEALTH CARE EDUCATION/TRAINING PROGRAM

## 2023-06-21 PROCEDURE — 80053 COMPREHEN METABOLIC PANEL: CPT | Performed by: STUDENT IN AN ORGANIZED HEALTH CARE EDUCATION/TRAINING PROGRAM

## 2023-06-21 PROCEDURE — 36415 COLL VENOUS BLD VENIPUNCTURE: CPT | Performed by: STUDENT IN AN ORGANIZED HEALTH CARE EDUCATION/TRAINING PROGRAM

## 2023-06-22 ENCOUNTER — OFFICE VISIT (OUTPATIENT)
Dept: FAMILY MEDICINE | Facility: CLINIC | Age: 78
End: 2023-06-22
Payer: MEDICARE

## 2023-06-22 VITALS
OXYGEN SATURATION: 98 % | HEART RATE: 75 BPM | WEIGHT: 192.81 LBS | HEIGHT: 76 IN | RESPIRATION RATE: 18 BRPM | SYSTOLIC BLOOD PRESSURE: 138 MMHG | BODY MASS INDEX: 23.48 KG/M2 | DIASTOLIC BLOOD PRESSURE: 70 MMHG

## 2023-06-22 DIAGNOSIS — E53.8 B12 DEFICIENCY: ICD-10-CM

## 2023-06-22 DIAGNOSIS — R10.9 FLANK PAIN: ICD-10-CM

## 2023-06-22 DIAGNOSIS — R53.83 FATIGUE, UNSPECIFIED TYPE: ICD-10-CM

## 2023-06-22 DIAGNOSIS — D75.89 MACROCYTOSIS: Primary | ICD-10-CM

## 2023-06-22 LAB
BILIRUB UR QL STRIP: NEGATIVE
CLARITY UR: CLEAR
COLOR UR: YELLOW
GLUCOSE UR QL STRIP: NEGATIVE
HGB UR QL STRIP: ABNORMAL
KETONES UR QL STRIP: NEGATIVE
LEUKOCYTE ESTERASE UR QL STRIP: NEGATIVE
NITRITE UR QL STRIP: NEGATIVE
PH UR STRIP: 6 [PH] (ref 5–8)
PROT UR QL STRIP: NEGATIVE
SP GR UR STRIP: 1.02 (ref 1–1.03)
URN SPEC COLLECT METH UR: ABNORMAL
UROBILINOGEN UR STRIP-ACNC: 1 EU/DL

## 2023-06-22 PROCEDURE — 99999 PR PBB SHADOW E&M-EST. PATIENT-LVL III: ICD-10-PCS | Mod: PBBFAC,,, | Performed by: FAMILY MEDICINE

## 2023-06-22 PROCEDURE — 99999 PR PBB SHADOW E&M-EST. PATIENT-LVL III: CPT | Mod: PBBFAC,,, | Performed by: FAMILY MEDICINE

## 2023-06-22 PROCEDURE — 99213 OFFICE O/P EST LOW 20 MIN: CPT | Mod: PBBFAC,PN | Performed by: FAMILY MEDICINE

## 2023-06-22 PROCEDURE — 81003 URINALYSIS AUTO W/O SCOPE: CPT | Performed by: FAMILY MEDICINE

## 2023-06-22 PROCEDURE — 99214 PR OFFICE/OUTPT VISIT, EST, LEVL IV, 30-39 MIN: ICD-10-PCS | Mod: S$PBB,,, | Performed by: FAMILY MEDICINE

## 2023-06-22 PROCEDURE — 99214 OFFICE O/P EST MOD 30 MIN: CPT | Mod: S$PBB,,, | Performed by: FAMILY MEDICINE

## 2023-06-22 NOTE — PATIENT INSTRUCTIONS
B12- 1000mcg daily x 3 months.     Thank you for allowing me to participate in your care today. It is an honor to be a part of your healthcare team at [facility]. If you had labs ordered today, you will receive notification via openPeople, phone call or mailed letter regarding your results within 7 days. If you have any questions or concerns regarding your visit today, please do not hesitate to contact us.  Sincerely,   Nola Ervin M.D.

## 2023-06-22 NOTE — PROGRESS NOTES
Subjective:       Patient ID: Akil Guzman is a 77 y.o. male.    Chief Complaint: Flank Pain (Left side - pain has been noticeable for 2 weeks.//Wants to also discuss blood work that was drawn on 06/21/2023) and Fatigue (Has been feeling very tired - has to lay down frequently. Noticed this happening 2 weeks ago.)    Left flank pain started about 10 days ago. He tells me that on 6/9 he drove to [place], he stayed the day and then drove back on the 11th. Around the 12th or 13th he started with a dull aching pain in the left flank. It lasted until probably yesterday and it is starting to improve. He can still feel it but it is not as severe as it was. Did not notice any changes to his urine. The only other time he has had a similar pain has been when he is constipated. He hardly drank any water on that weekend.     He is having some constipation. He did not want to do anything for the constipation until he saw someone. Last bowel movement was 2 days ago and was hard and pellet like.     In the past for constipation he has been able to resolve it with increased water intake.     In addition, Mr. Guzman notes that he has had a significant amount of fatigue that starts early afternoon and he has to lay down for a couple of hours and then he is better for the rest of the afternoon.     Review of Systems   Constitutional:  Negative for activity change, appetite change, fatigue and fever.   Respiratory:  Negative for shortness of breath.    Gastrointestinal:  Negative for abdominal pain.   Integumentary:  Negative for rash.       Objective:      Physical Exam  Vitals and nursing note reviewed.   Constitutional:       General: He is not in acute distress.     Appearance: He is not ill-appearing.   Cardiovascular:      Rate and Rhythm: Normal rate and regular rhythm.      Heart sounds: No murmur heard.  Pulmonary:      Effort: Pulmonary effort is normal.      Breath sounds: Normal breath sounds. No wheezing.   Abdominal:       General: There is no distension.      Palpations: Abdomen is soft.      Tenderness: There is no abdominal tenderness. There is no right CVA tenderness, left CVA tenderness or guarding.   Skin:     General: Skin is warm and dry.      Findings: No rash.   Neurological:      Mental Status: He is alert.   Psychiatric:         Mood and Affect: Mood normal.         Behavior: Behavior normal.       Assessment:       1. Macrocytosis    2. B12 deficiency    3. Fatigue, unspecified type    4. Flank pain        Plan:       Problem List Items Addressed This Visit    None  Visit Diagnoses       Macrocytosis    -  Primary    Relevant Orders    CBC Auto Differential    B12 deficiency        Relevant Orders    Vitamin B12    Fatigue, unspecified type        Flank pain        Relevant Orders    Urinalysis, Reflex to Urine Culture Urine, Clean Catch

## 2023-07-06 ENCOUNTER — OFFICE VISIT (OUTPATIENT)
Dept: UROLOGY | Facility: CLINIC | Age: 78
End: 2023-07-06
Payer: MEDICARE

## 2023-07-06 VITALS
HEIGHT: 76 IN | DIASTOLIC BLOOD PRESSURE: 65 MMHG | SYSTOLIC BLOOD PRESSURE: 146 MMHG | WEIGHT: 197.06 LBS | BODY MASS INDEX: 24 KG/M2 | HEART RATE: 60 BPM

## 2023-07-06 DIAGNOSIS — N40.1 BPH WITH OBSTRUCTION/LOWER URINARY TRACT SYMPTOMS: ICD-10-CM

## 2023-07-06 DIAGNOSIS — R10.9 LEFT FLANK PAIN: Primary | ICD-10-CM

## 2023-07-06 DIAGNOSIS — N13.8 BPH WITH OBSTRUCTION/LOWER URINARY TRACT SYMPTOMS: ICD-10-CM

## 2023-07-06 LAB
MICROSCOPIC COMMENT: NORMAL
RBC #/AREA URNS AUTO: 2 /HPF (ref 0–4)
SQUAMOUS #/AREA URNS AUTO: 0 /HPF
WBC #/AREA URNS AUTO: 0 /HPF (ref 0–5)

## 2023-07-06 PROCEDURE — 81001 URINALYSIS AUTO W/SCOPE: CPT | Performed by: NURSE PRACTITIONER

## 2023-07-06 PROCEDURE — 87086 URINE CULTURE/COLONY COUNT: CPT | Performed by: NURSE PRACTITIONER

## 2023-07-06 PROCEDURE — 99213 OFFICE O/P EST LOW 20 MIN: CPT | Mod: S$GLB,,, | Performed by: NURSE PRACTITIONER

## 2023-07-06 PROCEDURE — 99213 PR OFFICE/OUTPT VISIT, EST, LEVL III, 20-29 MIN: ICD-10-PCS | Mod: S$GLB,,, | Performed by: NURSE PRACTITIONER

## 2023-07-06 NOTE — PROGRESS NOTES
"Subjective:      Akil Guzman is a 78 y.o. male who returns today regarding his enlarged prostate and left flank pain.     Remains on flomax and finasteride. Urinary symptoms are well controlled.    Component      Latest Ref Rng & Units 6/21/2023   PSA, Screen      0.00 - 4.00 ng/mL 1.0     Today he reports intermittent left flank/abdominal pain that he believes is 2/2 constipation. Recently began miralax.   UA last month demonstrated trace RBCs    Denies dysuria, fever/chills, N/V and gross hematuria. Denies flank pain today.     The following portions of the patient's history were reviewed and updated as appropriate: allergies, current medications, past family history, past medical history, past social history, past surgical history and problem list.    Review of Systems  Constitutional: no fever or chills  ENT: no nasal congestion or sore throat  Respiratory: no cough or shortness of breath  Cardiovascular: no chest pain or palpitations  Gastrointestinal: no nausea or vomiting, tolerating diet  Genitourinary: as per HPI  Hematologic/Lymphatic: no easy bruising or lymphadenopathy  Musculoskeletal: no arthralgias or myalgias  Neurological: no seizures or tremors  Behavioral/Psych: no auditory or visual hallucinations     Objective:   Vitals: BP (!) 146/65 (BP Location: Right arm, Patient Position: Sitting, BP Method: Medium (Automatic))   Pulse 60   Ht 6' 4" (1.93 m)   Wt 89.4 kg (197 lb 1.5 oz)   BMI 23.99 kg/m²     Physical Exam   General: alert and oriented, no acute distress  Head: normocephalic, atraumatic  Neck: supple, normal ROM  Respiratory: Symmetric expansion, non-labored breathing  Cardiovascular: regular rate and rhythm  Abdomen: soft, non tender, non distended  Genitourinary:   Prostate:  non tender, no specific nodules, size: 30 gm; seminal vesicles not palpated  Rectum: normal rectal tone, no rectal mass, normal perineum   Skin: normal coloration and turgor, no rashes, no suspicious skin " lesions noted  Neuro: alert and oriented x3, no gross deficits  Psych: normal judgment and insight, normal mood/affect, and non-anxious    Lab Review   Urinalysis demonstrates negative for all components  Lab Results   Component Value Date    WBC 11.54 06/21/2023    HGB 13.9 (L) 06/21/2023    HCT 41.2 06/21/2023    MCV 99 (H) 06/21/2023     06/21/2023     Lab Results   Component Value Date    CREATININE 1.0 06/21/2023    BUN 13 06/21/2023     Lab Results   Component Value Date    PSA 1.0 06/21/2023     Imaging  None    Assessment:     1. Left flank pain    2. BPH with obstruction/lower urinary tract symptoms      Plan:   Akil was seen today for follow-up.    Diagnoses and all orders for this visit:    Left flank pain  -     Urine culture  -     Urinalysis Microscopic    BPH with obstruction/lower urinary tract symptoms    Plan:  --Continue flomax and finasteride  --Micro UA and culture today  --Discussed the AUA guidelines for microhematuria. Will proceed with workup if indicated  --If urine is negative for RBCs will proceed with RODNEY to r/o stones or hydro given L flank pain

## 2023-07-07 DIAGNOSIS — R10.9 LEFT FLANK PAIN: Primary | ICD-10-CM

## 2023-07-07 LAB — BACTERIA UR CULT: NO GROWTH

## 2023-07-11 ENCOUNTER — HOSPITAL ENCOUNTER (OUTPATIENT)
Dept: RADIOLOGY | Facility: HOSPITAL | Age: 78
Discharge: HOME | End: 2023-07-11
Attending: NURSE PRACTITIONER
Payer: MEDICARE

## 2023-07-11 DIAGNOSIS — R10.9 LEFT FLANK PAIN: ICD-10-CM

## 2023-07-11 PROCEDURE — 76770 US EXAM ABDO BACK WALL COMP: CPT | Mod: 26,,, | Performed by: RADIOLOGY

## 2023-07-11 PROCEDURE — 76770 US RETROPERITONEAL COMPLETE: ICD-10-PCS | Mod: 26,,, | Performed by: RADIOLOGY

## 2023-07-11 PROCEDURE — 76770 US EXAM ABDO BACK WALL COMP: CPT | Mod: TC

## 2023-07-13 ENCOUNTER — PATIENT MESSAGE (OUTPATIENT)
Dept: FAMILY MEDICINE | Facility: CLINIC | Age: 78
End: 2023-07-13
Payer: MEDICARE

## 2023-07-13 DIAGNOSIS — M25.50 ARTHRALGIA, UNSPECIFIED JOINT: Primary | ICD-10-CM

## 2023-07-18 ENCOUNTER — HOSPITAL ENCOUNTER (OUTPATIENT)
Dept: RADIOLOGY | Facility: HOSPITAL | Age: 78
Discharge: HOME | End: 2023-07-18
Attending: FAMILY MEDICINE
Payer: MEDICARE

## 2023-07-18 ENCOUNTER — TELEPHONE (OUTPATIENT)
Dept: FAMILY MEDICINE | Facility: CLINIC | Age: 78
End: 2023-07-18
Payer: MEDICARE

## 2023-07-18 DIAGNOSIS — M25.50 ARTHRALGIA, UNSPECIFIED JOINT: ICD-10-CM

## 2023-07-18 PROCEDURE — 72110 X-RAY EXAM L-2 SPINE 4/>VWS: CPT | Mod: TC,PN

## 2023-07-18 PROCEDURE — 71046 X-RAY EXAM CHEST 2 VIEWS: CPT | Mod: 26,,, | Performed by: RADIOLOGY

## 2023-07-18 PROCEDURE — 73521 XR HIPS BILATERAL 2 VIEW INCL AP PELVIS: ICD-10-PCS | Mod: 26,,, | Performed by: RADIOLOGY

## 2023-07-18 PROCEDURE — 71046 X-RAY EXAM CHEST 2 VIEWS: CPT | Mod: TC,PN

## 2023-07-18 PROCEDURE — 73521 X-RAY EXAM HIPS BI 2 VIEWS: CPT | Mod: TC,PN

## 2023-07-18 PROCEDURE — 72110 XR LUMBAR SPINE COMPLETE 5 VIEW: ICD-10-PCS | Mod: 26,,, | Performed by: RADIOLOGY

## 2023-07-18 PROCEDURE — 72110 X-RAY EXAM L-2 SPINE 4/>VWS: CPT | Mod: 26,,, | Performed by: RADIOLOGY

## 2023-07-18 PROCEDURE — 71046 XR CHEST PA AND LATERAL: ICD-10-PCS | Mod: 26,,, | Performed by: RADIOLOGY

## 2023-07-18 PROCEDURE — 73521 X-RAY EXAM HIPS BI 2 VIEWS: CPT | Mod: 26,,, | Performed by: RADIOLOGY

## 2023-07-18 RX ORDER — LEVOFLOXACIN 750 MG/1
750 TABLET ORAL DAILY
Qty: 5 TABLET | Refills: 0 | Status: SHIPPED | OUTPATIENT
Start: 2023-07-18 | End: 2023-08-09 | Stop reason: SDUPTHER

## 2023-07-18 NOTE — TELEPHONE ENCOUNTER
----- Message from Iftikhar Hurley MD sent at 7/18/2023 12:25 PM CDT -----  It looks like he is developing an pneumonia, I am going to send in some abx.

## 2023-07-18 NOTE — TELEPHONE ENCOUNTER
LVM notifying pt of new order. Told him to call with further questions or concerns. Pt active in portal. Message sent.

## 2023-07-19 ENCOUNTER — PATIENT MESSAGE (OUTPATIENT)
Dept: FAMILY MEDICINE | Facility: CLINIC | Age: 78
End: 2023-07-19
Payer: MEDICARE

## 2023-07-27 ENCOUNTER — TELEPHONE (OUTPATIENT)
Dept: SPORTS MEDICINE | Facility: CLINIC | Age: 78
End: 2023-07-27
Payer: MEDICARE

## 2023-07-27 ENCOUNTER — PATIENT MESSAGE (OUTPATIENT)
Dept: SPORTS MEDICINE | Facility: CLINIC | Age: 78
End: 2023-07-27
Payer: MEDICARE

## 2023-07-27 NOTE — TELEPHONE ENCOUNTER
Left message letting patient know Dr Schultz does not treat back pain. [facility] scheduling number. Also, let patient know I will be canceling the appointment with Dr Schultz. Will also try to send portal message.

## 2023-08-02 ENCOUNTER — OFFICE VISIT (OUTPATIENT)
Dept: PAIN MEDICINE | Facility: CLINIC | Age: 78
End: 2023-08-02
Payer: MEDICARE

## 2023-08-02 VITALS
BODY MASS INDEX: 23.35 KG/M2 | WEIGHT: 191.81 LBS | SYSTOLIC BLOOD PRESSURE: 125 MMHG | DIASTOLIC BLOOD PRESSURE: 62 MMHG | HEART RATE: 68 BPM

## 2023-08-02 DIAGNOSIS — M41.9 SCOLIOSIS, UNSPECIFIED SCOLIOSIS TYPE, UNSPECIFIED SPINAL REGION: ICD-10-CM

## 2023-08-02 DIAGNOSIS — M47.816 LUMBAR SPONDYLOSIS: Primary | ICD-10-CM

## 2023-08-02 DIAGNOSIS — M54.50 ACUTE LEFT-SIDED LOW BACK PAIN WITHOUT SCIATICA: ICD-10-CM

## 2023-08-02 PROCEDURE — 99203 OFFICE O/P NEW LOW 30 MIN: CPT | Mod: S$PBB,,, | Performed by: PHYSICIAN ASSISTANT

## 2023-08-02 PROCEDURE — 99203 PR OFFICE/OUTPT VISIT, NEW, LEVL III, 30-44 MIN: ICD-10-PCS | Mod: S$PBB,,, | Performed by: PHYSICIAN ASSISTANT

## 2023-08-02 PROCEDURE — 99999 PR PBB SHADOW E&M-EST. PATIENT-LVL III: CPT | Mod: PBBFAC,,, | Performed by: PHYSICIAN ASSISTANT

## 2023-08-02 PROCEDURE — 99213 OFFICE O/P EST LOW 20 MIN: CPT | Mod: PBBFAC,PN | Performed by: PHYSICIAN ASSISTANT

## 2023-08-02 PROCEDURE — 99999 PR PBB SHADOW E&M-EST. PATIENT-LVL III: ICD-10-PCS | Mod: PBBFAC,,, | Performed by: PHYSICIAN ASSISTANT

## 2023-08-02 RX ORDER — TIZANIDINE 4 MG/1
4 TABLET ORAL EVERY 12 HOURS PRN
Qty: 40 TABLET | Refills: 0 | Status: SHIPPED | OUTPATIENT
Start: 2023-08-02

## 2023-08-03 ENCOUNTER — OFFICE VISIT (OUTPATIENT)
Dept: FAMILY MEDICINE | Facility: CLINIC | Age: 78
End: 2023-08-03
Payer: MEDICARE

## 2023-08-03 ENCOUNTER — HOSPITAL ENCOUNTER (OUTPATIENT)
Dept: RADIOLOGY | Facility: HOSPITAL | Age: 78
Discharge: HOME | End: 2023-08-03
Attending: FAMILY MEDICINE
Payer: MEDICARE

## 2023-08-03 ENCOUNTER — CLINICAL SUPPORT (OUTPATIENT)
Dept: REHABILITATION | Facility: HOSPITAL | Age: 78
End: 2023-08-03
Payer: MEDICARE

## 2023-08-03 VITALS
BODY MASS INDEX: 23.4 KG/M2 | WEIGHT: 192.19 LBS | HEIGHT: 76 IN | RESPIRATION RATE: 18 BRPM | HEART RATE: 58 BPM | OXYGEN SATURATION: 98 % | SYSTOLIC BLOOD PRESSURE: 125 MMHG | DIASTOLIC BLOOD PRESSURE: 69 MMHG

## 2023-08-03 DIAGNOSIS — M54.50 ACUTE LEFT-SIDED LOW BACK PAIN WITHOUT SCIATICA: ICD-10-CM

## 2023-08-03 DIAGNOSIS — M47.816 LUMBAR SPONDYLOSIS: ICD-10-CM

## 2023-08-03 DIAGNOSIS — J18.9 PNEUMONIA OF LEFT LOWER LOBE DUE TO INFECTIOUS ORGANISM: ICD-10-CM

## 2023-08-03 DIAGNOSIS — J18.9 PNEUMONIA OF LEFT LOWER LOBE DUE TO INFECTIOUS ORGANISM: Primary | ICD-10-CM

## 2023-08-03 DIAGNOSIS — M41.9 SCOLIOSIS, UNSPECIFIED SCOLIOSIS TYPE, UNSPECIFIED SPINAL REGION: ICD-10-CM

## 2023-08-03 PROCEDURE — 99213 OFFICE O/P EST LOW 20 MIN: CPT | Mod: S$PBB,,, | Performed by: FAMILY MEDICINE

## 2023-08-03 PROCEDURE — 97110 THERAPEUTIC EXERCISES: CPT | Mod: PN

## 2023-08-03 PROCEDURE — 99999 PR PBB SHADOW E&M-EST. PATIENT-LVL III: ICD-10-PCS | Mod: PBBFAC,,, | Performed by: FAMILY MEDICINE

## 2023-08-03 PROCEDURE — 99999 PR PBB SHADOW E&M-EST. PATIENT-LVL III: CPT | Mod: PBBFAC,,, | Performed by: FAMILY MEDICINE

## 2023-08-03 PROCEDURE — 71046 X-RAY EXAM CHEST 2 VIEWS: CPT | Mod: 26,,, | Performed by: RADIOLOGY

## 2023-08-03 PROCEDURE — 71046 X-RAY EXAM CHEST 2 VIEWS: CPT | Mod: TC,PN

## 2023-08-03 PROCEDURE — 71046 XR CHEST PA AND LATERAL: ICD-10-PCS | Mod: 26,,, | Performed by: RADIOLOGY

## 2023-08-03 PROCEDURE — 99213 PR OFFICE/OUTPT VISIT, EST, LEVL III, 20-29 MIN: ICD-10-PCS | Mod: S$PBB,,, | Performed by: FAMILY MEDICINE

## 2023-08-03 PROCEDURE — 97161 PT EVAL LOW COMPLEX 20 MIN: CPT | Mod: PN

## 2023-08-03 PROCEDURE — 99213 OFFICE O/P EST LOW 20 MIN: CPT | Mod: PBBFAC,PN,25 | Performed by: FAMILY MEDICINE

## 2023-08-03 NOTE — PLAN OF CARE
PT/PTA met face to face to discuss pt's treatment plan and progress towards established goals. Pt will be seen by a physical therapist minimally every 6th visit or every 30 days.    Please see Plan of Care dated 8/3/23 for goals.    Heather Aguilar, PT

## 2023-08-03 NOTE — PLAN OF CARE
[facility]  Physical Therapy Initial Evaluation     Name: Akil Guzman  Clinic Number: [id number]     Therapy Diagnosis:        Encounter Diagnoses   Name Primary?    Acute left-sided low back pain without sciatica      Lumbar spondylosis      Scoliosis, unspecified scoliosis type, unspecified spinal region        Physician: Gretta Lara PA-C     Physician Orders: PT Eval and Treat   Medical Diagnosis from Referral: M54.50 (ICD-10-CM) - Acute left-sided low back pain without sciatica M47.816 (ICD-10-CM) - Lumbar spondylosis M41.9 (ICD-10-CM) - Scoliosis, unspecified scoliosis type, unspecified spinal region   Evaluation Date: 8/3/2023  Authorization Period Expiration: 8/3/24  Plan of Care Expiration: 11/01/23  Visit # / Visits authorized: 1/ 1  FOTO Visit #:  1/3     Time In: 3:35 pm  Time Out: 4:22 pm   Total Appointment Time (timed & untimed codes): 47 minutes     Precautions: Standard     Subjective   History of current condition - Akil reports: Pain started in Mid June 2023 after he drove to [place] and then back two days later.  He has pain in the lower back to the left side and left outer abdominal region.  At night pain is more in the left outer abdominal region.  He denies left leg pain.  Pain is improved with laying down, but does affect sleep.  He has tried ibuprfen and tylenol.  Currently being treated for left lower lobe pneumonia.     Medical History:        Past Medical History:   Diagnosis Date    Allergy      Basal cell carcinoma 2009     L ear    Cancer       Hx colon     Hyperlipidemia      Hypertension           Surgical History:   Akil Guzman  has a past surgical history that includes Colon surgery (2000) and Colonoscopy (N/A, 9/28/2020).     Medications:   Akil has a current medication list which includes the following prescription(s): finasteride, levofloxacin, sildenafil, tamsulosin, tizanidine, and [DISCONTINUED] dutasteride.     Allergies:         Review  of patient's allergies indicates:   Allergen Reactions    Penicillins         Other reaction(s): Rash         Imaging, Xray  Narrative & Impression  EXAMINATION:  XR CHEST PA AND LATERAL     CLINICAL HISTORY:  Pneumonia, unspecified organism     TECHNIQUE:  PA and lateral views of the chest were performed.     COMPARISON:  Chest of July 18, 2023     FINDINGS:  The mediastinal and cardiac size and contour is normal.  There is a now a small left pleural effusion and continued infiltrate in the left lower lung feel.  The right lung is clear.  No pneumothorax is seen.     Impression:     Continued left lower pneumonia and new small left pleural effusion.        Electronically signed by: Abel Pro MD  Date:                                            08/03/2023  Time:                                           08:35     EXAMINATION:  XR LUMBAR SPINE COMPLETE 5 VIEW     CLINICAL HISTORY:  Pain in unspecified joint     TECHNIQUE:  AP, lateral, spot and bilateral oblique views of the lumbar spine were performed.     COMPARISON:  None     FINDINGS:  There is a mild levoscoliosis.  Lumbar vertebral bodies are otherwise normal in height and alignment.  No acute fracture or subluxation.     There is mild to moderate multilevel degenerative disc disease most pronounced from L4 through S1 with disc space narrowing and osteophyte formation.  Bilateral oblique views demonstrate the facet joints to be intact with mild hypertrophic change.     SI joints are intact.  Calcified aortic plaque.     Impression:     Mild levoscoliosis with mild to moderate multilevel degenerative disc disease.        Electronically signed by: Pedro Merino  Date:                                            07/18/2023  Time:                                           12:29     Prior Therapy: Yes, for right shoulder pain  Social History: Single story home , no steps, lives with their spouse  Occupation: Retired  Prior Level of Function: Indep. Pt liked  "to cook-occasional low back pain with prolonged standing but did not last long. Gym 2-3 x per week; walking a couple of miles per day.  Current Level of Function: Inep  with mobility  and all activities. Primarily limited in duration of activities.     Pain:  Current 2/10, worst 6/10, best 0/10   Location: left low back  Description: Dull and Tight  Aggravating Factors: prolonged activity on feet (except for walking)  Easing Factors: meditation and rest; Ibuprofen, warm showers     Pts goals: "Be able to participate in gym activities without pain and walk further distances - 3 miles)     Objective      Observation:      Posture:    -       Rounded shoulders  -       Forward head  -       Kyphosis  -       Scoliosis  -Raised right scapula with delayed scapulothoracic rhythm   Gait: Normalized     Lumbar Range of Motion:     % of Normal Range Pain   Flexion 80 % -   Extension 100% -   Left Side Bending 100% -   Right Side Bending 50% -   Left rotation 75% -   Right Rotation 50% -      Lower Extremity Strength    Left Right   Knee extension: 5/5 5/5   Knee flexion: 5/5 5/5   Hip flexion: 4+/5 4+/5   Hip extension:  3+/5 3+/5   Hip abduction: 5/5 5/5   Hip adduction: 4+/5 4+/5   Ankle dorsiflexion: 5/5 5/5   Ankle plantarflexion: 5/5 5/5   Upper abdominals 3/5     Back extensors 3/5        Special Tests:     Repeated Flexion Negative   Repeated Extension Negative   Straight Leg Raise Negative   Slump Negative   Femoral nerve test Negative         Joint Mobility:   Lumbar: CPA restricted,   RPA restricted  LPA restricted     Thoracic: restricted     Palpation: minimal tenderness to palpation at left lumbar/thoracic paraspinals     Sensation: No deficits     Flexibility:                90/90 SLR = R no restriction, L no restriction              Ely's test: R moderate restriction, L moderate restriction                PT Evaluation Completed: Yes  Discussed Plan of Care with patient: Yes         Limitation/Restriction for " FOTO Modified Oswestry LBP Disability Ques Survey     Therapist reviewed FOTO scores for Akil Guzman on 8/3/2023.   FOTO documents entered into [employer] - see Media section.     Limitation Score: 30%            TREATMENT   Treatment Time In: 4:10 PM  Treatment Time Out: 4:22 PM  Total Treatment time (time-based codes) separate from Evaluation: 12 minutes     Akil received the treatments listed below:    THERAPEUTIC EXERCISES to develop ROM and flexibility for 10 minutes including   Pelvic tilts x 15  Fwd trunk flexion stretch 3 x 30 sec  LTR x 2 mins  DKTC stretch 4 x 30 sec     HEP PROVIDED AND REVIEWED  Biofreeze applied to lumbar/thoracic paraspinals at the end of exercises as indicated above     Home Exercises and Patient Education Provided     Education provided:   - Goals/POC     Written Home Exercises Provided: yes.  Exercises were reviewed and Akil was able to demonstrate them prior to the end of the session.  Akil demonstrated good  understanding of the education provided.      See EMR under Media for exercises provided 8/3/2023.     Assessment   Akil is a 78 y.o. male referred to outpatient Physical Therapy with a medical diagnosis of M54.50 (ICD-10-CM) - Acute left-sided low back pain without sciatica M47.816 (ICD-10-CM) - Lumbar spondylosis M41.9 (ICD-10-CM) - Scoliosis, unspecified scoliosis type, unspecified spinal region . Pt presents with complaints of back pain that limits standing duration and ambulation ability. Evaluation findings reveal postural deviations, Thoracic rotation and Lumbar ROM limitations, tissue extensibility deficits, lumbar instability, and BLE strength deficits. Greater tension left lumbar/thoracic paraspinal. These deficits affect patient's ability to perform ADLs and functional mobility at prior level of function.         Pt prognosis is Good.   Pt will benefit from skilled outpatient Physical Therapy to address the deficits stated above and in the chart below, provide  "pt/family education, and to maximize pt's level of independence.      Plan of care discussed with patient: Yes  Pt's spiritual, cultural and educational needs considered and patient is agreeable to the plan of care and goals as stated below:      Anticipated Barriers for therapy: NONE     Medical Necessity is demonstrated by the following  History  Co-morbidities and personal factors that may impact the plan of care Co-morbidities:   See above     Personal Factors:   no deficits       low   Examination  Body Structures and Functions, activity limitations and participation restrictions that may impact the plan of care Body Regions:   lower extremities  trunk     Body Systems:    ROM  strength     Participation Restrictions:   NONE     Activity limitations:   Learning and applying knowledge  no deficits     General Tasks and Commands  no deficits     Communication  no deficits     Mobility  lifting and carrying objects  driving (bike, car, motorcycle)     Self care  no deficits     Domestic Life  shopping  cooking  doing house work (cleaning house, washing dishes, laundry)  assisting others     Interactions/Relationships  no deficits     Life Areas  no deficits     Community and Social Life  community life  recreation and leisure             low   Clinical Presentation stable and uncomplicated low   Decision Making/ Complexity Score: low      Short Term GOALS: 3 weeks. Pt agrees with goals set.  1. Patient demonstrates independence with HEP.   2. Patient demonstrates independence with Postural Awareness.   3. Patient demonstrates independence with body mechanics.   4.Report decreased lower back pain < / = 3/10 "At Worst" to increase tolerance for functional mobility and ADLs.     Long Term GOALS: 6 weeks. Pt agrees with goals set.  1. Patient demonstrates increased trunk rom  to wfl to improve tolerance to functional activities.   2. Patient demonstrates increased strength BLE's to 5/5 or greater to improve tolerance " "to functional activities.   3.Patient demonstrates increased strength trunk and core to 4/5 or greater to improve tolerance to functional activities.   3. Patient demonstrates improved overall function per Oswestry to 10% or less.   4.Report decreased lower back pain < / = 1/10 "At Worst" to increase tolerance for functional mobility and ADLs.     Plan   Plan of care Certification: 8/3/2023 to 11/1/23.     Outpatient Physical Therapy 2 times weekly for 6 weeks to include the following interventions: Aquatic Therapy, Gait Training, Manual Therapy, Moist Heat/ Ice, Neuromuscular Re-ed, Therapeutic Activities, Therapeutic Exercise, and dry needling . Other modalities as appropriate.     Heather Aguilar, PT            "

## 2023-08-03 NOTE — PROGRESS NOTES
[facility]  Physical Therapy Initial Evaluation    Name: Akil Guzman  Clinic Number: [id number]    Therapy Diagnosis:   Encounter Diagnoses   Name Primary?    Acute left-sided low back pain without sciatica     Lumbar spondylosis     Scoliosis, unspecified scoliosis type, unspecified spinal region      Physician: Gretta Lara PA-C    Physician Orders: PT Eval and Treat   Medical Diagnosis from Referral: M54.50 (ICD-10-CM) - Acute left-sided low back pain without sciatica M47.816 (ICD-10-CM) - Lumbar spondylosis M41.9 (ICD-10-CM) - Scoliosis, unspecified scoliosis type, unspecified spinal region   Evaluation Date: 8/3/2023  Authorization Period Expiration: 8/3/24  Plan of Care Expiration: 11/01/23  Visit # / Visits authorized: 1/ 1  FOTO Visit #:  1/3    Time In: 3:35 pm  Time Out: 4:22 pm   Total Appointment Time (timed & untimed codes): 47 minutes    Precautions: Standard    Subjective   History of current condition - Akil reports: Pain started in Mid June 2023 after he drove to [place] and then back two days later.  He has pain in the lower back to the left side and left outer abdominal region.  At night pain is more in the left outer abdominal region.  He denies left leg pain.  Pain is improved with laying down, but does affect sleep.  He has tried ibuprfen and tylenol.  Currently being treated for left lower lobe pneumonia.     Medical History:   Past Medical History:   Diagnosis Date    Allergy     Basal cell carcinoma 2009    L ear    Cancer     Hx colon     Hyperlipidemia     Hypertension        Surgical History:   Akil Guzman  has a past surgical history that includes Colon surgery (2000) and Colonoscopy (N/A, 9/28/2020).    Medications:   Akil has a current medication list which includes the following prescription(s): finasteride, levofloxacin, sildenafil, tamsulosin, tizanidine, and [DISCONTINUED] dutasteride.    Allergies:   Review of patient's allergies indicates:    Allergen Reactions    Penicillins      Other reaction(s): Rash        Imaging, Xray  Narrative & Impression  EXAMINATION:  XR CHEST PA AND LATERAL     CLINICAL HISTORY:  Pneumonia, unspecified organism     TECHNIQUE:  PA and lateral views of the chest were performed.     COMPARISON:  Chest of July 18, 2023     FINDINGS:  The mediastinal and cardiac size and contour is normal.  There is a now a small left pleural effusion and continued infiltrate in the left lower lung feel.  The right lung is clear.  No pneumothorax is seen.     Impression:     Continued left lower pneumonia and new small left pleural effusion.        Electronically signed by: Abel Pro MD  Date:                                            08/03/2023  Time:                                           08:35    EXAMINATION:  XR LUMBAR SPINE COMPLETE 5 VIEW     CLINICAL HISTORY:  Pain in unspecified joint     TECHNIQUE:  AP, lateral, spot and bilateral oblique views of the lumbar spine were performed.     COMPARISON:  None     FINDINGS:  There is a mild levoscoliosis.  Lumbar vertebral bodies are otherwise normal in height and alignment.  No acute fracture or subluxation.     There is mild to moderate multilevel degenerative disc disease most pronounced from L4 through S1 with disc space narrowing and osteophyte formation.  Bilateral oblique views demonstrate the facet joints to be intact with mild hypertrophic change.     SI joints are intact.  Calcified aortic plaque.     Impression:     Mild levoscoliosis with mild to moderate multilevel degenerative disc disease.        Electronically signed by: Pedro Merino  Date:                                            07/18/2023  Time:                                           12:29    Prior Therapy: Yes, for right shoulder pain  Social History: Single story home , no steps, lives with their spouse  Occupation: Retired  Prior Level of Function: Indep. Pt liked to cook-occasional low back pain with  "prolonged standing but did not last long. Gym 2-3 x per week; walking a couple of miles per day.  Current Level of Function: Inep  with mobility  and all activities. Primarily limited in duration of activities.    Pain:  Current 2/10, worst 6/10, best 0/10   Location: left low back  Description: Dull and Tight  Aggravating Factors: prolonged activity on feet (except for walking)  Easing Factors: meditation and rest; Ibuprofen, warm showers    Pts goals: "Be able to participate in gym activities without pain and walk further distances - 3 miles)    Objective     Observation:     Posture:    -       Rounded shoulders  -       Forward head  -       Kyphosis  -       Scoliosis  -Raised right scapula with delayed scapulothoracic rhythm   Gait: Normalized    Lumbar Range of Motion:    % of Normal Range Pain   Flexion 80 % -   Extension 100% -   Left Side Bending 100% -   Right Side Bending 50% -   Left rotation 75% -   Right Rotation 50% -      Lower Extremity Strength   Left Right   Knee extension: 5/5 5/5   Knee flexion: 5/5 5/5   Hip flexion: 4+/5 4+/5   Hip extension:  3+/5 3+/5   Hip abduction: 5/5 5/5   Hip adduction: 4+/5 4+/5   Ankle dorsiflexion: 5/5 5/5   Ankle plantarflexion: 5/5 5/5   Upper abdominals 3/5    Back extensors 3/5      Special Tests:    Repeated Flexion Negative   Repeated Extension Negative   Straight Leg Raise Negative   Slump Negative   Femoral nerve test Negative       Joint Mobility:   Lumbar: CPA restricted,   RPA restricted  LPA restricted    Thoracic: restricted    Palpation: minimal tenderness to palpation at left lumbar/thoracic paraspinals    Sensation: No deficits    Flexibility:     90/90 SLR = R no restriction, L no restriction   Ely's test: R moderate restriction, L moderate restriction     PT Evaluation Completed: Yes  Discussed Plan of Care with patient: Yes       Limitation/Restriction for FOTO Modified Oswestry LBP Disability Ques Survey    Therapist reviewed FOTO scores for " Akil Guzman on 8/3/2023.   FOTO documents entered into Results Scorecard - see Media section.    Limitation Score: 30%         TREATMENT   Treatment Time In: 4:10 PM  Treatment Time Out: 4:22 PM  Total Treatment time (time-based codes) separate from Evaluation: 12 minutes    Akil received the treatments listed below:    THERAPEUTIC EXERCISES to develop ROM and flexibility for 10 minutes including   Pelvic tilts x 15  Fwd trunk flexion stretch 3 x 30 sec  LTR x 2 mins  DKTC stretch 4 x 30 sec    HEP PROVIDED AND REVIEWED  Biofreeze applied to lumbar/thoracic paraspinals at the end of exercises as indicated above    Home Exercises and Patient Education Provided    Education provided:   - Goals/POC    Written Home Exercises Provided: yes.  Exercises were reviewed and Akil was able to demonstrate them prior to the end of the session.  Akil demonstrated good  understanding of the education provided.     See EMR under Media for exercises provided 8/3/2023.    Assessment   Akil is a 78 y.o. male referred to outpatient Physical Therapy with a medical diagnosis of M54.50 (ICD-10-CM) - Acute left-sided low back pain without sciatica M47.816 (ICD-10-CM) - Lumbar spondylosis M41.9 (ICD-10-CM) - Scoliosis, unspecified scoliosis type, unspecified spinal region . Pt presents with complaints of back pain that limits standing duration and ambulation ability. Evaluation findings reveal postural deviations, Thoracic rotation and Lumbar ROM limitations, tissue extensibility deficits, lumbar instability, and BLE strength deficits. Greater tension left lumbar/thoracic paraspinal. These deficits affect patient's ability to perform ADLs and functional mobility at prior level of function.       Pt prognosis is Good.   Pt will benefit from skilled outpatient Physical Therapy to address the deficits stated above and in the chart below, provide pt/family education, and to maximize pt's level of independence.     Plan of care discussed with  "patient: Yes  Pt's spiritual, cultural and educational needs considered and patient is agreeable to the plan of care and goals as stated below:     Anticipated Barriers for therapy: NONE    Medical Necessity is demonstrated by the following  History  Co-morbidities and personal factors that may impact the plan of care Co-morbidities:   See above    Personal Factors:   no deficits     low   Examination  Body Structures and Functions, activity limitations and participation restrictions that may impact the plan of care Body Regions:   lower extremities  trunk    Body Systems:    ROM  strength    Participation Restrictions:   NONE    Activity limitations:   Learning and applying knowledge  no deficits    General Tasks and Commands  no deficits    Communication  no deficits    Mobility  lifting and carrying objects  driving (bike, car, motorcycle)    Self care  no deficits    Domestic Life  shopping  cooking  doing house work (cleaning house, washing dishes, laundry)  assisting others    Interactions/Relationships  no deficits    Life Areas  no deficits    Community and Social Life  community life  recreation and leisure         low   Clinical Presentation stable and uncomplicated low   Decision Making/ Complexity Score: low     Short Term GOALS: 3 weeks. Pt agrees with goals set.  1. Patient demonstrates independence with HEP.   2. Patient demonstrates independence with Postural Awareness.   3. Patient demonstrates independence with body mechanics.   4.Report decreased lower back pain < / = 3/10 "At Worst" to increase tolerance for functional mobility and ADLs.     Long Term GOALS: 6 weeks. Pt agrees with goals set.  1. Patient demonstrates increased trunk rom  to wfl to improve tolerance to functional activities.   2. Patient demonstrates increased strength BLE's to 5/5 or greater to improve tolerance to functional activities.   3.Patient demonstrates increased strength trunk and core to 4/5 or greater to improve " "tolerance to functional activities.   3. Patient demonstrates improved overall function per Oswestry to 10% or less.   4.Report decreased lower back pain < / = 1/10 "At Worst" to increase tolerance for functional mobility and ADLs.    Plan   Plan of care Certification: 8/3/2023 to 11/1/23.    Outpatient Physical Therapy 2 times weekly for 6 weeks to include the following interventions: Aquatic Therapy, Gait Training, Manual Therapy, Moist Heat/ Ice, Neuromuscular Re-ed, Therapeutic Activities, Therapeutic Exercise, and dry needling . Other modalities as appropriate.    Heather Aguilar, PT     "

## 2023-08-03 NOTE — PROGRESS NOTES
[facility] New Patient Evaluation      Referred by: Dr. Nola Ervin    PCP:   Nola Marshall MD    CC:   Chief Complaint   Patient presents with    Follow-up     Xray result          HPI:   Akil Guzman is a 78 y.o. year old male patient who has a past medical history of Allergy, Basal cell carcinoma, Cancer, Hyperlipidemia, and Hypertension. He presents in referral from Dr. Nola Ervin for lower back pain.  Pain started in Mid June 2023 after he drove to [place] and then back two days later.  He has pain in the lower back to the left side and left outer abdominal region.  At night pain is more in the left outer abdominal region.  He does occasionally have left leg pain.  Pain is improved with laying down, but does affect sleep.  He has tried ibuprfen and tylenol.  Currently being treated for left lower lobe pneumonia.    Denies bowel/ bladder incontinence.    Past and current medications:  Antineuropathics:  NSAIDs:  ibuprofen  Antidepressants:  Muscle relaxers:  Opioids:  Antiplatelets/Anticoagulants:  Others:  tylenol    Physical Therapy/ Chiropractic care:  none    Pain Intervention History:  none    Past Spine Surgical History:  none        History:    Current Outpatient Medications:     finasteride (PROSCAR) 5 mg tablet, TAKE 1 TABLET BY MOUTH EVERY DAY FOR PROSTATE, Disp: 90 tablet, Rfl: 3    levoFLOXacin (LEVAQUIN) 750 MG tablet, Take 1 tablet (750 mg total) by mouth once daily., Disp: 5 tablet, Rfl: 0    tamsulosin (FLOMAX) 0.4 mg Cap, Take 1 capsule (0.4 mg total) by mouth once daily., Disp: 90 capsule, Rfl: 3    sildenafiL (VIAGRA) 100 MG tablet, Take 1 tablet (100 mg total) by mouth daily as needed., Disp: 30 tablet, Rfl: 11    tiZANidine (ZANAFLEX) 4 MG tablet, Take 1 tablet (4 mg total) by mouth every 12 (twelve) hours as needed (muscle spasms/ pain)., Disp: 40 tablet, Rfl: 0    Past Medical History:   Diagnosis Date    Allergy     Basal cell carcinoma 2009    L ear    Cancer      Hx colon     Hyperlipidemia     Hypertension        Past Surgical History:   Procedure Laterality Date    COLON SURGERY  2000    1ft. sigmoid removed    COLONOSCOPY N/A 9/28/2020    Procedure: COLONOSCOPY;  Surgeon: Ty Pollock MD;  Location: [facility];  Service: General;  Laterality: N/A;       Family History   Problem Relation Age of Onset    Cancer Mother     Cancer Brother     Macular degeneration Brother     Melanoma Neg Hx        Social History     Socioeconomic History    Marital status:    Tobacco Use    Smoking status: Former     Current packs/day: 1.00     Types: Cigarettes    Smokeless tobacco: Never   Substance and Sexual Activity    Alcohol use: Yes     Comment: 2 mixed drinks daily    Drug use: No    Sexual activity: Yes     Partners: Female     Social Determinants of Health     Financial Resource Strain: Low Risk  (9/22/2020)    Overall Financial Resource Strain (CARDIA)     Difficulty of Paying Living Expenses: Not hard at all   Food Insecurity: No Food Insecurity (9/22/2020)    Hunger Vital Sign     Worried About Running Out of Food in the Last Year: Never true     Ran Out of Food in the Last Year: Never true   Transportation Needs: No Transportation Needs (9/22/2020)    PRAPARE - Transportation     Lack of Transportation (Medical): No     Lack of Transportation (Non-Medical): No   Physical Activity: Insufficiently Active (9/22/2020)    Exercise Vital Sign     Days of Exercise per Week: 2 days     Minutes of Exercise per Session: 30 min   Stress: No Stress Concern Present (9/22/2020)    [employer] [employer] - Occupational Stress Questionnaire     Feeling of Stress : Not at all   Social Connections: Unknown (12/18/2020)    Social Connection and Isolation Panel [NHANES]     Attends [employer]: 1 to 4 times per year       Review of patient's allergies indicates:   Allergen Reactions    Penicillins      Other reaction(s): Rash       Labs:  No results found  "for: "LABA1C", "HGBA1C"    Lab Results   Component Value Date    WBC 11.54 06/21/2023    HGB 13.9 (L) 06/21/2023    HCT 41.2 06/21/2023    MCV 99 (H) 06/21/2023     06/21/2023           Review of Systems:  Low back pain.  Abdominal pain.  Balance of review of systems is negative.    Physical Exam:  Vitals:    08/02/23 1345   BP: 125/62   Pulse: 68   Weight: 87 kg (191 lb 12.8 oz)   PainSc: 0-No pain     Body mass index is 23.35 kg/m².    Gen: NAD  Psych: mood appropriate for given condition  HEENT: eyes anicteric   CV: RRR, 2+ radial pulse  HEENT: anicteric   Respiratory: non-labored, no signs of respiratory distress  Abd: non-distended  Skin: warm, dry and intact.  Gait: Able to heel walk, toe walk. No antalgic gait.     Coordination:   Tandem walking coordination: normal    Cervical spine: ROM is full in flexion, extension and lateral rotation without increased pain.  Spurling's maneuver causes no neck pain to either side.  Myofascial exam: No Tenderness to palpation across cervical paraspinous region bilaterally.    Lumbar spine:  Lumbar spine: ROM is full with flexion extension and oblique extension with no increased pain.    Jaun's test causes no increased pain on either side.    Supine straight leg raise is negative bilaterally.    Internal and external rotation of the hip causes no increased pain on either side.  Myofascial exam: No tenderness to palpation across lumbar paraspinous muscles. No tenderness to palpation over the bilateral greater trochanters and bilateral SI joint    Sensory:  Intact and symmetrical to light touch in C4-T1 dermatomes bilaterally. Intact and symmetrical to light touch in L1-S1 dermatomes bilaterally.    Motor:    Right Left   C4 Shoulder Abduction  5  5   C5 Elbow Flexion    5  5   C6 Wrist Extension  5  5   C7 Elbow Extension   5  5   C8/T1 Hand Intrinsics   5  5        Right Left   L2/3 Iliacus Hip flexion  5  5   L3/4 Qudratus Femoris Knee Extension  5  5   L4/5 Tib " Anterior Ankle Dorsiflexion   5  5   L5/S1 Extensor Hallicus Longus Great toe extension  5  5   S1/S2 Gastroc/Soleus Plantar Flexion  5  5      Right Left   Triceps DTR 2+ 2+   Biceps DTR 2+ 2+   Brachioradialis DTR 2+ 2+   Patellar DTR 2+ 2+   Achilles DTR 2+ 2+   Puckett Absent  Absent   Clonus Absent Absent   Babinski Absent Absent       Imaging:  Xray lumbar spine 7-18-23:  Mild levoscoliosis with mild to moderate multilevel degenerative disc disease.      Assessment:   Akil Guzman is a 78 y.o. year old male patient who has a past medical history of Allergy, Basal cell carcinoma, Cancer, Hyperlipidemia, and Hypertension. He presents in referral from Dr. Nola Ervin for left low back pain.  Differenial includes - myofascial/ mechanical back pain, pain referred from degenerative chagnes In the spine and referred pain from let lower lobe pneumonia.    Problem List Items Addressed This Visit    None  Visit Diagnoses       Lumbar spondylosis    -  Primary    Relevant Medications    tiZANidine (ZANAFLEX) 4 MG tablet    Other Relevant Orders    Ambulatory referral/consult to Physical/Occupational Therapy    Acute left-sided low back pain without sciatica        Relevant Medications    tiZANidine (ZANAFLEX) 4 MG tablet    Other Relevant Orders    Ambulatory referral/consult to Physical/Occupational Therapy    Scoliosis, unspecified scoliosis type, unspecified spinal region        Relevant Medications    tiZANidine (ZANAFLEX) 4 MG tablet    Other Relevant Orders    Ambulatory referral/consult to Physical/Occupational Therapy            Plan:   - discussed medications, exercise, PT, further imaging of the spine  - he elects to try zanalfex and PT at this time  - encourage moveemnt and exercise with good posture.      Follow Up: RTC in 4-6 weeks to monitor progress    : Not applicable    Thank you for referring this interesting patient, and I look forward to continuing to collaborate in his care.        Gretta  ARTURO Lara  [facility]

## 2023-08-03 NOTE — PROGRESS NOTES
Subjective:       Patient ID: Akil Guzman is a 78 y.o. male.    Chief Complaint: Pneumonia and Follow-up    Following up pneumonia and back pain  Feeling better from a fatigue standpoints  Starting PT for his back pain.   Still noticing that he has a productive cough      Review of Systems   Constitutional:  Negative for activity change, appetite change, fatigue and fever.   Respiratory:  Positive for cough. Negative for shortness of breath.    Gastrointestinal:  Negative for abdominal pain.   Integumentary:  Negative for rash.         Objective:      Physical Exam  Vitals and nursing note reviewed.   Constitutional:       General: He is not in acute distress.     Appearance: He is not ill-appearing.   Cardiovascular:      Rate and Rhythm: Normal rate and regular rhythm.      Heart sounds: No murmur heard.  Pulmonary:      Effort: Pulmonary effort is normal.      Breath sounds: Normal breath sounds. No wheezing, rhonchi or rales.   Skin:     General: Skin is warm and dry.      Findings: No rash.   Neurological:      Mental Status: He is alert.   Psychiatric:         Mood and Affect: Mood normal.         Behavior: Behavior normal.         Assessment:       1. Pneumonia of left lower lobe due to infectious organism        Plan:       Problem List Items Addressed This Visit    None  Visit Diagnoses       Pneumonia of left lower lobe due to infectious organism    -  Primary    Relevant Orders    X-Ray Chest PA And Lateral            Will get a follow up x-ray to see if improved. Clinically with some improvement but not resolution.

## 2023-08-04 ENCOUNTER — PATIENT MESSAGE (OUTPATIENT)
Dept: FAMILY MEDICINE | Facility: CLINIC | Age: 78
End: 2023-08-04
Payer: MEDICARE

## 2023-08-04 DIAGNOSIS — J18.9 UNRESOLVED PNEUMONIA: Primary | ICD-10-CM

## 2023-08-04 DIAGNOSIS — Z85.038 PERSONAL HISTORY OF COLON CANCER, STAGE I: ICD-10-CM

## 2023-08-07 ENCOUNTER — HOSPITAL ENCOUNTER (OUTPATIENT)
Dept: RADIOLOGY | Facility: HOSPITAL | Age: 78
Discharge: HOME | End: 2023-08-07
Attending: FAMILY MEDICINE
Payer: MEDICARE

## 2023-08-07 DIAGNOSIS — Z85.038 PERSONAL HISTORY OF COLON CANCER, STAGE I: ICD-10-CM

## 2023-08-07 DIAGNOSIS — J18.9 UNRESOLVED PNEUMONIA: ICD-10-CM

## 2023-08-07 DIAGNOSIS — J18.9 UNRESOLVED PNEUMONIA: Primary | ICD-10-CM

## 2023-08-07 DIAGNOSIS — J90 PLEURAL EFFUSION ON LEFT: ICD-10-CM

## 2023-08-07 PROCEDURE — 71250 CT THORAX DX C-: CPT | Mod: 26,,, | Performed by: RADIOLOGY

## 2023-08-07 PROCEDURE — 71250 CT THORAX DX C-: CPT | Mod: TC

## 2023-08-07 PROCEDURE — 71250 CT CHEST WITHOUT CONTRAST: ICD-10-PCS | Mod: 26,,, | Performed by: RADIOLOGY

## 2023-08-07 RX ORDER — LEVOFLOXACIN 750 MG/1
750 TABLET ORAL DAILY
Qty: 7 TABLET | Refills: 0 | Status: ON HOLD | OUTPATIENT
Start: 2023-08-07 | End: 2023-09-11

## 2023-08-09 ENCOUNTER — OFFICE VISIT (OUTPATIENT)
Dept: PULMONOLOGY | Facility: CLINIC | Age: 78
End: 2023-08-09
Payer: MEDICARE

## 2023-08-09 VITALS
OXYGEN SATURATION: 97 % | HEART RATE: 91 BPM | WEIGHT: 191.81 LBS | HEIGHT: 76 IN | SYSTOLIC BLOOD PRESSURE: 126 MMHG | DIASTOLIC BLOOD PRESSURE: 65 MMHG | BODY MASS INDEX: 23.36 KG/M2

## 2023-08-09 DIAGNOSIS — J18.9 COMMUNITY ACQUIRED PNEUMONIA OF LEFT LOWER LOBE OF LUNG: ICD-10-CM

## 2023-08-09 DIAGNOSIS — J90 PLEURAL EFFUSION ON LEFT: Primary | ICD-10-CM

## 2023-08-09 DIAGNOSIS — R63.4 UNINTENTIONAL WEIGHT LOSS: ICD-10-CM

## 2023-08-09 PROCEDURE — 99204 OFFICE O/P NEW MOD 45 MIN: CPT | Mod: S$PBB,,, | Performed by: INTERNAL MEDICINE

## 2023-08-09 PROCEDURE — 99999 PR PBB SHADOW E&M-EST. PATIENT-LVL IV: ICD-10-PCS | Mod: PBBFAC,,, | Performed by: INTERNAL MEDICINE

## 2023-08-09 PROCEDURE — 99214 OFFICE O/P EST MOD 30 MIN: CPT | Mod: PBBFAC,PO | Performed by: INTERNAL MEDICINE

## 2023-08-09 PROCEDURE — 99204 PR OFFICE/OUTPT VISIT, NEW, LEVL IV, 45-59 MIN: ICD-10-PCS | Mod: S$PBB,,, | Performed by: INTERNAL MEDICINE

## 2023-08-09 PROCEDURE — 99999 PR PBB SHADOW E&M-EST. PATIENT-LVL IV: CPT | Mod: PBBFAC,,, | Performed by: INTERNAL MEDICINE

## 2023-08-09 RX ORDER — LEVOFLOXACIN 750 MG/1
750 TABLET ORAL DAILY
Qty: 7 TABLET | Refills: 0 | Status: ON HOLD | OUTPATIENT
Start: 2023-08-09 | End: 2023-09-11

## 2023-08-09 RX ORDER — LEVOFLOXACIN 750 MG/1
750 TABLET ORAL DAILY
Qty: 7 TABLET | Refills: 0 | Status: SHIPPED | OUTPATIENT
Start: 2023-08-09 | End: 2023-08-09

## 2023-08-09 NOTE — PROGRESS NOTES
8/9/2023    Akil Guzman  New Patient Consult    Chief Complaint   Patient presents with    Pleural Effusion       HPI:08/09/2023-   Pt is a 77 yo male with HTN, HLD, hx colon cancer presenting for new evaluation.  Pt feeling tired, hard to sleep, SOB, pain left flank for about 2 mos gradual worsening.  He was treated for pna last month with 5d levaquin, now taking another course of levaquin started yesterday. He has had minimal cough but spits out mucous, clear this week and green last week. He denies fevers, chills, sweats, denies recent URI symptoms.  Pain L side is achy, constant, takes ibuprofen which helps a little, 6-7/10.  He had colon ca surgery in 2000. No treatment was needed  He has past smoking hx, about 13 yrs, quit last year.  Denies pulmonary exposures, denies FH lung disease    The chief complaint problem is New to me    PFSH:  Past Medical History:   Diagnosis Date    Allergy     Basal cell carcinoma 2009    L ear    Cancer     Hx colon     Hyperlipidemia     Hypertension          Past Surgical History:   Procedure Laterality Date    COLON SURGERY  2000    1ft. sigmoid removed    COLONOSCOPY N/A 9/28/2020    Procedure: COLONOSCOPY;  Surgeon: Ty Pollock MD;  Location: [facility];  Service: General;  Laterality: N/A;     Social History     Tobacco Use    Smoking status: Former     Current packs/day: 1.00     Types: Cigarettes    Smokeless tobacco: Never   Substance Use Topics    Alcohol use: Yes     Comment: 2 mixed drinks daily    Drug use: No     Family History   Problem Relation Age of Onset    Cancer Mother     Cancer Brother     Macular degeneration Brother     Melanoma Neg Hx      Review of patient's allergies indicates:   Allergen Reactions    Penicillins      Other reaction(s): Rash       Performance Status:The patient's activity level is regular exercise.  He goes to gym and walks 2-3x/week    Review of Systems:  a review of eleven systems covering constitutional, Eye, HEENT,  "Psych, Respiratory, Cardiac, GI, , Musculoskeletal, Endocrine, Dermatologic was negative except for pertinent findings as listed ABOVE and below:  Wt loss 9# unintentional  Appetite minimal       Exam:Comprehensive exam done. /65 (BP Location: Left arm, Patient Position: Sitting, BP Method: Large (Automatic))   Pulse 91   Ht 6' 4" (1.93 m)   Wt 87 kg (191 lb 12.8 oz)   SpO2 97%   BMI 23.35 kg/m²   Exam included Vitals as listed, and patient's appearance and affect and alertness and mood, oral exam for yeast and hygiene and pharynx lesions and Mallapatti (M) score, neck with inspection for jvd and masses and thyroid abnormalities and lymph nodes (supraclavicular and infraclavicular nodes and axillary also examined and noted if abn), chest exam included symmetry and effort and fremitus and percussion and auscultation, cardiac exam included rhythm and gallops and murmur and rubs and jvd and edema, abdominal exam for mass and hepatosplenomegaly and tenderness and hernias and bowel sounds, Musculoskeletal exam with muscle tone and posture and mobility/gait and  strength, and skin for rashes and cyanosis and pallor and turgor, extremity for clubbing.  Findings were normal except for pertinent findings listed below:  M1, oropharynx clear, dentition intact  HR regular  Breath sounds distant L base, dull to percussion to L mid chest  Left effusion visualized by chest ultrasound- simple appearing free flowing dependent, moderate size  No edema/clubbing    Radiographs (ct chest and cxr) reviewed: view by direct vision and interpretation as below   CT chest 8/7/23- large L pleural effusion, L hilar mass      Labs reviewed     Lab Results   Component Value Date    WBC 20.29 (H) 08/07/2023    HGB 12.1 (L) 08/07/2023    HCT 34.9 (L) 08/07/2023    MCV 94 08/07/2023     08/07/2023       CMP  Sodium   Date Value Ref Range Status   08/07/2023 136 136 - 145 mmol/L Final     Potassium   Date Value Ref Range " Status   08/07/2023 3.9 3.5 - 5.1 mmol/L Final     Chloride   Date Value Ref Range Status   08/07/2023 103 95 - 110 mmol/L Final     CO2   Date Value Ref Range Status   08/07/2023 24 23 - 29 mmol/L Final     Glucose   Date Value Ref Range Status   08/07/2023 112 (H) 70 - 110 mg/dL Final     BUN   Date Value Ref Range Status   08/07/2023 17 8 - 23 mg/dL Final     Creatinine   Date Value Ref Range Status   08/07/2023 1.1 0.5 - 1.4 mg/dL Final     Calcium   Date Value Ref Range Status   08/07/2023 10.1 8.7 - 10.5 mg/dL Final     Total Protein   Date Value Ref Range Status   08/07/2023 6.8 6.0 - 8.4 g/dL Final     Albumin   Date Value Ref Range Status   08/07/2023 3.3 (L) 3.5 - 5.2 g/dL Final     Total Bilirubin   Date Value Ref Range Status   08/07/2023 0.5 0.1 - 1.0 mg/dL Final     Comment:     For infants and newborns, interpretation of results should be based  on gestational age, weight and in agreement with clinical  observations.    Premature Infant recommended reference ranges:  Up to 24 hours.............<8.0 mg/dL  Up to 48 hours............<12.0 mg/dL  3-5 days..................<15.0 mg/dL  6-29 days.................<15.0 mg/dL       Alkaline Phosphatase   Date Value Ref Range Status   08/07/2023 84 55 - 135 U/L Final     AST   Date Value Ref Range Status   08/07/2023 13 10 - 40 U/L Final     ALT   Date Value Ref Range Status   08/07/2023 13 10 - 44 U/L Final     Anion Gap   Date Value Ref Range Status   08/07/2023 9 8 - 16 mmol/L Final     eGFR   Date Value Ref Range Status   08/07/2023 >60.0 >60 mL/min/1.73 m^2 Final         PFT was not done      Plan:  Clinical impression is reasonably certain and repeated evaluation prn +/- follow up will be needed as below.   Left effusion suspect parapneumonic, has leukocytosis with left shift however symptoms atypical. May be obstructive pna? Possible hilar mass  Further evaluation as below    Problem List Items Addressed This Visit    None  Visit Diagnoses        Pleural effusion on left    -  Primary    Relevant Medications    levoFLOXacin (LEVAQUIN) 750 MG tablet    Other Relevant Orders    CT Chest With Contrast    IR Thoracentesis with Imaging    WBC & Diff,Body Fluid Pleural Fluid, Left    Protein, Peritoneal, Pleural Fluid or ROSCOE Drainage, In-House Pleural Fluid, Left    LDH, Peritoneal, Pleural Fluid or ROSCOE Drainage, In-House Pleural Fluid, Left    Glucose, Peritoneal, Pleural Fluid or ROSCOE Drainage, In-House Pleural Fluid, Left    Cytology, Fluid/Wash/Brush    Culture, Body Fluid (Aerobic) w/ GS    CBC Auto Differential    LACTATE DEHYDROGENASE    Unintentional weight loss        Community acquired pneumonia of left lower lobe of lung                Follow up in about 4 weeks (around 9/6/2023).    Discussed with patient above for education the following:      Patient Instructions   Left pleural effusion may be due to infection, need to rule out possible underlying lung cancer  Continue levaquin - will extend course to 14 days total  Get thoracentesis - outpatient procedure to drain fluid from chest, studies will be sent to the lab to further evaluate  After thoracentesis get repeat CT chest with contrast  Get blood work on same day as your procedure  If feeling worse go to the ER for further evaluation

## 2023-08-09 NOTE — PATIENT INSTRUCTIONS
Left pleural effusion may be due to infection, need to rule out possible underlying lung cancer  Continue levaquin - will extend course to 14 days total  Get thoracentesis - outpatient procedure to drain fluid from chest, studies will be sent to the lab to further evaluate  After thoracentesis get repeat CT chest with contrast  Get blood work on same day as your procedure  If feeling worse go to the ER for further evaluation

## 2023-08-11 ENCOUNTER — TELEPHONE (OUTPATIENT)
Dept: INTERVENTIONAL RADIOLOGY/VASCULAR | Facility: HOSPITAL | Age: 78
End: 2023-08-11

## 2023-08-11 NOTE — NURSING
Radiology Pre-Procedural Instructions- [facility]    Radiology Nurse contacted patient to provide instructions for scheduled Thoracentesis at 1300 on 8/17/23. Unable to reach patient after multiple attempts, left detailed message.   Patient instructed to arrive no later than 1200 am to outpatient registration.   Registration will direct patient to outpatient lab for pre-op lab work if required.  Following labs, patient needs to check in at the surgery waiting room desk located on the second floor.   Patient is not currently taking anticoagulants at this time.  Patient instructed to remain NPO after midnight with the exception of approved essential meds taken with a small sip of water.  Instructed patient to bathe the night before and the morning of their procedure with an anti bacterial soap or Hibiclens.   Patient is aware of their responsibility to make post transportation arrangements home by a responsible adult.   Patient educated on all additional pre-op instructions per policy.

## 2023-08-17 ENCOUNTER — HOSPITAL ENCOUNTER (OUTPATIENT)
Dept: INTERVENTIONAL RADIOLOGY/VASCULAR | Facility: HOSPITAL | Age: 78
Discharge: HOME | End: 2023-08-17
Attending: INTERNAL MEDICINE
Payer: MEDICARE

## 2023-08-17 ENCOUNTER — PATIENT MESSAGE (OUTPATIENT)
Dept: PULMONOLOGY | Facility: CLINIC | Age: 78
End: 2023-08-17
Payer: MEDICARE

## 2023-08-17 ENCOUNTER — HOSPITAL ENCOUNTER (OUTPATIENT)
Dept: RADIOLOGY | Facility: HOSPITAL | Age: 78
Discharge: HOME | End: 2023-08-17
Attending: INTERNAL MEDICINE
Payer: MEDICARE

## 2023-08-17 ENCOUNTER — HOSPITAL ENCOUNTER (OUTPATIENT)
Dept: RADIOLOGY | Facility: HOSPITAL | Age: 78
Discharge: HOME | End: 2023-08-17
Attending: PHYSICIAN ASSISTANT
Payer: MEDICARE

## 2023-08-17 VITALS
HEART RATE: 69 BPM | SYSTOLIC BLOOD PRESSURE: 138 MMHG | DIASTOLIC BLOOD PRESSURE: 70 MMHG | OXYGEN SATURATION: 95 % | TEMPERATURE: 97 F | RESPIRATION RATE: 14 BRPM

## 2023-08-17 DIAGNOSIS — J90 PLEURAL EFFUSION ON LEFT: ICD-10-CM

## 2023-08-17 DIAGNOSIS — J18.9 COMMUNITY ACQUIRED PNEUMONIA OF LEFT LOWER LOBE OF LUNG: ICD-10-CM

## 2023-08-17 DIAGNOSIS — J90 PLEURAL EFFUSION ON LEFT: Primary | ICD-10-CM

## 2023-08-17 DIAGNOSIS — J90 PLEURAL EFFUSION: Primary | ICD-10-CM

## 2023-08-17 LAB
APPEARANCE FLD: ABNORMAL
BODY FLD TYPE: ABNORMAL
COLOR FLD: YELLOW
EOSINOPHIL NFR FLD MANUAL: 1 %
LYMPHOCYTES NFR FLD MANUAL: 26 %
MESOTHL CELL NFR FLD MANUAL: 12 %
NEUTROPHILS NFR FLD MANUAL: 60 %
OTHER CELLS FLD MANUAL: 1 %
WBC # FLD: 1684 /CU MM

## 2023-08-17 PROCEDURE — C1729 CATH, DRAINAGE: HCPCS

## 2023-08-17 PROCEDURE — 83615 LACTATE (LD) (LDH) ENZYME: CPT | Performed by: INTERNAL MEDICINE

## 2023-08-17 PROCEDURE — 84157 ASSAY OF PROTEIN OTHER: CPT | Performed by: INTERNAL MEDICINE

## 2023-08-17 PROCEDURE — 82945 GLUCOSE OTHER FLUID: CPT | Performed by: INTERNAL MEDICINE

## 2023-08-17 PROCEDURE — 71045 XR CHEST 1 VIEW: ICD-10-PCS | Mod: 26,,, | Performed by: RADIOLOGY

## 2023-08-17 PROCEDURE — 71045 X-RAY EXAM CHEST 1 VIEW: CPT | Mod: 26,,, | Performed by: RADIOLOGY

## 2023-08-17 PROCEDURE — 32555 ASPIRATE PLEURA W/ IMAGING: CPT | Mod: LT

## 2023-08-17 PROCEDURE — 89051 BODY FLUID CELL COUNT: CPT | Performed by: INTERNAL MEDICINE

## 2023-08-17 PROCEDURE — 32555 ASPIRATE PLEURA W/ IMAGING: CPT | Mod: LT,,, | Performed by: RADIOLOGY

## 2023-08-17 PROCEDURE — 87070 CULTURE OTHR SPECIMN AEROBIC: CPT | Performed by: INTERNAL MEDICINE

## 2023-08-17 PROCEDURE — 32555 IR THORACENTESIS WITH IMAGING: ICD-10-PCS | Mod: LT,,, | Performed by: RADIOLOGY

## 2023-08-17 PROCEDURE — 87205 SMEAR GRAM STAIN: CPT | Performed by: INTERNAL MEDICINE

## 2023-08-17 RX ORDER — LIDOCAINE HYDROCHLORIDE 10 MG/ML
1 INJECTION, SOLUTION EPIDURAL; INFILTRATION; INTRACAUDAL; PERINEURAL ONCE AS NEEDED
Status: DISCONTINUED | OUTPATIENT
Start: 2023-08-17 | End: 2023-08-18 | Stop reason: HOSPADM

## 2023-08-17 NOTE — NURSING
Pt arrived to IR suite via stretcher   GABRIELA Sweeney, consented patient for IR ultrasound guided thoracentesis, time out performed. Left sided thoracentesis performed by  GABRIELA Sweeney.   750 ml of serosanguinous fluid drained- pt remained stable for duration of procedure.  Specimens collected and sent off for testing.   Post op chest xray performed. Report given to MELISSA Johns- pt transported back to room.

## 2023-08-17 NOTE — PLAN OF CARE
OK to discharge home per Dr. Pedroza. Patient discharged home with spouse with all valuables at side. Removed IV and handouts provided. Criteria met for discharge home and patient stated readiness to leave.

## 2023-08-17 NOTE — PROCEDURES
Thoracentesis      Pre-procedure ultrasound imaging was performed of the thorax and appropriate site was marked at the left posterior chest wall. Informed consent was obtained. Patient was prepped and draped in the usual sterile fashion. Local anesthesia was administered. A catheter drainage device was then advanced into the chest cavity. Stylette was removed and catheter left in place.  Initial fluid was straw colored. The patient tolerated the procedure well without immediate complication. Blood loss was negligible.     750ml withdrawn     The procedure was performed with physician supervision    If analyses were ordered, a sample of fluid was collected and sent to lab.

## 2023-08-19 ENCOUNTER — LAB VISIT (OUTPATIENT)
Dept: LAB | Facility: HOSPITAL | Age: 78
End: 2023-08-19
Attending: FAMILY MEDICINE
Payer: MEDICARE

## 2023-08-19 DIAGNOSIS — D75.89 MACROCYTOSIS: ICD-10-CM

## 2023-08-19 DIAGNOSIS — E53.8 B12 DEFICIENCY: ICD-10-CM

## 2023-08-19 LAB
BASOPHILS # BLD AUTO: 0.09 K/UL (ref 0–0.2)
BASOPHILS NFR BLD: 0.3 % (ref 0–1.9)
DIFFERENTIAL METHOD: ABNORMAL
EOSINOPHIL # BLD AUTO: 0.2 K/UL (ref 0–0.5)
EOSINOPHIL NFR BLD: 0.7 % (ref 0–8)
ERYTHROCYTE [DISTWIDTH] IN BLOOD BY AUTOMATED COUNT: 12.2 % (ref 11.5–14.5)
HCT VFR BLD AUTO: 34.5 % (ref 40–54)
HGB BLD-MCNC: 11.9 G/DL (ref 14–18)
IMM GRANULOCYTES # BLD AUTO: 0.21 K/UL (ref 0–0.04)
IMM GRANULOCYTES NFR BLD AUTO: 0.8 % (ref 0–0.5)
LACTATE DEHYDROGENASE FLUID: 200 U/L
LYMPHOCYTES # BLD AUTO: 1.4 K/UL (ref 1–4.8)
LYMPHOCYTES NFR BLD: 5.3 % (ref 18–48)
MCH RBC QN AUTO: 31.8 PG (ref 27–31)
MCHC RBC AUTO-ENTMCNC: 34.5 G/DL (ref 32–36)
MCV RBC AUTO: 92 FL (ref 82–98)
MONOCYTES # BLD AUTO: 2.1 K/UL (ref 0.3–1)
MONOCYTES NFR BLD: 8 % (ref 4–15)
NEUTROPHILS # BLD AUTO: 22.4 K/UL (ref 1.8–7.7)
NEUTROPHILS NFR BLD: 84.9 % (ref 38–73)
NRBC BLD-RTO: 0 /100 WBC
PLATELET # BLD AUTO: 333 K/UL (ref 150–450)
PMV BLD AUTO: 8.8 FL (ref 9.2–12.9)
RBC # BLD AUTO: 3.74 M/UL (ref 4.6–6.2)
VIT B12 SERPL-MCNC: 590 PG/ML (ref 210–950)
WBC # BLD AUTO: 26.41 K/UL (ref 3.9–12.7)

## 2023-08-19 PROCEDURE — 82607 VITAMIN B-12: CPT | Performed by: FAMILY MEDICINE

## 2023-08-19 PROCEDURE — 85025 COMPLETE CBC W/AUTO DIFF WBC: CPT | Performed by: FAMILY MEDICINE

## 2023-08-19 PROCEDURE — 36415 COLL VENOUS BLD VENIPUNCTURE: CPT | Performed by: FAMILY MEDICINE

## 2023-08-20 LAB
GLUCOSE FLD-MCNC: 101 MG/DL
PROT FLD-MCNC: 4 G/DL

## 2023-08-21 LAB
BACTERIA FLD AEROBE CULT: NO GROWTH
GRAM STN SPEC: NORMAL
GRAM STN SPEC: NORMAL

## 2023-08-24 ENCOUNTER — PATIENT MESSAGE (OUTPATIENT)
Dept: PULMONOLOGY | Facility: CLINIC | Age: 78
End: 2023-08-24
Payer: MEDICARE

## 2023-08-25 ENCOUNTER — TELEPHONE (OUTPATIENT)
Dept: HEPATOLOGY | Facility: CLINIC | Age: 78
End: 2023-08-25
Payer: MEDICARE

## 2023-08-25 DIAGNOSIS — R91.8 OTHER NONSPECIFIC ABNORMAL FINDING OF LUNG FIELD: ICD-10-CM

## 2023-08-25 DIAGNOSIS — C34.92 NSCLC OF LEFT LUNG: Primary | ICD-10-CM

## 2023-08-25 NOTE — TELEPHONE ENCOUNTER
Talked to pt over the phone and gave results of his pleural fluid studies- exudative effusion, adenocarcinoma lung origin per pathologist (discussed with Dr. Banks directly but path still not uploaded). Path dept to upload results today.  PET scan ordered  Oncology referral to Dr. Torres  Questions answered, pt confirms understanding

## 2023-08-31 ENCOUNTER — PATIENT MESSAGE (OUTPATIENT)
Dept: PULMONOLOGY | Facility: CLINIC | Age: 78
End: 2023-08-31
Payer: MEDICARE

## 2023-09-01 ENCOUNTER — PATIENT MESSAGE (OUTPATIENT)
Dept: HEMATOLOGY/ONCOLOGY | Facility: CLINIC | Age: 78
End: 2023-09-01

## 2023-09-01 ENCOUNTER — TELEPHONE (OUTPATIENT)
Dept: SURGERY | Facility: CLINIC | Age: 78
End: 2023-09-01
Payer: MEDICARE

## 2023-09-01 ENCOUNTER — HOSPITAL ENCOUNTER (OUTPATIENT)
Dept: RADIOLOGY | Facility: HOSPITAL | Age: 78
Discharge: HOME | End: 2023-09-01
Attending: INTERNAL MEDICINE
Payer: MEDICARE

## 2023-09-01 ENCOUNTER — OFFICE VISIT (OUTPATIENT)
Dept: HEMATOLOGY/ONCOLOGY | Facility: CLINIC | Age: 78
End: 2023-09-01
Payer: MEDICARE

## 2023-09-01 VITALS
OXYGEN SATURATION: 97 % | SYSTOLIC BLOOD PRESSURE: 138 MMHG | TEMPERATURE: 98 F | WEIGHT: 187.13 LBS | HEART RATE: 79 BPM | DIASTOLIC BLOOD PRESSURE: 62 MMHG | BODY MASS INDEX: 22.77 KG/M2

## 2023-09-01 DIAGNOSIS — R52 ACUTE PAIN: Primary | ICD-10-CM

## 2023-09-01 DIAGNOSIS — C34.12 MALIGNANT NEOPLASM OF UPPER LOBE OF LEFT LUNG: ICD-10-CM

## 2023-09-01 DIAGNOSIS — C34.92 NSCLC OF LEFT LUNG: ICD-10-CM

## 2023-09-01 PROCEDURE — 71046 X-RAY EXAM CHEST 2 VIEWS: CPT | Mod: 26,,, | Performed by: RADIOLOGY

## 2023-09-01 PROCEDURE — 99205 OFFICE O/P NEW HI 60 MIN: CPT | Mod: S$PBB,,, | Performed by: INTERNAL MEDICINE

## 2023-09-01 PROCEDURE — 99215 OFFICE O/P EST HI 40 MIN: CPT | Mod: 25 | Performed by: INTERNAL MEDICINE

## 2023-09-01 PROCEDURE — 71046 X-RAY EXAM CHEST 2 VIEWS: CPT | Mod: TC

## 2023-09-01 PROCEDURE — 99205 PR OFFICE/OUTPT VISIT, NEW, LEVL V, 60-74 MIN: ICD-10-PCS | Mod: S$PBB,,, | Performed by: INTERNAL MEDICINE

## 2023-09-01 PROCEDURE — 71046 XR CHEST PA AND LATERAL: ICD-10-PCS | Mod: 26,,, | Performed by: RADIOLOGY

## 2023-09-01 RX ORDER — HYDROCODONE BITARTRATE AND ACETAMINOPHEN 5; 325 MG/1; MG/1
1 TABLET ORAL EVERY 6 HOURS PRN
Qty: 20 TABLET | Refills: 0 | Status: SHIPPED | OUTPATIENT
Start: 2023-09-01 | End: 2023-09-07

## 2023-09-01 NOTE — ASSESSMENT & PLAN NOTE
Mr. Guzman has been diagnosed with at lease a stage JORGE adenocarcinoma of the lung.  I discussed this with him in detail today.  I reviewed that this is not a curable cancer but is certainly treatable.  I feel his best option would be combination chemo/IO therapy with carbo/pem/pem therapy and discussed this with him today.  I will plan to begin this therapy next week.      Will also arrange full staging with brain MRI and PET scan but will not hold up therapy to do so.  Referring for port and chemo education as well.      Note is also made of pleural effusion issue.  I sent him for CXR today which shows that the fluid is not yet re accumulating which is good news.  Will continue to watch and will arrange thoracentesis if this does reoccur.      Will have him back with me 2 weeks after starting therapy.  Spent over 65 min in total care today including pre-visit review and charting, visit and post-visit planning.

## 2023-09-01 NOTE — TELEPHONE ENCOUNTER
----- Message from Asha George sent at 9/1/2023 11:25 AM CDT -----  Good morning, doc would like to start the attached pt on treatment next week he will need a port let me know if one of the docs can see him thanks

## 2023-09-01 NOTE — PROGRESS NOTES
INITIAL [facility] HEM/ONC CONSULTATION      Subjective:       Patient ID: Akil Guzman is a 78 y.o. male.    8/7/2023-CT chest without:  1. Large left pleural effusion with dependent left lower lobe atelectasis.  2. Poorly defined mass-like infiltrate involving the periphery of the left upper lobe extending to the left hilum.  Underlying parenchymal mass is not excludable, however, evaluation is limited due to lack of intravenous contrast.  Correlate clinically with possible fever and/or elevated white count.  3. Bilateral soft tissue pulmonary nodules measuring up to 11 mm.  Follow-up per Fleischner guidelines.  For multiple solid nodules with any 6 mm or greater, Fleischner Society guidelines recommend follow up with non-contrast chest CT at 3-6 months and 18-24 months after discovery.  4. Partially calcified left upper lobe pulmonary nodule may represent granulomatous change and scarring.  5. 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  6. Questionable left hilar lymphadenopathy.  However, evaluation is again limited due to lack of intravenous contrast.  7. Small hiatal hernia.    8/17/2023 Left Pleural Fluid:  Positive for adenocarcinoma most c/w lung primary    Chief Complaint: No chief complaint on file.  Lung cancer    Mr. Guzman is a 77yo male referred for new lung cancer.  In early June he drove to [place] and 2 days later he developed pain in his left flank/back area.  He went to his PCP who felt it was constipation.  When this did not resolve he went to the bone and spine clinic in [place] but nothing clear was found.  PCP ultimately did CXR which was abnormal and he was referred to Dr. Hill.     CT showed a large pleural effusion which was drained on 8/17/2023 and cytology is c/w adenocarcinoma.  He has completed abx for pneumonia and is still feeling some left flank/side pain.  Also complains of fatigue which is constant.  Occasional sob.      History of colon cancer 12/1999  treated with surgery, no chemo/xrt needed and no recurrence issues.      Patient has a 15 year smoking history.      No PMH of CAD/AMI, lung/liver/renal disease.           Past Medical History:   Diagnosis Date    Allergy     Basal cell carcinoma 2009    L ear    Cancer     Hx colon     Hyperlipidemia     Hypertension        Past Surgical History:   Procedure Laterality Date    COLON SURGERY  2000    1ft. sigmoid removed    COLONOSCOPY N/A 9/28/2020    Procedure: COLONOSCOPY;  Surgeon: Ty Pollock MD;  Location: [facility];  Service: General;  Laterality: N/A;       Social History     Socioeconomic History    Marital status:    Tobacco Use    Smoking status: Former     Current packs/day: 1.00     Types: Cigarettes    Smokeless tobacco: Never   Substance and Sexual Activity    Alcohol use: Yes     Comment: 2 mixed drinks daily    Drug use: No    Sexual activity: Yes     Partners: Female     Social Determinants of Health     Financial Resource Strain: Low Risk  (8/29/2023)    Overall Financial Resource Strain (CARDIA)     Difficulty of Paying Living Expenses: Not hard at all   Food Insecurity: No Food Insecurity (8/29/2023)    Hunger Vital Sign     Worried About Running Out of Food in the Last Year: Never true     Ran Out of Food in the Last Year: Never true   Transportation Needs: No Transportation Needs (8/29/2023)    PRAPARE - Transportation     Lack of Transportation (Medical): No     Lack of Transportation (Non-Medical): No   Physical Activity: Insufficiently Active (8/29/2023)    Exercise Vital Sign     Days of Exercise per Week: 2 days     Minutes of Exercise per Session: 30 min   Stress: No Stress Concern Present (8/29/2023)    [place] [employer] - Occupational Stress Questionnaire     Feeling of Stress : Only a little   Social Connections: Unknown (8/29/2023)    Social Connection and Isolation Panel [NHANES]     Frequency of Communication with Friends and Family: Three  times a week     Frequency of Social Gatherings with Friends and Family: Twice a week     Active Member of Clubs or Organizations: No     Attends Club or Organization Meetings: Patient refused     Marital Status:    Housing Stability: Low Risk  (8/29/2023)    Housing Stability Vital Sign     Unable to Pay for Housing in the Last Year: No     Number of Places Lived in the Last Year: 1     Unstable Housing in the Last Year: No       Family History   Problem Relation Age of Onset    Cancer Mother     Cancer Brother     Macular degeneration Brother     Melanoma Neg Hx        Review of patient's allergies indicates:   Allergen Reactions    Penicillins      Other reaction(s): Rash       Current Outpatient Medications:     finasteride (PROSCAR) 5 mg tablet, TAKE 1 TABLET BY MOUTH EVERY DAY FOR PROSTATE, Disp: 90 tablet, Rfl: 3    levoFLOXacin (LEVAQUIN) 750 MG tablet, Take 1 tablet (750 mg total) by mouth once daily., Disp: 7 tablet, Rfl: 0    levoFLOXacin (LEVAQUIN) 750 MG tablet, Take 1 tablet (750 mg total) by mouth once daily., Disp: 7 tablet, Rfl: 0    sildenafiL (VIAGRA) 100 MG tablet, Take 1 tablet (100 mg total) by mouth daily as needed., Disp: 30 tablet, Rfl: 11    tamsulosin (FLOMAX) 0.4 mg Cap, Take 1 capsule (0.4 mg total) by mouth once daily., Disp: 90 capsule, Rfl: 3    tiZANidine (ZANAFLEX) 4 MG tablet, Take 1 tablet (4 mg total) by mouth every 12 (twelve) hours as needed (muscle spasms/ pain)., Disp: 40 tablet, Rfl: 0    All medications and past history have been reviewed.    Review of Systems   Constitutional:  Positive for appetite change and unexpected weight change.   HENT:  Negative for mouth sores.    Eyes:  Negative for visual disturbance.   Respiratory:  Positive for shortness of breath. Negative for cough.    Cardiovascular:  Negative for chest pain.   Gastrointestinal:  Negative for abdominal pain and diarrhea.   Genitourinary:  Positive for frequency.   Musculoskeletal:  Positive for back  pain.   Skin:  Negative for rash.   Neurological:  Negative for headaches.   Hematological:  Negative for adenopathy.   Psychiatric/Behavioral:  The patient is not nervous/anxious.        Objective:        /62   Pulse 79   Temp 98 °F (36.7 °C)   Wt 84.9 kg (187 lb 1.6 oz)   SpO2 97%   BMI 22.77 kg/m²     Physical Exam  Constitutional:       Appearance: Normal appearance.   HENT:      Head: Normocephalic and atraumatic.   Eyes:      General: No scleral icterus.     Conjunctiva/sclera: Conjunctivae normal.   Cardiovascular:      Rate and Rhythm: Normal rate and regular rhythm.   Pulmonary:      Effort: Pulmonary effort is normal.   Abdominal:      General: Abdomen is flat.   Neurological:      General: No focal deficit present.      Mental Status: He is oriented to person, place, and time.   Psychiatric:         Mood and Affect: Mood normal.         Behavior: Behavior normal.         Thought Content: Thought content normal.         Judgment: Judgment normal.           Lab  Recent Results (from the past 336 hour(s))   CBC Auto Differential    Collection Time: 08/19/23  9:49 AM   Result Value Ref Range    WBC 26.41 (H) 3.90 - 12.70 K/uL    Hemoglobin 11.9 (L) 14.0 - 18.0 g/dL    Hematocrit 34.5 (L) 40.0 - 54.0 %    Platelets 333 150 - 450 K/uL     CMP  Sodium   Date Value Ref Range Status   08/07/2023 136 136 - 145 mmol/L Final     Potassium   Date Value Ref Range Status   08/07/2023 3.9 3.5 - 5.1 mmol/L Final     Chloride   Date Value Ref Range Status   08/07/2023 103 95 - 110 mmol/L Final     CO2   Date Value Ref Range Status   08/07/2023 24 23 - 29 mmol/L Final     Glucose   Date Value Ref Range Status   08/07/2023 112 (H) 70 - 110 mg/dL Final     BUN   Date Value Ref Range Status   08/07/2023 17 8 - 23 mg/dL Final     Creatinine   Date Value Ref Range Status   08/07/2023 1.1 0.5 - 1.4 mg/dL Final     Calcium   Date Value Ref Range Status   08/07/2023 10.1 8.7 - 10.5 mg/dL Final     Total Protein   Date  Value Ref Range Status   08/07/2023 6.8 6.0 - 8.4 g/dL Final     Albumin   Date Value Ref Range Status   08/07/2023 3.3 (L) 3.5 - 5.2 g/dL Final     Total Bilirubin   Date Value Ref Range Status   08/07/2023 0.5 0.1 - 1.0 mg/dL Final     Comment:     For infants and newborns, interpretation of results should be based  on gestational age, weight and in agreement with clinical  observations.    Premature Infant recommended reference ranges:  Up to 24 hours.............<8.0 mg/dL  Up to 48 hours............<12.0 mg/dL  3-5 days..................<15.0 mg/dL  6-29 days.................<15.0 mg/dL       Alkaline Phosphatase   Date Value Ref Range Status   08/07/2023 84 55 - 135 U/L Final     AST   Date Value Ref Range Status   08/07/2023 13 10 - 40 U/L Final     ALT   Date Value Ref Range Status   08/07/2023 13 10 - 44 U/L Final     Anion Gap   Date Value Ref Range Status   08/07/2023 9 8 - 16 mmol/L Final     eGFR if    Date Value Ref Range Status   11/02/2021 56.4 (A) >60 mL/min/1.73 m^2 Final     eGFR if non    Date Value Ref Range Status   11/02/2021 48.8 (A) >60 mL/min/1.73 m^2 Final     Comment:     Calculation used to obtain the estimated glomerular filtration  rate (eGFR) is the CKD-EPI equation.            Specimen (24h ago, onward)      None                  All lab results and imaging results have been reviewed and discussed with the patient.     Assessment:       1. NSCLC of left lung    2. Malignant neoplasm of upper lobe of left lung      Problem List Items Addressed This Visit       NSCLC of left lung    Malignant neoplasm of upper lobe of left lung     Mr. Guzman has been diagnosed with at lease a stage JORGE adenocarcinoma of the lung.  I discussed this with him in detail today.  I reviewed that this is not a curable cancer but is certainly treatable.  I feel his best option would be combination chemo/IO therapy with carbo/pem/pem therapy and discussed this with him today.  I  will plan to begin this therapy next week.      Will also arrange full staging with brain MRI and PET scan but will not hold up therapy to do so.  Referring for port and chemo education as well.      Note is also made of pleural effusion issue.  I sent him for CXR today which shows that the fluid is not yet re accumulating which is good news.  Will continue to watch and will arrange thoracentesis if this does reoccur.      Will have him back with me 2 weeks after starting therapy.  Spent over 65 min in total care today including pre-visit review and charting, visit and post-visit planning.            Relevant Orders    MRI BRAIN W WO CONTRAST    Ambulatory referral/consult to General Surgery    Ambulatory referral/consult to [facility]o School    X-Ray Chest PA And Lateral (Completed)      Cancer Staging   No matching staging information was found for the patient.      Plan:         No follow-ups on file.       The plan was discussed with the patient and all questions/concerns have been answered to the patient's satisfaction.

## 2023-09-06 ENCOUNTER — PATIENT MESSAGE (OUTPATIENT)
Dept: HEMATOLOGY/ONCOLOGY | Facility: CLINIC | Age: 78
End: 2023-09-06

## 2023-09-06 ENCOUNTER — HOSPITAL ENCOUNTER (OUTPATIENT)
Dept: PREADMISSION TESTING | Facility: HOSPITAL | Age: 78
Discharge: HOME | End: 2023-09-06
Attending: SURGERY
Payer: MEDICARE

## 2023-09-06 ENCOUNTER — OFFICE VISIT (OUTPATIENT)
Dept: SURGERY | Facility: CLINIC | Age: 78
End: 2023-09-06
Payer: MEDICARE

## 2023-09-06 VITALS
DIASTOLIC BLOOD PRESSURE: 70 MMHG | HEART RATE: 82 BPM | RESPIRATION RATE: 16 BRPM | WEIGHT: 190 LBS | OXYGEN SATURATION: 95 % | HEIGHT: 77 IN | TEMPERATURE: 98 F | BODY MASS INDEX: 22.43 KG/M2 | SYSTOLIC BLOOD PRESSURE: 117 MMHG

## 2023-09-06 VITALS — TEMPERATURE: 97 F | DIASTOLIC BLOOD PRESSURE: 72 MMHG | HEART RATE: 93 BPM | SYSTOLIC BLOOD PRESSURE: 117 MMHG

## 2023-09-06 DIAGNOSIS — C34.90 MALIGNANT NEOPLASM OF LUNG, UNSPECIFIED LATERALITY, UNSPECIFIED PART OF LUNG: ICD-10-CM

## 2023-09-06 DIAGNOSIS — Z01.818 PRE-OP TESTING: Primary | ICD-10-CM

## 2023-09-06 DIAGNOSIS — C34.90 MALIGNANT NEOPLASM OF LUNG, UNSPECIFIED LATERALITY, UNSPECIFIED PART OF LUNG: Primary | ICD-10-CM

## 2023-09-06 DIAGNOSIS — C34.90 LUNG CANCER: ICD-10-CM

## 2023-09-06 PROCEDURE — 93010 PR ELECTROCARDIOGRAM REPORT: ICD-10-PCS | Mod: ,,, | Performed by: INTERNAL MEDICINE

## 2023-09-06 PROCEDURE — 99213 PR OFFICE/OUTPT VISIT, EST, LEVL III, 20-29 MIN: ICD-10-PCS | Mod: S$GLB,,, | Performed by: SURGERY

## 2023-09-06 PROCEDURE — 93010 ELECTROCARDIOGRAM REPORT: CPT | Mod: ,,, | Performed by: INTERNAL MEDICINE

## 2023-09-06 PROCEDURE — 99213 OFFICE O/P EST LOW 20 MIN: CPT | Mod: S$GLB,,, | Performed by: SURGERY

## 2023-09-06 PROCEDURE — 93005 ELECTROCARDIOGRAM TRACING: CPT | Performed by: INTERNAL MEDICINE

## 2023-09-06 RX ORDER — IPRATROPIUM BROMIDE 42 UG/1
SPRAY, METERED NASAL
COMMUNITY
Start: 2023-08-11

## 2023-09-06 NOTE — PROGRESS NOTES
FOLLOW-UP APPOINTMENT    PATIENT:   Akil Guzman  :    1945  MR#:    [id number]  DATE OF VISIT:  2023      Chief Complaint: Chemo School/Lung Cancer    HPI:   Mr. Akil Guzman presents today for chemotherapy education.  He will be starting treatment with Alimta, Carboplatin and Keytruda for the above diagnosis.         2023    12:59 PM 2023    10:18 AM 2023    10:42 AM 2023    11:18 AM 2014     1:00 PM   Depression Patient Health Questionnaire   Over the last two weeks how often have you been bothered by little interest or pleasure in doing things Not at all Not at all Not at all Not at all Nearly every day   Over the last two weeks how often have you been bothered by feeling down, depressed or hopeless Not at all Not at all Not at all Not at all Not at all   PHQ-2 Total Score 0 0 0 0 3       Review of Systems   Constitutional:  Positive for appetite change and unexpected weight change.   HENT:   Negative for mouth sores.    Respiratory:  Positive for shortness of breath. Negative for cough.    Cardiovascular:  Negative for chest pain.   Gastrointestinal:  Positive for abdominal pain. Negative for diarrhea.   Genitourinary:  Positive for frequency.    Musculoskeletal:  Negative for back pain.   Skin:  Negative for rash.   Neurological:  Negative for headaches.   Hematological:  Negative for adenopathy.   Psychiatric/Behavioral:  The patient is nervous/anxious.        Oncology History   Malignant neoplasm of upper lobe of left lung   2023 Initial Diagnosis    Malignant neoplasm of upper lobe of left lung     2023 -  Chemotherapy    Treatment Summary   Plan Name: OP NSCLC pembrolizumab PEMEtrexed CARBOplatin (AUC) Q3W followed by maintenance pembrolizumab PEMEtrexed Q3W  Treatment Goal: Control  Status: Active  Start Date: 2023 (Planned)  End Date: 2025 (Planned)  Provider: Reynaldo Torres MD  Chemotherapy: CARBOplatin (PARAPLATIN) in sodium chloride 0.9% 250  mL chemo infusion, , Intravenous, Clinic/HOD 1 time, 0 of 4 cycles  PEMEtrexed disodium (ALIMTA) 1,075 mg in sodium chloride 0.9% SolP 100 mL chemo infusion, 500 mg/m2, Intravenous, Clinic/HOD 1 time, 0 of 35 cycles         Patient Active Problem List   Diagnosis    Mild atopic dermatitis    HTN (hypertension)    Hyperlipidemia    Personal history of colon cancer, stage I    Nuclear sclerosis    Open angle with borderline findings and low glaucoma risk in both eyes    Paronychia of third toe of right foot    History of colon cancer    Arthritis of left knee    Muscle weakness    NSCLC of left lung    Malignant neoplasm of upper lobe of left lung       Past Medical History:   Diagnosis Date    Allergy     SEASON    Basal cell carcinoma 2009    L ear    Cancer     Hx colon     Pneumonia, unspecified organism 07/2023       Past Surgical History:   Procedure Laterality Date    CATARACT EXTRACTION Bilateral 2022    COLON SURGERY  2000    1ft. sigmoid removed    COLONOSCOPY N/A 09/28/2020    Procedure: COLONOSCOPY;  Surgeon: Ty Pollock MD;  Location: [facility];  Service: General;  Laterality: N/A;       Social History     Socioeconomic History    Marital status:    Tobacco Use    Smoking status: Former     Current packs/day: 0.25     Average packs/day: 0.3 packs/day for 15.0 years (3.8 ttl pk-yrs)     Types: Cigarettes     Start date: 9/6/2008    Smokeless tobacco: Never   Substance and Sexual Activity    Alcohol use: Yes     Alcohol/week: 2.0 standard drinks of alcohol     Types: 2 Drinks containing 0.5 oz of alcohol per week     Comment: 2 mixed drinks WEEK    Drug use: No    Sexual activity: Yes     Partners: Female     Social Determinants of Health     Financial Resource Strain: Low Risk  (8/29/2023)    Overall Financial Resource Strain (CARDIA)     Difficulty of Paying Living Expenses: Not hard at all   Food Insecurity: No Food Insecurity (9/4/2023)    Hunger Vital Sign      Worried About Running Out of Food in the Last Year: Never true     Ran Out of Food in the Last Year: Never true   Transportation Needs: No Transportation Needs (9/4/2023)    PRAPARE - Transportation     Lack of Transportation (Medical): No     Lack of Transportation (Non-Medical): No   Physical Activity: Insufficiently Active (9/4/2023)    Exercise Vital Sign     Days of Exercise per Week: 3 days     Minutes of Exercise per Session: 30 min   Stress: No Stress Concern Present (9/4/2023)    [employer] - Occupational Stress Questionnaire     Feeling of Stress : Only a little   Social Connections: Unknown (9/4/2023)    Social Connection and Isolation Panel [NHANES]     Frequency of Communication with Friends and Family: Three times a week     Frequency of Social Gatherings with Friends and Family: Twice a week     Active Member of Clubs or Organizations: No     Attends Club or Organization Meetings: Never     Marital Status:    Housing Stability: Low Risk  (9/4/2023)    Housing Stability Vital Sign     Unable to Pay for Housing in the Last Year: No     Number of Places Lived in the Last Year: 1     Unstable Housing in the Last Year: No       Family History   Problem Relation Age of Onset    Cancer Mother     Cancer Brother     Macular degeneration Brother     Melanoma Neg Hx          Current Outpatient Medications:     dexAMETHasone (DECADRON) 4 MG Tab, 2 tablets twice a day the day before and after each chemo, Disp: 32 tablet, Rfl: 5    finasteride (PROSCAR) 5 mg tablet, TAKE 1 TABLET BY MOUTH EVERY DAY FOR PROSTATE, Disp: 90 tablet, Rfl: 3    folic acid (FOLVITE) 1 MG tablet, Take 1 tablet (1 mg total) by mouth once daily., Disp: 100 tablet, Rfl: 3    HYDROcodone-acetaminophen (NORCO)  mg per tablet, Take 1 tablet by mouth every 6 (six) hours as needed for Pain., Disp: 60 tablet, Rfl: 0    ipratropium (ATROVENT) 42 mcg (0.06 %) nasal spray, USE 1  SPRAY in each nostril TWICE DAILY THANK YOU!, Disp: , Rfl:     levoFLOXacin (LEVAQUIN) 750 MG tablet, Take 1 tablet (750 mg total) by mouth once daily., Disp: 7 tablet, Rfl: 0    levoFLOXacin (LEVAQUIN) 750 MG tablet, Take 1 tablet (750 mg total) by mouth once daily., Disp: 7 tablet, Rfl: 0    ondansetron (ZOFRAN) 8 MG tablet, Take 1 tablet (8 mg total) by mouth every 8 (eight) hours as needed., Disp: 30 tablet, Rfl: 5    promethazine (PHENERGAN) 25 MG tablet, Take 1 tablet (25 mg total) by mouth every 4 to 6 hours as needed., Disp: 30 tablet, Rfl: 5    sildenafiL (VIAGRA) 100 MG tablet, Take 1 tablet (100 mg total) by mouth daily as needed., Disp: 30 tablet, Rfl: 11    tamsulosin (FLOMAX) 0.4 mg Cap, Take 1 capsule (0.4 mg total) by mouth once daily., Disp: 90 capsule, Rfl: 3    tiZANidine (ZANAFLEX) 4 MG tablet, Take 1 tablet (4 mg total) by mouth every 12 (twelve) hours as needed (muscle spasms/ pain)., Disp: 40 tablet, Rfl: 0    Review of patient's allergies indicates:   Allergen Reactions    Penicillins      Other reaction(s): Rash  50 YEARS AGO         Physcial Examination  VITAL SIGNS:    Body surface area is 2.15 meters squared.   Pain Assessment  Vitals:    09/07/23 1257   BP: 122/73   Pulse: 105   Temp: 97.6 °F (36.4 °C)   SpO2: 96%   Weight: 85.1 kg (187 lb 9.6 oz)   PainSc:   7   PainLoc: Abdomen        Wt Readings from Last 5 Encounters:   09/07/23 85.1 kg (187 lb 9.6 oz)   09/06/23 86.2 kg (190 lb)   09/01/23 84.9 kg (187 lb 1.6 oz)   08/09/23 87 kg (191 lb 12.8 oz)   08/03/23 87.2 kg (192 lb 3.2 oz)       GENERAL:  Akil Guzman is healthy-appearing 78 y.o. male, in no distress.   EYES:   Pupils equal, round, reactive.  Conjunctivae, sclera and lids normal.  HEENT: Head normocephalic and atraumatic, without alopecia.  Oropharynx is unremarkable.  No icterus, jaundice, stomatitis, mucositis, or ulceration is noted.  Ears are clear and unremarkable.  Nose, nares, and septum are unremarkable.     NECK:   No masses.  Thyroid and trachea are normal.    BREASTS:  Deferred.  RESPIRATORY: Clear to auscultation bilaterally.  Symmetrically effortless expansion.  No wheezing and no stridor.    CV: Heart reveals regular rate and rhythm without murmur, rub, or gallops.  ABDOMEN: Soft, non-tender.  No masses, no hernias, and no rebound or rigidity are noted.  /RECTAL:  Deferred.  LYMPHATICS: No preauricular, submandibular, cervical, supraclavicular, axillary, lymphadenopathy.  MUSCULOSKELETAL:Fair musculature, no atrophy.  No arthritic changes.  No edema or cyanosis. Back is without gross abnormal curvature.   NEUROLOGICAL: Cranial nerves II-XII grossly intact.  Motor and sensory exam intact.  SKIN:   No lesions, bruises, petechiae or rashes.  Good turgor.    PSYCHIATRIC: Patient is alert and oriented to time, place and person.  Mood and affect are appropriate.         Laboratory and Radiology   Lab Results   Component Value Date    WBC 32.88 (H) 09/06/2023    RBC 3.84 (L) 09/06/2023    HGB 12.1 (L) 09/06/2023    HCT 36.2 (L) 09/06/2023    MCV 94 09/06/2023    MCH 31.5 (H) 09/06/2023    MCHC 33.4 09/06/2023    RDW 13.0 09/06/2023     09/06/2023    MPV 8.8 (L) 09/06/2023    GRAN 29.3 (H) 09/06/2023    GRAN 89.0 (H) 09/06/2023    LYMPH 1.1 09/06/2023    LYMPH 3.2 (L) 09/06/2023    MONO 2.2 (H) 09/06/2023    MONO 6.7 09/06/2023    EOS 0.0 09/06/2023    BASO 0.08 09/06/2023    EOSINOPHIL 0.1 09/06/2023    BASOPHIL 0.2 09/06/2023     BMP  Lab Results   Component Value Date     (L) 09/06/2023    K 4.8 09/06/2023    CL 98 09/06/2023    CO2 27 09/06/2023    BUN 27 (H) 09/06/2023    CREATININE 1.0 09/06/2023    CALCIUM 9.9 09/06/2023    ANIONGAP 9 09/06/2023    ESTGFRAFRICA 56.4 (A) 11/02/2021    EGFRNONAA 48.8 (A) 11/02/2021     Lab Results   Component Value Date    ALT 12 09/06/2023    AST 18 09/06/2023    ALKPHOS 91 09/06/2023    BILITOT 0.5 09/06/2023     Results for orders placed or performed during the  hospital encounter of 08/07/23 (from the past 2160 hour(s))   CT Chest Without Contrast    Narrative    EXAMINATION:  CT CHEST WITHOUT CONTRAST    CLINICAL HISTORY:  Pneumonia, unresolved; Pneumonia, unspecified organism    TECHNIQUE:  Low dose axial images, sagittal and coronal reformations were obtained from the thoracic inlet to the lung bases. Contrast was not administered.    COMPARISON:  Chest x-ray 08/03/2023 and 07/18/2023.    FINDINGS:  There is a large left pleural effusion measuring to a diameter 6.5 cm extending to the left lung apex.  There is dependent atelectasis at the left lung base.  There is a poorly defined mass-like infiltrate involving the periphery of the left upper lobe with linear extension to the left hilum.  An underlying parenchymal mass is not excludable.  However, evaluation is limited due to lack of intravenous contrast.    There is a partially calcified 12 mm soft tissue nodule of the left upper lobe.  There is an 11 mm soft tissue pulmonary nodule medial left upper lobe which extends to the pleural surface.  This may represent parenchymal scarring.  Small soft tissue pulmonary nodules measuring 2-4 mm in size.  Pleural thickening is present within the right upper lobe.  There is biapical pleuroparenchymal scarring.  Scattered small pleural plaques consistent with prior occupational exposure.  Parenchymal scarring and discoid atelectasis at the medial right lung base.    No suspicious endobronchial lesion.  There is questionable mild left hilar lymphadenopathy.  However, again evaluation is limited due to lack of intravenous contrast.  No significant right hilar, mediastinal or axillary lymphadenopathy.    Limited evaluation of the upper abdomen demonstrates a small hiatal hernia.      Impression    1. Large left pleural effusion with dependent left lower lobe atelectasis.  2. Poorly defined mass-like infiltrate involving the periphery of the left upper lobe extending to the left  hilum.  Underlying parenchymal mass is not excludable, however, evaluation is limited due to lack of intravenous contrast.  Correlate clinically with possible fever and/or elevated white count.  3. Bilateral soft tissue pulmonary nodules measuring up to 11 mm.  Follow-up per Fleischner guidelines.  For multiple solid nodules with any 6 mm or greater, Fleischner Society guidelines recommend follow up with non-contrast chest CT at 3-6 months and 18-24 months after discovery.  4. Partially calcified left upper lobe pulmonary nodule may represent granulomatous change and scarring.  5. 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  6. Questionable left hilar lymphadenopathy.  However, evaluation is again limited due to lack of intravenous contrast.  7. Small hiatal hernia.  This report was flagged in Epic as abnormal.      Electronically signed by: Pedro Merino  Date:    08/07/2023  Time:    11:51     No results found for this or any previous visit (from the past 2160 hour(s)).  No results found for this or any previous visit (from the past 2160 hour(s)).    Pathology  Pathology Results  (Last 10 years)                 12/18/20 1434  Specimen to Pathology, Dermatology Final result    Narrative:  Number of Specimens:->1   ------------------------->-------------------------   Spec 1 Procedure:->Biopsy   Spec 1 Clinical Impression:->Erythematous scaly plaque,   overlying potential surgical scar, r/o BCC vs SCC vs   inflammatory   Spec 1 Source:->left lobule       09/28/20 0915  Specimen to Pathology, Surgery Gastrointestinal tract Final result    Narrative:  Pre-op Diagnosis: History of colon cancer [Z85.038]   Procedure(s):   COLONOSCOPY   Number of specimens: 3   Name of specimens: 1. Ascending colon polyp 2. Descending   colon polyp 3 sigmoid colon polyp   Specimen total (fresh, frozen, permanent):->3               TITLE: PLAN OF CARE FOR THE CHEMOTHERAPY PATIENT / TEACHING  PROTOCOL    PURPOSE: To involve the patient / significant other in the plan of care and to provide teaching to the significant other & patient receiving chemotherapy.    LEVEL: Independent.    CONTENT: The Plan of Care for the chemotherapy patient is individualized and appropriate to the patients needs, strengths, limitations, & goals.  Education includes information regarding chemotherapy side effects, the treatment itself, and self-care  Activities.    GOAL / OUTCOME STANDARDS    PHYSIOLOGIC: The client will remain free or experience minimal side effects or toxicities throughout the chemotherapy treatment period.     PSYCHOLOGIC: The client/significant others will demonstrate positive coping mechanisms in relation to chemotherapy and its side effects.      COGINITIVE: The client/significant others will verbalize understanding of self-care measure to avoid/minimize side effects of the chemotherapy regimen.    EVALUATION / COMMENT KEY:    V = Audiovisual/Video  S = Successfully meets outcome  N = Needs further instruction  NA = Not applicable to the patient  P = Previous knowledge  U = Unable to comprehend  * = See progress notes          PLAN OF CARE  INFORMATION TO BE DELIVERED / NURSING INTERVENTIONS DATE EVALUATION   Assessment of client/caregiver,         knowledge of cancer diagnosis,         and chemotherapy as a treatment. 1a. Evaluate patient/caregiver learning ability    b. Plan teaching sessions with patient/caregiver according to needs and present anxiety level/ability to learn.    c. Provide Chemotherapy Education Packet,        Mouth Care Protocol,         Specific Patient Education Sheets. 09/07/2023 S   Individual chemotherapy treatment         plan. 2a. Review of Chemotherapy Education handout from [employer]            09/07/2023   S   Knowledge Deficit & Self-Management of general side effects common to all chemotherapy:  Nausea/Vomiting  b.   Diarrhea  Mouth Care  Dental  care  Constipation  Hair Loss  Potential for infection  Fatigue   3a. Reinforce that the majority of side effects from chemotherapy are reversible and are  controlled both in the hospital and at home        (blood counts recover, hair grows back).   b.  Refer to the following for reinforcement of         information post-treatment:  Mouth Care Protocol.  Bowel Protocol for constipation or diarrhea.  3.  Drug Specific Chemotherapy Information Sheets for each medication patient receiving.    09/07/2023     S     PLAN OF CARE  INFORMATION TO BE DELIVERED / NURSING INTERVENTIONS DATE EVALUATION   h. Potential for bleeding         i. Potential anemia/fatigue         j. Potential sunburn         k. Birth control measures  l. Safety measures post treatment 4.  Chemotherapy Home Care Instruction  and Safety Information Sheet.  A. patient/caregivers to thoroughly cook shellfish (shrimp, crab, etc) to decrease the chance of infection.    B.  Use sunscreen and protective clothing while in the sun.   09/07/2023      Knowledge deficit & Self Management of Drug Specific  Side Effects.    BLADDER EFFECTS        (Hemorrhagic Cystitis)                  Preventable with adequate hydration; occurs 2-3 days or more post treatment.   1.  Instruct patient to:  a.   Void at least every 2 hours; increase intake.  b.   DO NOT hold urine; go when urge is felt.  c.    Empty bladder at bedtime and on         awakening.  d.   Observe for color changes (red to tea           colored), amount and frequency changes.  e.   Notify oncologist of any abnormalities           in urine or voiding or if you cannot               drink adequate fluids.   09/07/2023   S   b.   CHANGES IN URINE        COLOR:      1.   Instruct patient:  a.   Most evident in first 2-3 voidings after           administration.  Lasts less than 24 hours.  If urine is discolored 2 or more days post- treatment, notify oncologist.      09/07/2023 S   c.    KIDNEY EFFECTS            (Nephrotoxicity)   1.  Instruct patient to:  a.   Drink 8-16 glasses of fluid/day the day   pre-treatment and 3-4 days post-treatment to maintain hydration; the best way to minimize kidney problems.  b.   Notify oncologist immediately if unable to drink fluids or if changes are noted in urinary elimination.     09/07/2023   S   PULMONARY TOXICITY    Instruct patient to report symptoms such as shortness of breath, chest pain, shallow breathing, or chest wall discomfort to physician.  Reinforce preventative measures used by the health care team.  Baseline and periodic PFT and chest x-ray.   09/07/2023   S     PLAN OF CARE INFORMATION TO BE DELIVERED / NURSING INTERVENTIONS DATE EVALUATION   NERVE & MUSCLE EFFECTS (neurotoxocity; neuropathy, possible visual/hearing changes)        Instruct patient to:    Report numbness or tingling of the hands/feet, loss of fine motor movement (buttoning shirt, tying shoelaces), or gait changes to your oncologist.  If numbness/tingling are present:  protect feet with shoes at all times.  Use gloves for washing dishes/gardening & potholders in kitchen.       09/07/2023   S   CARDIOTOXICITY  Decreased effectiveness of             cardiac function. Effective are                  cumulative and irreversible.                                    CARDIAC ARRYTHMIAS              4   Instruct:  Heart function may be tested before treatment and perdiocally during treatment.  Notify oncologist of irregular pulse, palpitations, shortness of breath, or swelling in lower extremities/feet.          Can cause arrhythmias on infusion that resolve once infusion discontinued. Instruct nurse if any irregularity felt.    09/07/2023   S   EXTRAVASTION  Occurs when vesicants leak outside of vein and cause damage to the skin and underlying tissues.   Reinforce preventive measures used to avoid complications.  Fresh IV site or central line monitored continuously with vesicant IVP.  Continuous infusion via  central line site and blood return monitored periodically around the clock.  Instruct to:  Notify nurse of any discomfort, burning, stinging, etc. at IV site during chemotherapy administration.  Notify oncologist of any redness, pain, or swelling at IV site after discharge from hospital.   09/07/2023   S   HYPERSENSITIVITY can happen with any medication.   Instruct patient:  Nurse is with them during the initial part of treatment and will be close by to monitor.  Pre-medication ordered by the oncologist must be taken on time. If doses are missed, treatment will need to be re-scheduled.  Skin redness, itching, or hives appearing after discharge should be reported to oncologist. 09/07/2023   S       PLAN OF CARE INFORMATION TO BE DELIVERED / NURSING INTERVENTIONS DATE EVALUATION   FLU-LIKE SYNDROME      Instruct patient symptoms are hard to prevent and may include fever, shaking chills, muscle and body aches.  Taking prescribed medications from physician if needed.  Adequate fluids are important.    Reinforce the need to call if temperature is         elevated to 100.4 or more  09/07/2023   S   HAND-FOOT SYNDROME  causes painful, symmetric swelling and redness of palms and soles                  Instruct patient to report any numbness or tingling in the hands or feet.  Explain prevention techniques, such as     Use heavy moisturizers to lessen skin dryness and itching, but to avoid if skin is cracked or broken  Bathe in tepid water, use non-perfumed soap, and wash gently. Baths with oatmeal or diluted baking soda may be soothing.  Avoid tight fitting shoes and repetitive actions, such as rubbing hands or applying pressure to hands/feet.  Review measures to take should syndrome occur:  Cold compresses and elevation for          edema  Pain medications and other measures as ordered by oncologist.   4.   Syndrome resolves few weeks after therapy. 09/07/2023   S   5. DISCHARGE PLANNING /        EDUCATION 1.    Explain  importance of compliance with follow- up  tests (CBC, CMP).  2.    Verify patient/caregiver know:  a.    Oncologists office phone number.  b.    Dates of follow-up appointments.  c.    Prescriptions given for nausea  3.   Review side effects to monitor and notify          oncologist about.  4.   Reinforce the need for patient and caregivers to:  a.    Review information given.  b.    Call oncologists office with questions          or symptoms  5.   Provide [facility] [place] [facility]ure make referrals if needed for financial or .   09/07/2023   S     PROGRESS NOTES: I met with the patient today for chemotherapy education. he will be starting treatment with Carboplatin, Alimta and Keytruda . We discussed the mechanism of action, potential side effects of this treatment as well as ways he can manage them at home. Some of these side effects include but or not limited to fever, nausea, vomiting, decreased appetite, fatigue, weakness, cytopenias, myalgia/arthralgia, constipation, diarrhea, bleeding, headache, shortness of breath, nail changes, taste change, hair thinning/loss, mood disturbances, or edema. We also discussed dietary modifications he should make although this will be discussed in more detail with the dietician. he was provided with anti-emetic medication, a copy of all of the information we discussed today as well as our contact information. he will be provided a schedule on his first day of treatment. We will obtain labs on a weekly basis and the patient will follow-up with the physician for toxicity monitoring throughout treatment. All questions were answered and an informed consent was obtained. he was reminded to certainly contact us sooner if needed.  Attached to the patients folder and discussed with the patient the 24 hour/ 7 days a week after hours telephone number for the physician.  Patient notified to call anytime 24/7 because their is a physician on call for any problems that  may arise.  Patient also notified to report to [facility] if they can not get in touch with a physician after hours.  Discussed the five wishes booklet with the patient and their family.           Assessment/Plan     ICD-10-CM ICD-9-CM   1. Acute pain  R52 338.19   2. Malignant neoplasm of upper lobe of left lung  C34.12 162.3   3. Fatigue  R53.83 780.79          Medications Ordered:  Zofran 8mg 1 tab PO Q8h prn nausea  Phenergan 25mg PO Q4-6h prn nausea  Folic Acid Daily   Dexamethasone  B12 Daily    Standing Labs Ordered:  CBC weekly  CMP weekly  Mag weekly  TSH Monthly    Follow up in about 2 weeks (around 9/21/2023) for with Dr. Torres and 5 weeks with me.      [employer] Face to Face Time: 60 minutes face to face with the patient and their family discussing the chemotherapy side-effects and when to call our office.   Electronically signed by: Allie Shelley, MSN, APRN, AGNP-C, OCN    Answers submitted by the patient for this visit:  Review of Systems Questionnaire (Submitted on 9/4/2023)  visual disturbance: No

## 2023-09-06 NOTE — H&P (VIEW-ONLY)
GENERAL SURGERY  OUTPATIENT H&P    REASON FOR VISIT/CC: Port placement    HPI: Akil Guzman is a 78 y.o. male with recent diagnosis of non-small-cell lung cancer of the left side. Planning to undergo chemotherapy. Has been referred for port placement. Patient denies any prior head or neck trauma or central lines. Has had elevated white blood cell count of recent blood draws of uncertain etiology.  Denies fevers, chills, nausea or vomiting.    I have reviewed the patient's chart including prior progress notes, procedures and testing.     ROS:   Review of Systems    PROBLEM LIST:  Patient Active Problem List   Diagnosis    Mild atopic dermatitis    HTN (hypertension)    Hyperlipidemia    Personal history of colon cancer, stage I    Nuclear sclerosis    Open angle with borderline findings and low glaucoma risk in both eyes    Paronychia of third toe of right foot    History of colon cancer    Arthritis of left knee    Muscle weakness    NSCLC of left lung    Malignant neoplasm of upper lobe of left lung         HISTORY  Past Medical History:   Diagnosis Date    Allergy     SEASON    Basal cell carcinoma 2009    L ear    Cancer     Hx colon     Pneumonia, unspecified organism 07/2023       Past Surgical History:   Procedure Laterality Date    CATARACT EXTRACTION Bilateral 2022    COLON SURGERY  2000    1ft. sigmoid removed    COLONOSCOPY N/A 09/28/2020    Procedure: COLONOSCOPY;  Surgeon: Ty Pollock MD;  Location: [facility];  Service: General;  Laterality: N/A;       Social History     Tobacco Use    Smoking status: Former     Current packs/day: 0.25     Average packs/day: 0.3 packs/day for 15.0 years (3.8 ttl pk-yrs)     Types: Cigarettes     Start date: 9/6/2008    Smokeless tobacco: Never   Substance Use Topics    Alcohol use: Yes     Alcohol/week: 2.0 standard drinks of alcohol     Types: 2 Drinks containing 0.5 oz of alcohol per week     Comment: 2 mixed drinks WEEK    Drug use: No       Family History    Problem Relation Age of Onset    Cancer Mother     Cancer Brother     Macular degeneration Brother     Melanoma Neg Hx          MEDS:  Current Outpatient Medications on File Prior to Visit   Medication Sig Dispense Refill    finasteride (PROSCAR) 5 mg tablet TAKE 1 TABLET BY MOUTH EVERY DAY FOR PROSTATE 90 tablet 3    HYDROcodone-acetaminophen (NORCO) 5-325 mg per tablet Take 1 tablet by mouth every 6 (six) hours as needed for Pain. 20 tablet 0    ipratropium (ATROVENT) 42 mcg (0.06 %) nasal spray USE 1 SPRAY in each nostril TWICE DAILY THANK YOU!      levoFLOXacin (LEVAQUIN) 750 MG tablet Take 1 tablet (750 mg total) by mouth once daily. 7 tablet 0    tamsulosin (FLOMAX) 0.4 mg Cap Take 1 capsule (0.4 mg total) by mouth once daily. 90 capsule 3    tiZANidine (ZANAFLEX) 4 MG tablet Take 1 tablet (4 mg total) by mouth every 12 (twelve) hours as needed (muscle spasms/ pain). 40 tablet 0    levoFLOXacin (LEVAQUIN) 750 MG tablet Take 1 tablet (750 mg total) by mouth once daily. 7 tablet 0    sildenafiL (VIAGRA) 100 MG tablet Take 1 tablet (100 mg total) by mouth daily as needed. 30 tablet 11    [DISCONTINUED] dutasteride (AVODART) 0.5 mg capsule Take 1 capsule (0.5 mg total) by mouth once daily. 30 capsule 2     No current facility-administered medications on file prior to visit.       ALLERGIES:  Review of patient's allergies indicates:   Allergen Reactions    Penicillins      Other reaction(s): Rash  50 YEARS AGO           VITALS:  Vitals:    09/06/23 0939   BP: 117/72   Pulse: 93   Temp: 97 °F (36.1 °C)         PHYSICAL EXAM:  Physical Exam  Vitals reviewed.   Constitutional:       General: He is not in acute distress.     Appearance: Normal appearance. He is well-developed.   HENT:      Head: Normocephalic and atraumatic.   Eyes:      General: No scleral icterus.  Neck:      Trachea: No tracheal deviation.   Cardiovascular:      Rate and Rhythm: Normal rate and regular rhythm.      Pulses: Normal pulses.    Pulmonary:      Effort: Pulmonary effort is normal. No respiratory distress.      Breath sounds: Normal breath sounds.   Abdominal:      General: There is no distension.      Palpations: Abdomen is soft.      Tenderness: There is no abdominal tenderness.   Musculoskeletal:         General: No swelling or tenderness. Normal range of motion.      Cervical back: Normal range of motion and neck supple. No rigidity.   Skin:     General: Skin is warm and dry.      Coloration: Skin is not jaundiced.      Findings: No erythema.   Neurological:      General: No focal deficit present.      Mental Status: He is alert and oriented to person, place, and time. He is not disoriented.      Motor: No weakness or abnormal muscle tone.   Psychiatric:         Mood and Affect: Mood normal.         Behavior: Behavior normal.         Thought Content: Thought content normal.         Judgment: Judgment normal.           LABS:  Lab Results   Component Value Date    WBC 32.88 (H) 09/06/2023    RBC 3.84 (L) 09/06/2023    HGB 12.1 (L) 09/06/2023    HCT 36.2 (L) 09/06/2023     09/06/2023     Lab Results   Component Value Date     (H) 09/06/2023     (L) 09/06/2023    K 4.8 09/06/2023    CL 98 09/06/2023    CO2 27 09/06/2023    BUN 27 (H) 09/06/2023    CREATININE 1.0 09/06/2023    CALCIUM 9.9 09/06/2023     Lab Results   Component Value Date    ALT 12 09/06/2023    AST 18 09/06/2023    ALKPHOS 91 09/06/2023    BILITOT 0.5 09/06/2023     Lab Results   Component Value Date    PHOS 3.7 12/04/2014       STUDIES:  Images and reports were personally reviewed.  Cxr  FINDINGS:  Small left pleural effusion which appears further decreased compared to the post thoracentesis chest x-ray of 08/17/2023.  Also likely mild left basilar atelectasis with improved aeration of the left lung base.  No pneumothorax.  The right lung is clear.  The right costophrenic sulcus is sharp.  The cardiomediastinal silhouette is stable.     Impression:      Decrease of the left pleural effusion compared to the prior study 08/17/2023 which was post thoracentesis chest x-ray; and improved aeration the left lung base.  Small residual pleural effusion with also likely mild atelectasis left lung base.      ASSESSMENT & PLAN:  78 y.o. male with non-small-cell lung cancer of the left lung  - we discussed the indication for port placement to ensure secure and easy access to the venous system for chemotherapy infusion   - the procedure for placement as well as associated risk of pain, bleeding, scarring, infection, need to remove port, port malfunction, port malpositioning, injury to artery, injury to lung, arrhythmia, malfunction, hematoma, thrombus, etc.  were reviewed, patient expressed understanding these risks and agreed proceed with surgical intervention  -will plan to place on 09/11/2023  -labs up-to-date, will obtain repeat chest x-ray and EKG  -patient has leukocytosis of uncertain etiology, none on steroids, no evidence of infectious etiology, has been persistent for over a month

## 2023-09-06 NOTE — H&P
GENERAL SURGERY  OUTPATIENT H&P    REASON FOR VISIT/CC: Port placement    HPI: Akil Guzman is a 78 y.o. male with recent diagnosis of non-small-cell lung cancer of the left side. Planning to undergo chemotherapy. Has been referred for port placement. Patient denies any prior head or neck trauma or central lines. Has had elevated white blood cell count of recent blood draws of uncertain etiology.  Denies fevers, chills, nausea or vomiting.    I have reviewed the patient's chart including prior progress notes, procedures and testing.     ROS:   Review of Systems    PROBLEM LIST:  Patient Active Problem List   Diagnosis    Mild atopic dermatitis    HTN (hypertension)    Hyperlipidemia    Personal history of colon cancer, stage I    Nuclear sclerosis    Open angle with borderline findings and low glaucoma risk in both eyes    Paronychia of third toe of right foot    History of colon cancer    Arthritis of left knee    Muscle weakness    NSCLC of left lung    Malignant neoplasm of upper lobe of left lung         HISTORY  Past Medical History:   Diagnosis Date    Allergy     SEASON    Basal cell carcinoma 2009    L ear    Cancer     Hx colon     Pneumonia, unspecified organism 07/2023       Past Surgical History:   Procedure Laterality Date    CATARACT EXTRACTION Bilateral 2022    COLON SURGERY  2000    1ft. sigmoid removed    COLONOSCOPY N/A 09/28/2020    Procedure: COLONOSCOPY;  Surgeon: Ty Pollock MD;  Location: [facility];  Service: General;  Laterality: N/A;       Social History     Tobacco Use    Smoking status: Former     Current packs/day: 0.25     Average packs/day: 0.3 packs/day for 15.0 years (3.8 ttl pk-yrs)     Types: Cigarettes     Start date: 9/6/2008    Smokeless tobacco: Never   Substance Use Topics    Alcohol use: Yes     Alcohol/week: 2.0 standard drinks of alcohol     Types: 2 Drinks containing 0.5 oz of alcohol per week     Comment: 2 mixed drinks WEEK    Drug use: No       Family History    Problem Relation Age of Onset    Cancer Mother     Cancer Brother     Macular degeneration Brother     Melanoma Neg Hx          MEDS:  Current Outpatient Medications on File Prior to Visit   Medication Sig Dispense Refill    finasteride (PROSCAR) 5 mg tablet TAKE 1 TABLET BY MOUTH EVERY DAY FOR PROSTATE 90 tablet 3    HYDROcodone-acetaminophen (NORCO) 5-325 mg per tablet Take 1 tablet by mouth every 6 (six) hours as needed for Pain. 20 tablet 0    ipratropium (ATROVENT) 42 mcg (0.06 %) nasal spray USE 1 SPRAY in each nostril TWICE DAILY THANK YOU!      levoFLOXacin (LEVAQUIN) 750 MG tablet Take 1 tablet (750 mg total) by mouth once daily. 7 tablet 0    tamsulosin (FLOMAX) 0.4 mg Cap Take 1 capsule (0.4 mg total) by mouth once daily. 90 capsule 3    tiZANidine (ZANAFLEX) 4 MG tablet Take 1 tablet (4 mg total) by mouth every 12 (twelve) hours as needed (muscle spasms/ pain). 40 tablet 0    levoFLOXacin (LEVAQUIN) 750 MG tablet Take 1 tablet (750 mg total) by mouth once daily. 7 tablet 0    sildenafiL (VIAGRA) 100 MG tablet Take 1 tablet (100 mg total) by mouth daily as needed. 30 tablet 11    [DISCONTINUED] dutasteride (AVODART) 0.5 mg capsule Take 1 capsule (0.5 mg total) by mouth once daily. 30 capsule 2     No current facility-administered medications on file prior to visit.       ALLERGIES:  Review of patient's allergies indicates:   Allergen Reactions    Penicillins      Other reaction(s): Rash  50 YEARS AGO           VITALS:  Vitals:    09/06/23 0939   BP: 117/72   Pulse: 93   Temp: 97 °F (36.1 °C)         PHYSICAL EXAM:  Physical Exam  Vitals reviewed.   Constitutional:       General: He is not in acute distress.     Appearance: Normal appearance. He is well-developed.   HENT:      Head: Normocephalic and atraumatic.   Eyes:      General: No scleral icterus.  Neck:      Trachea: No tracheal deviation.   Cardiovascular:      Rate and Rhythm: Normal rate and regular rhythm.      Pulses: Normal pulses.    Pulmonary:      Effort: Pulmonary effort is normal. No respiratory distress.      Breath sounds: Normal breath sounds.   Abdominal:      General: There is no distension.      Palpations: Abdomen is soft.      Tenderness: There is no abdominal tenderness.   Musculoskeletal:         General: No swelling or tenderness. Normal range of motion.      Cervical back: Normal range of motion and neck supple. No rigidity.   Skin:     General: Skin is warm and dry.      Coloration: Skin is not jaundiced.      Findings: No erythema.   Neurological:      General: No focal deficit present.      Mental Status: He is alert and oriented to person, place, and time. He is not disoriented.      Motor: No weakness or abnormal muscle tone.   Psychiatric:         Mood and Affect: Mood normal.         Behavior: Behavior normal.         Thought Content: Thought content normal.         Judgment: Judgment normal.           LABS:  Lab Results   Component Value Date    WBC 32.88 (H) 09/06/2023    RBC 3.84 (L) 09/06/2023    HGB 12.1 (L) 09/06/2023    HCT 36.2 (L) 09/06/2023     09/06/2023     Lab Results   Component Value Date     (H) 09/06/2023     (L) 09/06/2023    K 4.8 09/06/2023    CL 98 09/06/2023    CO2 27 09/06/2023    BUN 27 (H) 09/06/2023    CREATININE 1.0 09/06/2023    CALCIUM 9.9 09/06/2023     Lab Results   Component Value Date    ALT 12 09/06/2023    AST 18 09/06/2023    ALKPHOS 91 09/06/2023    BILITOT 0.5 09/06/2023     Lab Results   Component Value Date    PHOS 3.7 12/04/2014       STUDIES:  Images and reports were personally reviewed.  Cxr  FINDINGS:  Small left pleural effusion which appears further decreased compared to the post thoracentesis chest x-ray of 08/17/2023.  Also likely mild left basilar atelectasis with improved aeration of the left lung base.  No pneumothorax.  The right lung is clear.  The right costophrenic sulcus is sharp.  The cardiomediastinal silhouette is stable.     Impression:      Decrease of the left pleural effusion compared to the prior study 08/17/2023 which was post thoracentesis chest x-ray; and improved aeration the left lung base.  Small residual pleural effusion with also likely mild atelectasis left lung base.      ASSESSMENT & PLAN:  78 y.o. male with non-small-cell lung cancer of the left lung  - we discussed the indication for port placement to ensure secure and easy access to the venous system for chemotherapy infusion   - the procedure for placement as well as associated risk of pain, bleeding, scarring, infection, need to remove port, port malfunction, port malpositioning, injury to artery, injury to lung, arrhythmia, malfunction, hematoma, thrombus, etc.  were reviewed, patient expressed understanding these risks and agreed proceed with surgical intervention  -will plan to place on 09/11/2023  -labs up-to-date, will obtain repeat chest x-ray and EKG  -patient has leukocytosis of uncertain etiology, none on steroids, no evidence of infectious etiology, has been persistent for over a month

## 2023-09-06 NOTE — DISCHARGE INSTRUCTIONS
INSTRUCTIONS  To confirm your doctor has scheduled your surgery for:  9/11/23      Morning of surgery please check in with registration near Parking Garage Entrance then proceed to Outpatient Surgery Department.    Preop nurses will call the afternoon prior to surgery between 4:00 and 6:00 PM with your final arrival time.  PLEASE NOTE:  The surgery schedule has many variables which may affect the time of your surgery case. Family members should be available if your surgery time changes. Plan to be here the day of your procedure between 4-6 hours.    TAKE ONLY THESE MEDICATIONS WITH A SMALL SIP OF WATER THE MORNING OF SURGERY: see list    DO NOT TAKE THESE MEDICATIONS 5-7 DAYS PRIOR to your procedure per your surgeon's request: ASPIRIN, ALEVE, BC powder, DENNIS SELTZER, IBUPROFEN, FISH OIL, VITAMIN E, OR HERBALS   (May take Tylenol)    If you are prescribed any types of blood thinners (Aspirin, Coumadin, Plavix, Pradaxa, Xarelto, Aggrenox, Effient, Eliquis, Savasya, Brilinta or any other), please ask your surgeon how many days before scheduled procedure should you stop taking them. You may also need to verify with prescribing physician if it is OK to stop your blood thinners.      INSTRUCTIONS IMPORTANT!!  Do not eat or drink anything after midnight.  ONLY if you are diabetic, check your sugar in the morning before your procedure.  Do not smoke, vape or drink alcoholic beverages 24 hours prior to your procedure.  Shower the night before AND the morning of your procedure with a Chlorhexidine wash such as hibiclens or Dial antibacterial soap from neck down. You may use your own shampoo and face wash. This helps your skin to be as bacteria free as possible.  If you wear contact lenses, dentures, hearing aids or glasses, bring a container to put them in and give to a family member.    Please leave all jewelry, piercings and valuables at home.  DO NOT remove hair from the surgery site.   If your condition changes such as  fever, cough, etc, please notify your surgeon.   ONLY if you have been diagnosed with sleep apnea please bring your C-PAP machine.  ONLY if you wear home oxygen please bring your portable oxygen tank the day of your procedure.   ONLY for patients requiring bowel prep, written instructions will be given by your doctor's office.  ONLY if you have a neuro stimulator, please bring the controller with you the morning of surgery  Make arrangements in advance for transportation home by a responsible adult.  You must make arrangements for transportation, TAXI'S, UBER'S OR LYFTS ARE NOT ALLOWED.        If you have any questions about these instructions, call Pre-Op Admit  Nursing at [phone] or the Pre-Op Day Surgery Unit at [phone].

## 2023-09-07 ENCOUNTER — OFFICE VISIT (OUTPATIENT)
Dept: HEMATOLOGY/ONCOLOGY | Facility: CLINIC | Age: 78
End: 2023-09-07
Payer: MEDICARE

## 2023-09-07 VITALS
BODY MASS INDEX: 22.25 KG/M2 | OXYGEN SATURATION: 96 % | WEIGHT: 187.63 LBS | TEMPERATURE: 98 F | SYSTOLIC BLOOD PRESSURE: 122 MMHG | HEART RATE: 105 BPM | DIASTOLIC BLOOD PRESSURE: 73 MMHG

## 2023-09-07 DIAGNOSIS — R52 ACUTE PAIN: Primary | ICD-10-CM

## 2023-09-07 DIAGNOSIS — C34.12 MALIGNANT NEOPLASM OF UPPER LOBE OF LEFT LUNG: ICD-10-CM

## 2023-09-07 DIAGNOSIS — R53.83 FATIGUE: ICD-10-CM

## 2023-09-07 PROCEDURE — 99215 PR OFFICE/OUTPT VISIT, EST, LEVL V, 40-54 MIN: ICD-10-PCS | Mod: S$PBB,,, | Performed by: NURSE PRACTITIONER

## 2023-09-07 PROCEDURE — 99215 OFFICE O/P EST HI 40 MIN: CPT | Mod: S$PBB,,, | Performed by: NURSE PRACTITIONER

## 2023-09-07 PROCEDURE — 99214 OFFICE O/P EST MOD 30 MIN: CPT | Performed by: NURSE PRACTITIONER

## 2023-09-07 RX ORDER — PROMETHAZINE HYDROCHLORIDE 25 MG/1
25 TABLET ORAL
Qty: 30 TABLET | Refills: 5 | Status: SHIPPED | OUTPATIENT
Start: 2023-09-07

## 2023-09-07 RX ORDER — DEXAMETHASONE 4 MG/1
TABLET ORAL
Qty: 32 TABLET | Refills: 5 | Status: SHIPPED | OUTPATIENT
Start: 2023-09-07 | End: 2024-04-02

## 2023-09-07 RX ORDER — ONDANSETRON HYDROCHLORIDE 8 MG/1
8 TABLET, FILM COATED ORAL EVERY 8 HOURS PRN
Qty: 30 TABLET | Refills: 5 | Status: SHIPPED | OUTPATIENT
Start: 2023-09-07 | End: 2024-09-06

## 2023-09-07 RX ORDER — FOLIC ACID 1 MG/1
1 TABLET ORAL DAILY
Qty: 100 TABLET | Refills: 3 | Status: SHIPPED | OUTPATIENT
Start: 2023-09-07 | End: 2024-09-06

## 2023-09-07 RX ORDER — HYDROCODONE BITARTRATE AND ACETAMINOPHEN 10; 325 MG/1; MG/1
1 TABLET ORAL EVERY 6 HOURS PRN
Qty: 60 TABLET | Refills: 0 | Status: SHIPPED | OUTPATIENT
Start: 2023-09-07 | End: 2023-10-06 | Stop reason: SDUPTHER

## 2023-09-08 ENCOUNTER — HOSPITAL ENCOUNTER (OUTPATIENT)
Dept: RADIOLOGY | Facility: HOSPITAL | Age: 78
Discharge: HOME | End: 2023-09-08
Attending: SURGERY
Payer: MEDICARE

## 2023-09-08 DIAGNOSIS — C34.90 MALIGNANT NEOPLASM OF LUNG, UNSPECIFIED LATERALITY, UNSPECIFIED PART OF LUNG: ICD-10-CM

## 2023-09-08 PROCEDURE — 71046 XR CHEST PA AND LATERAL: ICD-10-PCS | Mod: 26,,, | Performed by: RADIOLOGY

## 2023-09-08 PROCEDURE — 71046 X-RAY EXAM CHEST 2 VIEWS: CPT | Mod: 26,,, | Performed by: RADIOLOGY

## 2023-09-08 PROCEDURE — 71046 X-RAY EXAM CHEST 2 VIEWS: CPT | Mod: TC,PN

## 2023-09-11 ENCOUNTER — ANESTHESIA (OUTPATIENT)
Dept: SURGERY | Facility: HOSPITAL | Age: 78
End: 2023-09-11
Payer: MEDICARE

## 2023-09-11 ENCOUNTER — TELEPHONE (OUTPATIENT)
Dept: PULMONOLOGY | Facility: CLINIC | Age: 78
End: 2023-09-11
Payer: MEDICARE

## 2023-09-11 ENCOUNTER — ANESTHESIA EVENT (OUTPATIENT)
Dept: SURGERY | Facility: HOSPITAL | Age: 78
End: 2023-09-11
Payer: MEDICARE

## 2023-09-11 ENCOUNTER — HOSPITAL ENCOUNTER (OUTPATIENT)
Facility: HOSPITAL | Age: 78
Discharge: HOME | End: 2023-09-11
Attending: SURGERY | Admitting: SURGERY
Payer: MEDICARE

## 2023-09-11 VITALS
HEIGHT: 77 IN | SYSTOLIC BLOOD PRESSURE: 127 MMHG | OXYGEN SATURATION: 97 % | HEART RATE: 88 BPM | RESPIRATION RATE: 16 BRPM | BODY MASS INDEX: 22.08 KG/M2 | WEIGHT: 187 LBS | TEMPERATURE: 98 F | DIASTOLIC BLOOD PRESSURE: 68 MMHG

## 2023-09-11 DIAGNOSIS — C34.90 NON-SMALL CELL LUNG CANCER: ICD-10-CM

## 2023-09-11 DIAGNOSIS — C34.90 LUNG CANCER: ICD-10-CM

## 2023-09-11 DIAGNOSIS — C34.90 MALIGNANT NEOPLASM OF LUNG, UNSPECIFIED LATERALITY, UNSPECIFIED PART OF LUNG: ICD-10-CM

## 2023-09-11 PROCEDURE — 25000003 PHARM REV CODE 250: Performed by: SURGERY

## 2023-09-11 PROCEDURE — D9220A PRA ANESTHESIA: ICD-10-PCS | Mod: CRNA,,, | Performed by: NURSE ANESTHETIST, CERTIFIED REGISTERED

## 2023-09-11 PROCEDURE — 63600175 PHARM REV CODE 636 W HCPCS: Performed by: NURSE ANESTHETIST, CERTIFIED REGISTERED

## 2023-09-11 PROCEDURE — 36000707: Performed by: SURGERY

## 2023-09-11 PROCEDURE — 36561 PR INSERT TUNNELED CV CATH WITH PORT: ICD-10-PCS | Mod: LT,,, | Performed by: SURGERY

## 2023-09-11 PROCEDURE — 71000015 HC POSTOP RECOV 1ST HR: Performed by: SURGERY

## 2023-09-11 PROCEDURE — C1788 PORT, INDWELLING, IMP: HCPCS | Performed by: SURGERY

## 2023-09-11 PROCEDURE — 77001 CHG FLUOROGUIDE CNTRL VEN ACCESS,PLACE,REPLACE,REMOVE: ICD-10-PCS | Mod: 26,,, | Performed by: SURGERY

## 2023-09-11 PROCEDURE — 25000003 PHARM REV CODE 250: Performed by: NURSE ANESTHETIST, CERTIFIED REGISTERED

## 2023-09-11 PROCEDURE — 37000009 HC ANESTHESIA EA ADD 15 MINS: Performed by: SURGERY

## 2023-09-11 PROCEDURE — 36000706: Performed by: SURGERY

## 2023-09-11 PROCEDURE — D9220A PRA ANESTHESIA: Mod: ANES,,, | Performed by: STUDENT IN AN ORGANIZED HEALTH CARE EDUCATION/TRAINING PROGRAM

## 2023-09-11 PROCEDURE — 77001 FLUOROGUIDE FOR VEIN DEVICE: CPT | Mod: 26,,, | Performed by: SURGERY

## 2023-09-11 PROCEDURE — 27201423 OPTIME MED/SURG SUP & DEVICES STERILE SUPPLY: Performed by: SURGERY

## 2023-09-11 PROCEDURE — 37000008 HC ANESTHESIA 1ST 15 MINUTES: Performed by: SURGERY

## 2023-09-11 PROCEDURE — D9220A PRA ANESTHESIA: Mod: CRNA,,, | Performed by: NURSE ANESTHETIST, CERTIFIED REGISTERED

## 2023-09-11 PROCEDURE — D9220A PRA ANESTHESIA: ICD-10-PCS | Mod: ANES,,, | Performed by: STUDENT IN AN ORGANIZED HEALTH CARE EDUCATION/TRAINING PROGRAM

## 2023-09-11 PROCEDURE — 36561 INSERT TUNNELED CV CATH: CPT | Mod: LT,,, | Performed by: SURGERY

## 2023-09-11 DEVICE — PORT POWER MRI 8FR 1808000: Type: IMPLANTABLE DEVICE | Site: SUBCLAVIAN | Status: FUNCTIONAL

## 2023-09-11 RX ORDER — LIDOCAINE HYDROCHLORIDE 20 MG/ML
INJECTION, SOLUTION EPIDURAL; INFILTRATION; INTRACAUDAL; PERINEURAL
Status: DISCONTINUED | OUTPATIENT
Start: 2023-09-11 | End: 2023-09-11

## 2023-09-11 RX ORDER — PROPOFOL 10 MG/ML
VIAL (ML) INTRAVENOUS
Status: DISCONTINUED | OUTPATIENT
Start: 2023-09-11 | End: 2023-09-11

## 2023-09-11 RX ORDER — CEFAZOLIN SODIUM 2 G/50ML
2 SOLUTION INTRAVENOUS
Status: DISCONTINUED | OUTPATIENT
Start: 2023-09-11 | End: 2023-09-11 | Stop reason: HOSPADM

## 2023-09-11 RX ORDER — ONDANSETRON 2 MG/ML
INJECTION INTRAMUSCULAR; INTRAVENOUS
Status: DISCONTINUED | OUTPATIENT
Start: 2023-09-11 | End: 2023-09-11

## 2023-09-11 RX ORDER — FAMOTIDINE 10 MG/ML
INJECTION INTRAVENOUS
Status: DISCONTINUED | OUTPATIENT
Start: 2023-09-11 | End: 2023-09-11

## 2023-09-11 RX ORDER — FENTANYL CITRATE 50 UG/ML
INJECTION, SOLUTION INTRAMUSCULAR; INTRAVENOUS
Status: DISCONTINUED | OUTPATIENT
Start: 2023-09-11 | End: 2023-09-11

## 2023-09-11 RX ADMIN — LIDOCAINE HYDROCHLORIDE 40 MG: 20 INJECTION, SOLUTION INTRAVENOUS at 11:09

## 2023-09-11 RX ADMIN — PROPOFOL 20 MG: 10 INJECTION, EMULSION INTRAVENOUS at 11:09

## 2023-09-11 RX ADMIN — PROPOFOL 40 MG: 10 INJECTION, EMULSION INTRAVENOUS at 11:09

## 2023-09-11 RX ADMIN — FAMOTIDINE 20 MG: 10 INJECTION, SOLUTION INTRAVENOUS at 10:09

## 2023-09-11 RX ADMIN — FENTANYL CITRATE 50 MCG: 50 INJECTION, SOLUTION INTRAMUSCULAR; INTRAVENOUS at 11:09

## 2023-09-11 RX ADMIN — SODIUM CHLORIDE, SODIUM LACTATE, POTASSIUM CHLORIDE, AND CALCIUM CHLORIDE: .6; .31; .03; .02 INJECTION, SOLUTION INTRAVENOUS at 10:09

## 2023-09-11 RX ADMIN — ONDANSETRON 4 MG: 2 INJECTION INTRAMUSCULAR; INTRAVENOUS at 10:09

## 2023-09-11 NOTE — TELEPHONE ENCOUNTER
Sent message to MD   ----- Message from Moise Neely sent at 9/11/2023  4:10 PM CDT -----  Regarding: payable code  Good Afternoon,  Pt is scheduled 9/14. The diagnosis code C34.92 provided on the order does not meet medical necessity and may not be covered by the patient's insurance. A new order must be provided prior to the patient's appointment.  Please advise if the patient needs to be rescheduled or cancelled.  If I can be of any assistance, please call me at [phone]        Thanks,   Moise Neely  [facility]

## 2023-09-11 NOTE — TRANSFER OF CARE
"Anesthesia Transfer of Care Note    Patient: Akil Guzman    Procedure(s) Performed: Procedure(s) (LRB):  [id number] (N/A)    Patient location: PACU    Anesthesia Type: general    Transport from OR: Transported from OR on room air with adequate spontaneous ventilation    Post pain: adequate analgesia    Post assessment: no apparent anesthetic complications    Post vital signs: stable    Level of consciousness: awake and alert    Nausea/Vomiting: no nausea/vomiting    Complications: none    Transfer of care protocol was followed      Last vitals:   Visit Vitals  /66 (BP Location: Right arm, Patient Position: Sitting)   Pulse 107   Temp 36.7 °C (98 °F) (Oral)   Resp 16   Ht 6' 5" (1.956 m)   Wt 84.8 kg (187 lb)   SpO2 96%   BMI 22.17 kg/m²     "

## 2023-09-11 NOTE — OP NOTE
DATE OF PROCEDURE: 09/11/2023    PREOPERATIVE DIAGNOSIS: Lung cancer    POSTOPERATIVE DIAGNOSIS: Same     PROCEDURE: Left IJ Port-A-Cath Placement    SURGEON: Antwon Peres M.D.    ASSISTANT: None    ANESTHESIA: Local MAC    PREP: Chlorhexidine    ESTIMATED BLOOD LOSS: Minimal    INDICATION: Access for infusions    FINDINGS:  -  the left IJ was found to be widely patent on US evaluation  -  IJ accessed using ultrasound with return of dark red nonpulsatile blood.  -  Wire easily passed and confirmed in the venous system on fluoro  -  Catheter tip positioned at the atrial caval junction under fluoro  -  Port aspirated and flushed with ease.    PROCEDURE IN DETAIL:  The patient was identified in the preoperative unit and taken back to the operating room and laid supine on the operating room table. IV antibiotics were administered prior to the start of anesthesia. MAC anesthesia was administered without complication. The patient was then prepped and draped in the standard sterile fashion. The ultrasound was used to identify the left internal jugular vein.  Local anesthetic was injected and then a small skin nick was made.  The patient was placed into the Trendelenburg position and an 18g needle was used to access the vein under ultrasound guidance.We had return of dark red, non-pulsatile blood. The wire was advanced without issue under fluoroscopic guidance. We turned our attention to creation of the port pocket in the upper chest. Local anesthetic was injected into the upper chest in the location of the port pocket and along the path of the planned catheter tunnel. A 4cm incision was made sharpley and the subcutaneous tissue was dissected until an appropriate sized pocket was created to accommodate the port. Once we were satisfied with the pocket, the catheter was flushed and tunneled from the chest to the neck incision. Under fluoroscopic guidance the dilator and sheath were placed over the wire using Seldinger  technique. The dilator and wire were removed and the catheter was introduced and fed into the sheath as the sheath was peeled away. Fluoro was used to confirm the position of the catheter in the atriocaval junction and confirm that the catheter was not kinked. The catheter was then cut to size and attached to the port which was then placed in the chest wall pocket. The port was then access and easily aspirated blood. It was then flushed with heparinized saline. Hemostasis was assured and the dermis of the port pocket was re-approximated with 3-0 vicryl suture in an interrupted fashion. The skin was re-approximated using 4-0 monocryl suture in a running fashion. Dermabond was then applied. The patient was awakened from anesthesia without complication and returned to the postoperative recovery unit in stable condition. At the end of the case, sponge, instrument, and needle counts were correct and hemostasis was achieved. I was present and scrubbed throughout the entirety of the case. A post-operative CXR will be obtained to confirm appropriate catheter location and to rule out pneumothorax.      All images were personally interpreted by me and stored.    COMPLICATIONS: None    CONDITION:  Stable    DISPO: To PACU then home after CXR    Antwon Peres Jr

## 2023-09-11 NOTE — ANESTHESIA PREPROCEDURE EVALUATION
09/11/2023  Akil Guzman is a 78 y.o., male.      Patient Active Problem List   Diagnosis    Mild atopic dermatitis    HTN (hypertension)    Hyperlipidemia    Personal history of colon cancer, stage I    Nuclear sclerosis    Open angle with borderline findings and low glaucoma risk in both eyes    Paronychia of third toe of right foot    History of colon cancer    Arthritis of left knee    Muscle weakness    NSCLC of left lung    Malignant neoplasm of upper lobe of left lung       Past Surgical History:   Procedure Laterality Date    CATARACT EXTRACTION Bilateral 2022    COLON SURGERY  2000    1ft. sigmoid removed    COLONOSCOPY N/A 09/28/2020    Procedure: COLONOSCOPY;  Surgeon: Ty Pollock MD;  Location: [facility] ENDO;  Service: General;  Laterality: N/A;        Tobacco Use:  The patient  reports that he has quit smoking. His smoking use included cigarettes. He started smoking about 15 years ago. He has a 3.8 pack-year smoking history. He has never used smokeless tobacco.     Results for orders placed or performed in visit on 09/07/22   IN OFFICE EKG 12-LEAD (to [place])    Collection Time: 09/07/22  8:58 AM    Narrative    Test Reason : I10,Z01.818,    Vent. Rate : 053 BPM     Atrial Rate : 053 BPM     P-R Int : 180 ms          QRS Dur : 084 ms      QT Int : 482 ms       P-R-T Axes : 072 003 003 degrees     QTc Int : 452 ms    Sinus bradycardia  Otherwise normal ECG    Confirmed by Jaya Brooks MD ([id number]) on 9/8/2022 12:56:31 PM    Referred By:             Confirmed By:Jaya Brooks MD             Lab Results   Component Value Date    WBC 32.88 (H) 09/06/2023    HGB 12.1 (L) 09/06/2023    HCT 36.2 (L) 09/06/2023    MCV 94 09/06/2023     09/06/2023     BMP  Lab Results   Component Value Date     (L) 09/06/2023    K 4.8 09/06/2023    CL 98 09/06/2023    CO2 27 09/06/2023    BUN 27 (H)  09/06/2023    CREATININE 1.0 09/06/2023    CALCIUM 9.9 09/06/2023    ANIONGAP 9 09/06/2023     (H) 09/06/2023     (H) 08/07/2023     06/21/2023       No results found for this or any previous visit.            Pre-op Assessment    I have reviewed the Patient Summary Reports.     I have reviewed the Nursing Notes. I have reviewed the NPO Status.   I have reviewed the Medications.     Review of Systems  Anesthesia Hx:  No problems with previous Anesthesia  Denies Family Hx of Anesthesia complications.   Denies Personal Hx of Anesthesia complications.   Social:  Alcohol Use, Former Smoker    Hematology/Oncology:         -- Anemia: Current/Recent Cancer.   EENT/Dental:EENT/Dental Normal   Cardiovascular:   Hypertension hyperlipidemia ECG has been reviewed.    Pulmonary:  Pulmonary Normal    Renal/:   BPH    Hepatic/GI:  Hepatic/GI Normal    Musculoskeletal:   Arthritis     Neurological:  Neurology Normal    Endocrine:  Endocrine Normal    Psych:  Psychiatric Normal           Physical Exam  General: Well nourished, Cooperative, Alert and Oriented    Airway:  Mallampati: II   Mouth Opening: Normal  TM Distance: Normal  Tongue: Normal  Neck ROM: Normal ROM    Dental:  Intact    Chest/Lungs:  Clear to auscultation    Heart:  Rate: Normal  Rhythm: Regular Rhythm  Sounds: Normal        Anesthesia Plan  Type of Anesthesia, risks & benefits discussed:    Anesthesia Type: Gen Natural Airway  Intra-op Monitoring Plan: Standard ASA Monitors  Post Op Pain Control Plan: multimodal analgesia  Induction:  IV  Informed Consent: Informed consent signed with the Patient and all parties understand the risks and agree with anesthesia plan.  All questions answered.   ASA Score: 3  Anesthesia Plan Notes: ofirmev    Ready For Surgery From Anesthesia Perspective.     .

## 2023-09-11 NOTE — DISCHARGE SUMMARY
[facility]  Discharge Note  Short Stay    Procedure(s) (LRB):  [id number] (N/A)      OUTCOME: Patient tolerated treatment/procedure well without complication and is now ready for discharge.    DISPOSITION: Home or Self Care    FINAL DIAGNOSIS:  <principal problem not specified>    FOLLOWUP: In clinic    DISCHARGE INSTRUCTIONS:    Discharge Procedure Orders   Diet Adult Regular     Ice to affected area     Lifting restrictions   Order Comments: Please avoid lifting greater than 20 lb, straining, strenuous activity for one week.     Change dressing (specify)   Order Comments: Post-Operative Wound Care    A surgical glue has been placed over your incisions, please leave the glue in place and do not attempt to remove it.  It is ok to shower using mild soap and water over the incisions the day after your procedure. Pat dry your incisions. Do not soak in a bathtub or other body of water for 2 weeks or until cleared by your surgeon.     If you noticed redness, swelling, fever, increasing pain or significant drainage from your wound please call/message the office or the 24 hr nurse hotline after hours.     Notify your health care provider if you experience any of the following:  temperature >100.4     Notify your health care provider if you experience any of the following:  persistent nausea and vomiting or diarrhea     Notify your health care provider if you experience any of the following:  severe uncontrolled pain     Notify your health care provider if you experience any of the following:  redness, tenderness, or signs of infection (pain, swelling, redness, odor or green/yellow discharge around incision site)     Notify your health care provider if you experience any of the following:  worsening rash     Notify your health care provider if you experience any of the following:  increased confusion or weakness     Shower on day dressing removed (No bath)        TIME SPENT ON DISCHARGE: 10  minutes

## 2023-09-12 ENCOUNTER — TELEPHONE (OUTPATIENT)
Dept: PULMONOLOGY | Facility: CLINIC | Age: 78
End: 2023-09-12
Payer: MEDICARE

## 2023-09-12 RX ORDER — DIPHENHYDRAMINE HYDROCHLORIDE 50 MG/ML
50 INJECTION INTRAMUSCULAR; INTRAVENOUS ONCE AS NEEDED
Status: CANCELLED | OUTPATIENT
Start: 2023-09-13

## 2023-09-12 RX ORDER — SODIUM CHLORIDE 0.9 % (FLUSH) 0.9 %
10 SYRINGE (ML) INJECTION
Status: CANCELLED | OUTPATIENT
Start: 2023-09-13

## 2023-09-12 RX ORDER — HEPARIN 100 UNIT/ML
500 SYRINGE INTRAVENOUS
Status: CANCELLED | OUTPATIENT
Start: 2023-09-13

## 2023-09-12 RX ORDER — PROCHLORPERAZINE EDISYLATE 5 MG/ML
5 INJECTION INTRAMUSCULAR; INTRAVENOUS ONCE AS NEEDED
Status: CANCELLED | OUTPATIENT
Start: 2023-09-13

## 2023-09-12 RX ORDER — CYANOCOBALAMIN 1000 UG/ML
1000 INJECTION, SOLUTION INTRAMUSCULAR; SUBCUTANEOUS
Status: CANCELLED | OUTPATIENT
Start: 2023-09-13

## 2023-09-12 RX ORDER — EPINEPHRINE 0.3 MG/.3ML
0.3 INJECTION SUBCUTANEOUS ONCE AS NEEDED
Status: CANCELLED | OUTPATIENT
Start: 2023-09-13

## 2023-09-12 NOTE — TELEPHONE ENCOUNTER
----- Message from Moise Neely sent at 9/12/2023 12:13 PM CDT -----  Regarding: PET code needed  Good Afternoon,     The above mentioned patient is scheduled for a Pet Scan on Thursday Sept 14. The diagnosis code C34.92 provided on the order does not meet medical necessity and may not be covered by the patient's insurance. A new order must be provided prior to the patient's appointment.  Please advise if the patient needs to be rescheduled or cancelled.  If I can be of any assistance, please call me at [phone]        Thanks,   Moise Neely  [facility] Pre-Service

## 2023-09-12 NOTE — ANESTHESIA POSTPROCEDURE EVALUATION
Problem: Potential for hypoglycemia related to low birthweight, dysmaturity, cold stress or otherwise stressed   Goal:  will be free of signs/symptoms of hypoglycemia  Outcome: PROGRESSING AS EXPECTED  Note:   Infant VSS and within normal parameters. No signs or symptoms of hypoglycemia. No jitteriness, lethargy, or apnea upon assessment. Will continue to assess and monitor.      Problem: Knowledge deficit - Parent/Caregiver  Goal: Family involved in care of child  Outcome: PROGRESSING AS EXPECTED  Note:   Infant bundled, held, and viewed by FOB at bedside. MOB responding appropriately to infant cues, changing infants diaper when diaper line turns blue and hunger cues. Will continue to monitor and assess.      Anesthesia Post Evaluation    Patient: Akil Guzman    Procedure(s) Performed: Procedure(s) (LRB):  [id number] (N/A)    Final Anesthesia Type: general      Patient location during evaluation: PACU  Patient participation: Yes- Able to Participate  Level of consciousness: awake and alert  Post-procedure vital signs: reviewed and stable  Pain management: adequate  Airway patency: patent    PONV status at discharge: No PONV  Anesthetic complications: no      Cardiovascular status: hemodynamically stable  Respiratory status: unassisted, spontaneous ventilation and room air  Hydration status: euvolemic  Follow-up not needed.          Vitals Value Taken Time   /68 09/11/23 1210   Temp 36.8 °C (98.2 °F) 09/11/23 1210   Pulse 88 09/11/23 1210   Resp 16 09/11/23 1210   SpO2 97 % 09/11/23 1210         No case tracking events are documented in the log.      Pain/Monisha Score: No data recorded

## 2023-09-13 ENCOUNTER — TELEPHONE (OUTPATIENT)
Dept: INFUSION THERAPY | Facility: HOSPITAL | Age: 78
End: 2023-09-13

## 2023-09-13 ENCOUNTER — SOCIAL WORK (OUTPATIENT)
Dept: HEMATOLOGY/ONCOLOGY | Facility: CLINIC | Age: 78
End: 2023-09-13

## 2023-09-13 ENCOUNTER — DOCUMENTATION ONLY (OUTPATIENT)
Dept: HEMATOLOGY/ONCOLOGY | Facility: CLINIC | Age: 78
End: 2023-09-13

## 2023-09-13 ENCOUNTER — INFUSION (OUTPATIENT)
Dept: INFUSION THERAPY | Facility: HOSPITAL | Age: 78
End: 2023-09-13
Attending: INTERNAL MEDICINE
Payer: MEDICARE

## 2023-09-13 VITALS
WEIGHT: 183 LBS | OXYGEN SATURATION: 99 % | HEIGHT: 77 IN | RESPIRATION RATE: 16 BRPM | TEMPERATURE: 98 F | SYSTOLIC BLOOD PRESSURE: 104 MMHG | BODY MASS INDEX: 21.61 KG/M2 | DIASTOLIC BLOOD PRESSURE: 68 MMHG | HEART RATE: 76 BPM

## 2023-09-13 DIAGNOSIS — R53.83 FATIGUE: ICD-10-CM

## 2023-09-13 DIAGNOSIS — C34.12 MALIGNANT NEOPLASM OF UPPER LOBE OF LEFT LUNG: Primary | ICD-10-CM

## 2023-09-13 LAB
MAGNESIUM SERPL-MCNC: 1.7 MG/DL (ref 1.6–2.6)
TSH SERPL DL<=0.005 MIU/L-ACNC: 1.76 UIU/ML (ref 0.34–5.6)

## 2023-09-13 PROCEDURE — 96375 TX/PRO/DX INJ NEW DRUG ADDON: CPT

## 2023-09-13 PROCEDURE — 96417 CHEMO IV INFUS EACH ADDL SEQ: CPT

## 2023-09-13 PROCEDURE — 96413 CHEMO IV INFUSION 1 HR: CPT

## 2023-09-13 PROCEDURE — A4216 STERILE WATER/SALINE, 10 ML: HCPCS | Performed by: NURSE PRACTITIONER

## 2023-09-13 PROCEDURE — 25000003 PHARM REV CODE 250: Performed by: NURSE PRACTITIONER

## 2023-09-13 PROCEDURE — 63600175 PHARM REV CODE 636 W HCPCS: Performed by: NURSE PRACTITIONER

## 2023-09-13 PROCEDURE — 83735 ASSAY OF MAGNESIUM: CPT | Performed by: NURSE PRACTITIONER

## 2023-09-13 PROCEDURE — 96372 THER/PROPH/DIAG INJ SC/IM: CPT | Mod: 59

## 2023-09-13 PROCEDURE — 96411 CHEMO IV PUSH ADDL DRUG: CPT

## 2023-09-13 PROCEDURE — 84443 ASSAY THYROID STIM HORMONE: CPT | Performed by: NURSE PRACTITIONER

## 2023-09-13 PROCEDURE — 96367 TX/PROPH/DG ADDL SEQ IV INF: CPT

## 2023-09-13 RX ORDER — PROCHLORPERAZINE EDISYLATE 5 MG/ML
5 INJECTION INTRAMUSCULAR; INTRAVENOUS ONCE AS NEEDED
Status: DISCONTINUED | OUTPATIENT
Start: 2023-09-13 | End: 2023-09-13 | Stop reason: HOSPADM

## 2023-09-13 RX ORDER — SODIUM CHLORIDE 0.9 % (FLUSH) 0.9 %
10 SYRINGE (ML) INJECTION
Status: DISCONTINUED | OUTPATIENT
Start: 2023-09-13 | End: 2023-09-13 | Stop reason: HOSPADM

## 2023-09-13 RX ORDER — CYANOCOBALAMIN 1000 UG/ML
1000 INJECTION, SOLUTION INTRAMUSCULAR; SUBCUTANEOUS
Status: COMPLETED | OUTPATIENT
Start: 2023-09-13 | End: 2023-09-13

## 2023-09-13 RX ORDER — EPINEPHRINE 0.3 MG/.3ML
0.3 INJECTION SUBCUTANEOUS ONCE AS NEEDED
Status: DISCONTINUED | OUTPATIENT
Start: 2023-09-13 | End: 2023-09-13 | Stop reason: HOSPADM

## 2023-09-13 RX ORDER — DIPHENHYDRAMINE HYDROCHLORIDE 50 MG/ML
50 INJECTION INTRAMUSCULAR; INTRAVENOUS ONCE AS NEEDED
Status: DISCONTINUED | OUTPATIENT
Start: 2023-09-13 | End: 2023-09-13 | Stop reason: HOSPADM

## 2023-09-13 RX ORDER — HEPARIN 100 UNIT/ML
500 SYRINGE INTRAVENOUS
Status: DISCONTINUED | OUTPATIENT
Start: 2023-09-13 | End: 2023-09-13 | Stop reason: HOSPADM

## 2023-09-13 RX ADMIN — CARBOPLATIN 495 MG: 600 INJECTION, SOLUTION INTRAVENOUS at 12:09

## 2023-09-13 RX ADMIN — SODIUM CHLORIDE 200 MG: 9 INJECTION, SOLUTION INTRAVENOUS at 11:09

## 2023-09-13 RX ADMIN — CYANOCOBALAMIN 1000 MCG: 1000 INJECTION, SOLUTION INTRAMUSCULAR at 11:09

## 2023-09-13 RX ADMIN — APREPITANT 130 MG: 130 INJECTION, EMULSION INTRAVENOUS at 12:09

## 2023-09-13 RX ADMIN — HEPARIN 500 UNITS: 100 SYRINGE at 01:09

## 2023-09-13 RX ADMIN — PALONOSETRON HYDROCHLORIDE 0.25 MG: 0.25 INJECTION INTRAVENOUS at 12:09

## 2023-09-13 RX ADMIN — SODIUM CHLORIDE, PRESERVATIVE FREE 10 ML: 5 INJECTION INTRAVENOUS at 01:09

## 2023-09-13 RX ADMIN — PEMETREXED DISODIUM 1075 MG: 500 INJECTION, POWDER, LYOPHILIZED, FOR SOLUTION INTRAVENOUS at 12:09

## 2023-09-13 NOTE — TELEPHONE ENCOUNTER
Sent message to moise neely    ----- Message from Silke Hill MD sent at 9/13/2023  2:36 PM CDT -----  Regarding: RE: payable code  I changed the order, ask them to try again    ----- Message -----  From: Marce Morgan MA  Sent: 9/11/2023   4:25 PM CDT  To: Silke Hill MD  Subject: FW: payable code                                 Needs new payable code for PET scan   ----- Message -----  From: Moise Neely  Sent: 9/11/2023   4:12 PM CDT  To: Sergio Edouard Staff  Subject: payable code                                     Good Afternoon,  Pt is scheduled 9/14. The diagnosis code C34.92 provided on the order does not meet medical necessity and may not be covered by the patient's insurance. A new order must be provided prior to the patient's appointment.  Please advise if the patient needs to be rescheduled or cancelled.  If I can be of any assistance, please call me at [phone]        Thanks,   Moise Neely  [facility] Pre-Service

## 2023-09-13 NOTE — PROGRESS NOTES
Akil Guzman is a 78 year old diagnosed with lung cancer and a history of colon cancer.  Patient is here today, accompanied by his wife, for his chemo infusion.  I met with patient to complete new patient orientation and to complete the NCCN Distress Screening; patient indicated a rating of 0.  Patient denied needing psychosocial support at this time.  I provided patient with the [facility] community events flyer and my contact information in the event supportive services arise in the future.

## 2023-09-13 NOTE — PROGRESS NOTES
CHEMO SCHOOL- NUTRITION    Akil Guzman is a 78 y.o. male with lung cancer. Pt will be receiving carboplatin, alimta and keytruda. I met with Mr. Guzman for chemo school today. Pt reports good appetite but that his meals have become lighter in the last couple months due to abdominal/back pain. He reports good hydration but will try to increase during treatment. He does supplement with protein shakes and bars. He is not currently taking any herbal or antioxidant supplements. Denies N/V/D/C. BMs normal. He denies having any nutrition related questions or concerns at the current time.     CW: 183#.     Food Insecurity:  Patient is worried whether their food would run out before they have more money to buy more: Never  The food they bought just didn't last and they didn't have money to buy more: Never      Plan:   Reviewed chemo school packet & provided copy to pt.   Discussed importance of maintaining wt & staying hydrated.   Reviewed food safety guidelines recommended during treatment.   Recommended small, frequent meals and snacks  Reviewed examples of high calorie/protein foods  Provided RD contact info & encouraged pt to call with any questions/concerns.   Will f/u as needed.       Electronically signed by: Toma Waddell MBA, RDN, LDN

## 2023-09-13 NOTE — PLAN OF CARE
Problem: Fall Injury Risk  Goal: Absence of Fall and Fall-Related Injury  Outcome: Ongoing, Progressing  Intervention: Identify and Manage Contributors  Flowsheets (Taken 9/13/2023 1149)  Self-Care Promotion: safe use of adaptive equipment encouraged  Medication Review/Management: medications reviewed  Intervention: Promote Injury-Free Environment  Flowsheets (Taken 9/13/2023 1149)  Safety Promotion/Fall Prevention: assistive device/personal item within reach

## 2023-09-13 NOTE — TELEPHONE ENCOUNTER
LM on voicemail for patient regarding need for labs prior to treatment tomorrow. Call back number given.

## 2023-09-14 ENCOUNTER — HOSPITAL ENCOUNTER (OUTPATIENT)
Dept: RADIOLOGY | Facility: HOSPITAL | Age: 78
Discharge: HOME | End: 2023-09-14
Attending: INTERNAL MEDICINE
Payer: MEDICARE

## 2023-09-14 ENCOUNTER — OFFICE VISIT (OUTPATIENT)
Dept: PULMONOLOGY | Facility: CLINIC | Age: 78
End: 2023-09-14
Payer: MEDICARE

## 2023-09-14 VITALS
OXYGEN SATURATION: 97 % | DIASTOLIC BLOOD PRESSURE: 76 MMHG | HEIGHT: 77 IN | HEART RATE: 86 BPM | WEIGHT: 185.94 LBS | BODY MASS INDEX: 21.95 KG/M2 | SYSTOLIC BLOOD PRESSURE: 139 MMHG

## 2023-09-14 VITALS — HEIGHT: 77 IN | WEIGHT: 185 LBS | BODY MASS INDEX: 21.84 KG/M2

## 2023-09-14 DIAGNOSIS — R91.8 OTHER NONSPECIFIC ABNORMAL FINDING OF LUNG FIELD: ICD-10-CM

## 2023-09-14 DIAGNOSIS — C34.12 MALIGNANT NEOPLASM OF UPPER LOBE OF LEFT LUNG: ICD-10-CM

## 2023-09-14 DIAGNOSIS — J91.0 MALIGNANT PLEURAL EFFUSION: ICD-10-CM

## 2023-09-14 DIAGNOSIS — C34.92 NSCLC OF LEFT LUNG: Primary | ICD-10-CM

## 2023-09-14 DIAGNOSIS — D72.829 LEUKOCYTOSIS, UNSPECIFIED TYPE: ICD-10-CM

## 2023-09-14 DIAGNOSIS — R05.2 SUBACUTE COUGH: ICD-10-CM

## 2023-09-14 LAB — GLUCOSE SERPL-MCNC: 163 MG/DL (ref 70–110)

## 2023-09-14 PROCEDURE — 99999 PR PBB SHADOW E&M-EST. PATIENT-LVL III: ICD-10-PCS | Mod: PBBFAC,,, | Performed by: INTERNAL MEDICINE

## 2023-09-14 PROCEDURE — 25500020 PHARM REV CODE 255: Mod: PO | Performed by: INTERNAL MEDICINE

## 2023-09-14 PROCEDURE — A9585 GADOBUTROL INJECTION: HCPCS | Mod: PO | Performed by: INTERNAL MEDICINE

## 2023-09-14 PROCEDURE — 99214 PR OFFICE/OUTPT VISIT, EST, LEVL IV, 30-39 MIN: ICD-10-PCS | Mod: S$PBB,,, | Performed by: INTERNAL MEDICINE

## 2023-09-14 PROCEDURE — 99999 PR PBB SHADOW E&M-EST. PATIENT-LVL III: CPT | Mod: PBBFAC,,, | Performed by: INTERNAL MEDICINE

## 2023-09-14 PROCEDURE — 70553 MRI BRAIN STEM W/O & W/DYE: CPT | Mod: TC,PO

## 2023-09-14 PROCEDURE — 99213 OFFICE O/P EST LOW 20 MIN: CPT | Mod: PBBFAC,PO | Performed by: INTERNAL MEDICINE

## 2023-09-14 PROCEDURE — A9552 F18 FDG: HCPCS | Mod: PO

## 2023-09-14 PROCEDURE — 99214 OFFICE O/P EST MOD 30 MIN: CPT | Mod: S$PBB,,, | Performed by: INTERNAL MEDICINE

## 2023-09-14 RX ORDER — GADOBUTROL 604.72 MG/ML
8.5 INJECTION INTRAVENOUS
Status: COMPLETED | OUTPATIENT
Start: 2023-09-14 | End: 2023-09-14

## 2023-09-14 RX ADMIN — GADOBUTROL 8.5 ML: 604.72 INJECTION INTRAVENOUS at 02:09

## 2023-09-14 NOTE — PROGRESS NOTES
9/14/2023    Akil Guzman  Follow up    Chief Complaint   Patient presents with    Pleural Effusion       HPI:  09/14/2023- pt had thora on 8/17, found to have adenocarcinoma lung origin in pleural fluid. Has had port a cath and established with Dr. Torres.  He has cough/ light green phlegm occasionally. He denies chest pains, SOB, fevers.  He started chemo yesterday, tolerated well.    08/09/2023-   Left pleural effusion may be due to infection, need to rule out possible underlying lung cancer  Continue levaquin - will extend course to 14 days total  Get thoracentesis - outpatient procedure to drain fluid from chest, studies will be sent to the lab to further evaluate  After thoracentesis get repeat CT chest with contrast  Get blood work on same day as your procedure  If feeling worse go to the ER for further evaluation  Pt is a 79 yo male with HTN, HLD, hx colon cancer presenting for new evaluation.  Pt feeling tired, hard to sleep, SOB, pain left flank for about 2 mos gradual worsening.  He was treated for pna last month with 5d levaquin, now taking another course of levaquin started yesterday. He has had minimal cough but spits out mucous, clear this week and green last week. He denies fevers, chills, sweats, denies recent URI symptoms.  Pain L side is achy, constant, takes ibuprofen which helps a little, 6-7/10.  He had colon ca surgery in 2000. No treatment was needed  He has past smoking hx, about 13 yrs, quit last year.  Denies pulmonary exposures, denies FH lung disease    The chief complaint problem is New to me    PFSH:  Past Medical History:   Diagnosis Date    Allergy     SEASON    Basal cell carcinoma 2009    L ear    Cancer     Hx colon     Pneumonia, unspecified organism 07/2023         Past Surgical History:   Procedure Laterality Date    CATARACT EXTRACTION Bilateral 2022    COLON SURGERY  2000    1ft. sigmoid removed    COLONOSCOPY N/A 09/28/2020    Procedure: COLONOSCOPY;  Surgeon: Ty TUTTLE  "MD Phill;  Location: [facility] ENDO;  Service: General;  Laterality: N/A;    INSERTION OF TUNNELED CENTRAL VENOUS CATHETER (CVC) WITH SUBCUTANEOUS PORT N/A 9/11/2023    Procedure: [id number];  Surgeon: Antwon Peres Jr., MD;  [facility];  Service: General;  Laterality: N/A;     Social History     Tobacco Use    Smoking status: Former     Current packs/day: 0.25     Average packs/day: 0.3 packs/day for 15.0 years (3.8 ttl pk-yrs)     Types: Cigarettes     Start date: 9/6/2008    Smokeless tobacco: Never   Substance Use Topics    Alcohol use: Yes     Alcohol/week: 2.0 standard drinks of alcohol     Types: 2 Drinks containing 0.5 oz of alcohol per week     Comment: 2 mixed drinks WEEK    Drug use: No     Family History   Problem Relation Age of Onset    Cancer Mother     Cancer Brother     Macular degeneration Brother     Melanoma Neg Hx      Review of patient's allergies indicates:   Allergen Reactions    Penicillins      Other reaction(s): Rash  50 YEARS AGO         Performance Status:The patient's activity level is regular exercise.  He goes to gym and walks 2-3x/week    Review of Systems:  a review of eleven systems covering constitutional, Eye, HEENT, Psych, Respiratory, Cardiac, GI, , Musculoskeletal, Endocrine, Dermatologic was negative except for pertinent findings as listed ABOVE and below:  Wt loss 9# unintentional  Appetite minimal       Exam:Comprehensive exam done. /76 (BP Location: Left arm, Patient Position: Sitting, BP Method: Medium (Automatic))   Pulse 86   Ht 6' 5" (1.956 m)   Wt 84.4 kg (185 lb 15.3 oz)   SpO2 97%   BMI 22.05 kg/m²   Exam included Vitals as listed, and patient's appearance and affect and alertness and mood, oral exam for yeast and hygiene and pharynx lesions and Mallapatti (M) score, neck with inspection for jvd and masses and thyroid abnormalities and lymph nodes (supraclavicular and infraclavicular nodes and axillary also examined and noted " if abn), chest exam included symmetry and effort and fremitus and percussion and auscultation, cardiac exam included rhythm and gallops and murmur and rubs and jvd and edema, abdominal exam for mass and hepatosplenomegaly and tenderness and hernias and bowel sounds, Musculoskeletal exam with muscle tone and posture and mobility/gait and  strength, and skin for rashes and cyanosis and pallor and turgor, extremity for clubbing.  Findings were normal except for pertinent findings listed below:  M1, oropharynx clear, dentition intact  HR regular  Breath sounds distant L base, dull to percussion to L mid chest  No edema/clubbing    Radiographs (ct chest and cxr) reviewed: view by direct vision and interpretation as below   CXR 9/11/23- small to moderate L pleural effusion persists  CT chest 8/7/23- large L pleural effusion, L hilar mass      Labs reviewed        Latest Reference Range & Units 08/17/23 13:04   Fluid Color  Yellow   Fluid Appearance  Hazy   WBC, Body Fluid /cu mm 1684   Body Fluid Type  Pleural Fluid, Left   Segs, Fluid % 60   Lymphs, Fluid % 26   Eos, Fluid % 1   Mesothelial Cells, Fluid % 12   Other Cells, Fluid 0 % 1 !      Latest Reference Range & Units 08/17/23 13:56   Glucose, Fluid See Comment mg/dL 101   LD, Fluid See Comment U/L 200   Protein, Fluid See Comment g/dL 4.0     Lab Results   Component Value Date    WBC 32.88 (H) 09/06/2023    HGB 12.1 (L) 09/06/2023    HCT 36.2 (L) 09/06/2023    MCV 94 09/06/2023     09/06/2023       CMP  Sodium   Date Value Ref Range Status   09/06/2023 134 (L) 136 - 145 mmol/L Final     Potassium   Date Value Ref Range Status   09/06/2023 4.8 3.5 - 5.1 mmol/L Final     Chloride   Date Value Ref Range Status   09/06/2023 98 95 - 110 mmol/L Final     CO2   Date Value Ref Range Status   09/06/2023 27 23 - 29 mmol/L Final     Glucose   Date Value Ref Range Status   09/06/2023 117 (H) 70 - 110 mg/dL Final     BUN   Date Value Ref Range Status   09/06/2023 27  (H) 8 - 23 mg/dL Final     Creatinine   Date Value Ref Range Status   09/06/2023 1.0 0.5 - 1.4 mg/dL Final     Calcium   Date Value Ref Range Status   09/06/2023 9.9 8.7 - 10.5 mg/dL Final     Total Protein   Date Value Ref Range Status   09/06/2023 7.8 6.0 - 8.4 g/dL Final     Albumin   Date Value Ref Range Status   09/06/2023 3.8 3.5 - 5.2 g/dL Final     Total Bilirubin   Date Value Ref Range Status   09/06/2023 0.5 0.1 - 1.0 mg/dL Final     Comment:     For infants and newborns, interpretation of results should be based  on gestational age, weight and in agreement with clinical  observations.    Premature Infant recommended reference ranges:  Up to 24 hours.............<8.0 mg/dL  Up to 48 hours............<12.0 mg/dL  3-5 days..................<15.0 mg/dL  6-29 days.................<15.0 mg/dL       Alkaline Phosphatase   Date Value Ref Range Status   09/06/2023 91 55 - 135 U/L Final     AST   Date Value Ref Range Status   09/06/2023 18 10 - 40 U/L Final     ALT   Date Value Ref Range Status   09/06/2023 12 10 - 44 U/L Final     Anion Gap   Date Value Ref Range Status   09/06/2023 9 8 - 16 mmol/L Final     eGFR   Date Value Ref Range Status   09/06/2023 >60.0 >60 mL/min/1.73 m^2 Final         PFT was not done      Plan:  Clinical impression is reasonably certain and repeated evaluation prn +/- follow up will be needed as below.   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 Sims- Dr. Torres to reach out if needed  Leukocytosis persists - with left shift. Pleural fluid cx negative. He did complete empiric levaquin for possible obstructive pneumonia. WBC may be due to malignancy- continue to monitor    Problem List Items Addressed This Visit          Pulmonary    Malignant pleural effusion       Oncology    NSCLC of left lung - Primary    Leukocytosis     Other Visit Diagnoses       Subacute cough        Relevant  Orders    Culture, Respiratory with Gram Stain              Follow up if symptoms worsen or fail to improve.    Discussed with patient above for education the following:      Patient Instructions   Fluid around the left lung is still moderate- due to cancer- may persist or may improve with treatment  If you are short of breath we can drain the pleural effusion again  Continue chemotherapy per Dr. Torres  Bring sputum sample to the lab for culture

## 2023-09-14 NOTE — PATIENT INSTRUCTIONS
Fluid around the left lung is still moderate- due to cancer- may persist or may improve with treatment  If you are short of breath we can drain the pleural effusion again  Continue chemotherapy per Dr. Torres  Bring sputum sample to the lab for culture

## 2023-09-15 ENCOUNTER — PATIENT MESSAGE (OUTPATIENT)
Dept: HEMATOLOGY/ONCOLOGY | Facility: CLINIC | Age: 78
End: 2023-09-15

## 2023-09-15 DIAGNOSIS — K12.30 MUCOSITIS ORAL: Primary | ICD-10-CM

## 2023-09-20 ENCOUNTER — PATIENT MESSAGE (OUTPATIENT)
Dept: HEMATOLOGY/ONCOLOGY | Facility: CLINIC | Age: 78
End: 2023-09-20

## 2023-09-20 ENCOUNTER — PATIENT MESSAGE (OUTPATIENT)
Dept: DERMATOLOGY | Facility: CLINIC | Age: 78
End: 2023-09-20
Payer: MEDICARE

## 2023-09-21 ENCOUNTER — INFUSION (OUTPATIENT)
Dept: INFUSION THERAPY | Facility: HOSPITAL | Age: 78
End: 2023-09-21
Payer: MEDICARE

## 2023-09-21 ENCOUNTER — TELEPHONE (OUTPATIENT)
Dept: INFUSION THERAPY | Facility: HOSPITAL | Age: 78
End: 2023-09-21
Payer: MEDICARE

## 2023-09-21 ENCOUNTER — TELEPHONE (OUTPATIENT)
Dept: HEMATOLOGY/ONCOLOGY | Facility: CLINIC | Age: 78
End: 2023-09-21

## 2023-09-21 VITALS
WEIGHT: 170 LBS | TEMPERATURE: 97 F | OXYGEN SATURATION: 98 % | DIASTOLIC BLOOD PRESSURE: 56 MMHG | HEIGHT: 77 IN | BODY MASS INDEX: 20.07 KG/M2 | RESPIRATION RATE: 18 BRPM | SYSTOLIC BLOOD PRESSURE: 108 MMHG | HEART RATE: 85 BPM

## 2023-09-21 DIAGNOSIS — C34.12 MALIGNANT NEOPLASM OF UPPER LOBE OF LEFT LUNG: Primary | ICD-10-CM

## 2023-09-21 DIAGNOSIS — C34.92 NSCLC OF LEFT LUNG: ICD-10-CM

## 2023-09-21 PROCEDURE — 25000003 PHARM REV CODE 250: Performed by: NURSE PRACTITIONER

## 2023-09-21 PROCEDURE — 96374 THER/PROPH/DIAG INJ IV PUSH: CPT

## 2023-09-21 PROCEDURE — 63600175 PHARM REV CODE 636 W HCPCS: Performed by: NURSE PRACTITIONER

## 2023-09-21 PROCEDURE — 96360 HYDRATION IV INFUSION INIT: CPT

## 2023-09-21 PROCEDURE — 96375 TX/PRO/DX INJ NEW DRUG ADDON: CPT

## 2023-09-21 PROCEDURE — A4216 STERILE WATER/SALINE, 10 ML: HCPCS | Performed by: NURSE PRACTITIONER

## 2023-09-21 PROCEDURE — 96361 HYDRATE IV INFUSION ADD-ON: CPT

## 2023-09-21 RX ORDER — SODIUM CHLORIDE 0.9 % (FLUSH) 0.9 %
10 SYRINGE (ML) INJECTION
Status: CANCELLED | OUTPATIENT
Start: 2023-09-21

## 2023-09-21 RX ORDER — HEPARIN 100 UNIT/ML
500 SYRINGE INTRAVENOUS
Status: DISCONTINUED | OUTPATIENT
Start: 2023-09-21 | End: 2023-09-21 | Stop reason: HOSPADM

## 2023-09-21 RX ORDER — ONDANSETRON 2 MG/ML
8 INJECTION INTRAMUSCULAR; INTRAVENOUS ONCE
Status: COMPLETED | OUTPATIENT
Start: 2023-09-21 | End: 2023-09-21

## 2023-09-21 RX ORDER — ONDANSETRON 2 MG/ML
8 INJECTION INTRAMUSCULAR; INTRAVENOUS ONCE
Status: CANCELLED
Start: 2023-09-21 | End: 2023-09-21

## 2023-09-21 RX ORDER — POTASSIUM CHLORIDE 7.45 MG/ML
10 INJECTION INTRAVENOUS
Status: CANCELLED | OUTPATIENT
Start: 2023-09-21

## 2023-09-21 RX ORDER — HEPARIN 100 UNIT/ML
500 SYRINGE INTRAVENOUS
Status: CANCELLED | OUTPATIENT
Start: 2023-09-21

## 2023-09-21 RX ORDER — SODIUM CHLORIDE 0.9 % (FLUSH) 0.9 %
10 SYRINGE (ML) INJECTION
Status: DISCONTINUED | OUTPATIENT
Start: 2023-09-21 | End: 2023-09-21 | Stop reason: HOSPADM

## 2023-09-21 RX ORDER — LORAZEPAM/0.9% SODIUM CHLORIDE 100MG/0.1L
2 PLASTIC BAG, INJECTION (ML) INTRAVENOUS
Status: CANCELLED | OUTPATIENT
Start: 2023-09-21

## 2023-09-21 RX ADMIN — Medication 10 ML: at 02:09

## 2023-09-21 RX ADMIN — ONDANSETRON 8 MG: 2 INJECTION INTRAMUSCULAR; INTRAVENOUS at 01:09

## 2023-09-21 RX ADMIN — HEPARIN 500 UNITS: 100 SYRINGE at 02:09

## 2023-09-21 RX ADMIN — SODIUM CHLORIDE 1000 ML: 9 INJECTION, SOLUTION INTRAVENOUS at 01:09

## 2023-09-21 NOTE — TELEPHONE ENCOUNTER
Called patient to notify of 1300 appt. at [facility] folc IVF's and Zofran, NA and [facility] with [facility] number provided.

## 2023-09-21 NOTE — PLAN OF CARE
"Problem: Nausea and Vomiting  Goal: Fluid and Electrolyte Balance  Outcome: Ongoing, Progressing  Intervention: Prevent and Manage Nausea and Vomiting  Flowsheets (Taken 9/21/2023 1320)  Nausea/Vomiting Interventions:   nausea triggers minimized   slow deep breathing encouraged   stimuli minimized  Environmental Support:   calm environment promoted   comfort object encouraged   distractions minimized   environmental consistency promoted   personal routine supported   rest periods encouraged  BP (!) 109/59 (BP Location: Left arm, Patient Position: Sitting)   Pulse 88   Temp 97 °F (36.1 °C) (Oral)   Resp 16   Ht 6' 5" (1.956 m)   Wt 77.1 kg (170 lb)   SpO2 98%   BMI 20.16 kg/m² Pleasant, alert and oriented patient to OPT for IVF's and IV Zofran for CINV - VSS and patient tolerated infusion well - Patient discharged to home per self - RTC as needed.       "

## 2023-09-21 NOTE — TELEPHONE ENCOUNTER
Pt unable to keep food down.  Per Allie Shelley NP I have scheduled pt to receive 1,000 ml NS and 8 mg zofran iv at [place] outpatient.  I spoke with pt he verbalized understanding. Spoke with MELISSA Webber at outpatient infusion [place].  Pt will be scheduled to come in today. I rescheduled pts follow up appt with JODI Kelley to tomorrow 9/22 at 10:00.

## 2023-09-21 NOTE — PROGRESS NOTES
Follow up [facility]/On[facility]      Subjective:       Patient ID: Akil Guzmna is a 78 y.o. male.    8/7/2023-CT chest without:  1. Large left pleural effusion with dependent left lower lobe atelectasis.  2. Poorly defined mass-like infiltrate involving the periphery of the left upper lobe extending to the left hilum.  Underlying parenchymal mass is not excludable, however, evaluation is limited due to lack of intravenous contrast.  Correlate clinically with possible fever and/or elevated white count.  3. Bilateral soft tissue pulmonary nodules measuring up to 11 mm.  Follow-up per Fleischner guidelines.  For multiple solid nodules with any 6 mm or greater, Fleischner Society guidelines recommend follow up with non-contrast chest CT at 3-6 months and 18-24 months after discovery.  4. Partially calcified left upper lobe pulmonary nodule may represent granulomatous change and scarring.  5. 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  6. Questionable left hilar lymphadenopathy.  However, evaluation is again limited due to lack of intravenous contrast.  7. Small hiatal hernia.    8/17/2023 Left Pleural Fluid:  Positive for adenocarcinoma most c/w lung primary    Chief Complaint:   Lung cancer    Mr. Guzman is a 79yo male referred for new lung cancer.  In early June he drove to [place] and 2 days later he developed pain in his left flank/back area.  He went to his PCP who felt it was constipation.  When this did not resolve he went to the bone and spine clinic in [place] but nothing clear was found.  PCP ultimately did CXR which was abnormal and he was referred to Dr. Hill.     CT showed a large pleural effusion which was drained on 8/17/2023 and cytology is c/w adenocarcinoma.  He has completed abx for pneumonia and is still feeling some left flank/side pain.  Also complains of fatigue which is constant.  Occasional sob.      9/22/2023:   Post cycle 1 of carboplatin, alimta, and keytruda - infusion  date was on 9/13/2023  He did have some moderate nausea, gastritis, and dehydration   He did receive nausea medication and IV fluids yesterday at [facility] and feels better today.             Past Medical History:   Diagnosis Date    Allergy     SEASON    Basal cell carcinoma 2009    L ear    Cancer     Hx colon     Pneumonia, unspecified organism 07/2023       Past Surgical History:   Procedure Laterality Date    CATARACT EXTRACTION Bilateral 2022    COLON SURGERY  2000    1ft. sigmoid removed    COLONOSCOPY N/A 09/28/2020    Procedure: COLONOSCOPY;  Surgeon: Ty Pollock MD;  Location: [facility] ENDO;  Service: General;  Laterality: N/A;    INSERTION OF TUNNELED CENTRAL VENOUS CATHETER (CVC) WITH SUBCUTANEOUS PORT N/A 9/11/2023    Procedure: [id number];  Surgeon: Antwon Peres Jr., MD;  Location: [facility] OR;  Service: General;  Laterality: N/A;       Social History     Socioeconomic History    Marital status:    Tobacco Use    Smoking status: Former     Current packs/day: 0.25     Average packs/day: 0.3 packs/day for 15.0 years (3.8 ttl pk-yrs)     Types: Cigarettes     Start date: 9/6/2008    Smokeless tobacco: Never   Substance and Sexual Activity    Alcohol use: Yes     Alcohol/week: 2.0 standard drinks of alcohol     Types: 2 Drinks containing 0.5 oz of alcohol per week     Comment: 2 mixed drinks WEEK    Drug use: No    Sexual activity: Yes     Partners: Female     Social Determinants of Health     Financial Resource Strain: Low Risk  (9/22/2023)    Overall Financial Resource Strain (CARDIA)     Difficulty of Paying Living Expenses: Not hard at all   Food Insecurity: No Food Insecurity (9/22/2023)    Hunger Vital Sign     Worried About Running Out of Food in the Last Year: Never true     Ran Out of Food in the Last Year: Never true   Transportation Needs: No Transportation Needs (9/22/2023)    PRAPARE - Transportation     Lack of Transportation (Medical): No     Lack of  Transportation (Non-Medical): No   Physical Activity: Insufficiently Active (9/22/2023)    Exercise Vital Sign     Days of Exercise per Week: 2 days     Minutes of Exercise per Session: 10 min   Stress: Stress Concern Present (9/22/2023)    [employer] [employer] - Occupational Stress Questionnaire     Feeling of Stress : To some extent   Social Connections: Unknown (9/22/2023)    Social Connection and Isolation Panel [NHANES]     Frequency of Communication with Friends and Family: Patient refused     Frequency of Social Gatherings with Friends and Family: Twice a week     Active Member of Clubs or Organizations: No     Attends Club or Organization Meetings: Patient refused     Marital Status:    Housing Stability: Unknown (9/22/2023)    Housing Stability Vital Sign     Unable to Pay for Housing in the Last Year: Patient refused     Number of Places Lived in the Last Year: 1     Unstable Housing in the Last Year: No       Family History   Problem Relation Age of Onset    Cancer Mother     Cancer Brother     Macular degeneration Brother     Melanoma Neg Hx        Review of patient's allergies indicates:   Allergen Reactions    Penicillins      Other reaction(s): Rash  50 YEARS AGO         Current Outpatient Medications:     dexAMETHasone (DECADRON) 4 MG Tab, 2 tablets twice a day the day before and after each chemo, Disp: 32 tablet, Rfl: 5    duke's soln (benadryl 30 mL, mylanta 30 mL, LIDOcaine 30 mL, nystatin 30 mL) 120mL, Take 10 mLs by mouth 4 (four) times daily., Disp: 120 mL, Rfl: 5    finasteride (PROSCAR) 5 mg tablet, TAKE 1 TABLET BY MOUTH EVERY DAY FOR PROSTATE, Disp: 90 tablet, Rfl: 3    folic acid (FOLVITE) 1 MG tablet, Take 1 tablet (1 mg total) by mouth once daily., Disp: 100 tablet, Rfl: 3    HYDROcodone-acetaminophen (NORCO)  mg per tablet, Take 1 tablet by mouth every 6 (six) hours as needed for Pain., Disp: 60 tablet, Rfl: 0    ipratropium (ATROVENT) 42 mcg (0.06 %)  "nasal spray, USE 1 SPRAY in each nostril TWICE DAILY THANK YOU!, Disp: , Rfl:     ondansetron (ZOFRAN) 8 MG tablet, Take 1 tablet (8 mg total) by mouth every 8 (eight) hours as needed., Disp: 30 tablet, Rfl: 5    promethazine (PHENERGAN) 25 MG tablet, Take 1 tablet (25 mg total) by mouth every 4 to 6 hours as needed., Disp: 30 tablet, Rfl: 5    tamsulosin (FLOMAX) 0.4 mg Cap, Take 1 capsule (0.4 mg total) by mouth once daily., Disp: 90 capsule, Rfl: 3    tiZANidine (ZANAFLEX) 4 MG tablet, Take 1 tablet (4 mg total) by mouth every 12 (twelve) hours as needed (muscle spasms/ pain)., Disp: 40 tablet, Rfl: 0    sildenafiL (VIAGRA) 100 MG tablet, Take 1 tablet (100 mg total) by mouth daily as needed., Disp: 30 tablet, Rfl: 11  No current facility-administered medications for this visit.    All medications and past history have been reviewed.    Review of Systems   Constitutional:  Positive for appetite change and unexpected weight change.   HENT:  Negative for mouth sores.    Eyes:  Negative for visual disturbance.   Respiratory:  Positive for shortness of breath. Negative for cough.    Cardiovascular:  Negative for chest pain.   Gastrointestinal:  Positive for nausea. Negative for abdominal pain and diarrhea.   Genitourinary:  Positive for frequency.   Musculoskeletal:  Positive for back pain.   Skin:  Negative for rash.   Neurological:  Negative for headaches.   Hematological:  Negative for adenopathy.   Psychiatric/Behavioral:  The patient is not nervous/anxious.        Objective:        BP (!) 129/55   Pulse 85   Temp 97.8 °F (36.6 °C)   Resp 16   Ht 6' 5" (1.956 m)   Wt 79.1 kg (174 lb 6.4 oz)   BMI 20.68 kg/m²     Physical Exam  Constitutional:       Appearance: Normal appearance.   HENT:      Head: Normocephalic and atraumatic.      Mouth/Throat:      Mouth: Mucous membranes are moist.   Eyes:      General: No scleral icterus.     Conjunctiva/sclera: Conjunctivae normal.   Cardiovascular:      Rate and " Rhythm: Normal rate and regular rhythm.      Pulses: Normal pulses.      Heart sounds: Normal heart sounds.   Pulmonary:      Effort: Pulmonary effort is normal. No respiratory distress.      Breath sounds: Normal breath sounds. No wheezing.   Abdominal:      General: Abdomen is flat. There is no distension.      Palpations: Abdomen is soft. There is no mass.      Tenderness: There is no abdominal tenderness.   Musculoskeletal:         General: No swelling. Normal range of motion.      Right lower leg: No edema.      Left lower leg: No edema.   Skin:     General: Skin is warm and dry.      Capillary Refill: Capillary refill takes 2 to 3 seconds.      Findings: No bruising or rash.   Neurological:      General: No focal deficit present.      Mental Status: He is alert and oriented to person, place, and time. Mental status is at baseline.      Motor: No weakness.   Psychiatric:         Mood and Affect: Mood normal.         Behavior: Behavior normal.         Thought Content: Thought content normal.         Judgment: Judgment normal.           Lab    Lab Results   Component Value Date    WBC 32.88 (H) 09/06/2023    HGB 12.1 (L) 09/06/2023    HCT 36.2 (L) 09/06/2023    MCV 94 09/06/2023     09/06/2023         CMP  Sodium   Date Value Ref Range Status   09/06/2023 134 (L) 136 - 145 mmol/L Final     Potassium   Date Value Ref Range Status   09/06/2023 4.8 3.5 - 5.1 mmol/L Final     Chloride   Date Value Ref Range Status   09/06/2023 98 95 - 110 mmol/L Final     CO2   Date Value Ref Range Status   09/06/2023 27 23 - 29 mmol/L Final     Glucose   Date Value Ref Range Status   09/06/2023 117 (H) 70 - 110 mg/dL Final     BUN   Date Value Ref Range Status   09/06/2023 27 (H) 8 - 23 mg/dL Final     Creatinine   Date Value Ref Range Status   09/06/2023 1.0 0.5 - 1.4 mg/dL Final     Calcium   Date Value Ref Range Status   09/06/2023 9.9 8.7 - 10.5 mg/dL Final     Total Protein   Date Value Ref Range Status   09/06/2023 7.8  6.0 - 8.4 g/dL Final     Albumin   Date Value Ref Range Status   09/06/2023 3.8 3.5 - 5.2 g/dL Final     Total Bilirubin   Date Value Ref Range Status   09/06/2023 0.5 0.1 - 1.0 mg/dL Final     Comment:     For infants and newborns, interpretation of results should be based  on gestational age, weight and in agreement with clinical  observations.    Premature Infant recommended reference ranges:  Up to 24 hours.............<8.0 mg/dL  Up to 48 hours............<12.0 mg/dL  3-5 days..................<15.0 mg/dL  6-29 days.................<15.0 mg/dL       Alkaline Phosphatase   Date Value Ref Range Status   09/06/2023 91 55 - 135 U/L Final     AST   Date Value Ref Range Status   09/06/2023 18 10 - 40 U/L Final     ALT   Date Value Ref Range Status   09/06/2023 12 10 - 44 U/L Final     Anion Gap   Date Value Ref Range Status   09/06/2023 9 8 - 16 mmol/L Final     eGFR if    Date Value Ref Range Status   11/02/2021 56.4 (A) >60 mL/min/1.73 m^2 Final     eGFR if non    Date Value Ref Range Status   11/02/2021 48.8 (A) >60 mL/min/1.73 m^2 Final     Comment:     Calculation used to obtain the estimated glomerular filtration  rate (eGFR) is the CKD-EPI equation.            Specimen (24h ago, onward)      None                  All lab results and imaging results have been reviewed and discussed with the patient.     Assessment:       1. NSCLC of left lung    2. Dehydration    3. Nausea and vomiting, unspecified vomiting type        Problem List Items Addressed This Visit          Oncology    NSCLC of left lung - Primary     Other Visit Diagnoses       Dehydration        Nausea and vomiting, unspecified vomiting type                  Plan:         PLAN:   Labs weekly set up at [facility]   IV fluids and nausea medications every Friday at [facility]   Continue with nausea medications prn   PET scan reviewed with patient     RTC to see Allie in 3 weeks       The plan was discussed with the patient and  all questions/concerns have been answered to the patient's satisfaction.

## 2023-09-22 ENCOUNTER — OFFICE VISIT (OUTPATIENT)
Dept: HEMATOLOGY/ONCOLOGY | Facility: CLINIC | Age: 78
End: 2023-09-22
Payer: MEDICARE

## 2023-09-22 VITALS
SYSTOLIC BLOOD PRESSURE: 129 MMHG | RESPIRATION RATE: 16 BRPM | DIASTOLIC BLOOD PRESSURE: 55 MMHG | TEMPERATURE: 98 F | WEIGHT: 174.38 LBS | HEART RATE: 85 BPM | BODY MASS INDEX: 20.59 KG/M2 | HEIGHT: 77 IN

## 2023-09-22 DIAGNOSIS — C34.92 NSCLC OF LEFT LUNG: Primary | ICD-10-CM

## 2023-09-22 DIAGNOSIS — E86.0 DEHYDRATION: ICD-10-CM

## 2023-09-22 DIAGNOSIS — R11.2 NAUSEA AND VOMITING, UNSPECIFIED VOMITING TYPE: ICD-10-CM

## 2023-09-22 PROCEDURE — 99214 OFFICE O/P EST MOD 30 MIN: CPT | Mod: S$GLB,,, | Performed by: NURSE PRACTITIONER

## 2023-09-22 PROCEDURE — 99214 PR OFFICE/OUTPT VISIT, EST, LEVL IV, 30-39 MIN: ICD-10-PCS | Mod: S$GLB,,, | Performed by: NURSE PRACTITIONER

## 2023-09-25 ENCOUNTER — PATIENT MESSAGE (OUTPATIENT)
Dept: HEMATOLOGY/ONCOLOGY | Facility: CLINIC | Age: 78
End: 2023-09-25

## 2023-09-25 DIAGNOSIS — R53.83 FATIGUE, UNSPECIFIED TYPE: ICD-10-CM

## 2023-09-25 DIAGNOSIS — C34.92 NSCLC OF LEFT LUNG: Primary | ICD-10-CM

## 2023-09-26 ENCOUNTER — PATIENT MESSAGE (OUTPATIENT)
Dept: HEMATOLOGY/ONCOLOGY | Facility: CLINIC | Age: 78
End: 2023-09-26

## 2023-09-26 RX ORDER — ONDANSETRON 2 MG/ML
8 INJECTION INTRAMUSCULAR; INTRAVENOUS ONCE
Status: CANCELLED
Start: 2023-09-29 | End: 2023-09-29

## 2023-09-28 ENCOUNTER — LAB VISIT (OUTPATIENT)
Dept: LAB | Facility: HOSPITAL | Age: 78
End: 2023-09-28
Attending: NURSE PRACTITIONER
Payer: MEDICARE

## 2023-09-28 ENCOUNTER — INFUSION (OUTPATIENT)
Dept: INFUSION THERAPY | Facility: HOSPITAL | Age: 78
End: 2023-09-28
Payer: MEDICARE

## 2023-09-28 VITALS
TEMPERATURE: 98 F | SYSTOLIC BLOOD PRESSURE: 108 MMHG | RESPIRATION RATE: 18 BRPM | HEART RATE: 69 BPM | OXYGEN SATURATION: 98 % | DIASTOLIC BLOOD PRESSURE: 57 MMHG

## 2023-09-28 DIAGNOSIS — C34.12 MALIGNANT NEOPLASM OF UPPER LOBE OF LEFT LUNG: Primary | ICD-10-CM

## 2023-09-28 DIAGNOSIS — C34.92 NSCLC OF LEFT LUNG: ICD-10-CM

## 2023-09-28 DIAGNOSIS — R53.83 FATIGUE, UNSPECIFIED TYPE: ICD-10-CM

## 2023-09-28 LAB
ALBUMIN SERPL BCP-MCNC: 3.1 G/DL (ref 3.5–5.2)
ALP SERPL-CCNC: 84 U/L (ref 55–135)
ALT SERPL W/O P-5'-P-CCNC: 60 U/L (ref 10–44)
ANION GAP SERPL CALC-SCNC: 9 MMOL/L (ref 8–16)
AST SERPL-CCNC: 35 U/L (ref 10–40)
BASOPHILS # BLD AUTO: 0 K/UL (ref 0–0.2)
BASOPHILS NFR BLD: 0 % (ref 0–1.9)
BILIRUB SERPL-MCNC: 0.4 MG/DL (ref 0.1–1)
BUN SERPL-MCNC: 21 MG/DL (ref 8–23)
CALCIUM SERPL-MCNC: 9.1 MG/DL (ref 8.7–10.5)
CHLORIDE SERPL-SCNC: 99 MMOL/L (ref 95–110)
CO2 SERPL-SCNC: 27 MMOL/L (ref 23–29)
CREAT SERPL-MCNC: 1.1 MG/DL (ref 0.5–1.4)
DIFFERENTIAL METHOD: ABNORMAL
EOSINOPHIL # BLD AUTO: 0 K/UL (ref 0–0.5)
EOSINOPHIL NFR BLD: 1 % (ref 0–8)
ERYTHROCYTE [DISTWIDTH] IN BLOOD BY AUTOMATED COUNT: 12.2 % (ref 11.5–14.5)
EST. GFR  (NO RACE VARIABLE): >60 ML/MIN/1.73 M^2
GLUCOSE SERPL-MCNC: 101 MG/DL (ref 70–110)
HCT VFR BLD AUTO: 27.7 % (ref 40–54)
HGB BLD-MCNC: 9.4 G/DL (ref 14–18)
IMM GRANULOCYTES # BLD AUTO: 0.01 K/UL (ref 0–0.04)
IMM GRANULOCYTES NFR BLD AUTO: 0.3 % (ref 0–0.5)
LYMPHOCYTES # BLD AUTO: 1.1 K/UL (ref 1–4.8)
LYMPHOCYTES NFR BLD: 39 % (ref 18–48)
MAGNESIUM SERPL-MCNC: 1.6 MG/DL (ref 1.6–2.6)
MCH RBC QN AUTO: 30.5 PG (ref 27–31)
MCHC RBC AUTO-ENTMCNC: 33.9 G/DL (ref 32–36)
MCV RBC AUTO: 90 FL (ref 82–98)
MONOCYTES # BLD AUTO: 0.7 K/UL (ref 0.3–1)
MONOCYTES NFR BLD: 25.3 % (ref 4–15)
NEUTROPHILS # BLD AUTO: 1 K/UL (ref 1.8–7.7)
NEUTROPHILS NFR BLD: 34.4 % (ref 38–73)
NRBC BLD-RTO: 0 /100 WBC
PLATELET # BLD AUTO: 67 K/UL (ref 150–450)
PMV BLD AUTO: 9.4 FL (ref 9.2–12.9)
POTASSIUM SERPL-SCNC: 4 MMOL/L (ref 3.5–5.1)
PROT SERPL-MCNC: 6.3 G/DL (ref 6–8.4)
RBC # BLD AUTO: 3.08 M/UL (ref 4.6–6.2)
SODIUM SERPL-SCNC: 135 MMOL/L (ref 136–145)
TSH SERPL DL<=0.005 MIU/L-ACNC: 1.06 UIU/ML (ref 0.4–4)
WBC # BLD AUTO: 2.92 K/UL (ref 3.9–12.7)

## 2023-09-28 PROCEDURE — 25000003 PHARM REV CODE 250: Performed by: NURSE PRACTITIONER

## 2023-09-28 PROCEDURE — 84443 ASSAY THYROID STIM HORMONE: CPT | Performed by: NURSE PRACTITIONER

## 2023-09-28 PROCEDURE — 85025 COMPLETE CBC W/AUTO DIFF WBC: CPT | Performed by: NURSE PRACTITIONER

## 2023-09-28 PROCEDURE — 36415 COLL VENOUS BLD VENIPUNCTURE: CPT | Performed by: NURSE PRACTITIONER

## 2023-09-28 PROCEDURE — 83735 ASSAY OF MAGNESIUM: CPT | Performed by: NURSE PRACTITIONER

## 2023-09-28 PROCEDURE — 96375 TX/PRO/DX INJ NEW DRUG ADDON: CPT

## 2023-09-28 PROCEDURE — 63600175 PHARM REV CODE 636 W HCPCS: Performed by: NURSE PRACTITIONER

## 2023-09-28 PROCEDURE — 96374 THER/PROPH/DIAG INJ IV PUSH: CPT

## 2023-09-28 PROCEDURE — 80053 COMPREHEN METABOLIC PANEL: CPT | Performed by: NURSE PRACTITIONER

## 2023-09-28 PROCEDURE — 96361 HYDRATE IV INFUSION ADD-ON: CPT

## 2023-09-28 RX ORDER — ONDANSETRON 2 MG/ML
8 INJECTION INTRAMUSCULAR; INTRAVENOUS ONCE
Status: COMPLETED | OUTPATIENT
Start: 2023-09-28 | End: 2023-09-28

## 2023-09-28 RX ORDER — ONDANSETRON 2 MG/ML
8 INJECTION INTRAMUSCULAR; INTRAVENOUS ONCE
Status: CANCELLED
Start: 2023-09-29 | End: 2023-09-29

## 2023-09-28 RX ADMIN — ONDANSETRON 8 MG: 2 INJECTION INTRAMUSCULAR; INTRAVENOUS at 01:09

## 2023-09-28 RX ADMIN — SODIUM CHLORIDE 1000 ML: 9 INJECTION, SOLUTION INTRAVENOUS at 01:09

## 2023-09-28 NOTE — PLAN OF CARE
Problem: Nausea and Vomiting  Goal: Fluid and Electrolyte Balance  Outcome: Ongoing, Progressing     Problem: Adult Inpatient Plan of Care  Goal: Optimal Comfort and Wellbeing  Outcome: Ongoing, Progressing  Intervention: Provide Person-Centered Care  Flowsheets (Taken 9/28/2023 [id number])  Trust Relationship/Rapport:   care explained   choices provided   emotional support provided   empathic listening provided   questions answered   questions encouraged   reassurance provided   thoughts/feelings acknowledged

## 2023-09-28 NOTE — NURSING
PT tolerated Normal Saline infusion well. No adverse reaction noted. Pt education reinforced on Normal Saline side effects, what to expect and when to call Dr. Reynaldo Coon MD. Pt verbalized understanding. PIV d/c per protocol, pt tolerated well.  amr

## 2023-10-03 ENCOUNTER — LAB VISIT (OUTPATIENT)
Dept: LAB | Facility: HOSPITAL | Age: 78
End: 2023-10-03
Attending: FAMILY MEDICINE
Payer: MEDICARE

## 2023-10-03 ENCOUNTER — TELEPHONE (OUTPATIENT)
Dept: HEMATOLOGY/ONCOLOGY | Facility: CLINIC | Age: 78
End: 2023-10-03

## 2023-10-03 DIAGNOSIS — C34.92 NSCLC OF LEFT LUNG: ICD-10-CM

## 2023-10-03 DIAGNOSIS — C34.92 NSCLC OF LEFT LUNG: Primary | ICD-10-CM

## 2023-10-03 LAB
BASOPHILS # BLD AUTO: 0.02 K/UL (ref 0–0.2)
BASOPHILS NFR BLD: 0.1 % (ref 0–1.9)
DIFFERENTIAL METHOD: ABNORMAL
EOSINOPHIL # BLD AUTO: 0 K/UL (ref 0–0.5)
EOSINOPHIL NFR BLD: 0.1 % (ref 0–8)
ERYTHROCYTE [DISTWIDTH] IN BLOOD BY AUTOMATED COUNT: 13.3 % (ref 11.5–14.5)
HCT VFR BLD AUTO: 27.1 % (ref 40–54)
HGB BLD-MCNC: 9.2 G/DL (ref 14–18)
IMM GRANULOCYTES # BLD AUTO: 0.13 K/UL (ref 0–0.04)
IMM GRANULOCYTES NFR BLD AUTO: 0.9 % (ref 0–0.5)
LYMPHOCYTES # BLD AUTO: 0.8 K/UL (ref 1–4.8)
LYMPHOCYTES NFR BLD: 5.4 % (ref 18–48)
MCH RBC QN AUTO: 30.8 PG (ref 27–31)
MCHC RBC AUTO-ENTMCNC: 33.9 G/DL (ref 32–36)
MCV RBC AUTO: 91 FL (ref 82–98)
MONOCYTES # BLD AUTO: 0.5 K/UL (ref 0.3–1)
MONOCYTES NFR BLD: 3.5 % (ref 4–15)
NEUTROPHILS # BLD AUTO: 13.8 K/UL (ref 1.8–7.7)
NEUTROPHILS NFR BLD: 90 % (ref 38–73)
NRBC BLD-RTO: 0 /100 WBC
PLATELET # BLD AUTO: 300 K/UL (ref 150–450)
PMV BLD AUTO: 8.8 FL (ref 9.2–12.9)
RBC # BLD AUTO: 2.99 M/UL (ref 4.6–6.2)
WBC # BLD AUTO: 15.29 K/UL (ref 3.9–12.7)

## 2023-10-03 PROCEDURE — 85025 COMPLETE CBC W/AUTO DIFF WBC: CPT | Performed by: INTERNAL MEDICINE

## 2023-10-03 PROCEDURE — 36415 COLL VENOUS BLD VENIPUNCTURE: CPT | Performed by: INTERNAL MEDICINE

## 2023-10-03 RX ORDER — EPINEPHRINE 0.3 MG/.3ML
0.3 INJECTION SUBCUTANEOUS ONCE AS NEEDED
Status: CANCELLED | OUTPATIENT
Start: 2023-10-04

## 2023-10-03 RX ORDER — SODIUM CHLORIDE 0.9 % (FLUSH) 0.9 %
10 SYRINGE (ML) INJECTION
Status: CANCELLED | OUTPATIENT
Start: 2023-10-04

## 2023-10-03 RX ORDER — DIPHENHYDRAMINE HYDROCHLORIDE 50 MG/ML
50 INJECTION INTRAMUSCULAR; INTRAVENOUS ONCE AS NEEDED
Status: CANCELLED | OUTPATIENT
Start: 2023-10-04

## 2023-10-03 RX ORDER — PROCHLORPERAZINE EDISYLATE 5 MG/ML
5 INJECTION INTRAMUSCULAR; INTRAVENOUS ONCE AS NEEDED
Status: CANCELLED | OUTPATIENT
Start: 2023-10-04

## 2023-10-03 RX ORDER — HEPARIN 100 UNIT/ML
500 SYRINGE INTRAVENOUS
Status: CANCELLED | OUTPATIENT
Start: 2023-10-04

## 2023-10-03 NOTE — TELEPHONE ENCOUNTER
Spoke to pt and advised him to have a repeat CBC drawn at [facility] today per Allie. Pt verbalized understanding.

## 2023-10-04 ENCOUNTER — INFUSION (OUTPATIENT)
Dept: INFUSION THERAPY | Facility: HOSPITAL | Age: 78
End: 2023-10-04
Attending: INTERNAL MEDICINE
Payer: MEDICARE

## 2023-10-04 ENCOUNTER — DOCUMENTATION ONLY (OUTPATIENT)
Dept: HEMATOLOGY/ONCOLOGY | Facility: CLINIC | Age: 78
End: 2023-10-04

## 2023-10-04 VITALS
WEIGHT: 173.88 LBS | BODY MASS INDEX: 20.53 KG/M2 | TEMPERATURE: 97 F | HEART RATE: 59 BPM | OXYGEN SATURATION: 99 % | RESPIRATION RATE: 17 BRPM | DIASTOLIC BLOOD PRESSURE: 62 MMHG | SYSTOLIC BLOOD PRESSURE: 118 MMHG | HEIGHT: 77 IN

## 2023-10-04 DIAGNOSIS — C34.12 MALIGNANT NEOPLASM OF UPPER LOBE OF LEFT LUNG: Primary | ICD-10-CM

## 2023-10-04 PROCEDURE — 96417 CHEMO IV INFUS EACH ADDL SEQ: CPT

## 2023-10-04 PROCEDURE — 96367 TX/PROPH/DG ADDL SEQ IV INF: CPT

## 2023-10-04 PROCEDURE — 25000003 PHARM REV CODE 250: Performed by: NURSE PRACTITIONER

## 2023-10-04 PROCEDURE — 96411 CHEMO IV PUSH ADDL DRUG: CPT

## 2023-10-04 PROCEDURE — 96413 CHEMO IV INFUSION 1 HR: CPT

## 2023-10-04 PROCEDURE — A4216 STERILE WATER/SALINE, 10 ML: HCPCS | Performed by: NURSE PRACTITIONER

## 2023-10-04 PROCEDURE — 96375 TX/PRO/DX INJ NEW DRUG ADDON: CPT

## 2023-10-04 PROCEDURE — 63600175 PHARM REV CODE 636 W HCPCS: Mod: JZ,JG | Performed by: NURSE PRACTITIONER

## 2023-10-04 RX ORDER — PROCHLORPERAZINE EDISYLATE 5 MG/ML
5 INJECTION INTRAMUSCULAR; INTRAVENOUS ONCE AS NEEDED
Status: DISCONTINUED | OUTPATIENT
Start: 2023-10-04 | End: 2023-10-04 | Stop reason: HOSPADM

## 2023-10-04 RX ORDER — SODIUM CHLORIDE 0.9 % (FLUSH) 0.9 %
10 SYRINGE (ML) INJECTION
Status: DISCONTINUED | OUTPATIENT
Start: 2023-10-04 | End: 2023-10-04 | Stop reason: HOSPADM

## 2023-10-04 RX ORDER — HEPARIN 100 UNIT/ML
500 SYRINGE INTRAVENOUS
Status: DISCONTINUED | OUTPATIENT
Start: 2023-10-04 | End: 2023-10-04 | Stop reason: HOSPADM

## 2023-10-04 RX ORDER — DIPHENHYDRAMINE HYDROCHLORIDE 50 MG/ML
50 INJECTION INTRAMUSCULAR; INTRAVENOUS ONCE AS NEEDED
Status: DISCONTINUED | OUTPATIENT
Start: 2023-10-04 | End: 2023-10-04 | Stop reason: HOSPADM

## 2023-10-04 RX ORDER — EPINEPHRINE 0.3 MG/.3ML
0.3 INJECTION SUBCUTANEOUS ONCE AS NEEDED
Status: DISCONTINUED | OUTPATIENT
Start: 2023-10-04 | End: 2023-10-04 | Stop reason: HOSPADM

## 2023-10-04 RX ADMIN — HEPARIN 500 UNITS: 100 SYRINGE at 01:10

## 2023-10-04 RX ADMIN — PEMETREXED DISODIUM 1075 MG: 500 INJECTION, POWDER, LYOPHILIZED, FOR SOLUTION INTRAVENOUS at 11:10

## 2023-10-04 RX ADMIN — SODIUM CHLORIDE: 0.9 INJECTION, SOLUTION INTRAVENOUS at 10:10

## 2023-10-04 RX ADMIN — CARBOPLATIN 465 MG: 600 INJECTION, SOLUTION INTRAVENOUS at 12:10

## 2023-10-04 RX ADMIN — PALONOSETRON HYDROCHLORIDE 0.25 MG: 0.25 INJECTION INTRAVENOUS at 11:10

## 2023-10-04 RX ADMIN — SODIUM CHLORIDE 200 MG: 9 INJECTION, SOLUTION INTRAVENOUS at 10:10

## 2023-10-04 RX ADMIN — SODIUM CHLORIDE, PRESERVATIVE FREE 10 ML: 5 INJECTION INTRAVENOUS at 01:10

## 2023-10-04 RX ADMIN — APREPITANT 130 MG: 130 INJECTION, EMULSION INTRAVENOUS at 11:10

## 2023-10-04 NOTE — PROGRESS NOTES
Medical Nutrition Therapy Oncology Progress Note      Patient's PCP:Nola Ervin MD  Referring Provider: No ref. provider found  Subjective:        Patient ID: Akil Guzman is a 78 y.o. male.    Chief Complaint: Unintentional Weight Loss    Past Medical History:   Diagnosis Date    Allergy     SEASON    Basal cell carcinoma 2009    L ear    Cancer     Hx colon     Pneumonia, unspecified organism 07/2023       Past Surgical History:   Procedure Laterality Date    CATARACT EXTRACTION Bilateral 2022    COLON SURGERY  2000    1ft. sigmoid removed    COLONOSCOPY N/A 09/28/2020    Procedure: COLONOSCOPY;  Surgeon: Ty Pollock MD;  Location: [facility] ENDO;  Service: General;  Laterality: N/A;    INSERTION OF TUNNELED CENTRAL VENOUS CATHETER (CVC) WITH SUBCUTANEOUS PORT N/A 9/11/2023    Procedure: [id number];  Surgeon: Antwon Peres Jr., MD;  Location: [facility] OR;  Service: General;  Laterality: N/A;       Social History     Socioeconomic History    Marital status:    Tobacco Use    Smoking status: Former     Current packs/day: 0.25     Average packs/day: 0.3 packs/day for 15.1 years (3.8 ttl pk-yrs)     Types: Cigarettes     Start date: 9/6/2008    Smokeless tobacco: Never   Substance and Sexual Activity    Alcohol use: Yes     Alcohol/week: 2.0 standard drinks of alcohol     Types: 2 Drinks containing 0.5 oz of alcohol per week     Comment: 2 mixed drinks WEEK    Drug use: No    Sexual activity: Yes     Partners: Female     Social Determinants of Health     Financial Resource Strain: Low Risk  (9/22/2023)    Overall Financial Resource Strain (CARDIA)     Difficulty of Paying Living Expenses: Not hard at all   Food Insecurity: No Food Insecurity (9/22/2023)    Hunger Vital Sign     Worried About Running Out of Food in the Last Year: Never true     Ran Out of Food in the Last Year: Never true   Transportation Needs: No Transportation Needs (9/22/2023)     PRAPARE - Transportation     Lack of Transportation (Medical): No     Lack of Transportation (Non-Medical): No   Physical Activity: Insufficiently Active (9/22/2023)    Exercise Vital Sign     Days of Exercise per Week: 2 days     Minutes of Exercise per Session: 10 min   Stress: Stress Concern Present (9/22/2023)    [place] [employer] - Occupational Stress Questionnaire     Feeling of Stress : To some extent   Social Connections: Unknown (9/22/2023)    Social Connection and Isolation Panel [NHANES]     Frequency of Communication with Friends and Family: Patient refused     Frequency of Social Gatherings with Friends and Family: Twice a week     Active Member of Clubs or Organizations: No     Attends Club or Organization Meetings: Patient refused     Marital Status:    Housing Stability: Unknown (9/22/2023)    Housing Stability Vital Sign     Unable to Pay for Housing in the Last Year: Patient refused     Number of Places Lived in the Last Year: 1     Unstable Housing in the Last Year: No       Family History   Problem Relation Age of Onset    Cancer Mother     Cancer Brother     Macular degeneration Brother     Melanoma Neg Hx        Review of patient's allergies indicates:   Allergen Reactions    Penicillins      Other reaction(s): Rash  50 YEARS AGO         Current Outpatient Medications:     dexAMETHasone (DECADRON) 4 MG Tab, 2 tablets twice a day the day before and after each chemo, Disp: 32 tablet, Rfl: 5    duke's soln (benadryl 30 mL, mylanta 30 mL, LIDOcaine 30 mL, nystatin 30 mL) 120mL, Take 10 mLs by mouth 4 (four) times daily., Disp: 120 mL, Rfl: 5    finasteride (PROSCAR) 5 mg tablet, TAKE 1 TABLET BY MOUTH EVERY DAY FOR PROSTATE, Disp: 90 tablet, Rfl: 3    folic acid (FOLVITE) 1 MG tablet, Take 1 tablet (1 mg total) by mouth once daily., Disp: 100 tablet, Rfl: 3    HYDROcodone-acetaminophen (NORCO)  mg per tablet, Take 1 tablet by mouth every 6 (six) hours as needed  for Pain., Disp: 60 tablet, Rfl: 0    ipratropium (ATROVENT) 42 mcg (0.06 %) nasal spray, USE 1 SPRAY in each nostril TWICE DAILY THANK YOU!, Disp: , Rfl:     ondansetron (ZOFRAN) 8 MG tablet, Take 1 tablet (8 mg total) by mouth every 8 (eight) hours as needed., Disp: 30 tablet, Rfl: 5    promethazine (PHENERGAN) 25 MG tablet, Take 1 tablet (25 mg total) by mouth every 4 to 6 hours as needed., Disp: 30 tablet, Rfl: 5    sildenafiL (VIAGRA) 100 MG tablet, Take 1 tablet (100 mg total) by mouth daily as needed., Disp: 30 tablet, Rfl: 11    tamsulosin (FLOMAX) 0.4 mg Cap, Take 1 capsule (0.4 mg total) by mouth once daily., Disp: 90 capsule, Rfl: 3    tiZANidine (ZANAFLEX) 4 MG tablet, Take 1 tablet (4 mg total) by mouth every 12 (twelve) hours as needed (muscle spasms/ pain)., Disp: 40 tablet, Rfl: 0  No current facility-administered medications for this visit.    Facility-Administered Medications Ordered in Other Visits:     diphenhydrAMINE injection 50 mg, 50 mg, Intravenous, Once PRN, Allie Shelley NP-C    EPINEPHrine (EPIPEN) 0.3 mg/0.3 mL pen injection 0.3 mg, 0.3 mg, Intramuscular, Once PRN, Allie Shelley NP-C    heparin, porcine (PF) 100 unit/mL injection flush 500 Units, 500 Units, Intravenous, PRN, Allie Shelley NP-WILLY, 500 Units at 10/04/23 1304    hydrocortisone sodium succinate injection 100 mg, 100 mg, Intravenous, Once PRNKarsten Jodie, NP-C    prochlorperazine injection Soln 5 mg, 5 mg, Intravenous, Once PRN, Allie Shelley NP-C    sodium chloride 0.9% 250 mL flush bag, , Intravenous, PRN, Allie Shelley NP-C, Stopped at 10/04/23 1303    sodium chloride 0.9% flush 10 mL, 10 mL, Intravenous, PRN, Allie Shelley NP-C, 10 mL at 10/04/23 1304    All medications and past history have been reviewed.    OP NSCLC pembrolizumab PEMEtrexed CARBOplatin (AUC) Q3W followed by maintenance pembrolizumab PEMEtrexed Q3W      Treatment Goal:   Control      Status:   Active      Start Date:    9/13/2023      End Date:   8/27/2025 (Planned)      Provider:   Reynaldo Torres MD      Chemotherapy:   CARBOplatin (PARAPLATIN) 495 mg in sodium chloride 0.9% 334.5 mL chemo infusion, 495 mg (100 % of original dose 496.5 mg), Intravenous, Clinic/HOD 1 time, 2 of 4 cycles    Dose modification:   (original dose 496.5 mg, Cycle 1)    Administration: 495 mg (9/13/2023), 465 mg (10/4/2023)        PEMEtrexed disodium (ALIMTA) 1,075 mg in sodium chloride 0.9% SolP 100 mL chemo infusion, 500 mg/m2 = 1,075 mg, Intravenous, Clinic/HOD 1 time, 2 of 35 cycles    Administration: 1,075 mg (9/13/2023), 1,075 mg (10/4/2023)      Objective:      Wt Readings from Last 20 Encounters:   10/04/23 78.9 kg (173 lb 14.4 oz)   09/22/23 79.1 kg (174 lb 6.4 oz)   09/21/23 77.1 kg (170 lb)   09/14/23 83.9 kg (185 lb)   09/14/23 84.4 kg (185 lb 15.3 oz)   09/13/23 83 kg (183 lb)   09/11/23 84.8 kg (187 lb)   09/07/23 85.1 kg (187 lb 9.6 oz)   09/06/23 86.2 kg (190 lb)   09/01/23 84.9 kg (187 lb 1.6 oz)   08/09/23 87 kg (191 lb 12.8 oz)   08/03/23 87.2 kg (192 lb 3.2 oz)   08/02/23 87 kg (191 lb 12.8 oz)   07/06/23 89.4 kg (197 lb 1.5 oz)   06/22/23 87.5 kg (192 lb 12.8 oz)   10/06/22 91.5 kg (201 lb 12.8 oz)   09/07/22 92.1 kg (203 lb)   04/11/22 94.5 kg (208 lb 5.4 oz)   02/01/22 95.7 kg (211 lb)   10/26/21 94.6 kg (208 lb 8 oz)       Last Labs:  Last Labs:  Glucose   Date Value Ref Range Status   09/28/2023 101 70 - 110 mg/dL Final   09/06/2023 117 (H) 70 - 110 mg/dL Final     BUN   Date Value Ref Range Status   09/28/2023 21 8 - 23 mg/dL Final   09/06/2023 27 (H) 8 - 23 mg/dL Final     Creatinine   Date Value Ref Range Status   09/28/2023 1.1 0.5 - 1.4 mg/dL Final   09/06/2023 1.0 0.5 - 1.4 mg/dL Final     Sodium   Date Value Ref Range Status   09/28/2023 135 (L) 136 - 145 mmol/L Final   09/06/2023 134 (L) 136 - 145 mmol/L Final     Potassium   Date Value Ref Range Status   09/28/2023 4.0 3.5 - 5.1 mmol/L Final   09/06/2023 4.8 3.5 -  "5.1 mmol/L Final     Phosphorus   Date Value Ref Range Status   12/04/2014 3.7 2.7 - 4.5 mg/dL Final   07/21/2014 3.6 2.7 - 4.5 mg/dL Final     Calcium   Date Value Ref Range Status   09/28/2023 9.1 8.7 - 10.5 mg/dL Final   09/06/2023 9.9 8.7 - 10.5 mg/dL Final     No results found for: "PREALBUMIN"  Total Protein   Date Value Ref Range Status   09/28/2023 6.3 6.0 - 8.4 g/dL Final   09/06/2023 7.8 6.0 - 8.4 g/dL Final     Cholesterol   Date Value Ref Range Status   06/21/2023 163 120 - 199 mg/dL Final     Comment:     The National Cholesterol Education Program (NCEP) has set the  following guidelines (reference ranges) for Cholesterol:  Optimal.....................<200 mg/dL  Borderline High.............200-239 mg/dL  High........................> or = 240 mg/dL     11/02/2021 195 120 - 199 mg/dL Final     Comment:     The National Cholesterol Education Program (NCEP) has set the  following guidelines (reference ranges) for Cholesterol:  Optimal.....................<200 mg/dL  Borderline High.............200-239 mg/dL  High........................> or = 240 mg/dL       No results found for: "HGBA1C"  Hemoglobin   Date Value Ref Range Status   10/03/2023 9.2 (L) 14.0 - 18.0 g/dL Final   09/28/2023 9.4 (L) 14.0 - 18.0 g/dL Final     Hematocrit   Date Value Ref Range Status   10/03/2023 27.1 (L) 40.0 - 54.0 % Final   09/28/2023 27.7 (L) 40.0 - 54.0 % Final     Iron   Date Value Ref Range Status   09/25/2020 117 45 - 160 ug/dL Final   12/04/2014 116 45 - 160 ug/dL Final     No components found for: "FROLATE"  No results found for: "[id number]"  WBC   Date Value Ref Range Status   10/03/2023 15.29 (H) 3.90 - 12.70 K/uL Final   09/28/2023 2.92 (L) 3.90 - 12.70 K/uL Final       Assessment:     Nutrition/Diet History     Patient Reported Diet/Restrictions/Preferences: regular diet  Food Allergies: NKFA  Factors Affecting Nutritional Intake: none noted    Estimated/Assessed Needs     Weight Used For Calorie Calculations: " 79 kg (173 lb)  Energy Calorie Requirements (kcal): 5311-9583 kcal/day   Energy Need Method: 30-35 Kcal/kg  Protein Requirements:  g/day   Protein Need Method: 1.2-1.5 g/kg  Fluid Requirements: 2000 ml/day  Estimated Fluid Requirement Method: 1ml/kcal      Nutrition Support  N/A    Evaluation of Received Nutrient/Fluid Intake     Calorie Intake: not meeting needs  Protein Intake: not meeting needs  Fluid Intake: meeting needs  Tolerance: tolerating  % Intake of Estimated Energy Needs: 50 %      Nutrition Diagnosis Related to (Etiology) As Evidenced By (Signs/Symptoms)   Inadequate energy intake Decreased ability to consume sufficient energy Decreased intake, unintentional weight loss     RD Notes  Mr. Akil Guzman is a 79y/o male with lung cancer and personal history of colon cancer currently receiving carboplatin and alimta. Pt reports good appetite and intake but that he eats a small breakfast then snacks during the day and sometimes eats dinner. He does supplement with protein shakes and protein bars occasionally. His snacks consist mostly of fruit and nuts. Pt reports he did not take his antiemetics as prescribed after his first cycle and had several days of N/V. He is working on increasing his hydration. CW: 173#    Nutrition Intervention:      Nutrition Intervention Energy-modified diet, Protein-modified diet, and Schedule of food/fluids   Goals/Expected Outcomes Initiate small, frequent meals and snacks with high calorie/protein food choices to meet estimated nutritional needs and prevent unintentional weight loss   Progress Initial     Nutrition Intervention Medical food supplement: Commercial beverage   Goals/Expected Outcomes Initiate high calorie ONS at least once daily to assist in meeting estimated nutritional needs   Progress Initial     Plan  Provided decreased appetite packet and reviewed with patient  Discussed importance of weight and hydration maintenance  Discussed examples of high  calorie/protein foods  Continue small, frequent meals and snacks  Continue ONS at least once daily  Provided RD contact info. Encouraged pt to call with questions or concerns    Monitoring/Evaluation:     Monitor: po intake, weight, BMs    Next Visit: f/u as needed      I have explained and the patient understands all of  the current recommendation(s). I have answered all of their questions to the best of my ability and to their complete satisfaction.   The patient is to continue with the current management plan.    Electronically signed by: Toma Waddell MBA, RDN, LDN

## 2023-10-04 NOTE — PLAN OF CARE
Problem: Fall Injury Risk  Goal: Absence of Fall and Fall-Related Injury  Outcome: Met     Problem: Constipation  Goal: Effective Bowel Elimination  Outcome: Met     Problem: Activity Intolerance  Goal: Enhanced Capacity and Energy  Outcome: Met

## 2023-10-05 ENCOUNTER — INFUSION (OUTPATIENT)
Dept: INFUSION THERAPY | Facility: HOSPITAL | Age: 78
End: 2023-10-05
Payer: MEDICARE

## 2023-10-05 ENCOUNTER — LAB VISIT (OUTPATIENT)
Dept: LAB | Facility: HOSPITAL | Age: 78
End: 2023-10-05
Attending: NURSE PRACTITIONER
Payer: MEDICARE

## 2023-10-05 ENCOUNTER — PATIENT MESSAGE (OUTPATIENT)
Dept: HEMATOLOGY/ONCOLOGY | Facility: CLINIC | Age: 78
End: 2023-10-05

## 2023-10-05 VITALS — WEIGHT: 173.88 LBS | HEIGHT: 77 IN | BODY MASS INDEX: 20.53 KG/M2

## 2023-10-05 DIAGNOSIS — C34.12 MALIGNANT NEOPLASM OF UPPER LOBE OF LEFT LUNG: Primary | ICD-10-CM

## 2023-10-05 DIAGNOSIS — C34.92 NSCLC OF LEFT LUNG: ICD-10-CM

## 2023-10-05 DIAGNOSIS — R53.83 FATIGUE, UNSPECIFIED TYPE: ICD-10-CM

## 2023-10-05 LAB
ALBUMIN SERPL BCP-MCNC: 3.3 G/DL (ref 3.5–5.2)
ALP SERPL-CCNC: 96 U/L (ref 55–135)
ALT SERPL W/O P-5'-P-CCNC: 46 U/L (ref 10–44)
ANION GAP SERPL CALC-SCNC: 12 MMOL/L (ref 8–16)
ANISOCYTOSIS BLD QL SMEAR: SLIGHT
AST SERPL-CCNC: 29 U/L (ref 10–40)
BASOPHILS NFR BLD: 0 % (ref 0–1.9)
BILIRUB SERPL-MCNC: 0.3 MG/DL (ref 0.1–1)
BUN SERPL-MCNC: 32 MG/DL (ref 8–23)
CALCIUM SERPL-MCNC: 9.7 MG/DL (ref 8.7–10.5)
CHLORIDE SERPL-SCNC: 99 MMOL/L (ref 95–110)
CO2 SERPL-SCNC: 23 MMOL/L (ref 23–29)
CREAT SERPL-MCNC: 1 MG/DL (ref 0.5–1.4)
DIFFERENTIAL METHOD: ABNORMAL
EOSINOPHIL NFR BLD: 0 % (ref 0–8)
ERYTHROCYTE [DISTWIDTH] IN BLOOD BY AUTOMATED COUNT: 14.2 % (ref 11.5–14.5)
EST. GFR  (NO RACE VARIABLE): >60 ML/MIN/1.73 M^2
GLUCOSE SERPL-MCNC: 112 MG/DL (ref 70–110)
HCT VFR BLD AUTO: 25.4 % (ref 40–54)
HGB BLD-MCNC: 8.9 G/DL (ref 14–18)
IMM GRANULOCYTES # BLD AUTO: ABNORMAL K/UL (ref 0–0.04)
IMM GRANULOCYTES NFR BLD AUTO: ABNORMAL % (ref 0–0.5)
LYMPHOCYTES NFR BLD: 10 % (ref 18–48)
MAGNESIUM SERPL-MCNC: 1.8 MG/DL (ref 1.6–2.6)
MCH RBC QN AUTO: 31.6 PG (ref 27–31)
MCHC RBC AUTO-ENTMCNC: 35 G/DL (ref 32–36)
MCV RBC AUTO: 90 FL (ref 82–98)
MONOCYTES NFR BLD: 1 % (ref 4–15)
NEUTROPHILS NFR BLD: 86 % (ref 38–73)
NEUTS BAND NFR BLD MANUAL: 3 %
NRBC BLD-RTO: 0 /100 WBC
PLATELET # BLD AUTO: 382 K/UL (ref 150–450)
PMV BLD AUTO: 8.5 FL (ref 9.2–12.9)
POIKILOCYTOSIS BLD QL SMEAR: SLIGHT
POTASSIUM SERPL-SCNC: 4.8 MMOL/L (ref 3.5–5.1)
PROT SERPL-MCNC: 6.6 G/DL (ref 6–8.4)
RBC # BLD AUTO: 2.82 M/UL (ref 4.6–6.2)
SODIUM SERPL-SCNC: 134 MMOL/L (ref 136–145)
TARGETS BLD QL SMEAR: ABNORMAL
WBC # BLD AUTO: 32.76 K/UL (ref 3.9–12.7)

## 2023-10-05 PROCEDURE — 63600175 PHARM REV CODE 636 W HCPCS: Performed by: NURSE PRACTITIONER

## 2023-10-05 PROCEDURE — 85007 BL SMEAR W/DIFF WBC COUNT: CPT | Performed by: NURSE PRACTITIONER

## 2023-10-05 PROCEDURE — 96365 THER/PROPH/DIAG IV INF INIT: CPT

## 2023-10-05 PROCEDURE — 96375 TX/PRO/DX INJ NEW DRUG ADDON: CPT

## 2023-10-05 PROCEDURE — 25000003 PHARM REV CODE 250: Performed by: NURSE PRACTITIONER

## 2023-10-05 PROCEDURE — 83735 ASSAY OF MAGNESIUM: CPT | Performed by: NURSE PRACTITIONER

## 2023-10-05 PROCEDURE — 85027 COMPLETE CBC AUTOMATED: CPT | Performed by: NURSE PRACTITIONER

## 2023-10-05 PROCEDURE — 36415 COLL VENOUS BLD VENIPUNCTURE: CPT | Performed by: NURSE PRACTITIONER

## 2023-10-05 PROCEDURE — 80053 COMPREHEN METABOLIC PANEL: CPT | Performed by: NURSE PRACTITIONER

## 2023-10-05 RX ORDER — ONDANSETRON 2 MG/ML
8 INJECTION INTRAMUSCULAR; INTRAVENOUS ONCE
Status: COMPLETED | OUTPATIENT
Start: 2023-10-05 | End: 2023-10-05

## 2023-10-05 RX ORDER — ONDANSETRON 2 MG/ML
8 INJECTION INTRAMUSCULAR; INTRAVENOUS ONCE
Status: CANCELLED
Start: 2023-10-06 | End: 2023-10-06

## 2023-10-05 RX ADMIN — ONDANSETRON 8 MG: 2 INJECTION INTRAMUSCULAR; INTRAVENOUS at 01:10

## 2023-10-05 RX ADMIN — SODIUM CHLORIDE 1000 ML: 9 INJECTION, SOLUTION INTRAVENOUS at 01:10

## 2023-10-05 NOTE — PLAN OF CARE
Problem: Adult Inpatient Plan of Care  Goal: Optimal Comfort and Wellbeing  Outcome: Ongoing, Progressing  Intervention: Provide Person-Centered Care  Flowsheets (Taken 10/5/2023 1302)  Trust Relationship/Rapport:   care explained   choices provided   emotional support provided   empathic listening provided   questions answered   questions encouraged   reassurance provided   thoughts/feelings acknowledged

## 2023-10-05 NOTE — NURSING
PT tolerated Normal Saline/Zofran infusion well. No adverse reaction noted. Pt education reinforced on Normal Saline/Zofran side effects, what to expect and when to call Dr. Reynaldo Torres. Pt verbalized understanding. PIV d/c per protocol, pt tolerated well. amr

## 2023-10-11 NOTE — PROGRESS NOTES
Follow up [facility]      Subjective:       Patient ID: Akil Guzman is a 78 y.o. male.    8/7/2023-CT chest without:  1. Large left pleural effusion with dependent left lower lobe atelectasis.  2. Poorly defined mass-like infiltrate involving the periphery of the left upper lobe extending to the left hilum.  Underlying parenchymal mass is not excludable, however, evaluation is limited due to lack of intravenous contrast.  Correlate clinically with possible fever and/or elevated white count.  3. Bilateral soft tissue pulmonary nodules measuring up to 11 mm.  Follow-up per Fleischner guidelines.  For multiple solid nodules with any 6 mm or greater, Fleischner Society guidelines recommend follow up with non-contrast chest CT at 3-6 months and 18-24 months after discovery.  4. Partially calcified left upper lobe pulmonary nodule may represent granulomatous change and scarring.  5. 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  6. Questionable left hilar lymphadenopathy.  However, evaluation is again limited due to lack of intravenous contrast.  7. Small hiatal hernia.    8/17/2023 Left Pleural Fluid:  Positive for adenocarcinoma most c/w lung primary    Chief Complaint:   Lung cancer    Patient here after cycle 2 nausea and diarrhea better managed after this cycle if chemo. He continues to have mucositis and is using the [place]. He is also having difficulty sleeping.      Mr. Guzman is a 79yo male referred for new lung cancer.  In early June he drove to [place] and 2 days later he developed pain in his left flank/back area.  He went to his PCP who felt it was constipation.  When this did not resolve he went to the bone and spine clinic in [place] but nothing clear was found.  PCP ultimately did CXR which was abnormal and he was referred to Dr. Hill.     CT showed a large pleural effusion which was drained on 8/17/2023 and cytology is c/w adenocarcinoma.  He has completed abx for pneumonia  and is still feeling some left flank/side pain.  Also complains of fatigue which is constant.  Occasional sob.      9/22/2023:   Post cycle 1 of carboplatin, alimta, and keytruda - infusion date was on 9/13/2023  He did have some moderate nausea, gastritis, and dehydration   He did receive nausea medication and IV fluids yesterday at [facility] and feels better today.             Past Medical History:   Diagnosis Date    Allergy     SEASON    Basal cell carcinoma 2009    L ear    Cancer     Hx colon     Pneumonia, unspecified organism 07/2023       Past Surgical History:   Procedure Laterality Date    CATARACT EXTRACTION Bilateral 2022    COLON SURGERY  2000    1ft. sigmoid removed    COLONOSCOPY N/A 09/28/2020    Procedure: COLONOSCOPY;  Surgeon: Ty Pollock MD;  Location: [facility] ENDO;  Service: General;  Laterality: N/A;    INSERTION OF TUNNELED CENTRAL VENOUS CATHETER (CVC) WITH SUBCUTANEOUS PORT N/A 9/11/2023    Procedure: [id number];  Surgeon: Antwon Peres Jr., MD;  Location: [facility] OR;  Service: General;  Laterality: N/A;       Social History     Socioeconomic History    Marital status:    Tobacco Use    Smoking status: Former     Current packs/day: 0.25     Average packs/day: 0.3 packs/day for 15.1 years (3.8 ttl pk-yrs)     Types: Cigarettes     Start date: 9/6/2008    Smokeless tobacco: Never   Substance and Sexual Activity    Alcohol use: Yes     Alcohol/week: 2.0 standard drinks of alcohol     Types: 2 Drinks containing 0.5 oz of alcohol per week     Comment: 2 mixed drinks WEEK    Drug use: No    Sexual activity: Yes     Partners: Female     Social Determinants of Health     Financial Resource Strain: Low Risk  (9/22/2023)    Overall Financial Resource Strain (CARDIA)     Difficulty of Paying Living Expenses: Not hard at all   Food Insecurity: No Food Insecurity (9/22/2023)    Hunger Vital Sign     Worried About Running Out of Food in the Last Year: Never true      Ran Out of Food in the Last Year: Never true   Transportation Needs: No Transportation Needs (9/22/2023)    PRAPARE - Transportation     Lack of Transportation (Medical): No     Lack of Transportation (Non-Medical): No   Physical Activity: Insufficiently Active (9/22/2023)    Exercise Vital Sign     Days of Exercise per Week: 2 days     Minutes of Exercise per Session: 10 min   Stress: Stress Concern Present (9/22/2023)    [place] [employer] - Occupational Stress Questionnaire     Feeling of Stress : To some extent   Social Connections: Unknown (9/22/2023)    Social Connection and Isolation Panel [NHANES]     Frequency of Communication with Friends and Family: Patient refused     Frequency of Social Gatherings with Friends and Family: Twice a week     Active Member of Clubs or Organizations: No     Attends Club or Organization Meetings: Patient refused     Marital Status:    Housing Stability: Unknown (9/22/2023)    Housing Stability Vital Sign     Unable to Pay for Housing in the Last Year: Patient refused     Number of Places Lived in the Last Year: 1     Unstable Housing in the Last Year: No       Family History   Problem Relation Age of Onset    Cancer Mother     Brain cancer Mother     Cancer Brother     Macular degeneration Brother     Pancreatic cancer Brother     Melanoma Neg Hx        Review of patient's allergies indicates:   Allergen Reactions    Penicillins      Other reaction(s): Rash  50 YEARS AGO         Current Outpatient Medications:     dexAMETHasone (DECADRON) 4 MG Tab, 2 tablets twice a day the day before and after each chemo, Disp: 32 tablet, Rfl: 5    duke's soln (benadryl 30 mL, mylanta 30 mL, LIDOcaine 30 mL, nystatin 30 mL) 120mL, Take 10 mLs by mouth 4 (four) times daily., Disp: 120 mL, Rfl: 5    finasteride (PROSCAR) 5 mg tablet, TAKE 1 TABLET BY MOUTH EVERY DAY FOR PROSTATE, Disp: 90 tablet, Rfl: 3    folic acid (FOLVITE) 1 MG tablet, Take 1 tablet (1 mg  total) by mouth once daily., Disp: 100 tablet, Rfl: 3    HYDROcodone-acetaminophen (NORCO)  mg per tablet, Take 1 tablet by mouth every 6 (six) hours as needed for Pain., Disp: 60 tablet, Rfl: 0    ipratropium (ATROVENT) 42 mcg (0.06 %) nasal spray, USE 1 SPRAY in each nostril TWICE DAILY THANK YOU!, Disp: , Rfl:     ondansetron (ZOFRAN) 8 MG tablet, Take 1 tablet (8 mg total) by mouth every 8 (eight) hours as needed., Disp: 30 tablet, Rfl: 5    promethazine (PHENERGAN) 25 MG tablet, Take 1 tablet (25 mg total) by mouth every 4 to 6 hours as needed., Disp: 30 tablet, Rfl: 5    tamsulosin (FLOMAX) 0.4 mg Cap, Take 1 capsule (0.4 mg total) by mouth once daily., Disp: 90 capsule, Rfl: 3    tiZANidine (ZANAFLEX) 4 MG tablet, Take 1 tablet (4 mg total) by mouth every 12 (twelve) hours as needed (muscle spasms/ pain)., Disp: 40 tablet, Rfl: 0    magnesium oxide (MAG-OX) 400 mg (241.3 mg magnesium) tablet, Take 1 tablet (400 mg total) by mouth once daily., Disp: 30 tablet, Rfl: 11    sildenafiL (VIAGRA) 100 MG tablet, Take 1 tablet (100 mg total) by mouth daily as needed., Disp: 30 tablet, Rfl: 11    temazepam (RESTORIL) 15 mg Cap, Take 1 capsule (15 mg total) by mouth nightly as needed., Disp: 30 capsule, Rfl: 1    All medications and past history have been reviewed.    Review of Systems   Constitutional:  Positive for appetite change and unexpected weight change.   HENT:  Negative for mouth sores.    Eyes:  Negative for visual disturbance.   Respiratory:  Positive for shortness of breath. Negative for cough.    Cardiovascular:  Negative for chest pain.   Gastrointestinal:  Positive for nausea. Negative for abdominal pain and diarrhea.   Genitourinary:  Positive for frequency.   Musculoskeletal:  Positive for back pain.   Skin:  Negative for rash.   Neurological:  Negative for headaches.   Hematological:  Negative for adenopathy.   Psychiatric/Behavioral:  The patient is not nervous/anxious.        Objective:     "    BP (!) 111/55   Pulse 91   Temp 98.1 °F (36.7 °C)   Resp 18   Ht 6' 5" (1.956 m)   Wt 79.8 kg (176 lb)   BMI 20.87 kg/m²     Physical Exam  Constitutional:       Appearance: Normal appearance.   HENT:      Head: Normocephalic and atraumatic.      Mouth/Throat:      Mouth: Mucous membranes are moist.   Eyes:      General: No scleral icterus.     Conjunctiva/sclera: Conjunctivae normal.   Cardiovascular:      Rate and Rhythm: Normal rate and regular rhythm.      Pulses: Normal pulses.      Heart sounds: Normal heart sounds.   Pulmonary:      Effort: Pulmonary effort is normal. No respiratory distress.      Breath sounds: Normal breath sounds. No wheezing.   Abdominal:      General: Abdomen is flat. There is no distension.      Palpations: Abdomen is soft. There is no mass.      Tenderness: There is no abdominal tenderness.   Musculoskeletal:         General: No swelling. Normal range of motion.      Right lower leg: No edema.      Left lower leg: No edema.   Skin:     General: Skin is warm and dry.      Capillary Refill: Capillary refill takes 2 to 3 seconds.      Findings: No bruising or rash.   Neurological:      General: No focal deficit present.      Mental Status: He is alert and oriented to person, place, and time. Mental status is at baseline.      Motor: No weakness.   Psychiatric:         Mood and Affect: Mood normal.         Behavior: Behavior normal.         Thought Content: Thought content normal.         Judgment: Judgment normal.           Lab    Lab Results   Component Value Date    WBC 2.94 (L) 10/19/2023    HGB 7.5 (L) 10/19/2023    HCT 22.3 (L) 10/19/2023    MCV 91 10/19/2023    PLT 63 (L) 10/19/2023         CMP  Sodium   Date Value Ref Range Status   10/19/2023 133 (L) 136 - 145 mmol/L Final     Potassium   Date Value Ref Range Status   10/19/2023 4.2 3.5 - 5.1 mmol/L Final     Chloride   Date Value Ref Range Status   10/19/2023 98 95 - 110 mmol/L Final     CO2   Date Value Ref Range " Status   10/19/2023 24 23 - 29 mmol/L Final     Glucose   Date Value Ref Range Status   10/19/2023 117 (H) 70 - 110 mg/dL Final     BUN   Date Value Ref Range Status   10/19/2023 16 8 - 23 mg/dL Final     Creatinine   Date Value Ref Range Status   10/19/2023 1.1 0.5 - 1.4 mg/dL Final     Calcium   Date Value Ref Range Status   10/19/2023 9.3 8.7 - 10.5 mg/dL Final     Total Protein   Date Value Ref Range Status   10/19/2023 6.3 6.0 - 8.4 g/dL Final     Albumin   Date Value Ref Range Status   10/19/2023 3.0 (L) 3.5 - 5.2 g/dL Final     Total Bilirubin   Date Value Ref Range Status   10/19/2023 0.2 0.1 - 1.0 mg/dL Final     Comment:     For infants and newborns, interpretation of results should be based  on gestational age, weight and in agreement with clinical  observations.    Premature Infant recommended reference ranges:  Up to 24 hours.............<8.0 mg/dL  Up to 48 hours............<12.0 mg/dL  3-5 days..................<15.0 mg/dL  6-29 days.................<15.0 mg/dL       Alkaline Phosphatase   Date Value Ref Range Status   10/19/2023 96 55 - 135 U/L Final     AST   Date Value Ref Range Status   10/19/2023 25 10 - 40 U/L Final     ALT   Date Value Ref Range Status   10/19/2023 67 (H) 10 - 44 U/L Final     Anion Gap   Date Value Ref Range Status   10/19/2023 11 8 - 16 mmol/L Final     eGFR if    Date Value Ref Range Status   11/02/2021 56.4 (A) >60 mL/min/1.73 m^2 Final     eGFR if non    Date Value Ref Range Status   11/02/2021 48.8 (A) >60 mL/min/1.73 m^2 Final     Comment:     Calculation used to obtain the estimated glomerular filtration  rate (eGFR) is the CKD-EPI equation.            Specimen (24h ago, onward)      None                  All lab results and imaging results have been reviewed and discussed with the patient.     Assessment:       1. Malignant neoplasm of upper lobe of left lung    2. Insomnia, unspecified type    3. Rib lesion    4. Fatigue, unspecified  type    5. Nausea    6. Dehydration          Problem List Items Addressed This Visit          Oncology    Malignant neoplasm of upper lobe of left lung - Primary     Other Visit Diagnoses       Insomnia, unspecified type        Relevant Medications    temazepam (RESTORIL) 15 mg Cap    Rib lesion        Relevant Orders    Ambulatory referral/consult to Radiation Oncology    Fatigue, unspecified type        Nausea        Dehydration                    Plan:         PLAN:   Labs weekly set up at [facility]   Dehydration- IV fluids and nausea medications every Friday at [facility]   Nausea- Continue Zofran and Phenergan prn   Insomnia- Start Restoril QHS  Left rib lesion Amb ref Radiation Oncology for pain relief  Left rib pain- Continue Norco PRN    Follow up:     Follow up in about 3 weeks (around 11/2/2023) for with Dr. Torres and 6 weeks with me.      The plan was discussed with the patient and all questions/concerns have been answered to the patient's satisfaction.    Electronically signed by Allie Shelley, MSN, APRN, AGNP-C, OCN

## 2023-10-12 ENCOUNTER — INFUSION (OUTPATIENT)
Dept: INFUSION THERAPY | Facility: HOSPITAL | Age: 78
End: 2023-10-12
Payer: MEDICARE

## 2023-10-12 ENCOUNTER — OFFICE VISIT (OUTPATIENT)
Dept: HEMATOLOGY/ONCOLOGY | Facility: CLINIC | Age: 78
End: 2023-10-12
Payer: MEDICARE

## 2023-10-12 ENCOUNTER — LAB VISIT (OUTPATIENT)
Dept: LAB | Facility: HOSPITAL | Age: 78
End: 2023-10-12
Attending: FAMILY MEDICINE
Payer: MEDICARE

## 2023-10-12 VITALS
SYSTOLIC BLOOD PRESSURE: 111 MMHG | BODY MASS INDEX: 20.78 KG/M2 | DIASTOLIC BLOOD PRESSURE: 55 MMHG | HEIGHT: 77 IN | HEART RATE: 91 BPM | RESPIRATION RATE: 18 BRPM | WEIGHT: 176 LBS | TEMPERATURE: 98 F

## 2023-10-12 VITALS
OXYGEN SATURATION: 100 % | SYSTOLIC BLOOD PRESSURE: 113 MMHG | WEIGHT: 173.88 LBS | BODY MASS INDEX: 20.53 KG/M2 | RESPIRATION RATE: 18 BRPM | HEIGHT: 77 IN | DIASTOLIC BLOOD PRESSURE: 58 MMHG | TEMPERATURE: 98 F | HEART RATE: 80 BPM

## 2023-10-12 DIAGNOSIS — R53.83 FATIGUE, UNSPECIFIED TYPE: ICD-10-CM

## 2023-10-12 DIAGNOSIS — R11.0 NAUSEA: ICD-10-CM

## 2023-10-12 DIAGNOSIS — G47.00 INSOMNIA, UNSPECIFIED TYPE: ICD-10-CM

## 2023-10-12 DIAGNOSIS — C34.12 MALIGNANT NEOPLASM OF UPPER LOBE OF LEFT LUNG: Primary | ICD-10-CM

## 2023-10-12 DIAGNOSIS — E86.0 DEHYDRATION: ICD-10-CM

## 2023-10-12 DIAGNOSIS — M89.9 RIB LESION: ICD-10-CM

## 2023-10-12 DIAGNOSIS — C34.92 NSCLC OF LEFT LUNG: ICD-10-CM

## 2023-10-12 LAB
ALBUMIN SERPL BCP-MCNC: 2.9 G/DL (ref 3.5–5.2)
ALP SERPL-CCNC: 79 U/L (ref 55–135)
ALT SERPL W/O P-5'-P-CCNC: 63 U/L (ref 10–44)
ANION GAP SERPL CALC-SCNC: 12 MMOL/L (ref 8–16)
AST SERPL-CCNC: 51 U/L (ref 10–40)
BASOPHILS # BLD AUTO: 0.01 K/UL (ref 0–0.2)
BASOPHILS NFR BLD: 0.2 % (ref 0–1.9)
BILIRUB SERPL-MCNC: 0.7 MG/DL (ref 0.1–1)
BUN SERPL-MCNC: 25 MG/DL (ref 8–23)
CALCIUM SERPL-MCNC: 9 MG/DL (ref 8.7–10.5)
CHLORIDE SERPL-SCNC: 95 MMOL/L (ref 95–110)
CO2 SERPL-SCNC: 25 MMOL/L (ref 23–29)
CREAT SERPL-MCNC: 0.8 MG/DL (ref 0.5–1.4)
DIFFERENTIAL METHOD: ABNORMAL
EOSINOPHIL # BLD AUTO: 0 K/UL (ref 0–0.5)
EOSINOPHIL NFR BLD: 0 % (ref 0–8)
ERYTHROCYTE [DISTWIDTH] IN BLOOD BY AUTOMATED COUNT: 14.2 % (ref 11.5–14.5)
EST. GFR  (NO RACE VARIABLE): >60 ML/MIN/1.73 M^2
GLUCOSE SERPL-MCNC: 113 MG/DL (ref 70–110)
HCT VFR BLD AUTO: 26.8 % (ref 40–54)
HGB BLD-MCNC: 9.1 G/DL (ref 14–18)
IMM GRANULOCYTES # BLD AUTO: 0.14 K/UL (ref 0–0.04)
IMM GRANULOCYTES NFR BLD AUTO: 2.4 % (ref 0–0.5)
LYMPHOCYTES # BLD AUTO: 1 K/UL (ref 1–4.8)
LYMPHOCYTES NFR BLD: 17.7 % (ref 18–48)
MAGNESIUM SERPL-MCNC: 1.5 MG/DL (ref 1.6–2.6)
MCH RBC QN AUTO: 31 PG (ref 27–31)
MCHC RBC AUTO-ENTMCNC: 34 G/DL (ref 32–36)
MCV RBC AUTO: 91 FL (ref 82–98)
MONOCYTES # BLD AUTO: 0.2 K/UL (ref 0.3–1)
MONOCYTES NFR BLD: 3.1 % (ref 4–15)
NEUTROPHILS # BLD AUTO: 4.5 K/UL (ref 1.8–7.7)
NEUTROPHILS NFR BLD: 76.6 % (ref 38–73)
NRBC BLD-RTO: 0 /100 WBC
PLATELET # BLD AUTO: 204 K/UL (ref 150–450)
PLATELET BLD QL SMEAR: ABNORMAL
PMV BLD AUTO: 8.2 FL (ref 9.2–12.9)
POTASSIUM SERPL-SCNC: 3.9 MMOL/L (ref 3.5–5.1)
PROT SERPL-MCNC: 6 G/DL (ref 6–8.4)
RBC # BLD AUTO: 2.94 M/UL (ref 4.6–6.2)
SODIUM SERPL-SCNC: 132 MMOL/L (ref 136–145)
TSH SERPL DL<=0.005 MIU/L-ACNC: 0.99 UIU/ML (ref 0.4–4)
WBC # BLD AUTO: 5.81 K/UL (ref 3.9–12.7)

## 2023-10-12 PROCEDURE — 99214 OFFICE O/P EST MOD 30 MIN: CPT | Performed by: NURSE PRACTITIONER

## 2023-10-12 PROCEDURE — 85025 COMPLETE CBC W/AUTO DIFF WBC: CPT | Performed by: NURSE PRACTITIONER

## 2023-10-12 PROCEDURE — 84443 ASSAY THYROID STIM HORMONE: CPT | Performed by: NURSE PRACTITIONER

## 2023-10-12 PROCEDURE — 96360 HYDRATION IV INFUSION INIT: CPT

## 2023-10-12 PROCEDURE — 99214 PR OFFICE/OUTPT VISIT, EST, LEVL IV, 30-39 MIN: ICD-10-PCS | Mod: S$PBB,,, | Performed by: NURSE PRACTITIONER

## 2023-10-12 PROCEDURE — 99214 OFFICE O/P EST MOD 30 MIN: CPT | Mod: S$PBB,,, | Performed by: NURSE PRACTITIONER

## 2023-10-12 PROCEDURE — 80053 COMPREHEN METABOLIC PANEL: CPT | Performed by: NURSE PRACTITIONER

## 2023-10-12 PROCEDURE — 36415 COLL VENOUS BLD VENIPUNCTURE: CPT | Performed by: NURSE PRACTITIONER

## 2023-10-12 PROCEDURE — 25000003 PHARM REV CODE 250: Performed by: NURSE PRACTITIONER

## 2023-10-12 PROCEDURE — 63600175 PHARM REV CODE 636 W HCPCS: Performed by: NURSE PRACTITIONER

## 2023-10-12 PROCEDURE — 96375 TX/PRO/DX INJ NEW DRUG ADDON: CPT

## 2023-10-12 PROCEDURE — 83735 ASSAY OF MAGNESIUM: CPT | Performed by: NURSE PRACTITIONER

## 2023-10-12 RX ORDER — TEMAZEPAM 15 MG/1
15 CAPSULE ORAL NIGHTLY PRN
Qty: 30 CAPSULE | Refills: 1 | Status: CANCELLED | OUTPATIENT
Start: 2023-10-12 | End: 2023-11-11

## 2023-10-12 RX ORDER — ONDANSETRON 2 MG/ML
8 INJECTION INTRAMUSCULAR; INTRAVENOUS ONCE
Status: COMPLETED | OUTPATIENT
Start: 2023-10-12 | End: 2023-10-12

## 2023-10-12 RX ORDER — ONDANSETRON 2 MG/ML
8 INJECTION INTRAMUSCULAR; INTRAVENOUS ONCE
Status: CANCELLED
Start: 2023-10-13 | End: 2023-10-13

## 2023-10-12 RX ADMIN — SODIUM CHLORIDE 1000 ML: 9 INJECTION, SOLUTION INTRAVENOUS at 10:10

## 2023-10-12 RX ADMIN — ONDANSETRON 8 MG: 2 INJECTION INTRAMUSCULAR; INTRAVENOUS at 10:10

## 2023-10-12 NOTE — PROGRESS NOTES
Please have him start Mag Oxide 400 mg daily. If this causes diarrhea stop and call me and we will give IV Mag    Allie

## 2023-10-12 NOTE — NURSING
PT tolerated NS infusion well. No adverse reaction noted. Pt education reinforced on NS side effects, what to expect and when to call Dr. Reynaldo Torres. Pt verbalized understanding. PAC flushed with heparin and de-accessed per protocol. Pt tolerated well.   amr

## 2023-10-12 NOTE — PLAN OF CARE
Problem: Adult Inpatient Plan of Care  Goal: Optimal Comfort and Wellbeing  Outcome: Ongoing, Progressing  Intervention: Provide Person-Centered Care  Flowsheets (Taken 10/12/2023 1046)  Trust Relationship/Rapport:   care explained   choices provided   emotional support provided   empathic listening provided   thoughts/feelings acknowledged   reassurance provided   questions encouraged   questions answered

## 2023-10-13 ENCOUNTER — TELEPHONE (OUTPATIENT)
Dept: HEMATOLOGY/ONCOLOGY | Facility: CLINIC | Age: 78
End: 2023-10-13

## 2023-10-13 DIAGNOSIS — E83.42 HYPOMAGNESEMIA: Primary | ICD-10-CM

## 2023-10-13 RX ORDER — LANOLIN ALCOHOL/MO/W.PET/CERES
400 CREAM (GRAM) TOPICAL DAILY
Qty: 30 TABLET | Refills: 11 | Status: SHIPPED | OUTPATIENT
Start: 2023-10-13

## 2023-10-13 RX ORDER — TEMAZEPAM 15 MG/1
15 CAPSULE ORAL NIGHTLY PRN
Qty: 30 CAPSULE | Refills: 1 | Status: SHIPPED | OUTPATIENT
Start: 2023-10-13 | End: 2023-11-06

## 2023-10-13 NOTE — TELEPHONE ENCOUNTER
Spoke to pt and notified him of low Mag. Will send Mag Oxide into pharmacy per Allie. Advised pt to stop taking it if it causes diarrhea and we will give him IV Mag. Pt verbalized understanding.

## 2023-10-13 NOTE — TELEPHONE ENCOUNTER
----- Message from ANGI Pitts sent at 10/12/2023  4:33 PM CDT -----  I did not sorry.    Allie  ----- Message -----  From: Karrie Anderson RN  Sent: 10/12/2023   4:07 PM CDT  To: ANGI Pitts    I know you saw him today, did you talk to him about this or do you still need me to call him?  ----- Message -----  From: Allie Shelley NP-C  Sent: 10/12/2023  11:20 AM CDT  To: Melissa Jacobs Staff    Please have him start Mag Oxide 400 mg daily. If this causes diarrhea stop and call me and we will give IV Mag    Allie

## 2023-10-16 ENCOUNTER — OFFICE VISIT (OUTPATIENT)
Dept: RADIATION ONCOLOGY | Facility: CLINIC | Age: 78
End: 2023-10-16
Payer: MEDICARE

## 2023-10-16 ENCOUNTER — DOCUMENTATION ONLY (OUTPATIENT)
Dept: HEMATOLOGY/ONCOLOGY | Facility: CLINIC | Age: 78
End: 2023-10-16

## 2023-10-16 VITALS
RESPIRATION RATE: 18 BRPM | HEART RATE: 88 BPM | BODY MASS INDEX: 20.59 KG/M2 | DIASTOLIC BLOOD PRESSURE: 70 MMHG | SYSTOLIC BLOOD PRESSURE: 150 MMHG | WEIGHT: 173.63 LBS | TEMPERATURE: 98 F | OXYGEN SATURATION: 99 %

## 2023-10-16 DIAGNOSIS — C34.12 MALIGNANT NEOPLASM OF UPPER LOBE OF LEFT LUNG: Primary | ICD-10-CM

## 2023-10-16 DIAGNOSIS — M89.9 RIB LESION: ICD-10-CM

## 2023-10-16 PROCEDURE — 99205 OFFICE O/P NEW HI 60 MIN: CPT | Mod: S$GLB,,, | Performed by: RADIOLOGY

## 2023-10-16 PROCEDURE — 99205 PR OFFICE/OUTPT VISIT, NEW, LEVL V, 60-74 MIN: ICD-10-PCS | Mod: S$GLB,,, | Performed by: RADIOLOGY

## 2023-10-16 NOTE — Clinical Note
Edilson-- Main complaint is esophagitis--says started with chemo; no malignant etiology obvious on films. He is reticent to undergo RT now but I explained will likely need in future to lung (impending airway blockage) and ribs (large mass/pain). Presently symptoms improving, so reasonable to re-eval after 4c on CTs. Expect to have to hold chemo for RT fyi (hate to hold him after just 2c)

## 2023-10-16 NOTE — PROGRESS NOTES
Akil Guzman  [id number]  1945  10/16/2023  Allie Shelley, Np-c  [address] [facility]  [address]  [place] [address]    REASON FOR CONSULTATION: IVB, [id number] adenoca of SAM    TREATMENT GOAL: palliative    HISTORY OF PRESENT ILLNESS:   Akil Guzman is a 78 y.o. male current smoker (15pk-yr) with prior oncologic history of colon cancer in 1999 treated with surgery alone who presented with left flank pain with abnormal chest x-ray for which he was referred to Dr. Hill and ultimately CT chest revealing a large left pleural effusion with a poorly defined left upper lobe mass extending to hilum, bilateral pulmonary nodules.  He underwent thoracentesis 750mL, cytology returning adenocarcinoma consistent with lung primary.  The PET-CT confirmed uptake at nodular thickening of lingular bronchus and 2 cm adjacent density, throughout left pleural effusion and at large 7.2 cm left chest wall mass with destruction of left 10th-11th ribs.  MRI brain was negative.    Patient was seen by Dr. Ball and has started on q3wk carboplatin, pemetrexed, Keytruda; D1C2 10/4/23.     Caris pending.    He is referred to discuss palliative radiotherapy.    Denies fever, chills, chest pain, shortness of breath, cough or hemoptysis.  Main complaint is esophagitis since initiation of chemotherapy.  He has some discomfort in left lower chest wall that he only appreciates while sleeping.  Rare use of pain medication.  Reports fatigue, nausea occasional vomiting associated with chemotherapy.    Reports he lost his wife to lung cancer 15 years ago after difficult treatment.    Review of systems otherwise negative unless indicated in HPI.    Past Medical History:   Diagnosis Date    Allergy     SEASON    Basal cell carcinoma 2009    L ear    Cancer     Hx colon     Pneumonia, unspecified organism 07/2023     Past Surgical History:   Procedure Laterality Date    CATARACT EXTRACTION Bilateral 2022    COLON SURGERY  2000    1ft. sigmoid removed     COLONOSCOPY N/A 09/28/2020    Procedure: COLONOSCOPY;  Surgeon: Ty Pollock MD;  Location: [facility] ENDO;  Service: General;  Laterality: N/A;    INSERTION OF TUNNELED CENTRAL VENOUS CATHETER (CVC) WITH SUBCUTANEOUS PORT N/A 9/11/2023    Procedure: [id number];  Surgeon: Antwon Peres Jr., MD;  Location: [facility] OR;  Service: General;  Laterality: N/A;     Social History     Socioeconomic History    Marital status:    Tobacco Use    Smoking status: Former     Current packs/day: 0.25     Average packs/day: 0.3 packs/day for 15.1 years (3.8 ttl pk-yrs)     Types: Cigarettes     Start date: 9/6/2008    Smokeless tobacco: Never   Substance and Sexual Activity    Alcohol use: Yes     Alcohol/week: 2.0 standard drinks of alcohol     Types: 2 Drinks containing 0.5 oz of alcohol per week     Comment: 2 mixed drinks WEEK    Drug use: No    Sexual activity: Yes     Partners: Female     Social Determinants of Health     Financial Resource Strain: Low Risk  (9/22/2023)    Overall Financial Resource Strain (CARDIA)     Difficulty of Paying Living Expenses: Not hard at all   Food Insecurity: No Food Insecurity (9/22/2023)    Hunger Vital Sign     Worried About Running Out of Food in the Last Year: Never true     Ran Out of Food in the Last Year: Never true   Transportation Needs: No Transportation Needs (9/22/2023)    PRAPARE - Transportation     Lack of Transportation (Medical): No     Lack of Transportation (Non-Medical): No   Physical Activity: Insufficiently Active (9/22/2023)    Exercise Vital Sign     Days of Exercise per Week: 2 days     Minutes of Exercise per Session: 10 min   Stress: Stress Concern Present (9/22/2023)    [employer] [place] [employer] - Occupational Stress Questionnaire     Feeling of Stress : To some extent   Social Connections: Unknown (9/22/2023)    Social Connection and Isolation Panel [NHANES]     Frequency of Communication with Friends and Family: Patient  refused     Frequency of Social Gatherings with Friends and Family: Twice a week     Active Member of Clubs or Organizations: No     Attends Club or Organization Meetings: Patient refused     Marital Status:    Housing Stability: Unknown (9/22/2023)    Housing Stability Vital Sign     Unable to Pay for Housing in the Last Year: Patient refused     Number of Places Lived in the Last Year: 1     Unstable Housing in the Last Year: No     Family History   Problem Relation Age of Onset    Cancer Mother     Brain cancer Mother     Cancer Brother     Macular degeneration Brother     Pancreatic cancer Brother     Melanoma Neg Hx        PRIOR HISTORY OF CHEMOTHERAPY OR RADIOTHERAPY: Please see HPI for patients prior oncologic history.    Medication List with Changes/Refills   Current Medications    DEXAMETHASONE (DECADRON) 4 MG TAB    2 tablets twice a day the day before and after each chemo    DUKE'S SOLN (BENADRYL 30 ML, MYLANTA 30 ML, LIDOCAINE 30 ML, NYSTATIN 30 ML) 120ML    Take 10 mLs by mouth 4 (four) times daily.    FINASTERIDE (PROSCAR) 5 MG TABLET    TAKE 1 TABLET BY MOUTH EVERY DAY FOR PROSTATE    FOLIC ACID (FOLVITE) 1 MG TABLET    Take 1 tablet (1 mg total) by mouth once daily.    HYDROCODONE-ACETAMINOPHEN (NORCO)  MG PER TABLET    Take 1 tablet by mouth every 6 (six) hours as needed for Pain.    IPRATROPIUM (ATROVENT) 42 MCG (0.06 %) NASAL SPRAY    USE 1 SPRAY in each nostril TWICE DAILY THANK YOU!    MAGNESIUM OXIDE (MAG-OX) 400 MG (241.3 MG MAGNESIUM) TABLET    Take 1 tablet (400 mg total) by mouth once daily.    ONDANSETRON (ZOFRAN) 8 MG TABLET    Take 1 tablet (8 mg total) by mouth every 8 (eight) hours as needed.    PROMETHAZINE (PHENERGAN) 25 MG TABLET    Take 1 tablet (25 mg total) by mouth every 4 to 6 hours as needed.    SILDENAFIL (VIAGRA) 100 MG TABLET    Take 1 tablet (100 mg total) by mouth daily as needed.    TAMSULOSIN (FLOMAX) 0.4 MG CAP    Take 1 capsule (0.4 mg total) by mouth  once daily.    TEMAZEPAM (RESTORIL) 15 MG CAP    Take 1 capsule (15 mg total) by mouth nightly as needed.    TIZANIDINE (ZANAFLEX) 4 MG TABLET    Take 1 tablet (4 mg total) by mouth every 12 (twelve) hours as needed (muscle spasms/ pain).     Review of patient's allergies indicates:   Allergen Reactions    Penicillins      Other reaction(s): Rash  50 YEARS AGO         QUALITY OF LIFE: 90%- Able to Carry on Normal Activity: Minor Symptoms of Disease    Vitals:    10/16/23 0959   BP: (!) 150/70   Pulse: 88   Resp: 18   Temp: 97.6 °F (36.4 °C)   SpO2: 99%   Weight: 78.7 kg (173 lb 9.6 oz)   PainSc:   6   PainLoc: Chest     Body mass index is 20.59 kg/m².    PHYSICAL EXAM:   GENERAL: alert; in no apparent distress.  Thin, not ill-appearing  HEAD: normocephalic, atraumatic.  EYES: pupils are equal, round, reactive to light and accommodation. Sclera anicteric. Conjunctiva not injected.   NOSE/THROAT: no nasal erythema or rhinorrhea. Oropharynx pink, without erythema, ulcerations or thrush.   NECK: no cervical motion rigidity; supple with no masses.    CHEST: Patient is speaking comfortably on room air with normal work of breathing without using accessory muscles of respiration.  CARDIOVASCULAR: regular rate and rhythm  ABDOMEN: soft, nontender, nondistended.   MUSCULOSKELETAL: no tenderness to palpation along the spine or scapulae. Normal range of motion.  NEUROLOGIC: cranial nerves II-XII intact bilaterally. Strength 5/5 in bilateral upper and lower extremities. No sensory deficits appreciated. Normal gait.  EXTREMITIES: + clubbing; no cyanosis, edema.  SKIN: no erythema, rashes, ulcerations noted.     REVIEW OF IMAGING/PATHOLOGY/LABS: Please see HPI. All images reviewed personally by dictating physician.     ASSESSMENT: Akil Guzman is a 78 y.o. male with stage IVB, [id number] adenoca of SAM  PLAN:  Akil Guzman presents with metastatic non-small cell lung cancer.  I reviewed his films in detail with him today  demonstrating the FDG avid pleural effusion and small bronchial/hilar mass causing obstruction of the airway.  There is no evidence of lung collapse on today's exam.  He reports breathing well.  I explained that this is an impending concern given the location of this tumor.  We also reviewed the large soft tissue/left 10th-11th rib metastasis.  This is causing him discomfort when resting at night but presently pain is tolerable, improving on chemotherapy.  Citing his wife's difficult treatment course for lung cancer, he is reticent to consider addition of radiotherapy at this time.  Presently there are no signs or symptoms of pulmonary compromise and his pain is tolerable/improving--advised continue with chemotherapy through cycle 4 and re-evaluate at imaging.  If he becomes intolerant of chemotherapy or develops progressive pain or signs of lung collapse, then will plan to proceed with palliative radiotherapy.  Will likely have to hold systemic therapy for radiotherapy course.     I carefully explained the process of simulation and treatment delivery with weekly physician visits.      We discussed the risks and benefits of the above treatment and have gone over in detail the acute and late toxicities of radiation therapy to the lung, left 10-11th ribs.     Will re-evaluate at next imaging.  Continue with chemotherapy.     The patient has our contact information and understands that they are free to contact us at any time with questions or concerns regarding radiation therapy.     DISPOSITION: RTC FOR CT SIM AFTER CURRENT THERAPY     I have personally seen and evaluated this patient with a high complexity diagnosis.      Greater than 60 minutes were dedicated to reviewing/interpreting pertinent laboratory/imaging/pathology as well as prior consultations; reviewing and performing history and physical; counseling patient on oncologic recommendations; documentation in the electronic medical record including ordering of  additional tests and/or radiation treatment protocol; and coordination of care with physicians with referrals placed as appropriate.    PHYSICIAN: Tin Varela Jr, MD    Thank you for the opportunity to meet and consult with Akil Guzman.   Please feel free to contact me to discuss the above recommendation further.

## 2023-10-19 ENCOUNTER — INFUSION (OUTPATIENT)
Dept: INFUSION THERAPY | Facility: HOSPITAL | Age: 78
End: 2023-10-19
Payer: MEDICARE

## 2023-10-19 ENCOUNTER — LAB VISIT (OUTPATIENT)
Dept: LAB | Facility: HOSPITAL | Age: 78
End: 2023-10-19
Attending: NURSE PRACTITIONER
Payer: MEDICARE

## 2023-10-19 VITALS
DIASTOLIC BLOOD PRESSURE: 58 MMHG | BODY MASS INDEX: 20.43 KG/M2 | HEIGHT: 77 IN | TEMPERATURE: 98 F | HEART RATE: 69 BPM | SYSTOLIC BLOOD PRESSURE: 115 MMHG | RESPIRATION RATE: 18 BRPM | OXYGEN SATURATION: 98 % | WEIGHT: 173 LBS

## 2023-10-19 DIAGNOSIS — C34.12 MALIGNANT NEOPLASM OF UPPER LOBE OF LEFT LUNG: Primary | ICD-10-CM

## 2023-10-19 DIAGNOSIS — R53.83 FATIGUE, UNSPECIFIED TYPE: ICD-10-CM

## 2023-10-19 DIAGNOSIS — C34.92 NSCLC OF LEFT LUNG: ICD-10-CM

## 2023-10-19 LAB
ALBUMIN SERPL BCP-MCNC: 3 G/DL (ref 3.5–5.2)
ALP SERPL-CCNC: 96 U/L (ref 55–135)
ALT SERPL W/O P-5'-P-CCNC: 67 U/L (ref 10–44)
ANION GAP SERPL CALC-SCNC: 11 MMOL/L (ref 8–16)
AST SERPL-CCNC: 25 U/L (ref 10–40)
BASOPHILS # BLD AUTO: 0.01 K/UL (ref 0–0.2)
BASOPHILS NFR BLD: 0.3 % (ref 0–1.9)
BILIRUB SERPL-MCNC: 0.2 MG/DL (ref 0.1–1)
BUN SERPL-MCNC: 16 MG/DL (ref 8–23)
CALCIUM SERPL-MCNC: 9.3 MG/DL (ref 8.7–10.5)
CHLORIDE SERPL-SCNC: 98 MMOL/L (ref 95–110)
CO2 SERPL-SCNC: 24 MMOL/L (ref 23–29)
CREAT SERPL-MCNC: 1.1 MG/DL (ref 0.5–1.4)
DIFFERENTIAL METHOD: ABNORMAL
EOSINOPHIL # BLD AUTO: 0 K/UL (ref 0–0.5)
EOSINOPHIL NFR BLD: 0 % (ref 0–8)
ERYTHROCYTE [DISTWIDTH] IN BLOOD BY AUTOMATED COUNT: 13.9 % (ref 11.5–14.5)
EST. GFR  (NO RACE VARIABLE): >60 ML/MIN/1.73 M^2
GLUCOSE SERPL-MCNC: 117 MG/DL (ref 70–110)
HCT VFR BLD AUTO: 22.3 % (ref 40–54)
HGB BLD-MCNC: 7.5 G/DL (ref 14–18)
IMM GRANULOCYTES # BLD AUTO: 0.01 K/UL (ref 0–0.04)
IMM GRANULOCYTES NFR BLD AUTO: 0.3 % (ref 0–0.5)
LYMPHOCYTES # BLD AUTO: 1.5 K/UL (ref 1–4.8)
LYMPHOCYTES NFR BLD: 49.3 % (ref 18–48)
MAGNESIUM SERPL-MCNC: 1.7 MG/DL (ref 1.6–2.6)
MCH RBC QN AUTO: 30.5 PG (ref 27–31)
MCHC RBC AUTO-ENTMCNC: 33.6 G/DL (ref 32–36)
MCV RBC AUTO: 91 FL (ref 82–98)
MONOCYTES # BLD AUTO: 1 K/UL (ref 0.3–1)
MONOCYTES NFR BLD: 34 % (ref 4–15)
NEUTROPHILS # BLD AUTO: 0.5 K/UL (ref 1.8–7.7)
NEUTROPHILS NFR BLD: 16.1 % (ref 38–73)
NRBC BLD-RTO: 0 /100 WBC
PLATELET # BLD AUTO: 63 K/UL (ref 150–450)
PLATELET BLD QL SMEAR: ABNORMAL
PMV BLD AUTO: 10.2 FL (ref 9.2–12.9)
POTASSIUM SERPL-SCNC: 4.2 MMOL/L (ref 3.5–5.1)
PROT SERPL-MCNC: 6.3 G/DL (ref 6–8.4)
RBC # BLD AUTO: 2.46 M/UL (ref 4.6–6.2)
SODIUM SERPL-SCNC: 133 MMOL/L (ref 136–145)
TSH SERPL DL<=0.005 MIU/L-ACNC: 0.74 UIU/ML (ref 0.4–4)
WBC # BLD AUTO: 2.94 K/UL (ref 3.9–12.7)

## 2023-10-19 PROCEDURE — 80053 COMPREHEN METABOLIC PANEL: CPT | Performed by: NURSE PRACTITIONER

## 2023-10-19 PROCEDURE — 84443 ASSAY THYROID STIM HORMONE: CPT | Performed by: NURSE PRACTITIONER

## 2023-10-19 PROCEDURE — 96375 TX/PRO/DX INJ NEW DRUG ADDON: CPT

## 2023-10-19 PROCEDURE — 36415 COLL VENOUS BLD VENIPUNCTURE: CPT | Performed by: NURSE PRACTITIONER

## 2023-10-19 PROCEDURE — 83735 ASSAY OF MAGNESIUM: CPT | Performed by: NURSE PRACTITIONER

## 2023-10-19 PROCEDURE — 96360 HYDRATION IV INFUSION INIT: CPT

## 2023-10-19 PROCEDURE — 85025 COMPLETE CBC W/AUTO DIFF WBC: CPT | Performed by: NURSE PRACTITIONER

## 2023-10-19 PROCEDURE — 25000003 PHARM REV CODE 250: Performed by: NURSE PRACTITIONER

## 2023-10-19 PROCEDURE — 63600175 PHARM REV CODE 636 W HCPCS: Performed by: NURSE PRACTITIONER

## 2023-10-19 RX ORDER — ONDANSETRON 2 MG/ML
8 INJECTION INTRAMUSCULAR; INTRAVENOUS ONCE
Status: COMPLETED | OUTPATIENT
Start: 2023-10-19 | End: 2023-10-19

## 2023-10-19 RX ORDER — ONDANSETRON 2 MG/ML
8 INJECTION INTRAMUSCULAR; INTRAVENOUS ONCE
Status: CANCELLED
Start: 2023-10-20 | End: 2023-10-20

## 2023-10-19 RX ADMIN — SODIUM CHLORIDE 1000 ML: 9 INJECTION, SOLUTION INTRAVENOUS at 01:10

## 2023-10-19 RX ADMIN — ONDANSETRON 8 MG: 2 INJECTION INTRAMUSCULAR; INTRAVENOUS at 01:10

## 2023-10-19 NOTE — PROGRESS NOTES
Please notify the patient that their hgb is low at 7.5. See if he is having symptoms and would sudhakar 2 units blood.  Detail Level: Zone Detail Level: Generalized

## 2023-10-19 NOTE — PLAN OF CARE
Problem: Nausea and Vomiting  Goal: Fluid and Electrolyte Balance  Outcome: Ongoing, Progressing  Intervention: Prevent and Manage Nausea and Vomiting  Flowsheets (Taken 10/19/2023 1317)  Nausea/Vomiting Interventions:   nausea triggers minimized   slow deep breathing encouraged   stimuli minimized  Environmental Support:   calm environment promoted   comfort object encouraged   distractions minimized   environmental consistency promoted   personal routine supported   rest periods encouraged

## 2023-10-20 ENCOUNTER — TELEPHONE (OUTPATIENT)
Dept: HEMATOLOGY/ONCOLOGY | Facility: CLINIC | Age: 78
End: 2023-10-20

## 2023-10-20 ENCOUNTER — LAB VISIT (OUTPATIENT)
Dept: LAB | Facility: HOSPITAL | Age: 78
End: 2023-10-20
Attending: NURSE PRACTITIONER
Payer: MEDICARE

## 2023-10-20 DIAGNOSIS — C34.92 NSCLC OF LEFT LUNG: Primary | ICD-10-CM

## 2023-10-20 DIAGNOSIS — C34.92 NSCLC OF LEFT LUNG: ICD-10-CM

## 2023-10-20 LAB
ABO + RH BLD: NORMAL
BLD GP AB SCN CELLS X3 SERPL QL: NORMAL
SPECIMEN OUTDATE: NORMAL

## 2023-10-20 PROCEDURE — 36415 COLL VENOUS BLD VENIPUNCTURE: CPT | Performed by: NURSE PRACTITIONER

## 2023-10-20 PROCEDURE — 86850 RBC ANTIBODY SCREEN: CPT | Performed by: NURSE PRACTITIONER

## 2023-10-20 PROCEDURE — 86920 COMPATIBILITY TEST SPIN: CPT | Mod: 59 | Performed by: NURSE PRACTITIONER

## 2023-10-20 RX ORDER — HYDROCODONE BITARTRATE AND ACETAMINOPHEN 500; 5 MG/1; MG/1
TABLET ORAL ONCE
Status: CANCELLED | OUTPATIENT
Start: 2023-10-20 | End: 2023-10-20

## 2023-10-20 RX ORDER — FUROSEMIDE 10 MG/ML
20 INJECTION INTRAMUSCULAR; INTRAVENOUS ONCE
Status: CANCELLED | OUTPATIENT
Start: 2023-10-20

## 2023-10-20 RX ORDER — ACETAMINOPHEN 325 MG/1
650 TABLET ORAL ONCE
Status: CANCELLED | OUTPATIENT
Start: 2023-10-20

## 2023-10-20 RX ORDER — DIPHENHYDRAMINE HYDROCHLORIDE 50 MG/ML
25 INJECTION INTRAMUSCULAR; INTRAVENOUS ONCE
Status: CANCELLED | OUTPATIENT
Start: 2023-10-20

## 2023-10-20 NOTE — TELEPHONE ENCOUNTER
----- Message from ANGI Pitts sent at 10/19/2023  4:22 PM CDT -----  Please notify the patient that their hgb is low at 7.5. See if he is having symptoms and would sudhakar 2 units blood.

## 2023-10-20 NOTE — TELEPHONE ENCOUNTER
Spoke to pt and notified him of low hgb. Per Allie, 2 u of PRBC ordered to be transfused on Monday. Scheduling will call with a time. Pt verbalized understanding.

## 2023-10-23 ENCOUNTER — INFUSION (OUTPATIENT)
Dept: INFUSION THERAPY | Facility: HOSPITAL | Age: 78
End: 2023-10-23
Attending: NURSE PRACTITIONER
Payer: MEDICARE

## 2023-10-23 VITALS
OXYGEN SATURATION: 98 % | DIASTOLIC BLOOD PRESSURE: 57 MMHG | RESPIRATION RATE: 16 BRPM | TEMPERATURE: 98 F | SYSTOLIC BLOOD PRESSURE: 141 MMHG | HEART RATE: 53 BPM

## 2023-10-23 DIAGNOSIS — C34.12 MALIGNANT NEOPLASM OF UPPER LOBE OF LEFT LUNG: ICD-10-CM

## 2023-10-23 DIAGNOSIS — C34.92 NSCLC OF LEFT LUNG: Primary | ICD-10-CM

## 2023-10-23 DIAGNOSIS — R52 ACUTE PAIN: ICD-10-CM

## 2023-10-23 LAB
BLD PROD TYP BPU: NORMAL
BLD PROD TYP BPU: NORMAL
BLOOD UNIT EXPIRATION DATE: NORMAL
BLOOD UNIT EXPIRATION DATE: NORMAL
BLOOD UNIT TYPE CODE: 6200
BLOOD UNIT TYPE CODE: 6200
BLOOD UNIT TYPE: NORMAL
BLOOD UNIT TYPE: NORMAL
CODING SYSTEM: NORMAL
CODING SYSTEM: NORMAL
CROSSMATCH INTERPRETATION: NORMAL
CROSSMATCH INTERPRETATION: NORMAL
DISPENSE STATUS: NORMAL
DISPENSE STATUS: NORMAL
NUM UNITS TRANS PACKED RBC: NORMAL
NUM UNITS TRANS PACKED RBC: NORMAL

## 2023-10-23 PROCEDURE — 36430 TRANSFUSION BLD/BLD COMPNT: CPT

## 2023-10-23 PROCEDURE — 96375 TX/PRO/DX INJ NEW DRUG ADDON: CPT

## 2023-10-23 PROCEDURE — 63600175 PHARM REV CODE 636 W HCPCS: Performed by: NURSE PRACTITIONER

## 2023-10-23 PROCEDURE — 96374 THER/PROPH/DIAG INJ IV PUSH: CPT

## 2023-10-23 PROCEDURE — P9016 RBC LEUKOCYTES REDUCED: HCPCS | Performed by: NURSE PRACTITIONER

## 2023-10-23 PROCEDURE — 25000003 PHARM REV CODE 250: Performed by: NURSE PRACTITIONER

## 2023-10-23 RX ORDER — SODIUM CHLORIDE 0.9 % (FLUSH) 0.9 %
10 SYRINGE (ML) INJECTION
Status: CANCELLED | OUTPATIENT
Start: 2023-10-23

## 2023-10-23 RX ORDER — HEPARIN 100 UNIT/ML
500 SYRINGE INTRAVENOUS
Status: CANCELLED | OUTPATIENT
Start: 2023-10-23

## 2023-10-23 RX ORDER — ACETAMINOPHEN 325 MG/1
650 TABLET ORAL ONCE
Status: COMPLETED | OUTPATIENT
Start: 2023-10-23 | End: 2023-10-23

## 2023-10-23 RX ORDER — SODIUM CHLORIDE 0.9 % (FLUSH) 0.9 %
10 SYRINGE (ML) INJECTION
Status: DISCONTINUED | OUTPATIENT
Start: 2023-10-23 | End: 2023-10-23 | Stop reason: HOSPADM

## 2023-10-23 RX ORDER — FUROSEMIDE 10 MG/ML
20 INJECTION INTRAMUSCULAR; INTRAVENOUS ONCE
Status: COMPLETED | OUTPATIENT
Start: 2023-10-23 | End: 2023-10-23

## 2023-10-23 RX ORDER — DIPHENHYDRAMINE HYDROCHLORIDE 50 MG/ML
25 INJECTION INTRAMUSCULAR; INTRAVENOUS ONCE
Status: COMPLETED | OUTPATIENT
Start: 2023-10-23 | End: 2023-10-23

## 2023-10-23 RX ORDER — HEPARIN 100 UNIT/ML
500 SYRINGE INTRAVENOUS
Status: DISCONTINUED | OUTPATIENT
Start: 2023-10-23 | End: 2023-10-23 | Stop reason: HOSPADM

## 2023-10-23 RX ORDER — HYDROCODONE BITARTRATE AND ACETAMINOPHEN 500; 5 MG/1; MG/1
TABLET ORAL ONCE
Status: ACTIVE | OUTPATIENT
Start: 2023-10-23

## 2023-10-23 RX ADMIN — Medication 500 UNITS: at 02:10

## 2023-10-23 RX ADMIN — FUROSEMIDE 20 MG: 10 INJECTION, SOLUTION INTRAMUSCULAR; INTRAVENOUS at 11:10

## 2023-10-23 RX ADMIN — ACETAMINOPHEN 650 MG: 325 TABLET ORAL at 08:10

## 2023-10-23 RX ADMIN — DIPHENHYDRAMINE HYDROCHLORIDE 25 MG: 50 INJECTION INTRAMUSCULAR; INTRAVENOUS at 08:10

## 2023-10-23 NOTE — PROGRESS NOTES
Patient tolerated 2 units PRBCs without complaints-VS wnl per blood administration tab. Patient voided x3 total. Offered snacks and food-pt refused, did drink about 100ml h2o. Discharged to home per self.

## 2023-10-24 ENCOUNTER — LAB VISIT (OUTPATIENT)
Dept: LAB | Facility: HOSPITAL | Age: 78
End: 2023-10-24
Attending: INTERNAL MEDICINE
Payer: MEDICARE

## 2023-10-24 ENCOUNTER — TELEPHONE (OUTPATIENT)
Dept: HEMATOLOGY/ONCOLOGY | Facility: CLINIC | Age: 78
End: 2023-10-24

## 2023-10-24 DIAGNOSIS — C34.92 NSCLC OF LEFT LUNG: ICD-10-CM

## 2023-10-24 DIAGNOSIS — C34.92 NSCLC OF LEFT LUNG: Primary | ICD-10-CM

## 2023-10-24 LAB
ALBUMIN SERPL BCP-MCNC: 3.2 G/DL (ref 3.5–5.2)
ALP SERPL-CCNC: 81 U/L (ref 55–135)
ALT SERPL W/O P-5'-P-CCNC: 31 U/L (ref 10–44)
ANION GAP SERPL CALC-SCNC: 11 MMOL/L (ref 8–16)
AST SERPL-CCNC: 23 U/L (ref 10–40)
BASOPHILS NFR BLD: 0 % (ref 0–1.9)
BILIRUB SERPL-MCNC: 0.2 MG/DL (ref 0.1–1)
BUN SERPL-MCNC: 25 MG/DL (ref 8–23)
CALCIUM SERPL-MCNC: 9.5 MG/DL (ref 8.7–10.5)
CHLORIDE SERPL-SCNC: 100 MMOL/L (ref 95–110)
CO2 SERPL-SCNC: 26 MMOL/L (ref 23–29)
CREAT SERPL-MCNC: 1.2 MG/DL (ref 0.5–1.4)
DIFFERENTIAL METHOD: ABNORMAL
EOSINOPHIL NFR BLD: 0 % (ref 0–8)
ERYTHROCYTE [DISTWIDTH] IN BLOOD BY AUTOMATED COUNT: 16.1 % (ref 11.5–14.5)
EST. GFR  (NO RACE VARIABLE): >60 ML/MIN/1.73 M^2
GLUCOSE SERPL-MCNC: 182 MG/DL (ref 70–110)
HCT VFR BLD AUTO: 28.6 % (ref 40–54)
HGB BLD-MCNC: 9.7 G/DL (ref 14–18)
IMM GRANULOCYTES # BLD AUTO: ABNORMAL K/UL (ref 0–0.04)
IMM GRANULOCYTES NFR BLD AUTO: ABNORMAL % (ref 0–0.5)
LYMPHOCYTES NFR BLD: 11 % (ref 18–48)
MCH RBC QN AUTO: 31.1 PG (ref 27–31)
MCHC RBC AUTO-ENTMCNC: 33.9 G/DL (ref 32–36)
MCV RBC AUTO: 92 FL (ref 82–98)
MONOCYTES NFR BLD: 8 % (ref 4–15)
NEUTROPHILS NFR BLD: 81 % (ref 38–73)
NRBC BLD-RTO: 0 /100 WBC
PLATELET # BLD AUTO: 311 K/UL (ref 150–450)
PLATELET BLD QL SMEAR: ABNORMAL
PMV BLD AUTO: 9.2 FL (ref 9.2–12.9)
POTASSIUM SERPL-SCNC: 4.7 MMOL/L (ref 3.5–5.1)
PROT SERPL-MCNC: 6.7 G/DL (ref 6–8.4)
RBC # BLD AUTO: 3.12 M/UL (ref 4.6–6.2)
SODIUM SERPL-SCNC: 137 MMOL/L (ref 136–145)
WBC # BLD AUTO: 12.33 K/UL (ref 3.9–12.7)

## 2023-10-24 PROCEDURE — 36415 COLL VENOUS BLD VENIPUNCTURE: CPT | Performed by: INTERNAL MEDICINE

## 2023-10-24 PROCEDURE — 85007 BL SMEAR W/DIFF WBC COUNT: CPT | Performed by: INTERNAL MEDICINE

## 2023-10-24 PROCEDURE — 85027 COMPLETE CBC AUTOMATED: CPT | Performed by: INTERNAL MEDICINE

## 2023-10-24 PROCEDURE — 80053 COMPREHEN METABOLIC PANEL: CPT | Performed by: INTERNAL MEDICINE

## 2023-10-25 ENCOUNTER — INFUSION (OUTPATIENT)
Dept: INFUSION THERAPY | Facility: HOSPITAL | Age: 78
End: 2023-10-25
Attending: INTERNAL MEDICINE
Payer: MEDICARE

## 2023-10-25 VITALS
OXYGEN SATURATION: 99 % | SYSTOLIC BLOOD PRESSURE: 134 MMHG | HEIGHT: 77 IN | RESPIRATION RATE: 18 BRPM | DIASTOLIC BLOOD PRESSURE: 63 MMHG | BODY MASS INDEX: 20.72 KG/M2 | TEMPERATURE: 97 F | HEART RATE: 61 BPM | WEIGHT: 175.5 LBS

## 2023-10-25 DIAGNOSIS — C34.12 MALIGNANT NEOPLASM OF UPPER LOBE OF LEFT LUNG: Primary | ICD-10-CM

## 2023-10-25 PROCEDURE — 96367 TX/PROPH/DG ADDL SEQ IV INF: CPT

## 2023-10-25 PROCEDURE — 96375 TX/PRO/DX INJ NEW DRUG ADDON: CPT

## 2023-10-25 PROCEDURE — 25000003 PHARM REV CODE 250: Performed by: NURSE PRACTITIONER

## 2023-10-25 PROCEDURE — 96413 CHEMO IV INFUSION 1 HR: CPT

## 2023-10-25 PROCEDURE — 63600175 PHARM REV CODE 636 W HCPCS: Mod: JZ,JG | Performed by: NURSE PRACTITIONER

## 2023-10-25 PROCEDURE — 96411 CHEMO IV PUSH ADDL DRUG: CPT

## 2023-10-25 PROCEDURE — 96417 CHEMO IV INFUS EACH ADDL SEQ: CPT

## 2023-10-25 PROCEDURE — A4216 STERILE WATER/SALINE, 10 ML: HCPCS | Performed by: NURSE PRACTITIONER

## 2023-10-25 RX ORDER — HEPARIN 100 UNIT/ML
500 SYRINGE INTRAVENOUS
Status: CANCELLED | OUTPATIENT
Start: 2023-10-25

## 2023-10-25 RX ORDER — DIPHENHYDRAMINE HYDROCHLORIDE 50 MG/ML
50 INJECTION INTRAMUSCULAR; INTRAVENOUS ONCE AS NEEDED
Status: CANCELLED | OUTPATIENT
Start: 2023-10-25

## 2023-10-25 RX ORDER — DIPHENHYDRAMINE HYDROCHLORIDE 50 MG/ML
50 INJECTION INTRAMUSCULAR; INTRAVENOUS ONCE AS NEEDED
Status: DISCONTINUED | OUTPATIENT
Start: 2023-10-25 | End: 2023-10-25 | Stop reason: HOSPADM

## 2023-10-25 RX ORDER — SODIUM CHLORIDE 0.9 % (FLUSH) 0.9 %
10 SYRINGE (ML) INJECTION
Status: CANCELLED | OUTPATIENT
Start: 2023-10-25

## 2023-10-25 RX ORDER — EPINEPHRINE 0.3 MG/.3ML
0.3 INJECTION SUBCUTANEOUS ONCE AS NEEDED
Status: DISCONTINUED | OUTPATIENT
Start: 2023-10-25 | End: 2023-10-25 | Stop reason: HOSPADM

## 2023-10-25 RX ORDER — EPINEPHRINE 0.3 MG/.3ML
0.3 INJECTION SUBCUTANEOUS ONCE AS NEEDED
Status: CANCELLED | OUTPATIENT
Start: 2023-10-25

## 2023-10-25 RX ORDER — SODIUM CHLORIDE 0.9 % (FLUSH) 0.9 %
10 SYRINGE (ML) INJECTION
Status: DISCONTINUED | OUTPATIENT
Start: 2023-10-25 | End: 2023-10-25 | Stop reason: HOSPADM

## 2023-10-25 RX ORDER — PROCHLORPERAZINE EDISYLATE 5 MG/ML
5 INJECTION INTRAMUSCULAR; INTRAVENOUS ONCE AS NEEDED
Status: CANCELLED | OUTPATIENT
Start: 2023-10-25

## 2023-10-25 RX ORDER — PROCHLORPERAZINE EDISYLATE 5 MG/ML
5 INJECTION INTRAMUSCULAR; INTRAVENOUS ONCE AS NEEDED
Status: DISCONTINUED | OUTPATIENT
Start: 2023-10-25 | End: 2023-10-25 | Stop reason: HOSPADM

## 2023-10-25 RX ORDER — ONDANSETRON 2 MG/ML
8 INJECTION INTRAMUSCULAR; INTRAVENOUS ONCE
Status: CANCELLED | OUTPATIENT
Start: 2023-10-25 | End: 2023-10-25

## 2023-10-25 RX ORDER — HEPARIN 100 UNIT/ML
500 SYRINGE INTRAVENOUS
Status: DISCONTINUED | OUTPATIENT
Start: 2023-10-25 | End: 2023-10-25 | Stop reason: HOSPADM

## 2023-10-25 RX ORDER — HYDROCODONE BITARTRATE AND ACETAMINOPHEN 10; 325 MG/1; MG/1
1 TABLET ORAL EVERY 6 HOURS PRN
Qty: 60 TABLET | Refills: 0 | Status: SHIPPED | OUTPATIENT
Start: 2023-10-25 | End: 2023-11-22 | Stop reason: SDUPTHER

## 2023-10-25 RX ADMIN — APREPITANT 130 MG: 130 INJECTION, EMULSION INTRAVENOUS at 11:10

## 2023-10-25 RX ADMIN — PEMETREXED DISODIUM 1075 MG: 500 INJECTION, POWDER, LYOPHILIZED, FOR SOLUTION INTRAVENOUS at 12:10

## 2023-10-25 RX ADMIN — SODIUM CHLORIDE 200 MG: 9 INJECTION, SOLUTION INTRAVENOUS at 10:10

## 2023-10-25 RX ADMIN — CARBOPLATIN 435 MG: 10 INJECTION, SOLUTION INTRAVENOUS at 12:10

## 2023-10-25 RX ADMIN — DEXAMETHASONE SODIUM PHOSPHATE 0.25 MG: 4 INJECTION, SOLUTION INTRA-ARTICULAR; INTRALESIONAL; INTRAMUSCULAR; INTRAVENOUS; SOFT TISSUE at 11:10

## 2023-10-25 RX ADMIN — HEPARIN 500 UNITS: 100 SYRINGE at 01:10

## 2023-10-25 RX ADMIN — SODIUM CHLORIDE, PRESERVATIVE FREE 10 ML: 5 INJECTION INTRAVENOUS at 01:10

## 2023-10-26 ENCOUNTER — LAB VISIT (OUTPATIENT)
Dept: LAB | Facility: HOSPITAL | Age: 78
End: 2023-10-26
Attending: NURSE PRACTITIONER
Payer: MEDICARE

## 2023-10-26 ENCOUNTER — INFUSION (OUTPATIENT)
Dept: INFUSION THERAPY | Facility: HOSPITAL | Age: 78
End: 2023-10-26
Payer: MEDICARE

## 2023-10-26 VITALS
HEART RATE: 56 BPM | WEIGHT: 175.5 LBS | TEMPERATURE: 98 F | DIASTOLIC BLOOD PRESSURE: 52 MMHG | SYSTOLIC BLOOD PRESSURE: 109 MMHG | OXYGEN SATURATION: 97 % | HEIGHT: 77 IN | RESPIRATION RATE: 18 BRPM | BODY MASS INDEX: 20.72 KG/M2

## 2023-10-26 DIAGNOSIS — C34.12 MALIGNANT NEOPLASM OF UPPER LOBE OF LEFT LUNG: Primary | ICD-10-CM

## 2023-10-26 DIAGNOSIS — C34.92 NSCLC OF LEFT LUNG: ICD-10-CM

## 2023-10-26 DIAGNOSIS — R53.83 FATIGUE, UNSPECIFIED TYPE: ICD-10-CM

## 2023-10-26 LAB
ALBUMIN SERPL BCP-MCNC: 3.2 G/DL (ref 3.5–5.2)
ALP SERPL-CCNC: 80 U/L (ref 55–135)
ALT SERPL W/O P-5'-P-CCNC: 28 U/L (ref 10–44)
ANION GAP SERPL CALC-SCNC: 12 MMOL/L (ref 8–16)
AST SERPL-CCNC: 17 U/L (ref 10–40)
BASOPHILS # BLD AUTO: 0.01 K/UL (ref 0–0.2)
BASOPHILS NFR BLD: 0.1 % (ref 0–1.9)
BILIRUB SERPL-MCNC: 0.2 MG/DL (ref 0.1–1)
BUN SERPL-MCNC: 22 MG/DL (ref 8–23)
CALCIUM SERPL-MCNC: 9.6 MG/DL (ref 8.7–10.5)
CHLORIDE SERPL-SCNC: 100 MMOL/L (ref 95–110)
CO2 SERPL-SCNC: 23 MMOL/L (ref 23–29)
CREAT SERPL-MCNC: 1 MG/DL (ref 0.5–1.4)
DIFFERENTIAL METHOD: ABNORMAL
EOSINOPHIL # BLD AUTO: 0 K/UL (ref 0–0.5)
EOSINOPHIL NFR BLD: 0 % (ref 0–8)
ERYTHROCYTE [DISTWIDTH] IN BLOOD BY AUTOMATED COUNT: 16.7 % (ref 11.5–14.5)
EST. GFR  (NO RACE VARIABLE): >60 ML/MIN/1.73 M^2
GLUCOSE SERPL-MCNC: 184 MG/DL (ref 70–110)
HCT VFR BLD AUTO: 30.7 % (ref 40–54)
HGB BLD-MCNC: 10.1 G/DL (ref 14–18)
IMM GRANULOCYTES # BLD AUTO: 0.22 K/UL (ref 0–0.04)
IMM GRANULOCYTES NFR BLD AUTO: 1.3 % (ref 0–0.5)
LYMPHOCYTES # BLD AUTO: 1.1 K/UL (ref 1–4.8)
LYMPHOCYTES NFR BLD: 6.1 % (ref 18–48)
MAGNESIUM SERPL-MCNC: 1.7 MG/DL (ref 1.6–2.6)
MCH RBC QN AUTO: 30.6 PG (ref 27–31)
MCHC RBC AUTO-ENTMCNC: 32.9 G/DL (ref 32–36)
MCV RBC AUTO: 93 FL (ref 82–98)
MONOCYTES # BLD AUTO: 0.9 K/UL (ref 0.3–1)
MONOCYTES NFR BLD: 5.1 % (ref 4–15)
NEUTROPHILS # BLD AUTO: 15.4 K/UL (ref 1.8–7.7)
NEUTROPHILS NFR BLD: 87.4 % (ref 38–73)
NRBC BLD-RTO: 0 /100 WBC
PLATELET # BLD AUTO: 414 K/UL (ref 150–450)
PMV BLD AUTO: 8.4 FL (ref 9.2–12.9)
POTASSIUM SERPL-SCNC: 4.4 MMOL/L (ref 3.5–5.1)
PROT SERPL-MCNC: 6.5 G/DL (ref 6–8.4)
RBC # BLD AUTO: 3.3 M/UL (ref 4.6–6.2)
SODIUM SERPL-SCNC: 135 MMOL/L (ref 136–145)
T4 FREE SERPL-MCNC: 0.97 NG/DL (ref 0.71–1.51)
TSH SERPL DL<=0.005 MIU/L-ACNC: 0.28 UIU/ML (ref 0.4–4)
WBC # BLD AUTO: 17.6 K/UL (ref 3.9–12.7)

## 2023-10-26 PROCEDURE — 63600175 PHARM REV CODE 636 W HCPCS: Performed by: NURSE PRACTITIONER

## 2023-10-26 PROCEDURE — 25000003 PHARM REV CODE 250: Performed by: NURSE PRACTITIONER

## 2023-10-26 PROCEDURE — 80053 COMPREHEN METABOLIC PANEL: CPT | Performed by: NURSE PRACTITIONER

## 2023-10-26 PROCEDURE — 84443 ASSAY THYROID STIM HORMONE: CPT | Performed by: NURSE PRACTITIONER

## 2023-10-26 PROCEDURE — 96375 TX/PRO/DX INJ NEW DRUG ADDON: CPT

## 2023-10-26 PROCEDURE — 84439 ASSAY OF FREE THYROXINE: CPT | Performed by: NURSE PRACTITIONER

## 2023-10-26 PROCEDURE — 36415 COLL VENOUS BLD VENIPUNCTURE: CPT | Performed by: NURSE PRACTITIONER

## 2023-10-26 PROCEDURE — 85025 COMPLETE CBC W/AUTO DIFF WBC: CPT | Performed by: NURSE PRACTITIONER

## 2023-10-26 PROCEDURE — 83735 ASSAY OF MAGNESIUM: CPT | Performed by: NURSE PRACTITIONER

## 2023-10-26 PROCEDURE — 96360 HYDRATION IV INFUSION INIT: CPT

## 2023-10-26 RX ORDER — ONDANSETRON 2 MG/ML
8 INJECTION INTRAMUSCULAR; INTRAVENOUS ONCE
Status: COMPLETED | OUTPATIENT
Start: 2023-10-26 | End: 2023-10-26

## 2023-10-26 RX ORDER — ONDANSETRON 2 MG/ML
8 INJECTION INTRAMUSCULAR; INTRAVENOUS ONCE
Status: CANCELLED
Start: 2023-10-27 | End: 2023-10-27

## 2023-10-26 RX ADMIN — SODIUM CHLORIDE 1000 ML: 9 INJECTION, SOLUTION INTRAVENOUS at 01:10

## 2023-10-26 RX ADMIN — ONDANSETRON 8 MG: 2 INJECTION INTRAMUSCULAR; INTRAVENOUS at 01:10

## 2023-10-26 NOTE — NURSING
PT tolerated NS infusion well. No adverse reaction noted. Pt education reinforced on NS side effects, what to expect and when to call Nichole Oseguera Jr. Pt verbalized understanding. PIV d/c per protocol, pt tolerated well. amr

## 2023-10-26 NOTE — PLAN OF CARE
Problem: Adult Inpatient Plan of Care  Goal: Optimal Comfort and Wellbeing  Outcome: Ongoing, Progressing  Intervention: Provide Person-Centered Care  Flowsheets (Taken 10/26/2023 1343)  Trust Relationship/Rapport:   care explained   choices provided   emotional support provided   empathic listening provided   questions answered   reassurance provided   thoughts/feelings acknowledged   questions encouraged

## 2023-10-30 ENCOUNTER — LAB VISIT (OUTPATIENT)
Dept: LAB | Facility: HOSPITAL | Age: 78
End: 2023-10-30
Attending: INTERNAL MEDICINE
Payer: MEDICARE

## 2023-10-30 ENCOUNTER — TELEPHONE (OUTPATIENT)
Dept: HEMATOLOGY/ONCOLOGY | Facility: CLINIC | Age: 78
End: 2023-10-30

## 2023-10-30 DIAGNOSIS — C34.92 NSCLC OF LEFT LUNG: Primary | ICD-10-CM

## 2023-10-30 DIAGNOSIS — C34.92 NSCLC OF LEFT LUNG: ICD-10-CM

## 2023-10-30 LAB
ALBUMIN SERPL BCP-MCNC: 3.1 G/DL (ref 3.5–5.2)
ALP SERPL-CCNC: 79 U/L (ref 55–135)
ALT SERPL W/O P-5'-P-CCNC: 21 U/L (ref 10–44)
ANION GAP SERPL CALC-SCNC: 10 MMOL/L (ref 8–16)
AST SERPL-CCNC: 20 U/L (ref 10–40)
BASOPHILS # BLD AUTO: 0.03 K/UL (ref 0–0.2)
BASOPHILS NFR BLD: 0.4 % (ref 0–1.9)
BILIRUB SERPL-MCNC: 0.3 MG/DL (ref 0.1–1)
BUN SERPL-MCNC: 24 MG/DL (ref 8–23)
CALCIUM SERPL-MCNC: 8.9 MG/DL (ref 8.7–10.5)
CHLORIDE SERPL-SCNC: 101 MMOL/L (ref 95–110)
CO2 SERPL-SCNC: 25 MMOL/L (ref 23–29)
CREAT SERPL-MCNC: 0.9 MG/DL (ref 0.5–1.4)
DIFFERENTIAL METHOD: ABNORMAL
EOSINOPHIL # BLD AUTO: 0 K/UL (ref 0–0.5)
EOSINOPHIL NFR BLD: 0.1 % (ref 0–8)
ERYTHROCYTE [DISTWIDTH] IN BLOOD BY AUTOMATED COUNT: 16.6 % (ref 11.5–14.5)
EST. GFR  (NO RACE VARIABLE): >60 ML/MIN/1.73 M^2
GLUCOSE SERPL-MCNC: 123 MG/DL (ref 70–110)
HCT VFR BLD AUTO: 30.7 % (ref 40–54)
HGB BLD-MCNC: 10.1 G/DL (ref 14–18)
IMM GRANULOCYTES # BLD AUTO: 0.02 K/UL (ref 0–0.04)
IMM GRANULOCYTES NFR BLD AUTO: 0.3 % (ref 0–0.5)
LYMPHOCYTES # BLD AUTO: 1.1 K/UL (ref 1–4.8)
LYMPHOCYTES NFR BLD: 15.1 % (ref 18–48)
MCH RBC QN AUTO: 30.4 PG (ref 27–31)
MCHC RBC AUTO-ENTMCNC: 32.9 G/DL (ref 32–36)
MCV RBC AUTO: 93 FL (ref 82–98)
MONOCYTES # BLD AUTO: 0.1 K/UL (ref 0.3–1)
MONOCYTES NFR BLD: 1.4 % (ref 4–15)
NEUTROPHILS # BLD AUTO: 5.9 K/UL (ref 1.8–7.7)
NEUTROPHILS NFR BLD: 82.7 % (ref 38–73)
NRBC BLD-RTO: 0 /100 WBC
PLATELET # BLD AUTO: 393 K/UL (ref 150–450)
PMV BLD AUTO: 8.2 FL (ref 9.2–12.9)
POTASSIUM SERPL-SCNC: 4.6 MMOL/L (ref 3.5–5.1)
PROT SERPL-MCNC: 6.3 G/DL (ref 6–8.4)
RBC # BLD AUTO: 3.32 M/UL (ref 4.6–6.2)
SODIUM SERPL-SCNC: 136 MMOL/L (ref 136–145)
WBC # BLD AUTO: 7.14 K/UL (ref 3.9–12.7)

## 2023-10-30 PROCEDURE — 36415 COLL VENOUS BLD VENIPUNCTURE: CPT | Performed by: INTERNAL MEDICINE

## 2023-10-30 PROCEDURE — 80053 COMPREHEN METABOLIC PANEL: CPT | Performed by: INTERNAL MEDICINE

## 2023-10-30 PROCEDURE — 85025 COMPLETE CBC W/AUTO DIFF WBC: CPT | Performed by: INTERNAL MEDICINE

## 2023-10-30 NOTE — TELEPHONE ENCOUNTER
Patient made aware of above, he reports he doesn't feel like he needs any more IV fluids at this time, but will get labs drawn.

## 2023-10-30 NOTE — TELEPHONE ENCOUNTER
----- Message from ANGI Pitts sent at 10/30/2023  1:13 PM CDT -----  See if he wants to get set up for IV fluids at [facility] and he needs labs cbc and cmp to make sure its not the anemia or dehydration.       Allie  ----- Message -----  From: Gretta Padilla RN  Sent: 10/30/2023   1:05 PM CDT  To: ANGI Pitts    Patient said that he was sitting in the car and headed to the store and reports that he had passed out in the car for approximately 15-20 seconds, shook and then woke up and was alert. No other neuro symptoms, he was able to walk when he got out the car without any issues.   ----- Message -----  From: Liya Whelan  Sent: 10/30/2023  10:43 AM CDT  To: Melissa Jacobs Staff    The patient said he was out yesterday and he became faint and was out of it for a few seconds and then shook and came back out of it. He went home and checked his blood pressure which was fine and he felt ok the rest of the day. # [phone]

## 2023-11-02 ENCOUNTER — LAB VISIT (OUTPATIENT)
Dept: LAB | Facility: HOSPITAL | Age: 78
End: 2023-11-02
Attending: NURSE PRACTITIONER
Payer: MEDICARE

## 2023-11-02 ENCOUNTER — INFUSION (OUTPATIENT)
Dept: INFUSION THERAPY | Facility: HOSPITAL | Age: 78
End: 2023-11-02
Payer: MEDICARE

## 2023-11-02 VITALS
OXYGEN SATURATION: 100 % | HEIGHT: 77 IN | WEIGHT: 175.5 LBS | SYSTOLIC BLOOD PRESSURE: 129 MMHG | HEART RATE: 86 BPM | BODY MASS INDEX: 20.72 KG/M2 | RESPIRATION RATE: 15 BRPM | TEMPERATURE: 98 F | DIASTOLIC BLOOD PRESSURE: 63 MMHG

## 2023-11-02 DIAGNOSIS — C34.92 NSCLC OF LEFT LUNG: ICD-10-CM

## 2023-11-02 DIAGNOSIS — R53.83 FATIGUE, UNSPECIFIED TYPE: ICD-10-CM

## 2023-11-02 DIAGNOSIS — C34.12 MALIGNANT NEOPLASM OF UPPER LOBE OF LEFT LUNG: Primary | ICD-10-CM

## 2023-11-02 LAB
ALBUMIN SERPL BCP-MCNC: 3.1 G/DL (ref 3.5–5.2)
ALP SERPL-CCNC: 86 U/L (ref 55–135)
ALT SERPL W/O P-5'-P-CCNC: 22 U/L (ref 10–44)
ANION GAP SERPL CALC-SCNC: 12 MMOL/L (ref 8–16)
AST SERPL-CCNC: 23 U/L (ref 10–40)
BASOPHILS # BLD AUTO: 0.01 K/UL (ref 0–0.2)
BASOPHILS NFR BLD: 0.3 % (ref 0–1.9)
BILIRUB SERPL-MCNC: 0.4 MG/DL (ref 0.1–1)
BUN SERPL-MCNC: 19 MG/DL (ref 8–23)
CALCIUM SERPL-MCNC: 9.4 MG/DL (ref 8.7–10.5)
CHLORIDE SERPL-SCNC: 101 MMOL/L (ref 95–110)
CO2 SERPL-SCNC: 24 MMOL/L (ref 23–29)
CREAT SERPL-MCNC: 0.9 MG/DL (ref 0.5–1.4)
DIFFERENTIAL METHOD: ABNORMAL
EOSINOPHIL # BLD AUTO: 0 K/UL (ref 0–0.5)
EOSINOPHIL NFR BLD: 0 % (ref 0–8)
ERYTHROCYTE [DISTWIDTH] IN BLOOD BY AUTOMATED COUNT: 16.4 % (ref 11.5–14.5)
EST. GFR  (NO RACE VARIABLE): >60 ML/MIN/1.73 M^2
GLUCOSE SERPL-MCNC: 102 MG/DL (ref 70–110)
HCT VFR BLD AUTO: 30.9 % (ref 40–54)
HGB BLD-MCNC: 10.1 G/DL (ref 14–18)
IMM GRANULOCYTES # BLD AUTO: 0.01 K/UL (ref 0–0.04)
IMM GRANULOCYTES NFR BLD AUTO: 0.3 % (ref 0–0.5)
LYMPHOCYTES # BLD AUTO: 1.2 K/UL (ref 1–4.8)
LYMPHOCYTES NFR BLD: 32.8 % (ref 18–48)
MAGNESIUM SERPL-MCNC: 1.5 MG/DL (ref 1.6–2.6)
MCH RBC QN AUTO: 30.4 PG (ref 27–31)
MCHC RBC AUTO-ENTMCNC: 32.7 G/DL (ref 32–36)
MCV RBC AUTO: 93 FL (ref 82–98)
MONOCYTES # BLD AUTO: 0.4 K/UL (ref 0.3–1)
MONOCYTES NFR BLD: 11.8 % (ref 4–15)
NEUTROPHILS # BLD AUTO: 2 K/UL (ref 1.8–7.7)
NEUTROPHILS NFR BLD: 54.8 % (ref 38–73)
NRBC BLD-RTO: 0 /100 WBC
PLATELET # BLD AUTO: 214 K/UL (ref 150–450)
PMV BLD AUTO: 8.3 FL (ref 9.2–12.9)
POTASSIUM SERPL-SCNC: 4.5 MMOL/L (ref 3.5–5.1)
PROT SERPL-MCNC: 6.3 G/DL (ref 6–8.4)
RBC # BLD AUTO: 3.32 M/UL (ref 4.6–6.2)
SODIUM SERPL-SCNC: 137 MMOL/L (ref 136–145)
WBC # BLD AUTO: 3.57 K/UL (ref 3.9–12.7)

## 2023-11-02 PROCEDURE — 36415 COLL VENOUS BLD VENIPUNCTURE: CPT | Performed by: NURSE PRACTITIONER

## 2023-11-02 PROCEDURE — 83735 ASSAY OF MAGNESIUM: CPT | Performed by: NURSE PRACTITIONER

## 2023-11-02 PROCEDURE — 85025 COMPLETE CBC W/AUTO DIFF WBC: CPT | Performed by: NURSE PRACTITIONER

## 2023-11-02 PROCEDURE — 96374 THER/PROPH/DIAG INJ IV PUSH: CPT

## 2023-11-02 PROCEDURE — 25000003 PHARM REV CODE 250: Performed by: NURSE PRACTITIONER

## 2023-11-02 PROCEDURE — 63600175 PHARM REV CODE 636 W HCPCS: Performed by: NURSE PRACTITIONER

## 2023-11-02 PROCEDURE — 96361 HYDRATE IV INFUSION ADD-ON: CPT

## 2023-11-02 PROCEDURE — 80053 COMPREHEN METABOLIC PANEL: CPT | Performed by: NURSE PRACTITIONER

## 2023-11-02 RX ORDER — ONDANSETRON 2 MG/ML
8 INJECTION INTRAMUSCULAR; INTRAVENOUS ONCE
Status: COMPLETED | OUTPATIENT
Start: 2023-11-02 | End: 2023-11-02

## 2023-11-02 RX ORDER — ONDANSETRON 2 MG/ML
8 INJECTION INTRAMUSCULAR; INTRAVENOUS ONCE
Status: CANCELLED
Start: 2023-11-03 | End: 2023-11-03

## 2023-11-02 RX ADMIN — SODIUM CHLORIDE 1000 ML: 9 INJECTION, SOLUTION INTRAVENOUS at 01:11

## 2023-11-02 RX ADMIN — ONDANSETRON 8 MG: 2 INJECTION INTRAMUSCULAR; INTRAVENOUS at 01:11

## 2023-11-02 NOTE — PLAN OF CARE
Problem: Adult Inpatient Plan of Care  Goal: Optimal Comfort and Wellbeing  Outcome: Ongoing, Progressing  Intervention: Provide Person-Centered Care  Flowsheets (Taken 11/2/2023 5077)  Trust Relationship/Rapport:   care explained   choices provided   emotional support provided   empathic listening provided   questions answered   questions encouraged   reassurance provided   thoughts/feelings acknowledged

## 2023-11-02 NOTE — NURSING
PT tolerated Zofran and IVF infusion well. No adverse reaction noted. Pt education reinforced on Zofran and IVF side effects, what to expect and when to call Dr. Reynaldo Torres. Pt verbalized understanding. PAC flushed with heparin and de-accessed per protocol. Pt tolerated well.    amr

## 2023-11-05 ENCOUNTER — PATIENT MESSAGE (OUTPATIENT)
Dept: HEMATOLOGY/ONCOLOGY | Facility: CLINIC | Age: 78
End: 2023-11-05

## 2023-11-05 DIAGNOSIS — G47.00 INSOMNIA, UNSPECIFIED TYPE: Primary | ICD-10-CM

## 2023-11-06 RX ORDER — TEMAZEPAM 30 MG/1
30 CAPSULE ORAL NIGHTLY PRN
Qty: 30 CAPSULE | Refills: 2 | Status: SHIPPED | OUTPATIENT
Start: 2023-11-06 | End: 2023-12-06

## 2023-11-07 ENCOUNTER — PATIENT MESSAGE (OUTPATIENT)
Dept: HEMATOLOGY/ONCOLOGY | Facility: CLINIC | Age: 78
End: 2023-11-07

## 2023-11-07 ENCOUNTER — LAB VISIT (OUTPATIENT)
Dept: LAB | Facility: HOSPITAL | Age: 78
End: 2023-11-07
Attending: NURSE PRACTITIONER
Payer: MEDICARE

## 2023-11-07 ENCOUNTER — TELEPHONE (OUTPATIENT)
Dept: HEMATOLOGY/ONCOLOGY | Facility: CLINIC | Age: 78
End: 2023-11-07

## 2023-11-07 ENCOUNTER — INFUSION (OUTPATIENT)
Dept: INFUSION THERAPY | Facility: HOSPITAL | Age: 78
End: 2023-11-07
Payer: MEDICARE

## 2023-11-07 VITALS
RESPIRATION RATE: 16 BRPM | DIASTOLIC BLOOD PRESSURE: 60 MMHG | WEIGHT: 172 LBS | HEART RATE: 82 BPM | HEIGHT: 76 IN | SYSTOLIC BLOOD PRESSURE: 118 MMHG | BODY MASS INDEX: 20.95 KG/M2 | TEMPERATURE: 99 F | OXYGEN SATURATION: 100 %

## 2023-11-07 DIAGNOSIS — C34.92 NSCLC OF LEFT LUNG: ICD-10-CM

## 2023-11-07 DIAGNOSIS — R53.83 FATIGUE, UNSPECIFIED TYPE: ICD-10-CM

## 2023-11-07 DIAGNOSIS — C34.12 MALIGNANT NEOPLASM OF UPPER LOBE OF LEFT LUNG: Primary | ICD-10-CM

## 2023-11-07 LAB
ALBUMIN SERPL BCP-MCNC: 3 G/DL (ref 3.5–5.2)
ALP SERPL-CCNC: 88 U/L (ref 55–135)
ALT SERPL W/O P-5'-P-CCNC: 28 U/L (ref 10–44)
ANION GAP SERPL CALC-SCNC: 9 MMOL/L (ref 8–16)
AST SERPL-CCNC: 23 U/L (ref 10–40)
BASOPHILS # BLD AUTO: 0.01 K/UL (ref 0–0.2)
BASOPHILS NFR BLD: 0.4 % (ref 0–1.9)
BILIRUB SERPL-MCNC: 0.3 MG/DL (ref 0.1–1)
BUN SERPL-MCNC: 17 MG/DL (ref 8–23)
CALCIUM SERPL-MCNC: 9.1 MG/DL (ref 8.7–10.5)
CHLORIDE SERPL-SCNC: 101 MMOL/L (ref 95–110)
CO2 SERPL-SCNC: 25 MMOL/L (ref 23–29)
CREAT SERPL-MCNC: 0.9 MG/DL (ref 0.5–1.4)
DIFFERENTIAL METHOD: ABNORMAL
EOSINOPHIL # BLD AUTO: 0 K/UL (ref 0–0.5)
EOSINOPHIL NFR BLD: 0 % (ref 0–8)
ERYTHROCYTE [DISTWIDTH] IN BLOOD BY AUTOMATED COUNT: 16.1 % (ref 11.5–14.5)
EST. GFR  (NO RACE VARIABLE): >60 ML/MIN/1.73 M^2
GLUCOSE SERPL-MCNC: 131 MG/DL (ref 70–110)
HCT VFR BLD AUTO: 23.7 % (ref 40–54)
HGB BLD-MCNC: 8.1 G/DL (ref 14–18)
IMM GRANULOCYTES # BLD AUTO: 0 K/UL (ref 0–0.04)
IMM GRANULOCYTES NFR BLD AUTO: 0 % (ref 0–0.5)
LYMPHOCYTES # BLD AUTO: 1 K/UL (ref 1–4.8)
LYMPHOCYTES NFR BLD: 35.4 % (ref 18–48)
MAGNESIUM SERPL-MCNC: 1.7 MG/DL (ref 1.6–2.6)
MCH RBC QN AUTO: 31.2 PG (ref 27–31)
MCHC RBC AUTO-ENTMCNC: 34.2 G/DL (ref 32–36)
MCV RBC AUTO: 91 FL (ref 82–98)
MONOCYTES # BLD AUTO: 0.7 K/UL (ref 0.3–1)
MONOCYTES NFR BLD: 24.9 % (ref 4–15)
NEUTROPHILS # BLD AUTO: 1.1 K/UL (ref 1.8–7.7)
NEUTROPHILS NFR BLD: 39.3 % (ref 38–73)
NRBC BLD-RTO: 0 /100 WBC
PLATELET # BLD AUTO: 58 K/UL (ref 150–450)
PMV BLD AUTO: 9.5 FL (ref 9.2–12.9)
POTASSIUM SERPL-SCNC: 4.3 MMOL/L (ref 3.5–5.1)
PROT SERPL-MCNC: 6 G/DL (ref 6–8.4)
RBC # BLD AUTO: 2.6 M/UL (ref 4.6–6.2)
SODIUM SERPL-SCNC: 135 MMOL/L (ref 136–145)
TSH SERPL DL<=0.005 MIU/L-ACNC: 0.83 UIU/ML (ref 0.4–4)
WBC # BLD AUTO: 2.85 K/UL (ref 3.9–12.7)

## 2023-11-07 PROCEDURE — 63600175 PHARM REV CODE 636 W HCPCS: Performed by: NURSE PRACTITIONER

## 2023-11-07 PROCEDURE — 80053 COMPREHEN METABOLIC PANEL: CPT | Performed by: NURSE PRACTITIONER

## 2023-11-07 PROCEDURE — 25000003 PHARM REV CODE 250: Performed by: NURSE PRACTITIONER

## 2023-11-07 PROCEDURE — 84443 ASSAY THYROID STIM HORMONE: CPT | Performed by: NURSE PRACTITIONER

## 2023-11-07 PROCEDURE — 96375 TX/PRO/DX INJ NEW DRUG ADDON: CPT

## 2023-11-07 PROCEDURE — 96365 THER/PROPH/DIAG IV INF INIT: CPT

## 2023-11-07 PROCEDURE — 83735 ASSAY OF MAGNESIUM: CPT | Performed by: NURSE PRACTITIONER

## 2023-11-07 PROCEDURE — 36415 COLL VENOUS BLD VENIPUNCTURE: CPT | Performed by: NURSE PRACTITIONER

## 2023-11-07 PROCEDURE — 85025 COMPLETE CBC W/AUTO DIFF WBC: CPT | Performed by: NURSE PRACTITIONER

## 2023-11-07 RX ORDER — HEPARIN 100 UNIT/ML
500 SYRINGE INTRAVENOUS
Status: DISCONTINUED | OUTPATIENT
Start: 2023-11-07 | End: 2023-11-07 | Stop reason: HOSPADM

## 2023-11-07 RX ORDER — SODIUM CHLORIDE 0.9 % (FLUSH) 0.9 %
10 SYRINGE (ML) INJECTION
Status: DISCONTINUED | OUTPATIENT
Start: 2023-11-07 | End: 2023-11-07 | Stop reason: HOSPADM

## 2023-11-07 RX ORDER — ONDANSETRON 2 MG/ML
8 INJECTION INTRAMUSCULAR; INTRAVENOUS ONCE
Status: COMPLETED | OUTPATIENT
Start: 2023-11-07 | End: 2023-11-07

## 2023-11-07 RX ORDER — SODIUM CHLORIDE 0.9 % (FLUSH) 0.9 %
10 SYRINGE (ML) INJECTION
Status: CANCELLED | OUTPATIENT
Start: 2023-11-07

## 2023-11-07 RX ORDER — ONDANSETRON 2 MG/ML
8 INJECTION INTRAMUSCULAR; INTRAVENOUS ONCE
Status: CANCELLED
Start: 2023-11-10 | End: 2023-11-10

## 2023-11-07 RX ORDER — HEPARIN 100 UNIT/ML
500 SYRINGE INTRAVENOUS
Status: CANCELLED | OUTPATIENT
Start: 2023-11-07

## 2023-11-07 RX ADMIN — SODIUM CHLORIDE 1000 ML: 9 INJECTION, SOLUTION INTRAVENOUS at 01:11

## 2023-11-07 RX ADMIN — HEPARIN 500 UNITS: 100 SYRINGE at 02:11

## 2023-11-07 RX ADMIN — ONDANSETRON 8 MG: 2 INJECTION INTRAMUSCULAR; INTRAVENOUS at 01:11

## 2023-11-07 NOTE — TELEPHONE ENCOUNTER
Spoke to pt and notified him of low Hgb at 8.1. He said he feels great and would like to discuss other options with Dr. Torres at his appt tomorrow since his bloodwork fluctuates.

## 2023-11-07 NOTE — PROGRESS NOTES
Please notify the patient that their hgb is low at lab result is abnormal. Hgb is low again and see if he wants 2 unit of blood.    Allie

## 2023-11-07 NOTE — PLAN OF CARE
Problem: Adult Inpatient Plan of Care  Goal: Optimal Comfort and Wellbeing  Outcome: Ongoing, Progressing  Intervention: Provide Person-Centered Care  Flowsheets (Taken 11/7/2023 1344)  Trust Relationship/Rapport:   care explained   choices provided   emotional support provided   empathic listening provided   questions answered   questions encouraged   reassurance provided   thoughts/feelings acknowledged

## 2023-11-08 ENCOUNTER — OFFICE VISIT (OUTPATIENT)
Dept: HEMATOLOGY/ONCOLOGY | Facility: CLINIC | Age: 78
End: 2023-11-08
Payer: MEDICARE

## 2023-11-08 VITALS
SYSTOLIC BLOOD PRESSURE: 129 MMHG | OXYGEN SATURATION: 96 % | BODY MASS INDEX: 21.42 KG/M2 | TEMPERATURE: 97 F | HEART RATE: 90 BPM | DIASTOLIC BLOOD PRESSURE: 63 MMHG | WEIGHT: 176 LBS

## 2023-11-08 DIAGNOSIS — D63.0 ANEMIA IN NEOPLASTIC DISEASE: ICD-10-CM

## 2023-11-08 DIAGNOSIS — D53.9 NUTRITIONAL ANEMIA, UNSPECIFIED: ICD-10-CM

## 2023-11-08 DIAGNOSIS — C34.12 MALIGNANT NEOPLASM OF UPPER LOBE OF LEFT LUNG: ICD-10-CM

## 2023-11-08 DIAGNOSIS — E46 PROTEIN-CALORIE MALNUTRITION, UNSPECIFIED SEVERITY: ICD-10-CM

## 2023-11-08 PROCEDURE — 99213 OFFICE O/P EST LOW 20 MIN: CPT | Performed by: INTERNAL MEDICINE

## 2023-11-08 PROCEDURE — 99214 PR OFFICE/OUTPT VISIT, EST, LEVL IV, 30-39 MIN: ICD-10-PCS | Mod: S$PBB,,, | Performed by: INTERNAL MEDICINE

## 2023-11-08 PROCEDURE — 99214 OFFICE O/P EST MOD 30 MIN: CPT | Mod: S$PBB,,, | Performed by: INTERNAL MEDICINE

## 2023-11-08 NOTE — ASSESSMENT & PLAN NOTE
Patient is doing well and he is tolerating the therapy well.  He is doing well with the IO and has no sign of AI therapy.  Note is made of his low counts which are likely related to the therapy and will be watched.

## 2023-11-08 NOTE — PROGRESS NOTES
PROGRESS NOTE    Subjective:       Patient ID: Akil Guzman is a 78 y.o. male.    8/7/2023-CT chest without:  1. Large left pleural effusion with dependent left lower lobe atelectasis.  2. Poorly defined mass-like infiltrate involving the periphery of the left upper lobe extending to the left hilum.  Underlying parenchymal mass is not excludable, however, evaluation is limited due to lack of intravenous contrast.  Correlate clinically with possible fever and/or elevated white count.  3. Bilateral soft tissue pulmonary nodules measuring up to 11 mm.  Follow-up per Fleischner guidelines.  For multiple solid nodules with any 6 mm or greater, Fleischner Society guidelines recommend follow up with non-contrast chest CT at 3-6 months and 18-24 months after discovery.  4. Partially calcified left upper lobe pulmonary nodule may represent granulomatous change and scarring.  5. 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  6. Questionable left hilar lymphadenopathy.  However, evaluation is again limited due to lack of intravenous contrast.  7. Small hiatal hernia.    8/17/2023 Left Pleural Fluid:  Positive for adenocarcinoma most c/w lung primary    9/14/2023-PET:  1. FDG avid nodular thickening involving origin of the lingular bronchus with adjacent FDG avid 20 mm nodular density, suspicious for primary pulmonary malignancy.  2. Moderate left pleural effusion with scattered foci of FDG uptake along the parietal pleura of concern for malignant pleural effusion.  3. Left chest wall FDG avid mass resulting in lytic destruction and pathologic fractures of left 10th and 11th ribs, characteristic of metastatic disease.  4. Diffuse prominent FDG activity throughout the intramedullary compartments of the axial and proximal appendicular skeleton is nonspecific. This can be seen with pharmacologic bone marrow stimulation although diffuse metastatic  disease can also be considered. Metastatic disease is felt less likely given history.    9/14/2023-MRI Brain  Negative for mets    Stage IVB-Adenocarcinoma of the lung    PLAN:   Carbo/Pem/Pem ChemoIO therapy    Carbo/Pem/Pem:  Cycle 1: 9/13/2023  Cycle 2: 10/4/2023  Cycle 3: 10/25/2023  Cycle 4: 11/15/2023-due        Chief Complaint:  No chief complaint on file.  Stage IVB lung carcinoma follow up    History of Present Illness:   Akil Guzman is a 78 y.o. male who presents for follow up of lung cancer.     He has been on chemo/IO therapy and seems to be doing well.  Not many side effects.  No increasing sob and no diarrhea.         Family and Social history reviewed and is unchanged from 9/1/2023             Current Outpatient Medications:     dexAMETHasone (DECADRON) 4 MG Tab, 2 tablets twice a day the day before and after each chemo, Disp: 32 tablet, Rfl: 5    duke's soln (benadryl 30 mL, mylanta 30 mL, LIDOcaine 30 mL, nystatin 30 mL) 120mL, Take 10 mLs by mouth 4 (four) times daily., Disp: 120 mL, Rfl: 5    finasteride (PROSCAR) 5 mg tablet, TAKE 1 TABLET BY MOUTH EVERY DAY FOR PROSTATE, Disp: 90 tablet, Rfl: 3    folic acid (FOLVITE) 1 MG tablet, Take 1 tablet (1 mg total) by mouth once daily., Disp: 100 tablet, Rfl: 3    HYDROcodone-acetaminophen (NORCO)  mg per tablet, Take 1 tablet by mouth every 6 (six) hours as needed for Pain., Disp: 60 tablet, Rfl: 0    ipratropium (ATROVENT) 42 mcg (0.06 %) nasal spray, USE 1 SPRAY in each nostril TWICE DAILY THANK YOU!, Disp: , Rfl:     magnesium oxide (MAG-OX) 400 mg (241.3 mg magnesium) tablet, Take 1 tablet (400 mg total) by mouth once daily., Disp: 30 tablet, Rfl: 11    ondansetron (ZOFRAN) 8 MG tablet, Take 1 tablet (8 mg total) by mouth every 8 (eight) hours as needed., Disp: 30 tablet, Rfl: 5    promethazine (PHENERGAN) 25 MG tablet, Take 1 tablet (25 mg total) by mouth every 4 to 6 hours as needed., Disp: 30 tablet, Rfl: 5    sildenafiL (VIAGRA) 100 MG  tablet, Take 1 tablet (100 mg total) by mouth daily as needed., Disp: 30 tablet, Rfl: 11    tamsulosin (FLOMAX) 0.4 mg Cap, Take 1 capsule (0.4 mg total) by mouth once daily., Disp: 90 capsule, Rfl: 3    temazepam (RESTORIL) 30 mg capsule, Take 1 capsule (30 mg total) by mouth nightly as needed for Insomnia., Disp: 30 capsule, Rfl: 2    tiZANidine (ZANAFLEX) 4 MG tablet, Take 1 tablet (4 mg total) by mouth every 12 (twelve) hours as needed (muscle spasms/ pain)., Disp: 40 tablet, Rfl: 0    Current Facility-Administered Medications:     0.9%  NaCl infusion (for blood administration), , Intravenous, Once, Allie Shelley NP-C, Stopped at 10/23/23 2927        Objective:       Physical Examination:     /63   Pulse 90   Temp 97.3 °F (36.3 °C)   Wt 79.8 kg (176 lb)   SpO2 96%   BMI 21.42 kg/m²     Physical Exam    Labs:   Recent Results (from the past 336 hour(s))   CBC Auto Differential    Collection Time: 11/07/23  1:35 PM   Result Value Ref Range    WBC 2.85 (L) 3.90 - 12.70 K/uL    Hemoglobin 8.1 (L) 14.0 - 18.0 g/dL    Hematocrit 23.7 (L) 40.0 - 54.0 %    Platelets 58 (L) 150 - 450 K/uL   CBC Auto Differential    Collection Time: 11/02/23 12:53 PM   Result Value Ref Range    WBC 3.57 (L) 3.90 - 12.70 K/uL    Hemoglobin 10.1 (L) 14.0 - 18.0 g/dL    Hematocrit 30.9 (L) 40.0 - 54.0 %    Platelets 214 150 - 450 K/uL   CBC w/ DIFF    Collection Time: 10/30/23  3:35 PM   Result Value Ref Range    WBC 7.14 3.90 - 12.70 K/uL    Hemoglobin 10.1 (L) 14.0 - 18.0 g/dL    Hematocrit 30.7 (L) 40.0 - 54.0 %    Platelets 393 150 - 450 K/uL     CMP  Sodium   Date Value Ref Range Status   11/07/2023 135 (L) 136 - 145 mmol/L Final     Potassium   Date Value Ref Range Status   11/07/2023 4.3 3.5 - 5.1 mmol/L Final     Chloride   Date Value Ref Range Status   11/07/2023 101 95 - 110 mmol/L Final     CO2   Date Value Ref Range Status   11/07/2023 25 23 - 29 mmol/L Final     Glucose   Date Value Ref Range Status   11/07/2023  "131 (H) 70 - 110 mg/dL Final     BUN   Date Value Ref Range Status   11/07/2023 17 8 - 23 mg/dL Final     Creatinine   Date Value Ref Range Status   11/07/2023 0.9 0.5 - 1.4 mg/dL Final     Calcium   Date Value Ref Range Status   11/07/2023 9.1 8.7 - 10.5 mg/dL Final     Total Protein   Date Value Ref Range Status   11/07/2023 6.0 6.0 - 8.4 g/dL Final     Albumin   Date Value Ref Range Status   11/07/2023 3.0 (L) 3.5 - 5.2 g/dL Final     Total Bilirubin   Date Value Ref Range Status   11/07/2023 0.3 0.1 - 1.0 mg/dL Final     Comment:     For infants and newborns, interpretation of results should be based  on gestational age, weight and in agreement with clinical  observations.    Premature Infant recommended reference ranges:  Up to 24 hours.............<8.0 mg/dL  Up to 48 hours............<12.0 mg/dL  3-5 days..................<15.0 mg/dL  6-29 days.................<15.0 mg/dL       Alkaline Phosphatase   Date Value Ref Range Status   11/07/2023 88 55 - 135 U/L Final     AST   Date Value Ref Range Status   11/07/2023 23 10 - 40 U/L Final     ALT   Date Value Ref Range Status   11/07/2023 28 10 - 44 U/L Final     Anion Gap   Date Value Ref Range Status   11/07/2023 9 8 - 16 mmol/L Final     eGFR if    Date Value Ref Range Status   11/02/2021 56.4 (A) >60 mL/min/1.73 m^2 Final     eGFR if non    Date Value Ref Range Status   11/02/2021 48.8 (A) >60 mL/min/1.73 m^2 Final     Comment:     Calculation used to obtain the estimated glomerular filtration  rate (eGFR) is the CKD-EPI equation.        No results found for: "CEA"  Lab Results   Component Value Date    PSA 1.0 06/21/2023    PSA 1.2 11/02/2021    PSA 1.3 08/10/2020           Assessment/Plan:     Problem List Items Addressed This Visit       Malignant neoplasm of upper lobe of left lung     Patient is doing well and he is tolerating the therapy well.  He is doing well with the IO and has no sign of AI therapy.  Note is made of his " low counts which are likely related to the therapy and will be watched.          Anemia in neoplastic disease     Counts are low and I would like to check for iron deficiency, B12 and folate def.  Will replace this if he is low.  Likely due mostly to chemotherapy.          Relevant Orders    Ferritin    Vitamin B12    Folate     Other Visit Diagnoses       Protein-calorie malnutrition, unspecified severity        Relevant Orders    Vitamin B12    Nutritional anemia, unspecified        Relevant Orders    Folate            Discussion:     Follow up in about 6 weeks (around 12/20/2023).      Electronically signed by Reynaldo Ball

## 2023-11-08 NOTE — ASSESSMENT & PLAN NOTE
Counts are low and I would like to check for iron deficiency, B12 and folate def.  Will replace this if he is low.  Likely due mostly to chemotherapy.

## 2023-11-14 ENCOUNTER — LAB VISIT (OUTPATIENT)
Dept: LAB | Facility: HOSPITAL | Age: 78
End: 2023-11-14
Attending: INTERNAL MEDICINE
Payer: MEDICARE

## 2023-11-14 DIAGNOSIS — D63.0 ANEMIA IN NEOPLASTIC DISEASE: ICD-10-CM

## 2023-11-14 DIAGNOSIS — D53.9 NUTRITIONAL ANEMIA, UNSPECIFIED: ICD-10-CM

## 2023-11-14 DIAGNOSIS — R53.83 FATIGUE, UNSPECIFIED TYPE: ICD-10-CM

## 2023-11-14 DIAGNOSIS — C34.92 NSCLC OF LEFT LUNG: ICD-10-CM

## 2023-11-14 DIAGNOSIS — E46 PROTEIN-CALORIE MALNUTRITION, UNSPECIFIED SEVERITY: ICD-10-CM

## 2023-11-14 LAB
ALBUMIN SERPL BCP-MCNC: 3.2 G/DL (ref 3.5–5.2)
ALP SERPL-CCNC: 89 U/L (ref 55–135)
ALT SERPL W/O P-5'-P-CCNC: 17 U/L (ref 10–44)
ANION GAP SERPL CALC-SCNC: 12 MMOL/L (ref 8–16)
AST SERPL-CCNC: 20 U/L (ref 10–40)
BASOPHILS # BLD AUTO: 0.01 K/UL (ref 0–0.2)
BASOPHILS NFR BLD: 0.2 % (ref 0–1.9)
BILIRUB SERPL-MCNC: 0.2 MG/DL (ref 0.1–1)
BUN SERPL-MCNC: 25 MG/DL (ref 8–23)
CALCIUM SERPL-MCNC: 9.6 MG/DL (ref 8.7–10.5)
CHLORIDE SERPL-SCNC: 103 MMOL/L (ref 95–110)
CO2 SERPL-SCNC: 23 MMOL/L (ref 23–29)
CREAT SERPL-MCNC: 0.9 MG/DL (ref 0.5–1.4)
DIFFERENTIAL METHOD: ABNORMAL
EOSINOPHIL # BLD AUTO: 0 K/UL (ref 0–0.5)
EOSINOPHIL NFR BLD: 0.2 % (ref 0–8)
ERYTHROCYTE [DISTWIDTH] IN BLOOD BY AUTOMATED COUNT: 19.3 % (ref 11.5–14.5)
EST. GFR  (NO RACE VARIABLE): >60 ML/MIN/1.73 M^2
GLUCOSE SERPL-MCNC: 95 MG/DL (ref 70–110)
HCT VFR BLD AUTO: 26 % (ref 40–54)
HGB BLD-MCNC: 8.5 G/DL (ref 14–18)
IMM GRANULOCYTES # BLD AUTO: 0.09 K/UL (ref 0–0.04)
IMM GRANULOCYTES NFR BLD AUTO: 2.2 % (ref 0–0.5)
LYMPHOCYTES # BLD AUTO: 0.7 K/UL (ref 1–4.8)
LYMPHOCYTES NFR BLD: 17.7 % (ref 18–48)
MAGNESIUM SERPL-MCNC: 1.6 MG/DL (ref 1.6–2.6)
MCH RBC QN AUTO: 31.5 PG (ref 27–31)
MCHC RBC AUTO-ENTMCNC: 32.7 G/DL (ref 32–36)
MCV RBC AUTO: 96 FL (ref 82–98)
MONOCYTES # BLD AUTO: 0.7 K/UL (ref 0.3–1)
MONOCYTES NFR BLD: 16.7 % (ref 4–15)
NEUTROPHILS # BLD AUTO: 2.6 K/UL (ref 1.8–7.7)
NEUTROPHILS NFR BLD: 63 % (ref 38–73)
NRBC BLD-RTO: 0 /100 WBC
PLATELET # BLD AUTO: 209 K/UL (ref 150–450)
PMV BLD AUTO: 9.3 FL (ref 9.2–12.9)
POTASSIUM SERPL-SCNC: 4.7 MMOL/L (ref 3.5–5.1)
PROT SERPL-MCNC: 6.6 G/DL (ref 6–8.4)
RBC # BLD AUTO: 2.7 M/UL (ref 4.6–6.2)
SODIUM SERPL-SCNC: 138 MMOL/L (ref 136–145)
TSH SERPL DL<=0.005 MIU/L-ACNC: 1.76 UIU/ML (ref 0.4–4)
WBC # BLD AUTO: 4.13 K/UL (ref 3.9–12.7)

## 2023-11-14 PROCEDURE — 82746 ASSAY OF FOLIC ACID SERUM: CPT | Performed by: INTERNAL MEDICINE

## 2023-11-14 PROCEDURE — 82728 ASSAY OF FERRITIN: CPT | Performed by: INTERNAL MEDICINE

## 2023-11-14 PROCEDURE — 84443 ASSAY THYROID STIM HORMONE: CPT | Performed by: NURSE PRACTITIONER

## 2023-11-14 PROCEDURE — 36415 COLL VENOUS BLD VENIPUNCTURE: CPT | Performed by: INTERNAL MEDICINE

## 2023-11-14 PROCEDURE — 82607 VITAMIN B-12: CPT | Performed by: INTERNAL MEDICINE

## 2023-11-14 PROCEDURE — 83735 ASSAY OF MAGNESIUM: CPT | Performed by: NURSE PRACTITIONER

## 2023-11-14 PROCEDURE — 85025 COMPLETE CBC W/AUTO DIFF WBC: CPT | Performed by: NURSE PRACTITIONER

## 2023-11-14 PROCEDURE — 80053 COMPREHEN METABOLIC PANEL: CPT | Performed by: NURSE PRACTITIONER

## 2023-11-15 ENCOUNTER — PATIENT MESSAGE (OUTPATIENT)
Dept: HEMATOLOGY/ONCOLOGY | Facility: CLINIC | Age: 78
End: 2023-11-15

## 2023-11-15 ENCOUNTER — INFUSION (OUTPATIENT)
Dept: INFUSION THERAPY | Facility: HOSPITAL | Age: 78
End: 2023-11-15
Attending: INTERNAL MEDICINE
Payer: MEDICARE

## 2023-11-15 VITALS
OXYGEN SATURATION: 100 % | HEIGHT: 76 IN | TEMPERATURE: 98 F | WEIGHT: 172.19 LBS | RESPIRATION RATE: 18 BRPM | SYSTOLIC BLOOD PRESSURE: 117 MMHG | BODY MASS INDEX: 20.97 KG/M2 | DIASTOLIC BLOOD PRESSURE: 60 MMHG | HEART RATE: 67 BPM

## 2023-11-15 DIAGNOSIS — C34.12 MALIGNANT NEOPLASM OF UPPER LOBE OF LEFT LUNG: Primary | ICD-10-CM

## 2023-11-15 LAB
FERRITIN SERPL-MCNC: 1826 NG/ML (ref 20–300)
FOLATE SERPL-MCNC: 15.2 NG/ML (ref 4–24)
VIT B12 SERPL-MCNC: 859 PG/ML (ref 210–950)

## 2023-11-15 PROCEDURE — 96375 TX/PRO/DX INJ NEW DRUG ADDON: CPT

## 2023-11-15 PROCEDURE — 96417 CHEMO IV INFUS EACH ADDL SEQ: CPT

## 2023-11-15 PROCEDURE — 96372 THER/PROPH/DIAG INJ SC/IM: CPT | Mod: 59

## 2023-11-15 PROCEDURE — A4216 STERILE WATER/SALINE, 10 ML: HCPCS | Performed by: INTERNAL MEDICINE

## 2023-11-15 PROCEDURE — 25000003 PHARM REV CODE 250: Performed by: INTERNAL MEDICINE

## 2023-11-15 PROCEDURE — 96413 CHEMO IV INFUSION 1 HR: CPT

## 2023-11-15 PROCEDURE — 63600175 PHARM REV CODE 636 W HCPCS: Mod: JZ,JG | Performed by: INTERNAL MEDICINE

## 2023-11-15 PROCEDURE — 96411 CHEMO IV PUSH ADDL DRUG: CPT

## 2023-11-15 PROCEDURE — 96367 TX/PROPH/DG ADDL SEQ IV INF: CPT

## 2023-11-15 RX ORDER — PROCHLORPERAZINE EDISYLATE 5 MG/ML
5 INJECTION INTRAMUSCULAR; INTRAVENOUS ONCE AS NEEDED
Status: CANCELLED | OUTPATIENT
Start: 2023-11-15

## 2023-11-15 RX ORDER — CYANOCOBALAMIN 1000 UG/ML
1000 INJECTION, SOLUTION INTRAMUSCULAR; SUBCUTANEOUS
Status: CANCELLED | OUTPATIENT
Start: 2023-11-15

## 2023-11-15 RX ORDER — CYANOCOBALAMIN 1000 UG/ML
1000 INJECTION, SOLUTION INTRAMUSCULAR; SUBCUTANEOUS
Status: COMPLETED | OUTPATIENT
Start: 2023-11-15 | End: 2023-11-15

## 2023-11-15 RX ORDER — DIPHENHYDRAMINE HYDROCHLORIDE 50 MG/ML
50 INJECTION INTRAMUSCULAR; INTRAVENOUS ONCE AS NEEDED
Status: CANCELLED | OUTPATIENT
Start: 2023-11-15

## 2023-11-15 RX ORDER — PROCHLORPERAZINE EDISYLATE 5 MG/ML
5 INJECTION INTRAMUSCULAR; INTRAVENOUS ONCE AS NEEDED
Status: DISCONTINUED | OUTPATIENT
Start: 2023-11-15 | End: 2023-11-15 | Stop reason: HOSPADM

## 2023-11-15 RX ORDER — DIPHENHYDRAMINE HYDROCHLORIDE 50 MG/ML
50 INJECTION INTRAMUSCULAR; INTRAVENOUS ONCE AS NEEDED
Status: DISCONTINUED | OUTPATIENT
Start: 2023-11-15 | End: 2023-11-15 | Stop reason: HOSPADM

## 2023-11-15 RX ORDER — EPINEPHRINE 0.3 MG/.3ML
0.3 INJECTION SUBCUTANEOUS ONCE AS NEEDED
Status: DISCONTINUED | OUTPATIENT
Start: 2023-11-15 | End: 2023-11-15 | Stop reason: HOSPADM

## 2023-11-15 RX ORDER — SODIUM CHLORIDE 0.9 % (FLUSH) 0.9 %
10 SYRINGE (ML) INJECTION
Status: DISCONTINUED | OUTPATIENT
Start: 2023-11-15 | End: 2023-11-15 | Stop reason: HOSPADM

## 2023-11-15 RX ORDER — SODIUM CHLORIDE 0.9 % (FLUSH) 0.9 %
10 SYRINGE (ML) INJECTION
Status: CANCELLED | OUTPATIENT
Start: 2023-11-15

## 2023-11-15 RX ORDER — HEPARIN 100 UNIT/ML
500 SYRINGE INTRAVENOUS
Status: CANCELLED | OUTPATIENT
Start: 2023-11-15

## 2023-11-15 RX ORDER — EPINEPHRINE 0.3 MG/.3ML
0.3 INJECTION SUBCUTANEOUS ONCE AS NEEDED
Status: CANCELLED | OUTPATIENT
Start: 2023-11-15

## 2023-11-15 RX ORDER — HEPARIN 100 UNIT/ML
500 SYRINGE INTRAVENOUS
Status: DISCONTINUED | OUTPATIENT
Start: 2023-11-15 | End: 2023-11-15 | Stop reason: HOSPADM

## 2023-11-15 RX ADMIN — CARBOPLATIN 540 MG: 10 INJECTION, SOLUTION INTRAVENOUS at 11:11

## 2023-11-15 RX ADMIN — PEMETREXED DISODIUM 1075 MG: 500 INJECTION, POWDER, LYOPHILIZED, FOR SOLUTION INTRAVENOUS at 11:11

## 2023-11-15 RX ADMIN — SODIUM CHLORIDE, PRESERVATIVE FREE 10 ML: 5 INJECTION INTRAVENOUS at 12:11

## 2023-11-15 RX ADMIN — CYANOCOBALAMIN 1000 MCG: 1000 INJECTION, SOLUTION INTRAMUSCULAR at 11:11

## 2023-11-15 RX ADMIN — HEPARIN 500 UNITS: 100 SYRINGE at 12:11

## 2023-11-15 RX ADMIN — PALONOSETRON HYDROCHLORIDE 0.25 MG: 0.25 INJECTION INTRAVENOUS at 11:11

## 2023-11-15 RX ADMIN — APREPITANT 130 MG: 130 INJECTION, EMULSION INTRAVENOUS at 10:11

## 2023-11-15 RX ADMIN — SODIUM CHLORIDE 200 MG: 9 INJECTION, SOLUTION INTRAVENOUS at 10:11

## 2023-11-15 NOTE — PLAN OF CARE
Problem: Fatigue  Goal: Improved Activity Tolerance  11/15/2023 1019 by Stephanie Griffith, RN  Outcome: Met  11/15/2023 1019 by Stephanie Griffith, RN  Outcome: Ongoing, Progressing

## 2023-11-16 ENCOUNTER — INFUSION (OUTPATIENT)
Dept: INFUSION THERAPY | Facility: HOSPITAL | Age: 78
End: 2023-11-16
Payer: MEDICARE

## 2023-11-16 VITALS
OXYGEN SATURATION: 100 % | TEMPERATURE: 98 F | RESPIRATION RATE: 16 BRPM | WEIGHT: 172.19 LBS | SYSTOLIC BLOOD PRESSURE: 160 MMHG | HEIGHT: 76 IN | HEART RATE: 78 BPM | BODY MASS INDEX: 20.97 KG/M2 | DIASTOLIC BLOOD PRESSURE: 66 MMHG

## 2023-11-16 DIAGNOSIS — C34.92 NSCLC OF LEFT LUNG: Primary | ICD-10-CM

## 2023-11-16 DIAGNOSIS — C34.12 MALIGNANT NEOPLASM OF UPPER LOBE OF LEFT LUNG: ICD-10-CM

## 2023-11-16 PROCEDURE — 96360 HYDRATION IV INFUSION INIT: CPT

## 2023-11-16 PROCEDURE — 96375 TX/PRO/DX INJ NEW DRUG ADDON: CPT

## 2023-11-16 PROCEDURE — 63600175 PHARM REV CODE 636 W HCPCS: Performed by: NURSE PRACTITIONER

## 2023-11-16 PROCEDURE — 25000003 PHARM REV CODE 250: Performed by: NURSE PRACTITIONER

## 2023-11-16 RX ORDER — ONDANSETRON 2 MG/ML
8 INJECTION INTRAMUSCULAR; INTRAVENOUS ONCE
Status: CANCELLED
Start: 2023-11-17 | End: 2023-11-17

## 2023-11-16 RX ORDER — ONDANSETRON 2 MG/ML
8 INJECTION INTRAMUSCULAR; INTRAVENOUS ONCE
Status: COMPLETED | OUTPATIENT
Start: 2023-11-16 | End: 2023-11-16

## 2023-11-16 RX ADMIN — SODIUM CHLORIDE 1000 ML: 9 INJECTION, SOLUTION INTRAVENOUS at 01:11

## 2023-11-16 RX ADMIN — ONDANSETRON 8 MG: 2 INJECTION INTRAMUSCULAR; INTRAVENOUS at 01:11

## 2023-11-16 NOTE — NURSING
PT tolerated IV Zofran and NS infusion well. No adverse reaction noted. Pt education reinforced on IV Zofran and NS  side effects, what to expect and when to call Dr. Reynaldo Torres. Pt verbalized understanding. PAC flushed with heparin and de-accessed per protocol. Pt tolerated well.  amr

## 2023-11-16 NOTE — PLAN OF CARE
Problem: Adult Inpatient Plan of Care  Goal: Optimal Comfort and Wellbeing  Outcome: Ongoing, Progressing  Intervention: Provide Person-Centered Care  Flowsheets (Taken 11/16/2023 5581)  Trust Relationship/Rapport:   choices provided   emotional support provided   care explained   empathic listening provided   questions answered   reassurance provided   thoughts/feelings acknowledged

## 2023-11-22 ENCOUNTER — INFUSION (OUTPATIENT)
Dept: INFUSION THERAPY | Facility: HOSPITAL | Age: 78
End: 2023-11-22
Payer: MEDICARE

## 2023-11-22 VITALS
HEIGHT: 76 IN | OXYGEN SATURATION: 99 % | SYSTOLIC BLOOD PRESSURE: 124 MMHG | RESPIRATION RATE: 20 BRPM | DIASTOLIC BLOOD PRESSURE: 61 MMHG | BODY MASS INDEX: 20.95 KG/M2 | WEIGHT: 172 LBS | TEMPERATURE: 98 F | HEART RATE: 95 BPM

## 2023-11-22 DIAGNOSIS — C34.92 NSCLC OF LEFT LUNG: Primary | ICD-10-CM

## 2023-11-22 DIAGNOSIS — C34.12 MALIGNANT NEOPLASM OF UPPER LOBE OF LEFT LUNG: ICD-10-CM

## 2023-11-22 DIAGNOSIS — R52 ACUTE PAIN: ICD-10-CM

## 2023-11-22 PROCEDURE — 96375 TX/PRO/DX INJ NEW DRUG ADDON: CPT

## 2023-11-22 PROCEDURE — 96360 HYDRATION IV INFUSION INIT: CPT

## 2023-11-22 PROCEDURE — 25000003 PHARM REV CODE 250: Performed by: NURSE PRACTITIONER

## 2023-11-22 PROCEDURE — 63600175 PHARM REV CODE 636 W HCPCS: Performed by: NURSE PRACTITIONER

## 2023-11-22 RX ORDER — SODIUM CHLORIDE 0.9 % (FLUSH) 0.9 %
10 SYRINGE (ML) INJECTION
Status: CANCELLED | OUTPATIENT
Start: 2023-11-22

## 2023-11-22 RX ORDER — ONDANSETRON 2 MG/ML
8 INJECTION INTRAMUSCULAR; INTRAVENOUS ONCE
Status: CANCELLED
Start: 2023-11-24 | End: 2023-11-24

## 2023-11-22 RX ORDER — ONDANSETRON 2 MG/ML
8 INJECTION INTRAMUSCULAR; INTRAVENOUS ONCE
Status: COMPLETED | OUTPATIENT
Start: 2023-11-22 | End: 2023-11-22

## 2023-11-22 RX ORDER — HEPARIN 100 UNIT/ML
500 SYRINGE INTRAVENOUS
Status: DISCONTINUED | OUTPATIENT
Start: 2023-11-22 | End: 2023-11-22 | Stop reason: HOSPADM

## 2023-11-22 RX ORDER — HYDROCODONE BITARTRATE AND ACETAMINOPHEN 10; 325 MG/1; MG/1
1 TABLET ORAL EVERY 6 HOURS PRN
Qty: 60 TABLET | Refills: 0 | Status: SHIPPED | OUTPATIENT
Start: 2023-11-22 | End: 2023-12-20 | Stop reason: SDUPTHER

## 2023-11-22 RX ORDER — ONDANSETRON 2 MG/ML
8 INJECTION INTRAMUSCULAR; INTRAVENOUS ONCE
Status: DISCONTINUED | OUTPATIENT
Start: 2023-11-22 | End: 2023-11-22 | Stop reason: HOSPADM

## 2023-11-22 RX ORDER — HEPARIN 100 UNIT/ML
500 SYRINGE INTRAVENOUS
Status: CANCELLED | OUTPATIENT
Start: 2023-11-22

## 2023-11-22 RX ORDER — SODIUM CHLORIDE 0.9 % (FLUSH) 0.9 %
10 SYRINGE (ML) INJECTION
Status: DISCONTINUED | OUTPATIENT
Start: 2023-11-22 | End: 2023-11-22 | Stop reason: HOSPADM

## 2023-11-22 RX ADMIN — SODIUM CHLORIDE 1000 ML: 9 INJECTION, SOLUTION INTRAVENOUS at 11:11

## 2023-11-22 RX ADMIN — HEPARIN 500 UNITS: 100 SYRINGE at 12:11

## 2023-11-22 RX ADMIN — ONDANSETRON 8 MG: 2 INJECTION INTRAMUSCULAR; INTRAVENOUS at 11:11

## 2023-11-22 NOTE — PLAN OF CARE
Problem: Adult Inpatient Plan of Care  Goal: Optimal Comfort and Wellbeing  11/22/2023 1126 by Odalis Wiseman, RN  Outcome: Ongoing, Progressing  11/22/2023 1126 by Odalis Wiseman, RN  Outcome: Ongoing, Progressing  Intervention: Provide Person-Centered Care  Flowsheets (Taken 11/22/2023 1126)  Trust Relationship/Rapport:   care explained   choices provided   emotional support provided   empathic listening provided   questions answered   questions encouraged   reassurance provided   thoughts/feelings acknowledged

## 2023-11-30 ENCOUNTER — INFUSION (OUTPATIENT)
Dept: INFUSION THERAPY | Facility: HOSPITAL | Age: 78
End: 2023-11-30
Payer: MEDICARE

## 2023-11-30 VITALS
RESPIRATION RATE: 18 BRPM | OXYGEN SATURATION: 99 % | TEMPERATURE: 98 F | BODY MASS INDEX: 20.95 KG/M2 | SYSTOLIC BLOOD PRESSURE: 120 MMHG | HEART RATE: 65 BPM | WEIGHT: 172 LBS | DIASTOLIC BLOOD PRESSURE: 57 MMHG | HEIGHT: 76 IN

## 2023-11-30 DIAGNOSIS — C34.12 MALIGNANT NEOPLASM OF UPPER LOBE OF LEFT LUNG: ICD-10-CM

## 2023-11-30 DIAGNOSIS — C34.92 NSCLC OF LEFT LUNG: Primary | ICD-10-CM

## 2023-11-30 PROCEDURE — 63600175 PHARM REV CODE 636 W HCPCS: Performed by: NURSE PRACTITIONER

## 2023-11-30 PROCEDURE — 96360 HYDRATION IV INFUSION INIT: CPT

## 2023-11-30 PROCEDURE — 25000003 PHARM REV CODE 250: Performed by: NURSE PRACTITIONER

## 2023-11-30 PROCEDURE — A4216 STERILE WATER/SALINE, 10 ML: HCPCS | Performed by: NURSE PRACTITIONER

## 2023-11-30 RX ORDER — HEPARIN 100 UNIT/ML
500 SYRINGE INTRAVENOUS
Status: CANCELLED | OUTPATIENT
Start: 2023-11-30

## 2023-11-30 RX ORDER — ONDANSETRON 2 MG/ML
8 INJECTION INTRAMUSCULAR; INTRAVENOUS ONCE
Status: COMPLETED | OUTPATIENT
Start: 2023-11-30 | End: 2023-11-30

## 2023-11-30 RX ORDER — SODIUM CHLORIDE 0.9 % (FLUSH) 0.9 %
10 SYRINGE (ML) INJECTION
Status: DISCONTINUED | OUTPATIENT
Start: 2023-11-30 | End: 2023-11-30 | Stop reason: HOSPADM

## 2023-11-30 RX ORDER — ONDANSETRON 2 MG/ML
8 INJECTION INTRAMUSCULAR; INTRAVENOUS ONCE
Status: CANCELLED
Start: 2023-12-01 | End: 2023-12-01

## 2023-11-30 RX ORDER — HEPARIN 100 UNIT/ML
500 SYRINGE INTRAVENOUS
Status: DISCONTINUED | OUTPATIENT
Start: 2023-11-30 | End: 2023-11-30 | Stop reason: HOSPADM

## 2023-11-30 RX ORDER — SODIUM CHLORIDE 0.9 % (FLUSH) 0.9 %
10 SYRINGE (ML) INJECTION
Status: CANCELLED | OUTPATIENT
Start: 2023-11-30

## 2023-11-30 RX ADMIN — Medication 10 ML: at 02:11

## 2023-11-30 RX ADMIN — ONDANSETRON 8 MG: 2 INJECTION INTRAMUSCULAR; INTRAVENOUS at 01:11

## 2023-11-30 RX ADMIN — HEPARIN 500 UNITS: 100 SYRINGE at 02:11

## 2023-11-30 RX ADMIN — SODIUM CHLORIDE 1000 ML: 9 INJECTION, SOLUTION INTRAVENOUS at 01:11

## 2023-11-30 NOTE — PLAN OF CARE
Problem: Adult Inpatient Plan of Care  Goal: Plan of Care Review  Outcome: Ongoing, Progressing  Goal: Patient-Specific Goal (Individualized)  Outcome: Ongoing, Progressing  Goal: Absence of Hospital-Acquired Illness or Injury  Outcome: Ongoing, Progressing  Goal: Optimal Comfort and Wellbeing  Outcome: Ongoing, Progressing  Goal: Readiness for Transition of Care  Outcome: Ongoing, Progressing      OBSERVATION ADMIT

## 2023-11-30 NOTE — PLAN OF CARE
"  Problem: Fatigue  Goal: Improved Activity Tolerance  Outcome: Ongoing, Progressing  Intervention: Promote Improved Energy  Flowsheets (Taken 11/30/2023 0840)  Fatigue Management:   activity schedule adjusted   activity assistance provided   fatigue-related activity identified   frequent rest breaks encouraged   paced activity encouraged  Sleep/Rest Enhancement:   awakenings minimized   consistent schedule promoted   natural light exposure provided   noise level reduced   reading promoted   regular sleep/rest pattern promoted   relaxation techniques promoted  Activity Management:   Ambulated -L4   Up in chair - L3  BP (!) 120/57 (BP Location: Left arm, Patient Position: Sitting)   Pulse 65   Temp 98.4 °F (36.9 °C) (Tympanic)   Resp 18   Ht 6' 4" (1.93 m)   Wt 78 kg (172 lb)   SpO2 99%   BMI 20.94 kg/m² Pleasant, alert and oriented patient to OPT for IVF's and IV Zofran for CINV - VSS and patient tolerated infusion well - Patient discharged to home per self - RTC in 1 week       "

## 2023-12-04 ENCOUNTER — HOSPITAL ENCOUNTER (EMERGENCY)
Facility: HOSPITAL | Age: 78
Discharge: HOME | End: 2023-12-04
Attending: EMERGENCY MEDICINE
Payer: MEDICARE

## 2023-12-04 ENCOUNTER — TELEPHONE (OUTPATIENT)
Dept: HEMATOLOGY/ONCOLOGY | Facility: CLINIC | Age: 78
End: 2023-12-04

## 2023-12-04 VITALS
DIASTOLIC BLOOD PRESSURE: 68 MMHG | RESPIRATION RATE: 18 BRPM | OXYGEN SATURATION: 100 % | TEMPERATURE: 98 F | SYSTOLIC BLOOD PRESSURE: 129 MMHG | BODY MASS INDEX: 20.95 KG/M2 | HEIGHT: 76 IN | WEIGHT: 172 LBS | HEART RATE: 68 BPM

## 2023-12-04 DIAGNOSIS — R53.1 WEAKNESS: ICD-10-CM

## 2023-12-04 DIAGNOSIS — D64.9 SYMPTOMATIC ANEMIA: Primary | ICD-10-CM

## 2023-12-04 DIAGNOSIS — R53.0 NEOPLASTIC MALIGNANT RELATED FATIGUE: ICD-10-CM

## 2023-12-04 LAB
ABO + RH BLD: NORMAL
BLD GP AB SCN CELLS X3 SERPL QL: NORMAL
BLD PROD TYP BPU: NORMAL
BLD PROD TYP BPU: NORMAL
BLOOD UNIT EXPIRATION DATE: NORMAL
BLOOD UNIT EXPIRATION DATE: NORMAL
BLOOD UNIT TYPE CODE: 6200
BLOOD UNIT TYPE CODE: 6200
BLOOD UNIT TYPE: NORMAL
BLOOD UNIT TYPE: NORMAL
CODING SYSTEM: NORMAL
CODING SYSTEM: NORMAL
CROSSMATCH INTERPRETATION: NORMAL
CROSSMATCH INTERPRETATION: NORMAL
DISPENSE STATUS: NORMAL
DISPENSE STATUS: NORMAL
HCT VFR BLD AUTO: 18 % (ref 40–54)
HGB BLD-MCNC: 5.9 G/DL (ref 14–18)
NUM UNITS TRANS PACKED RBC: NORMAL
NUM UNITS TRANS PACKED RBC: NORMAL
SPECIMEN OUTDATE: NORMAL

## 2023-12-04 PROCEDURE — 96365 THER/PROPH/DIAG IV INF INIT: CPT

## 2023-12-04 PROCEDURE — 99291 CRITICAL CARE FIRST HOUR: CPT | Mod: 25

## 2023-12-04 PROCEDURE — 86901 BLOOD TYPING SEROLOGIC RH(D): CPT | Performed by: EMERGENCY MEDICINE

## 2023-12-04 PROCEDURE — 85018 HEMOGLOBIN: CPT | Performed by: EMERGENCY MEDICINE

## 2023-12-04 PROCEDURE — 63600175 PHARM REV CODE 636 W HCPCS: Performed by: EMERGENCY MEDICINE

## 2023-12-04 PROCEDURE — P9016 RBC LEUKOCYTES REDUCED: HCPCS | Performed by: EMERGENCY MEDICINE

## 2023-12-04 PROCEDURE — 36415 COLL VENOUS BLD VENIPUNCTURE: CPT | Performed by: EMERGENCY MEDICINE

## 2023-12-04 PROCEDURE — 85014 HEMATOCRIT: CPT | Mod: 91 | Performed by: EMERGENCY MEDICINE

## 2023-12-04 PROCEDURE — 86920 COMPATIBILITY TEST SPIN: CPT | Mod: 59 | Performed by: EMERGENCY MEDICINE

## 2023-12-04 PROCEDURE — 36430 TRANSFUSION BLD/BLD COMPNT: CPT

## 2023-12-04 RX ORDER — HYDROCODONE BITARTRATE AND ACETAMINOPHEN 500; 5 MG/1; MG/1
TABLET ORAL
Status: DISCONTINUED | OUTPATIENT
Start: 2023-12-04 | End: 2023-12-04 | Stop reason: HOSPADM

## 2023-12-04 RX ORDER — MAGNESIUM SULFATE HEPTAHYDRATE 40 MG/ML
2 INJECTION, SOLUTION INTRAVENOUS
Status: COMPLETED | OUTPATIENT
Start: 2023-12-04 | End: 2023-12-04

## 2023-12-04 RX ADMIN — MAGNESIUM SULFATE HEPTAHYDRATE 2 G: 40 INJECTION, SOLUTION INTRAVENOUS at 04:12

## 2023-12-04 NOTE — ED NOTES
Initial assessment complete. Pt presents after a call from his practitioner reporting a critically low Hemaglobin. Pt wife at bedside.VSS. POC updated. Call light in reach.

## 2023-12-04 NOTE — TELEPHONE ENCOUNTER
Critical hgb of 6.4 and hct of 19.5 called from Mady at [facility]. Reviewed with ANGI Pitts and per her verbal order, patient to report to the ER for evaluation and treatment. Writer stressed that patient should have someone drive him to the ER or call EMS. Patient made aware of above and verbalized understanding.

## 2023-12-04 NOTE — DISCHARGE INSTRUCTIONS
Continue previously prescribed medications and treatments, contact your primary care provider and your oncologist tomorrow for further instructions, return here as needed or if worse in any way.

## 2023-12-04 NOTE — ED PROVIDER NOTES
Encounter Date: 12/4/2023       History     Chief Complaint   Patient presents with    Abnormal Labs     Sent by MD for low blood counts    Fatigue     Male, currently undergoing chemotherapy treatments for lung cancer, here at the request of his oncologist for possible blood transfusion.  Routine lab work done this morning showed patient's H&H was low, at 6.4 and 19.5.  Magnesium slightly low at 1.4.  Patient states that ever since his diagnosis, he has felt fatigued and somewhat weak, but his symptoms seemed to be somewhat worse over the last few weeks.  Patient has had blood transfusions in the past secondary to his disease and resulting anemia.  Because of his weakness, he had a fall a few days ago after he tripped.  He sustained some abrasions to his face, but no other significant injuries.  Patient denies shortness of breath.  Denies chest pain or palpitations.  No nausea or vomiting.  No fever, no chills.      Review of patient's allergies indicates:   Allergen Reactions    Penicillins      Other reaction(s): Rash  50 YEARS AGO       Past Medical History:   Diagnosis Date    Allergy     SEASON    Basal cell carcinoma 2009    L ear    Cancer     Hx colon     Pneumonia, unspecified organism 07/2023     Past Surgical History:   Procedure Laterality Date    CATARACT EXTRACTION Bilateral 2022    COLON SURGERY  2000    1ft. sigmoid removed    COLONOSCOPY N/A 09/28/2020    Procedure: COLONOSCOPY;  Surgeon: Ty Pollock MD;  Location: [facility];  Service: General;  Laterality: N/A;    INSERTION OF TUNNELED CENTRAL VENOUS CATHETER (CVC) WITH SUBCUTANEOUS PORT N/A 9/11/2023    Procedure: [id number]-[id number];  Surgeon: Antwon Peres Jr., MD;  Location: [facility] OR;  Service: General;  Laterality: N/A;     Family History   Problem Relation Age of Onset    Cancer Mother     Brain cancer Mother     Cancer Brother     Macular degeneration Brother     Pancreatic cancer Brother     Melanoma Neg Hx      Social  History     Tobacco Use    Smoking status: Former     Current packs/day: 0.25     Average packs/day: 0.3 packs/day for 15.3 years (3.8 ttl pk-yrs)     Types: Cigarettes     Start date: 9/6/2008    Smokeless tobacco: Never   Substance Use Topics    Alcohol use: Yes     Alcohol/week: 2.0 standard drinks of alcohol     Types: 2 Drinks containing 0.5 oz of alcohol per week     Comment: 2 mixed drinks WEEK    Drug use: No     Review of Systems   Constitutional:  Positive for fatigue.   HENT: Negative.     Eyes: Negative.    Respiratory: Negative.     Cardiovascular: Negative.    Gastrointestinal: Negative.  Negative for blood in stool.   Musculoskeletal: Negative.    Skin:  Positive for pallor.   Allergic/Immunologic: Negative.    Neurological:  Positive for weakness.   Psychiatric/Behavioral: Negative.         Physical Exam     Initial Vitals   BP Pulse Resp Temp SpO2   12/04/23 1526 12/04/23 1524 12/04/23 1524 12/04/23 1524 12/04/23 1524   (!) 108/49 92 20 97.9 °F (36.6 °C) 97 %      MAP       --                Physical Exam    Nursing note and vitals reviewed.  Constitutional: He appears well-developed and well-nourished. No distress.   HENT:   Head: Normocephalic.   Nose: Nose normal.   Mouth/Throat: Oropharynx is clear and moist. No oropharyngeal exudate.   Abrasions over the bridge of the nose, and above the right eyebrow.  No current bleeding.  Injuries appear to be several days old.   Eyes: Conjunctivae and EOM are normal. Pupils are equal, round, and reactive to light. No scleral icterus.   Neck: Neck supple. No JVD present.   Normal range of motion.  Cardiovascular:  Normal rate, regular rhythm, normal heart sounds and intact distal pulses.           No murmur heard.  Pulmonary/Chest: Breath sounds normal. No stridor. No respiratory distress. He has no wheezes.   Abdominal: Abdomen is soft. Bowel sounds are normal. He exhibits no distension. There is no abdominal tenderness.   Musculoskeletal:         General:  No tenderness or edema. Normal range of motion.      Cervical back: Normal range of motion and neck supple.     Neurological: He is alert and oriented to person, place, and time. He has normal strength. No cranial nerve deficit or sensory deficit. GCS score is 15. GCS eye subscore is 4. GCS verbal subscore is 5. GCS motor subscore is 6.   Skin: Skin is warm and dry. Capillary refill takes less than 2 seconds. No rash noted. No erythema. There is pallor.   Psychiatric: He has a normal mood and affect. His behavior is normal.         ED Course   Critical Care    Date/Time: 12/4/2023 5:26 PM    Performed by: Reji Bloom MD  Authorized by: Reji Bloom MD  Direct patient critical care time: 27 minutes  Additional history critical care time: 9 minutes  Ordering / reviewing critical care time: 10 minutes  Documentation critical care time: 15 minutes  Total critical care time (exclusive of procedural time) : 61 minutes  Critical care time was exclusive of separately billable procedures and treating other patients and teaching time.  Critical care was necessary to treat or prevent imminent or life-threatening deterioration of the following conditions: circulatory failure.  Critical care was time spent personally by me on the following activities: development of treatment plan with patient or surrogate, interpretation of cardiac output measurements, evaluation of patient's response to treatment, examination of patient, obtaining history from patient or surrogate, ordering and performing treatments and interventions, ordering and review of laboratory studies, ordering and review of radiographic studies, pulse oximetry, re-evaluation of patient's condition and review of old charts.        Labs Reviewed   HEMOGLOBIN - Abnormal; Notable for the following components:       Result Value    Hemoglobin 5.9 (*)     All other components within normal limits    Narrative:        5.9 hgb and 18.0 hct critical result(s) called  at 16:57 and verbal   readback obtained from TATY Burns RN by AML2 12/04/2023 16:58   HEMATOCRIT - Abnormal; Notable for the following components:    Hematocrit 18.0 (*)     All other components within normal limits    Narrative:        5.9 hgb and 18.0 hct critical result(s) called at 16:57 and verbal   readback obtained from TATY Burns RN by AML2 12/04/2023 16:58   TYPE & SCREEN   PREPARE RBC SOFT          Imaging Results    None          Medications   magnesium sulfate 2g in water 50mL IVPB (premix) (0 g Intravenous Stopped 12/4/23 1705)     Medical Decision Making  Differential includes anemia of chronic disease, anemia secondary to chemotherapy, anemia secondary to GI loss, etc.    Patient's repeat H&H showed 5.9 and 18.  He will need transfusion of 2 units packed red blood cells.  Patient has consented to the transfusion and blood is being prepared.  His magnesium was also low, so he was given 2 g IV.  At shift change, patient's care be transferred to Dr. Madison who will manage patient until transfusion is completed then likely discharge.    Amount and/or Complexity of Data Reviewed  Labs: ordered.    Risk  Prescription drug management.                                      Clinical Impression:  Final diagnoses:  [R53.1] Weakness  [R53.0] Neoplastic malignant related fatigue  [D64.9] Symptomatic anemia (Primary)          ED Disposition Condition    Discharge Stable          ED Prescriptions    None       Follow-up Information       Follow up With Specialties Details Why Contact Info    Nola Ervin MD Family Medicine Call in 1 day  [address]  [address]  [facility] [address] [phone]      Your oncologist  Call in 1 day      [facility] Emergency Dept Emergency Medicine  As needed, If symptoms worsen [address] [facility] [address] [phone]             Reji Bloom MD  12/11/23 0608       Reji Bloom MD  12/11/23 0608

## 2023-12-06 ENCOUNTER — PATIENT MESSAGE (OUTPATIENT)
Dept: HEMATOLOGY/ONCOLOGY | Facility: CLINIC | Age: 78
End: 2023-12-06

## 2023-12-06 ENCOUNTER — TELEPHONE (OUTPATIENT)
Dept: HEMATOLOGY/ONCOLOGY | Facility: CLINIC | Age: 78
End: 2023-12-06

## 2023-12-06 DIAGNOSIS — C34.12 MALIGNANT NEOPLASM OF UPPER LOBE OF LEFT LUNG: Primary | ICD-10-CM

## 2023-12-06 NOTE — TELEPHONE ENCOUNTER
Spoke to pt and notified him that he is due for a PET scan per Allie. His treatment will be held until after he has that done. He also needs to have labs re-drawn since he had a blood transfusion on 12/4. Pt verbalized understanding.

## 2023-12-07 ENCOUNTER — LAB VISIT (OUTPATIENT)
Dept: LAB | Facility: HOSPITAL | Age: 78
End: 2023-12-07
Attending: NURSE PRACTITIONER
Payer: MEDICARE

## 2023-12-07 ENCOUNTER — INFUSION (OUTPATIENT)
Dept: INFUSION THERAPY | Facility: HOSPITAL | Age: 78
End: 2023-12-07
Payer: MEDICARE

## 2023-12-07 VITALS
DIASTOLIC BLOOD PRESSURE: 63 MMHG | RESPIRATION RATE: 17 BRPM | WEIGHT: 172 LBS | BODY MASS INDEX: 20.95 KG/M2 | TEMPERATURE: 98 F | SYSTOLIC BLOOD PRESSURE: 141 MMHG | HEART RATE: 66 BPM | OXYGEN SATURATION: 98 % | HEIGHT: 76 IN

## 2023-12-07 DIAGNOSIS — C34.12 MALIGNANT NEOPLASM OF UPPER LOBE OF LEFT LUNG: ICD-10-CM

## 2023-12-07 DIAGNOSIS — C34.92 NSCLC OF LEFT LUNG: Primary | ICD-10-CM

## 2023-12-07 DIAGNOSIS — R53.83 FATIGUE, UNSPECIFIED TYPE: ICD-10-CM

## 2023-12-07 DIAGNOSIS — C34.92 NSCLC OF LEFT LUNG: ICD-10-CM

## 2023-12-07 LAB
ALBUMIN SERPL BCP-MCNC: 2.9 G/DL (ref 3.5–5.2)
ALP SERPL-CCNC: 87 U/L (ref 55–135)
ALT SERPL W/O P-5'-P-CCNC: 19 U/L (ref 10–44)
ANION GAP SERPL CALC-SCNC: 11 MMOL/L (ref 8–16)
AST SERPL-CCNC: 22 U/L (ref 10–40)
BASOPHILS # BLD AUTO: 0.02 K/UL (ref 0–0.2)
BASOPHILS NFR BLD: 0.3 % (ref 0–1.9)
BILIRUB SERPL-MCNC: 0.2 MG/DL (ref 0.1–1)
BUN SERPL-MCNC: 11 MG/DL (ref 8–23)
CALCIUM SERPL-MCNC: 9.4 MG/DL (ref 8.7–10.5)
CHLORIDE SERPL-SCNC: 101 MMOL/L (ref 95–110)
CO2 SERPL-SCNC: 24 MMOL/L (ref 23–29)
CREAT SERPL-MCNC: 0.9 MG/DL (ref 0.5–1.4)
DIFFERENTIAL METHOD: ABNORMAL
EOSINOPHIL # BLD AUTO: 0 K/UL (ref 0–0.5)
EOSINOPHIL NFR BLD: 0 % (ref 0–8)
ERYTHROCYTE [DISTWIDTH] IN BLOOD BY AUTOMATED COUNT: 21.1 % (ref 11.5–14.5)
EST. GFR  (NO RACE VARIABLE): >60 ML/MIN/1.73 M^2
GLUCOSE SERPL-MCNC: 201 MG/DL (ref 70–110)
HCT VFR BLD AUTO: 28.4 % (ref 40–54)
HGB BLD-MCNC: 9.2 G/DL (ref 14–18)
IMM GRANULOCYTES # BLD AUTO: 0.08 K/UL (ref 0–0.04)
IMM GRANULOCYTES NFR BLD AUTO: 1.2 % (ref 0–0.5)
LYMPHOCYTES # BLD AUTO: 1.5 K/UL (ref 1–4.8)
LYMPHOCYTES NFR BLD: 22.8 % (ref 18–48)
MAGNESIUM SERPL-MCNC: 1.6 MG/DL (ref 1.6–2.6)
MCH RBC QN AUTO: 30.6 PG (ref 27–31)
MCHC RBC AUTO-ENTMCNC: 32.4 G/DL (ref 32–36)
MCV RBC AUTO: 94 FL (ref 82–98)
MONOCYTES # BLD AUTO: 1.2 K/UL (ref 0.3–1)
MONOCYTES NFR BLD: 18.9 % (ref 4–15)
NEUTROPHILS # BLD AUTO: 3.6 K/UL (ref 1.8–7.7)
NEUTROPHILS NFR BLD: 56.8 % (ref 38–73)
NRBC BLD-RTO: 0 /100 WBC
PLATELET # BLD AUTO: 232 K/UL (ref 150–450)
PMV BLD AUTO: 9.2 FL (ref 9.2–12.9)
POTASSIUM SERPL-SCNC: 4.4 MMOL/L (ref 3.5–5.1)
PROT SERPL-MCNC: 6.4 G/DL (ref 6–8.4)
RBC # BLD AUTO: 3.01 M/UL (ref 4.6–6.2)
SODIUM SERPL-SCNC: 136 MMOL/L (ref 136–145)
WBC # BLD AUTO: 6.41 K/UL (ref 3.9–12.7)

## 2023-12-07 PROCEDURE — A4216 STERILE WATER/SALINE, 10 ML: HCPCS | Performed by: NURSE PRACTITIONER

## 2023-12-07 PROCEDURE — 96365 THER/PROPH/DIAG IV INF INIT: CPT | Performed by: PHYSICIAN ASSISTANT

## 2023-12-07 PROCEDURE — 36415 COLL VENOUS BLD VENIPUNCTURE: CPT | Performed by: NURSE PRACTITIONER

## 2023-12-07 PROCEDURE — 63600175 PHARM REV CODE 636 W HCPCS: Performed by: NURSE PRACTITIONER

## 2023-12-07 PROCEDURE — 25000003 PHARM REV CODE 250: Performed by: NURSE PRACTITIONER

## 2023-12-07 PROCEDURE — 83735 ASSAY OF MAGNESIUM: CPT | Performed by: NURSE PRACTITIONER

## 2023-12-07 PROCEDURE — 80053 COMPREHEN METABOLIC PANEL: CPT | Performed by: NURSE PRACTITIONER

## 2023-12-07 PROCEDURE — 85025 COMPLETE CBC W/AUTO DIFF WBC: CPT | Performed by: NURSE PRACTITIONER

## 2023-12-07 RX ORDER — ONDANSETRON 2 MG/ML
8 INJECTION INTRAMUSCULAR; INTRAVENOUS ONCE
Status: COMPLETED | OUTPATIENT
Start: 2023-12-07 | End: 2023-12-07

## 2023-12-07 RX ORDER — SODIUM CHLORIDE 0.9 % (FLUSH) 0.9 %
10 SYRINGE (ML) INJECTION
Status: CANCELLED | OUTPATIENT
Start: 2023-12-07

## 2023-12-07 RX ORDER — ONDANSETRON 2 MG/ML
8 INJECTION INTRAMUSCULAR; INTRAVENOUS ONCE
Status: CANCELLED
Start: 2023-12-08 | End: 2023-12-08

## 2023-12-07 RX ORDER — HEPARIN 100 UNIT/ML
500 SYRINGE INTRAVENOUS
Status: CANCELLED | OUTPATIENT
Start: 2023-12-07

## 2023-12-07 RX ORDER — HEPARIN 100 UNIT/ML
500 SYRINGE INTRAVENOUS
Status: DISCONTINUED | OUTPATIENT
Start: 2023-12-07 | End: 2023-12-07 | Stop reason: HOSPADM

## 2023-12-07 RX ORDER — SODIUM CHLORIDE 0.9 % (FLUSH) 0.9 %
10 SYRINGE (ML) INJECTION
Status: DISCONTINUED | OUTPATIENT
Start: 2023-12-07 | End: 2023-12-07 | Stop reason: HOSPADM

## 2023-12-07 RX ADMIN — HEPARIN 500 UNITS: 100 SYRINGE at 02:12

## 2023-12-07 RX ADMIN — Medication 10 ML: at 02:12

## 2023-12-07 RX ADMIN — ONDANSETRON 8 MG: 2 INJECTION INTRAMUSCULAR; INTRAVENOUS at 01:12

## 2023-12-07 RX ADMIN — SODIUM CHLORIDE 1000 ML: 9 INJECTION, SOLUTION INTRAVENOUS at 01:12

## 2023-12-16 ENCOUNTER — PATIENT MESSAGE (OUTPATIENT)
Dept: FAMILY MEDICINE | Facility: CLINIC | Age: 78
End: 2023-12-16
Payer: MEDICARE

## 2023-12-16 DIAGNOSIS — N40.1 BPH WITH OBSTRUCTION/LOWER URINARY TRACT SYMPTOMS: ICD-10-CM

## 2023-12-16 DIAGNOSIS — N13.8 BPH WITH OBSTRUCTION/LOWER URINARY TRACT SYMPTOMS: ICD-10-CM

## 2023-12-17 RX ORDER — FINASTERIDE 5 MG/1
TABLET, FILM COATED ORAL
Qty: 90 TABLET | Refills: 3 | Status: SHIPPED | OUTPATIENT
Start: 2023-12-17 | End: 2023-12-19 | Stop reason: SDUPTHER

## 2023-12-17 RX ORDER — FINASTERIDE 5 MG/1
TABLET, FILM COATED ORAL
Qty: 90 TABLET | Refills: 3 | Status: SHIPPED | OUTPATIENT
Start: 2023-12-17 | End: 2023-12-17

## 2023-12-19 RX ORDER — FINASTERIDE 5 MG/1
TABLET, FILM COATED ORAL
Qty: 90 TABLET | Refills: 3 | Status: SHIPPED | OUTPATIENT
Start: 2023-12-19

## 2023-12-20 ENCOUNTER — HOSPITAL ENCOUNTER (OUTPATIENT)
Dept: RADIOLOGY | Facility: HOSPITAL | Age: 78
Discharge: HOME | End: 2023-12-20
Attending: INTERNAL MEDICINE
Payer: MEDICARE

## 2023-12-20 VITALS — BODY MASS INDEX: 20.7 KG/M2 | HEIGHT: 76 IN | WEIGHT: 170 LBS

## 2023-12-20 DIAGNOSIS — C34.12 MALIGNANT NEOPLASM OF UPPER LOBE OF LEFT LUNG: ICD-10-CM

## 2023-12-20 DIAGNOSIS — R52 ACUTE PAIN: ICD-10-CM

## 2023-12-20 LAB — GLUCOSE SERPL-MCNC: 97 MG/DL (ref 70–110)

## 2023-12-20 PROCEDURE — A9552 F18 FDG: HCPCS | Mod: PO

## 2023-12-21 ENCOUNTER — INFUSION (OUTPATIENT)
Dept: INFUSION THERAPY | Facility: HOSPITAL | Age: 78
End: 2023-12-21
Payer: MEDICARE

## 2023-12-21 VITALS
TEMPERATURE: 98 F | HEIGHT: 76 IN | BODY MASS INDEX: 20.7 KG/M2 | OXYGEN SATURATION: 100 % | WEIGHT: 170 LBS | RESPIRATION RATE: 17 BRPM | DIASTOLIC BLOOD PRESSURE: 64 MMHG | SYSTOLIC BLOOD PRESSURE: 136 MMHG | HEART RATE: 83 BPM

## 2023-12-21 DIAGNOSIS — C34.12 MALIGNANT NEOPLASM OF UPPER LOBE OF LEFT LUNG: ICD-10-CM

## 2023-12-21 DIAGNOSIS — C34.92 NSCLC OF LEFT LUNG: Primary | ICD-10-CM

## 2023-12-21 PROCEDURE — 96523 IRRIG DRUG DELIVERY DEVICE: CPT

## 2023-12-21 PROCEDURE — 96365 THER/PROPH/DIAG IV INF INIT: CPT

## 2023-12-21 PROCEDURE — 25000003 PHARM REV CODE 250: Performed by: NURSE PRACTITIONER

## 2023-12-21 PROCEDURE — A4216 STERILE WATER/SALINE, 10 ML: HCPCS | Performed by: NURSE PRACTITIONER

## 2023-12-21 PROCEDURE — 63600175 PHARM REV CODE 636 W HCPCS: Performed by: NURSE PRACTITIONER

## 2023-12-21 RX ORDER — ONDANSETRON 2 MG/ML
8 INJECTION INTRAMUSCULAR; INTRAVENOUS ONCE
Status: CANCELLED
Start: 2023-12-22 | End: 2023-12-22

## 2023-12-21 RX ORDER — ONDANSETRON 2 MG/ML
8 INJECTION INTRAMUSCULAR; INTRAVENOUS ONCE
Status: COMPLETED | OUTPATIENT
Start: 2023-12-21 | End: 2023-12-21

## 2023-12-21 RX ORDER — SODIUM CHLORIDE 0.9 % (FLUSH) 0.9 %
10 SYRINGE (ML) INJECTION
Status: CANCELLED | OUTPATIENT
Start: 2023-12-21

## 2023-12-21 RX ORDER — HEPARIN 100 UNIT/ML
500 SYRINGE INTRAVENOUS
Status: CANCELLED | OUTPATIENT
Start: 2023-12-21

## 2023-12-21 RX ORDER — SODIUM CHLORIDE 0.9 % (FLUSH) 0.9 %
10 SYRINGE (ML) INJECTION
Status: DISCONTINUED | OUTPATIENT
Start: 2023-12-21 | End: 2023-12-21 | Stop reason: HOSPADM

## 2023-12-21 RX ORDER — HEPARIN 100 UNIT/ML
500 SYRINGE INTRAVENOUS
Status: DISCONTINUED | OUTPATIENT
Start: 2023-12-21 | End: 2023-12-21 | Stop reason: HOSPADM

## 2023-12-21 RX ADMIN — ONDANSETRON 8 MG: 2 INJECTION INTRAMUSCULAR; INTRAVENOUS at 01:12

## 2023-12-21 RX ADMIN — Medication 10 ML: at 02:12

## 2023-12-21 RX ADMIN — SODIUM CHLORIDE 1000 ML: 0.9 INJECTION, SOLUTION INTRAVENOUS at 01:12

## 2023-12-21 RX ADMIN — HEPARIN 500 UNITS: 100 SYRINGE at 02:12

## 2023-12-21 NOTE — PLAN OF CARE
Problem: Adult Inpatient Plan of Care  Goal: Plan of Care Review  Outcome: Ongoing, Progressing  Flowsheets (Taken 12/21/2023 1323)  Plan of Care Reviewed With: patient  Goal: Patient-Specific Goal (Individualized)  Outcome: Ongoing, Progressing  Flowsheets (Taken 12/21/2023 1323)  Anxieties, Fears or Concerns: none  Individualized Care Needs: none  Goal: Absence of Hospital-Acquired Illness or Injury  Outcome: Ongoing, Progressing  Goal: Optimal Comfort and Wellbeing  Outcome: Ongoing, Progressing  Goal: Readiness for Transition of Care  Outcome: Ongoing, Progressing

## 2023-12-22 RX ORDER — HYDROCODONE BITARTRATE AND ACETAMINOPHEN 10; 325 MG/1; MG/1
1 TABLET ORAL EVERY 6 HOURS PRN
Qty: 60 TABLET | Refills: 0 | Status: SHIPPED | OUTPATIENT
Start: 2023-12-22 | End: 2024-01-25 | Stop reason: SDUPTHER

## 2023-12-27 DIAGNOSIS — N52.01 ERECTILE DYSFUNCTION DUE TO ARTERIAL INSUFFICIENCY: ICD-10-CM

## 2023-12-28 DIAGNOSIS — N52.01 ERECTILE DYSFUNCTION DUE TO ARTERIAL INSUFFICIENCY: ICD-10-CM

## 2023-12-28 RX ORDER — SILDENAFIL 100 MG/1
100 TABLET, FILM COATED ORAL DAILY PRN
Qty: 30 TABLET | Refills: 11 | Status: SHIPPED | OUTPATIENT
Start: 2023-12-28 | End: 2023-12-28 | Stop reason: SDUPTHER

## 2023-12-28 RX ORDER — SILDENAFIL 100 MG/1
100 TABLET, FILM COATED ORAL DAILY PRN
Qty: 30 TABLET | Refills: 11 | Status: SHIPPED | OUTPATIENT
Start: 2023-12-28

## 2024-01-02 ENCOUNTER — LAB VISIT (OUTPATIENT)
Dept: LAB | Facility: HOSPITAL | Age: 79
End: 2024-01-02
Attending: NURSE PRACTITIONER
Payer: MEDICARE

## 2024-01-02 ENCOUNTER — INFUSION (OUTPATIENT)
Dept: INFUSION THERAPY | Facility: HOSPITAL | Age: 79
End: 2024-01-02
Payer: MEDICARE

## 2024-01-02 VITALS
DIASTOLIC BLOOD PRESSURE: 61 MMHG | HEIGHT: 76 IN | OXYGEN SATURATION: 98 % | SYSTOLIC BLOOD PRESSURE: 136 MMHG | BODY MASS INDEX: 20.7 KG/M2 | RESPIRATION RATE: 18 BRPM | HEART RATE: 79 BPM | WEIGHT: 170 LBS | TEMPERATURE: 99 F

## 2024-01-02 DIAGNOSIS — C34.12 MALIGNANT NEOPLASM OF UPPER LOBE OF LEFT LUNG: ICD-10-CM

## 2024-01-02 DIAGNOSIS — C34.92 NSCLC OF LEFT LUNG: ICD-10-CM

## 2024-01-02 DIAGNOSIS — R53.83 FATIGUE, UNSPECIFIED TYPE: ICD-10-CM

## 2024-01-02 DIAGNOSIS — C34.92 NSCLC OF LEFT LUNG: Primary | ICD-10-CM

## 2024-01-02 LAB
ALBUMIN SERPL BCP-MCNC: 3.5 G/DL (ref 3.5–5.2)
ALP SERPL-CCNC: 82 U/L (ref 55–135)
ALT SERPL W/O P-5'-P-CCNC: 10 U/L (ref 10–44)
ANION GAP SERPL CALC-SCNC: 13 MMOL/L (ref 8–16)
AST SERPL-CCNC: 15 U/L (ref 10–40)
BASOPHILS # BLD AUTO: 0.02 K/UL (ref 0–0.2)
BASOPHILS NFR BLD: 0.2 % (ref 0–1.9)
BILIRUB SERPL-MCNC: 0.3 MG/DL (ref 0.1–1)
BUN SERPL-MCNC: 19 MG/DL (ref 8–23)
CALCIUM SERPL-MCNC: 9.9 MG/DL (ref 8.7–10.5)
CHLORIDE SERPL-SCNC: 101 MMOL/L (ref 95–110)
CO2 SERPL-SCNC: 24 MMOL/L (ref 23–29)
CREAT SERPL-MCNC: 1 MG/DL (ref 0.5–1.4)
DIFFERENTIAL METHOD BLD: ABNORMAL
EOSINOPHIL # BLD AUTO: 0 K/UL (ref 0–0.5)
EOSINOPHIL NFR BLD: 0.1 % (ref 0–8)
ERYTHROCYTE [DISTWIDTH] IN BLOOD BY AUTOMATED COUNT: 17.8 % (ref 11.5–14.5)
EST. GFR  (NO RACE VARIABLE): >60 ML/MIN/1.73 M^2
GLUCOSE SERPL-MCNC: 147 MG/DL (ref 70–110)
HCT VFR BLD AUTO: 31.4 % (ref 40–54)
HGB BLD-MCNC: 10.1 G/DL (ref 14–18)
IMM GRANULOCYTES # BLD AUTO: 0.03 K/UL (ref 0–0.04)
IMM GRANULOCYTES NFR BLD AUTO: 0.3 % (ref 0–0.5)
LYMPHOCYTES # BLD AUTO: 0.9 K/UL (ref 1–4.8)
LYMPHOCYTES NFR BLD: 9.6 % (ref 18–48)
MAGNESIUM SERPL-MCNC: 1.7 MG/DL (ref 1.6–2.6)
MCH RBC QN AUTO: 31.4 PG (ref 27–31)
MCHC RBC AUTO-ENTMCNC: 32.2 G/DL (ref 32–36)
MCV RBC AUTO: 98 FL (ref 82–98)
MONOCYTES # BLD AUTO: 0.2 K/UL (ref 0.3–1)
MONOCYTES NFR BLD: 1.7 % (ref 4–15)
NEUTROPHILS # BLD AUTO: 8.3 K/UL (ref 1.8–7.7)
NEUTROPHILS NFR BLD: 88.1 % (ref 38–73)
NRBC BLD-RTO: 0 /100 WBC
PLATELET # BLD AUTO: 277 K/UL (ref 150–450)
PMV BLD AUTO: 8.6 FL (ref 9.2–12.9)
POTASSIUM SERPL-SCNC: 4.5 MMOL/L (ref 3.5–5.1)
PROT SERPL-MCNC: 7.4 G/DL (ref 6–8.4)
RBC # BLD AUTO: 3.22 M/UL (ref 4.6–6.2)
SODIUM SERPL-SCNC: 138 MMOL/L (ref 136–145)
WBC # BLD AUTO: 9.4 K/UL (ref 3.9–12.7)

## 2024-01-02 PROCEDURE — 96365 THER/PROPH/DIAG IV INF INIT: CPT

## 2024-01-02 PROCEDURE — 80053 COMPREHEN METABOLIC PANEL: CPT | Performed by: NURSE PRACTITIONER

## 2024-01-02 PROCEDURE — 25000003 PHARM REV CODE 250: Performed by: NURSE PRACTITIONER

## 2024-01-02 PROCEDURE — 83735 ASSAY OF MAGNESIUM: CPT | Performed by: NURSE PRACTITIONER

## 2024-01-02 PROCEDURE — 63600175 PHARM REV CODE 636 W HCPCS: Performed by: NURSE PRACTITIONER

## 2024-01-02 PROCEDURE — 85025 COMPLETE CBC W/AUTO DIFF WBC: CPT | Performed by: NURSE PRACTITIONER

## 2024-01-02 PROCEDURE — 96375 TX/PRO/DX INJ NEW DRUG ADDON: CPT

## 2024-01-02 PROCEDURE — 96366 THER/PROPH/DIAG IV INF ADDON: CPT

## 2024-01-02 PROCEDURE — 36415 COLL VENOUS BLD VENIPUNCTURE: CPT | Performed by: NURSE PRACTITIONER

## 2024-01-02 RX ORDER — HEPARIN 100 UNIT/ML
500 SYRINGE INTRAVENOUS
Status: DISCONTINUED | OUTPATIENT
Start: 2024-01-02 | End: 2024-01-02 | Stop reason: HOSPADM

## 2024-01-02 RX ORDER — HEPARIN 100 UNIT/ML
500 SYRINGE INTRAVENOUS
Status: CANCELLED | OUTPATIENT
Start: 2024-01-03

## 2024-01-02 RX ORDER — PROCHLORPERAZINE EDISYLATE 5 MG/ML
5 INJECTION INTRAMUSCULAR; INTRAVENOUS ONCE AS NEEDED
Status: CANCELLED | OUTPATIENT
Start: 2024-01-03

## 2024-01-02 RX ORDER — EPINEPHRINE 0.3 MG/.3ML
0.3 INJECTION SUBCUTANEOUS ONCE AS NEEDED
Status: CANCELLED | OUTPATIENT
Start: 2024-01-03

## 2024-01-02 RX ORDER — SODIUM CHLORIDE 0.9 % (FLUSH) 0.9 %
10 SYRINGE (ML) INJECTION
Status: CANCELLED | OUTPATIENT
Start: 2024-01-02

## 2024-01-02 RX ORDER — ONDANSETRON 2 MG/ML
8 INJECTION INTRAMUSCULAR; INTRAVENOUS ONCE
Status: CANCELLED
Start: 2024-01-05 | End: 2024-01-05

## 2024-01-02 RX ORDER — SODIUM CHLORIDE 0.9 % (FLUSH) 0.9 %
10 SYRINGE (ML) INJECTION
Status: DISCONTINUED | OUTPATIENT
Start: 2024-01-02 | End: 2024-01-02 | Stop reason: HOSPADM

## 2024-01-02 RX ORDER — ONDANSETRON 2 MG/ML
8 INJECTION INTRAMUSCULAR; INTRAVENOUS ONCE
Status: COMPLETED | OUTPATIENT
Start: 2024-01-02 | End: 2024-01-02

## 2024-01-02 RX ORDER — DIPHENHYDRAMINE HYDROCHLORIDE 50 MG/ML
50 INJECTION, SOLUTION INTRAMUSCULAR; INTRAVENOUS ONCE AS NEEDED
Status: CANCELLED | OUTPATIENT
Start: 2024-01-03

## 2024-01-02 RX ORDER — SODIUM CHLORIDE 0.9 % (FLUSH) 0.9 %
10 SYRINGE (ML) INJECTION
Status: CANCELLED | OUTPATIENT
Start: 2024-01-03

## 2024-01-02 RX ORDER — HEPARIN 100 UNIT/ML
500 SYRINGE INTRAVENOUS
Status: CANCELLED | OUTPATIENT
Start: 2024-01-02

## 2024-01-02 RX ADMIN — ONDANSETRON 8 MG: 2 INJECTION INTRAMUSCULAR; INTRAVENOUS at 01:01

## 2024-01-02 RX ADMIN — SODIUM CHLORIDE 1000 ML: 9 INJECTION, SOLUTION INTRAVENOUS at 01:01

## 2024-01-02 RX ADMIN — HEPARIN 500 UNITS: 100 SYRINGE at 02:01

## 2024-01-02 NOTE — PROGRESS NOTES
Spoke with the patient and his wife regarding the results of the PET scan showing mixed response. Will get a bx of one of the new  lesions and a Guardant test. Pleural fluids was non diagnostic.    Will continue Keytruda until bx resulted

## 2024-01-02 NOTE — PLAN OF CARE
Problem: Adult Inpatient Plan of Care  Goal: Optimal Comfort and Wellbeing  Outcome: Ongoing, Progressing  Intervention: Provide Person-Centered Care  Flowsheets (Taken 1/2/2024 1342)  Trust Relationship/Rapport:   care explained   choices provided   emotional support provided   empathic listening provided   questions answered   questions encouraged   reassurance provided   thoughts/feelings acknowledged

## 2024-01-03 ENCOUNTER — TELEPHONE (OUTPATIENT)
Dept: HEMATOLOGY/ONCOLOGY | Facility: CLINIC | Age: 79
End: 2024-01-03

## 2024-01-03 ENCOUNTER — OFFICE VISIT (OUTPATIENT)
Dept: HEMATOLOGY/ONCOLOGY | Facility: CLINIC | Age: 79
End: 2024-01-03
Payer: MEDICARE

## 2024-01-03 ENCOUNTER — INFUSION (OUTPATIENT)
Dept: INFUSION THERAPY | Facility: HOSPITAL | Age: 79
End: 2024-01-03
Attending: INTERNAL MEDICINE
Payer: MEDICARE

## 2024-01-03 VITALS
HEIGHT: 76 IN | DIASTOLIC BLOOD PRESSURE: 68 MMHG | RESPIRATION RATE: 18 BRPM | TEMPERATURE: 98 F | WEIGHT: 173.81 LBS | SYSTOLIC BLOOD PRESSURE: 114 MMHG | HEART RATE: 81 BPM | BODY MASS INDEX: 21.17 KG/M2 | OXYGEN SATURATION: 98 %

## 2024-01-03 DIAGNOSIS — C34.12 MALIGNANT NEOPLASM OF UPPER LOBE OF LEFT LUNG: Primary | ICD-10-CM

## 2024-01-03 DIAGNOSIS — G89.3 CANCER RELATED PAIN: ICD-10-CM

## 2024-01-03 DIAGNOSIS — C34.92 NSCLC OF LEFT LUNG: Primary | ICD-10-CM

## 2024-01-03 DIAGNOSIS — D63.0 ANEMIA IN NEOPLASTIC DISEASE: ICD-10-CM

## 2024-01-03 DIAGNOSIS — Z85.038 PERSONAL HISTORY OF COLON CANCER, STAGE I: ICD-10-CM

## 2024-01-03 DIAGNOSIS — C34.12 MALIGNANT NEOPLASM OF UPPER LOBE OF LEFT LUNG: ICD-10-CM

## 2024-01-03 DIAGNOSIS — E03.2 HYPOTHYROIDISM DUE TO DRUGS: Primary | ICD-10-CM

## 2024-01-03 LAB
CEA SERPL-MCNC: 1.6 NG/ML (ref 0–5)
TSH SERPL DL<=0.005 MIU/L-ACNC: 0.48 UIU/ML (ref 0.34–5.6)

## 2024-01-03 PROCEDURE — 96411 CHEMO IV PUSH ADDL DRUG: CPT

## 2024-01-03 PROCEDURE — 96413 CHEMO IV INFUSION 1 HR: CPT

## 2024-01-03 PROCEDURE — 99214 OFFICE O/P EST MOD 30 MIN: CPT | Mod: S$PBB,,, | Performed by: NURSE PRACTITIONER

## 2024-01-03 PROCEDURE — 84443 ASSAY THYROID STIM HORMONE: CPT | Performed by: NURSE PRACTITIONER

## 2024-01-03 PROCEDURE — 25000003 PHARM REV CODE 250: Performed by: NURSE PRACTITIONER

## 2024-01-03 PROCEDURE — 99211 OFF/OP EST MAY X REQ PHY/QHP: CPT | Mod: 25 | Performed by: NURSE PRACTITIONER

## 2024-01-03 PROCEDURE — 82378 CARCINOEMBRYONIC ANTIGEN: CPT | Performed by: NURSE PRACTITIONER

## 2024-01-03 PROCEDURE — 63600175 PHARM REV CODE 636 W HCPCS: Mod: JZ,JG | Performed by: NURSE PRACTITIONER

## 2024-01-03 PROCEDURE — A4216 STERILE WATER/SALINE, 10 ML: HCPCS | Performed by: NURSE PRACTITIONER

## 2024-01-03 PROCEDURE — 96367 TX/PROPH/DG ADDL SEQ IV INF: CPT

## 2024-01-03 RX ORDER — HEPARIN 100 UNIT/ML
500 SYRINGE INTRAVENOUS
Status: DISCONTINUED | OUTPATIENT
Start: 2024-01-03 | End: 2024-01-03 | Stop reason: HOSPADM

## 2024-01-03 RX ORDER — PROCHLORPERAZINE EDISYLATE 5 MG/ML
5 INJECTION INTRAMUSCULAR; INTRAVENOUS ONCE AS NEEDED
Status: DISCONTINUED | OUTPATIENT
Start: 2024-01-03 | End: 2024-01-03 | Stop reason: HOSPADM

## 2024-01-03 RX ORDER — SODIUM CHLORIDE 0.9 % (FLUSH) 0.9 %
10 SYRINGE (ML) INJECTION
Status: DISCONTINUED | OUTPATIENT
Start: 2024-01-03 | End: 2024-01-03 | Stop reason: HOSPADM

## 2024-01-03 RX ORDER — EPINEPHRINE 0.3 MG/.3ML
0.3 INJECTION SUBCUTANEOUS ONCE AS NEEDED
Status: DISCONTINUED | OUTPATIENT
Start: 2024-01-03 | End: 2024-01-03 | Stop reason: HOSPADM

## 2024-01-03 RX ORDER — DIPHENHYDRAMINE HYDROCHLORIDE 50 MG/ML
50 INJECTION, SOLUTION INTRAMUSCULAR; INTRAVENOUS ONCE AS NEEDED
Status: DISCONTINUED | OUTPATIENT
Start: 2024-01-03 | End: 2024-01-03 | Stop reason: HOSPADM

## 2024-01-03 RX ADMIN — PEMETREXED DISODIUM 1075 MG: 500 INJECTION, POWDER, LYOPHILIZED, FOR SOLUTION INTRAVENOUS at 11:01

## 2024-01-03 RX ADMIN — SODIUM CHLORIDE, PRESERVATIVE FREE 10 ML: 5 INJECTION INTRAVENOUS at 11:01

## 2024-01-03 RX ADMIN — HEPARIN 500 UNITS: 100 SYRINGE at 11:01

## 2024-01-03 RX ADMIN — SODIUM CHLORIDE 200 MG: 9 INJECTION, SOLUTION INTRAVENOUS at 10:01

## 2024-01-03 RX ADMIN — DEXAMETHASONE SODIUM PHOSPHATE 8 MG: 4 INJECTION, SOLUTION INTRA-ARTICULAR; INTRALESIONAL; INTRAMUSCULAR; INTRAVENOUS; SOFT TISSUE at 11:01

## 2024-01-03 NOTE — PROGRESS NOTES
PROGRESS NOTE    Subjective:       Patient ID: Akil Guzman is a 78 y.o. male.    8/7/2023-CT chest without:  1. Large left pleural effusion with dependent left lower lobe atelectasis.  2. Poorly defined mass-like infiltrate involving the periphery of the left upper lobe extending to the left hilum.  Underlying parenchymal mass is not excludable, however, evaluation is limited due to lack of intravenous contrast.  Correlate clinically with possible fever and/or elevated white count.  3. Bilateral soft tissue pulmonary nodules measuring up to 11 mm.  Follow-up per Fleischner guidelines.  For multiple solid nodules with any 6 mm or greater, Fleischner Society guidelines recommend follow up with non-contrast chest CT at 3-6 months and 18-24 months after discovery.  4. Partially calcified left upper lobe pulmonary nodule may represent granulomatous change and scarring.  5. 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  6. Questionable left hilar lymphadenopathy.  However, evaluation is again limited due to lack of intravenous contrast.  7. Small hiatal hernia.    8/17/2023 Left Pleural Fluid:  Positive for adenocarcinoma most c/w lung primary    9/14/2023-PET:  1. FDG avid nodular thickening involving origin of the lingular bronchus with adjacent FDG avid 20 mm nodular density, suspicious for primary pulmonary malignancy.  2. Moderate left pleural effusion with scattered foci of FDG uptake along the parietal pleura of concern for malignant pleural effusion.  3. Left chest wall FDG avid mass resulting in lytic destruction and pathologic fractures of left 10th and 11th ribs, characteristic of metastatic disease.  4. Diffuse prominent FDG activity throughout the intramedullary compartments of the axial and proximal appendicular skeleton is nonspecific. This can be seen with pharmacologic bone marrow stimulation although diffuse metastatic  disease can also be considered. Metastatic disease is felt less likely given history.    9/14/2023-MRI Brain  Negative for mets    Stage IVB-Adenocarcinoma of the lung    PLAN:   Carbo/Pem/Pem ChemoIO therapy    Carbo/Pem/Pem:  Cycle 1: 9/13/2023  Cycle 2: 10/4/2023  Cycle 3: 10/25/2023  Cycle 4: 11/15/2023  Cycle 5: 1/3/2023- Due maint Pem/Pem        Chief Complaint:  No chief complaint on file.  Stage IVB lung carcinoma follow up    History of Present Illness:   Akil Guzman is a 78 y.o. male who presents for follow up of lung cancer.     He has been on chemo/IO therapy and seems to be doing well.  He had bloo0d recently and is feeling better.     Reviewed the PET with the patient and his wife showing decreased size of the lung mass but new retroperitoneal and supraclavicular LN. Concerning for mixed response versus new primary cancer. Discussed with Dr. Torres and will get a Guardant and new biopsy of the new lesions if IR able. Continue Keytruda Alimta for now.     Patient state his pain is much better he is eating well and gaining weight.      Family and Social history reviewed and is unchanged from 9/1/2023             Current Outpatient Medications:     dexAMETHasone (DECADRON) 4 MG Tab, 2 tablets twice a day the day before and after each chemo, Disp: 32 tablet, Rfl: 5    duke's soln (benadryl 30 mL, mylanta 30 mL, LIDOcaine 30 mL, nystatin 30 mL) 120mL, Take 10 mLs by mouth 4 (four) times daily., Disp: 120 mL, Rfl: 5    finasteride (PROSCAR) 5 mg tablet, TAKE 1 TABLET BY MOUTH EVERY DAY FOR PROSTATE, Disp: 90 tablet, Rfl: 3    folic acid (FOLVITE) 1 MG tablet, Take 1 tablet (1 mg total) by mouth once daily., Disp: 100 tablet, Rfl: 3    HYDROcodone-acetaminophen (NORCO)  mg per tablet, Take 1 tablet by mouth every 6 (six) hours as needed for Pain., Disp: 60 tablet, Rfl: 0    ipratropium (ATROVENT) 42 mcg (0.06 %) nasal spray, USE 1 SPRAY in each nostril TWICE DAILY THANK YOU!, Disp: , Rfl:      magnesium oxide (MAG-OX) 400 mg (241.3 mg magnesium) tablet, Take 1 tablet (400 mg total) by mouth once daily., Disp: 30 tablet, Rfl: 11    ondansetron (ZOFRAN) 8 MG tablet, Take 1 tablet (8 mg total) by mouth every 8 (eight) hours as needed., Disp: 30 tablet, Rfl: 5    promethazine (PHENERGAN) 25 MG tablet, Take 1 tablet (25 mg total) by mouth every 4 to 6 hours as needed., Disp: 30 tablet, Rfl: 5    sildenafiL (VIAGRA) 100 MG tablet, Take 1 tablet (100 mg total) by mouth daily as needed for Erectile Dysfunction., Disp: 30 tablet, Rfl: 11    tamsulosin (FLOMAX) 0.4 mg Cap, Take 1 capsule (0.4 mg total) by mouth once daily., Disp: 90 capsule, Rfl: 3    tiZANidine (ZANAFLEX) 4 MG tablet, Take 1 tablet (4 mg total) by mouth every 12 (twelve) hours as needed (muscle spasms/ pain)., Disp: 40 tablet, Rfl: 0    Current Facility-Administered Medications:     0.9%  NaCl infusion (for blood administration), , Intravenous, Once, Allie Shelley NP-C, Stopped at 10/23/23 1445    Facility-Administered Medications Ordered in Other Visits:     heparin, porcine (PF) 100 unit/mL injection flush 500 Units, 500 Units, Intravenous, PRN, Allie Shelley NP-C, 500 Units at 12/14/23 1422    sodium chloride 0.9% flush 10 mL, 10 mL, Intravenous, PRN, Allie Shelley NP-C, 10 mL at 12/14/23 1426        Objective:       Physical Examination:     There were no vitals taken for this visit.    Physical Exam  Constitutional:       General: He is not in acute distress.     Appearance: Normal appearance. He is well-developed.   HENT:      Head: Normocephalic and atraumatic.      Right Ear: Hearing, tympanic membrane, ear canal and external ear normal.      Left Ear: Hearing, tympanic membrane, ear canal and external ear normal.      Nose: Nose normal.      Mouth/Throat:      Pharynx: Uvula midline.   Eyes:      Conjunctiva/sclera: Conjunctivae normal.      Pupils: Pupils are equal, round, and reactive to light.   Neck:      Thyroid: No thyroid  mass or thyromegaly.      Trachea: Trachea normal.   Cardiovascular:      Rate and Rhythm: Normal rate and regular rhythm.      Pulses: Normal pulses.      Heart sounds: Normal heart sounds, S1 normal and S2 normal.   Pulmonary:      Breath sounds: Normal breath sounds. No wheezing, rhonchi or rales.   Abdominal:      General: Bowel sounds are normal. There is no distension.      Tenderness: There is no guarding or rebound.   Musculoskeletal:      Right shoulder: No deformity or crepitus. Normal range of motion.      Left shoulder: No deformity or crepitus. Normal range of motion.      Cervical back: Neck supple.   Lymphadenopathy:      Cervical: No cervical adenopathy.      Upper Body:      Right upper body: No supraclavicular adenopathy.      Left upper body: No supraclavicular adenopathy.   Skin:     General: Skin is warm and dry.      Capillary Refill: Capillary refill takes less than 2 seconds.      Findings: No lesion or rash.      Nails: There is no clubbing.   Neurological:      Mental Status: He is alert and oriented to person, place, and time.      Sensory: No sensory deficit.      Motor: No tremor.   Psychiatric:         Speech: Speech normal.         Behavior: Behavior normal.         Labs:   Recent Results (from the past 336 hour(s))   CBC Auto Differential    Collection Time: 01/02/24  1:00 PM   Result Value Ref Range    WBC 9.40 3.90 - 12.70 K/uL    Hemoglobin 10.1 (L) 14.0 - 18.0 g/dL    Hematocrit 31.4 (L) 40.0 - 54.0 %    Platelets 277 150 - 450 K/uL     CMP  Sodium   Date Value Ref Range Status   01/02/2024 138 136 - 145 mmol/L Final     Potassium   Date Value Ref Range Status   01/02/2024 4.5 3.5 - 5.1 mmol/L Final     Chloride   Date Value Ref Range Status   01/02/2024 101 95 - 110 mmol/L Final     CO2   Date Value Ref Range Status   01/02/2024 24 23 - 29 mmol/L Final     Glucose   Date Value Ref Range Status   01/02/2024 147 (H) 70 - 110 mg/dL Final     BUN   Date Value Ref Range Status    01/02/2024 19 8 - 23 mg/dL Final     Creatinine   Date Value Ref Range Status   01/02/2024 1.0 0.5 - 1.4 mg/dL Final     Calcium   Date Value Ref Range Status   01/02/2024 9.9 8.7 - 10.5 mg/dL Final     Total Protein   Date Value Ref Range Status   01/02/2024 7.4 6.0 - 8.4 g/dL Final     Albumin   Date Value Ref Range Status   01/02/2024 3.5 3.5 - 5.2 g/dL Final     Total Bilirubin   Date Value Ref Range Status   01/02/2024 0.3 0.1 - 1.0 mg/dL Final     Comment:     For infants and newborns, interpretation of results should be based  on gestational age, weight and in agreement with clinical  observations.    Premature Infant recommended reference ranges:  Up to 24 hours.............<8.0 mg/dL  Up to 48 hours............<12.0 mg/dL  3-5 days..................<15.0 mg/dL  6-29 days.................<15.0 mg/dL       Alkaline Phosphatase   Date Value Ref Range Status   01/02/2024 82 55 - 135 U/L Final     AST   Date Value Ref Range Status   01/02/2024 15 10 - 40 U/L Final     ALT   Date Value Ref Range Status   01/02/2024 10 10 - 44 U/L Final     Anion Gap   Date Value Ref Range Status   01/02/2024 13 8 - 16 mmol/L Final     eGFR if    Date Value Ref Range Status   11/02/2021 56.4 (A) >60 mL/min/1.73 m^2 Final     eGFR if non    Date Value Ref Range Status   11/02/2021 48.8 (A) >60 mL/min/1.73 m^2 Final     Comment:     Calculation used to obtain the estimated glomerular filtration  rate (eGFR) is the CKD-EPI equation.        Lab Results   Component Value Date    CEA 1.6 01/03/2024     Lab Results   Component Value Date    PSA 1.0 06/21/2023    PSA 1.2 11/02/2021    PSA 1.3 08/10/2020           Assessment/Plan:     Problem List Items Addressed This Visit          Oncology    Personal history of colon cancer, stage I    NSCLC of left lung - Primary    Anemia in neoplastic disease     Other Visit Diagnoses       Cancer related pain              Met Lung Cancer- Bx new lesion to rule out  new primary; Guardant testing; Continue with Maint Alimta and Pembro  2. Cancer related pain- continue Percocet PRN  3. Anemia- continue weekly labs  4. History of colon cancer- Cea today    Discussion:     Follow up in about 3 weeks (around 1/24/2024) for with Dr. Torres and 6 weeks with me.      Electronically signed by Allie Shelley, MSN, APRN, AGNP-C, OCN

## 2024-01-03 NOTE — PLAN OF CARE
Problem: Fall Injury Risk  Goal: Absence of Fall and Fall-Related Injury  Outcome: Ongoing, Progressing  Intervention: Identify and Manage Contributors  Flowsheets (Taken 1/3/2024 0956)  Self-Care Promotion: safe use of adaptive equipment encouraged  Medication Review/Management: medications reviewed  Intervention: Promote Injury-Free Environment  Flowsheets (Taken 1/3/2024 0956)  Safety Promotion/Fall Prevention: assistive device/personal item within reach

## 2024-01-04 ENCOUNTER — PATIENT MESSAGE (OUTPATIENT)
Dept: HEMATOLOGY/ONCOLOGY | Facility: CLINIC | Age: 79
End: 2024-01-04

## 2024-01-04 DIAGNOSIS — R59.0 LAD (LYMPHADENOPATHY), SUPRACLAVICULAR: Primary | ICD-10-CM

## 2024-01-08 ENCOUNTER — TELEPHONE (OUTPATIENT)
Dept: RADIOLOGY | Facility: HOSPITAL | Age: 79
End: 2024-01-08

## 2024-01-08 NOTE — NURSING
Lymph node bx scheduled @ [facility] on  1/22 @ 9am with arrival @ 830.  Pre-procedure instructions given and understanding verbalized.

## 2024-01-11 ENCOUNTER — INFUSION (OUTPATIENT)
Dept: INFUSION THERAPY | Facility: HOSPITAL | Age: 79
End: 2024-01-11
Payer: MEDICARE

## 2024-01-11 VITALS
TEMPERATURE: 97 F | SYSTOLIC BLOOD PRESSURE: 110 MMHG | HEIGHT: 76 IN | OXYGEN SATURATION: 99 % | HEART RATE: 85 BPM | DIASTOLIC BLOOD PRESSURE: 53 MMHG | WEIGHT: 173 LBS | RESPIRATION RATE: 16 BRPM | BODY MASS INDEX: 21.07 KG/M2

## 2024-01-11 DIAGNOSIS — C34.92 NSCLC OF LEFT LUNG: Primary | ICD-10-CM

## 2024-01-11 DIAGNOSIS — C34.12 MALIGNANT NEOPLASM OF UPPER LOBE OF LEFT LUNG: ICD-10-CM

## 2024-01-11 PROCEDURE — 96365 THER/PROPH/DIAG IV INF INIT: CPT

## 2024-01-11 PROCEDURE — 63600175 PHARM REV CODE 636 W HCPCS: Performed by: NURSE PRACTITIONER

## 2024-01-11 PROCEDURE — 25000003 PHARM REV CODE 250: Performed by: NURSE PRACTITIONER

## 2024-01-11 PROCEDURE — 96523 IRRIG DRUG DELIVERY DEVICE: CPT

## 2024-01-11 RX ORDER — SODIUM CHLORIDE 0.9 % (FLUSH) 0.9 %
10 SYRINGE (ML) INJECTION
Status: DISCONTINUED | OUTPATIENT
Start: 2024-01-11 | End: 2024-01-11 | Stop reason: HOSPADM

## 2024-01-11 RX ORDER — ONDANSETRON 2 MG/ML
8 INJECTION INTRAMUSCULAR; INTRAVENOUS ONCE
Status: COMPLETED | OUTPATIENT
Start: 2024-01-11 | End: 2024-01-11

## 2024-01-11 RX ORDER — ONDANSETRON 2 MG/ML
8 INJECTION INTRAMUSCULAR; INTRAVENOUS ONCE
Status: CANCELLED
Start: 2024-01-12 | End: 2024-01-12

## 2024-01-11 RX ORDER — HEPARIN 100 UNIT/ML
500 SYRINGE INTRAVENOUS
Status: DISCONTINUED | OUTPATIENT
Start: 2024-01-11 | End: 2024-01-11 | Stop reason: HOSPADM

## 2024-01-11 RX ORDER — HEPARIN 100 UNIT/ML
500 SYRINGE INTRAVENOUS
Status: CANCELLED | OUTPATIENT
Start: 2024-01-11

## 2024-01-11 RX ORDER — SODIUM CHLORIDE 0.9 % (FLUSH) 0.9 %
10 SYRINGE (ML) INJECTION
Status: CANCELLED | OUTPATIENT
Start: 2024-01-11

## 2024-01-11 RX ADMIN — ONDANSETRON 8 MG: 2 INJECTION INTRAMUSCULAR; INTRAVENOUS at 01:01

## 2024-01-11 RX ADMIN — SODIUM CHLORIDE 1000 ML: 9 INJECTION, SOLUTION INTRAVENOUS at 01:01

## 2024-01-11 RX ADMIN — HEPARIN 500 UNITS: 100 SYRINGE at 02:01

## 2024-01-11 NOTE — PLAN OF CARE
Problem: Adult Inpatient Plan of Care  Goal: Optimal Comfort and Wellbeing  Outcome: Ongoing, Progressing  Intervention: Provide Person-Centered Care  Flowsheets (Taken 1/11/2024 1324)  Trust Relationship/Rapport:   care explained   choices provided   emotional support provided   empathic listening provided   questions answered   questions encouraged   reassurance provided   thoughts/feelings acknowledged

## 2024-01-16 ENCOUNTER — OFFICE VISIT (OUTPATIENT)
Dept: HEMATOLOGY/ONCOLOGY | Facility: CLINIC | Age: 79
End: 2024-01-16
Payer: MEDICARE

## 2024-01-16 VITALS
BODY MASS INDEX: 21 KG/M2 | RESPIRATION RATE: 16 BRPM | HEART RATE: 91 BPM | TEMPERATURE: 98 F | SYSTOLIC BLOOD PRESSURE: 136 MMHG | WEIGHT: 172.5 LBS | DIASTOLIC BLOOD PRESSURE: 64 MMHG

## 2024-01-16 DIAGNOSIS — G89.3 CANCER ASSOCIATED PAIN: ICD-10-CM

## 2024-01-16 DIAGNOSIS — C34.12 MALIGNANT NEOPLASM OF UPPER LOBE OF LEFT LUNG: Primary | ICD-10-CM

## 2024-01-16 PROCEDURE — 99213 OFFICE O/P EST LOW 20 MIN: CPT | Performed by: INTERNAL MEDICINE

## 2024-01-16 PROCEDURE — 99214 OFFICE O/P EST MOD 30 MIN: CPT | Mod: S$PBB,,, | Performed by: INTERNAL MEDICINE

## 2024-01-16 NOTE — PROGRESS NOTES
PROGRESS NOTE    Subjective:       Patient ID: Akil Guzman is a 78 y.o. male.    8/7/2023-CT chest without:  1. Large left pleural effusion with dependent left lower lobe atelectasis.  2. Poorly defined mass-like infiltrate involving the periphery of the left upper lobe extending to the left hilum.  Underlying parenchymal mass is not excludable, however, evaluation is limited due to lack of intravenous contrast.  Correlate clinically with possible fever and/or elevated white count.  3. Bilateral soft tissue pulmonary nodules measuring up to 11 mm.  Follow-up per Fleischner guidelines.  For multiple solid nodules with any 6 mm or greater, Fleischner Society guidelines recommend follow up with non-contrast chest CT at 3-6 months and 18-24 months after discovery.  4. Partially calcified left upper lobe pulmonary nodule may represent granulomatous change and scarring.  5. 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  6. Questionable left hilar lymphadenopathy.  However, evaluation is again limited due to lack of intravenous contrast.  7. Small hiatal hernia.    8/17/2023 Left Pleural Fluid:  Positive for adenocarcinoma most c/w lung primary    9/14/2023-PET:  1. FDG avid nodular thickening involving origin of the lingular bronchus with adjacent FDG avid 20 mm nodular density, suspicious for primary pulmonary malignancy.  2. Moderate left pleural effusion with scattered foci of FDG uptake along the parietal pleura of concern for malignant pleural effusion.  3. Left chest wall FDG avid mass resulting in lytic destruction and pathologic fractures of left 10th and 11th ribs, characteristic of metastatic disease.  4. Diffuse prominent FDG activity throughout the intramedullary compartments of the axial and proximal appendicular skeleton is nonspecific. This can be seen with pharmacologic bone marrow stimulation although diffuse metastatic  disease can also be considered. Metastatic disease is felt less likely given history.    9/14/2023-MRI Brain  Negative for mets    Stage IVB-Adenocarcinoma of the lung    PLAN:   Carbo/Pem/Pem ChemoIO therapy    Carbo/Pem/Pem:  Cycle 1: 9/13/2023  Cycle 2: 10/4/2023  Cycle 3: 10/25/2023  Cycle 4: 11/15/2023  Cycle 5: 1/3/2024  Cycle 6: 1/24/2023-due        Chief Complaint:  No chief complaint on file.  Stage IVB lung carcinoma follow up    History of Present Illness:   Akil Guzman is a 78 y.o. male who presents for follow up of lung cancer.     Mr. Guzman is doing ok today.  He tolerated the last cycle of therapy better than previous.       Family and Social history reviewed and is unchanged from 9/1/2023             Current Outpatient Medications:     dexAMETHasone (DECADRON) 4 MG Tab, 2 tablets twice a day the day before and after each chemo, Disp: 32 tablet, Rfl: 5    duke's soln (benadryl 30 mL, mylanta 30 mL, LIDOcaine 30 mL, nystatin 30 mL) 120mL, Take 10 mLs by mouth 4 (four) times daily., Disp: 120 mL, Rfl: 5    finasteride (PROSCAR) 5 mg tablet, TAKE 1 TABLET BY MOUTH EVERY DAY FOR PROSTATE, Disp: 90 tablet, Rfl: 3    folic acid (FOLVITE) 1 MG tablet, Take 1 tablet (1 mg total) by mouth once daily., Disp: 100 tablet, Rfl: 3    HYDROcodone-acetaminophen (NORCO)  mg per tablet, Take 1 tablet by mouth every 6 (six) hours as needed for Pain., Disp: 60 tablet, Rfl: 0    ipratropium (ATROVENT) 42 mcg (0.06 %) nasal spray, USE 1 SPRAY in each nostril TWICE DAILY THANK YOU!, Disp: , Rfl:     magnesium oxide (MAG-OX) 400 mg (241.3 mg magnesium) tablet, Take 1 tablet (400 mg total) by mouth once daily., Disp: 30 tablet, Rfl: 11    ondansetron (ZOFRAN) 8 MG tablet, Take 1 tablet (8 mg total) by mouth every 8 (eight) hours as needed., Disp: 30 tablet, Rfl: 5    promethazine (PHENERGAN) 25 MG tablet, Take 1 tablet (25 mg total) by mouth every 4 to 6 hours as needed., Disp: 30 tablet, Rfl: 5    sildenafiL (VIAGRA)  100 MG tablet, Take 1 tablet (100 mg total) by mouth daily as needed for Erectile Dysfunction., Disp: 30 tablet, Rfl: 11    tamsulosin (FLOMAX) 0.4 mg Cap, Take 1 capsule (0.4 mg total) by mouth once daily., Disp: 90 capsule, Rfl: 3    tiZANidine (ZANAFLEX) 4 MG tablet, Take 1 tablet (4 mg total) by mouth every 12 (twelve) hours as needed (muscle spasms/ pain)., Disp: 40 tablet, Rfl: 0    Current Facility-Administered Medications:     0.9%  NaCl infusion (for blood administration), , Intravenous, Once, Allie Shelley NP-C, Stopped at 10/23/23 1445    Facility-Administered Medications Ordered in Other Visits:     heparin, porcine (PF) 100 unit/mL injection flush 500 Units, 500 Units, Intravenous, PRN, Allie Shelley NP-C, 500 Units at 12/14/23 1422    sodium chloride 0.9% flush 10 mL, 10 mL, Intravenous, PRN, Allie Shelley NP-C, 10 mL at 12/14/23 1426        Objective:       Physical Examination:     /64   Pulse 91   Temp 97.9 °F (36.6 °C)   Resp 16   Wt 78.2 kg (172 lb 8 oz)   BMI 21.00 kg/m²     Physical Exam  Constitutional:       Appearance: Normal appearance.   HENT:      Head: Normocephalic and atraumatic.   Eyes:      General: No scleral icterus.     Conjunctiva/sclera: Conjunctivae normal.   Cardiovascular:      Rate and Rhythm: Normal rate.   Pulmonary:      Effort: Pulmonary effort is normal.   Abdominal:      General: Abdomen is flat.   Neurological:      General: No focal deficit present.      Mental Status: He is alert and oriented to person, place, and time.   Psychiatric:         Mood and Affect: Mood normal.         Behavior: Behavior normal.         Labs:   No results found for this or any previous visit (from the past 336 hour(s)).    CMP  Sodium   Date Value Ref Range Status   01/02/2024 138 136 - 145 mmol/L Final     Potassium   Date Value Ref Range Status   01/02/2024 4.5 3.5 - 5.1 mmol/L Final     Chloride   Date Value Ref Range Status   01/02/2024 101 95 - 110 mmol/L Final      CO2   Date Value Ref Range Status   01/02/2024 24 23 - 29 mmol/L Final     Glucose   Date Value Ref Range Status   01/02/2024 147 (H) 70 - 110 mg/dL Final     BUN   Date Value Ref Range Status   01/02/2024 19 8 - 23 mg/dL Final     Creatinine   Date Value Ref Range Status   01/02/2024 1.0 0.5 - 1.4 mg/dL Final     Calcium   Date Value Ref Range Status   01/02/2024 9.9 8.7 - 10.5 mg/dL Final     Total Protein   Date Value Ref Range Status   01/02/2024 7.4 6.0 - 8.4 g/dL Final     Albumin   Date Value Ref Range Status   01/02/2024 3.5 3.5 - 5.2 g/dL Final     Total Bilirubin   Date Value Ref Range Status   01/02/2024 0.3 0.1 - 1.0 mg/dL Final     Comment:     For infants and newborns, interpretation of results should be based  on gestational age, weight and in agreement with clinical  observations.    Premature Infant recommended reference ranges:  Up to 24 hours.............<8.0 mg/dL  Up to 48 hours............<12.0 mg/dL  3-5 days..................<15.0 mg/dL  6-29 days.................<15.0 mg/dL       Alkaline Phosphatase   Date Value Ref Range Status   01/02/2024 82 55 - 135 U/L Final     AST   Date Value Ref Range Status   01/02/2024 15 10 - 40 U/L Final     ALT   Date Value Ref Range Status   01/02/2024 10 10 - 44 U/L Final     Anion Gap   Date Value Ref Range Status   01/02/2024 13 8 - 16 mmol/L Final     eGFR if    Date Value Ref Range Status   11/02/2021 56.4 (A) >60 mL/min/1.73 m^2 Final     eGFR if non    Date Value Ref Range Status   11/02/2021 48.8 (A) >60 mL/min/1.73 m^2 Final     Comment:     Calculation used to obtain the estimated glomerular filtration  rate (eGFR) is the CKD-EPI equation.        Lab Results   Component Value Date    CEA 1.6 01/03/2024     Lab Results   Component Value Date    PSA 1.0 06/21/2023    PSA 1.2 11/02/2021    PSA 1.3 08/10/2020           Assessment/Plan:     Problem List Items Addressed This Visit       Malignant neoplasm of upper lobe  "of left lung - Primary     Patient is doing ok on the therapy.  He has completed the chemotherapy with carbo and is on alimta/pembro.  Doing better with this.  No sign of AI disease.  Will continue therapy moving forward but note is made of PET scan findiings.      He has a "mixed response" on the PET and this needs to be investigated.  Biopsy will be done next week and will see where this takes us.  May need change of therapy.  Discussed this today.          Cancer associated pain     Patient is doing ok from this standpoint.  Will continue current care approach.            Discussion:     Follow up in about 3 weeks (around 2/6/2024) for for NP visit and 6 weeks with me. .      Electronically signed by Reynaldo Ball      "

## 2024-01-16 NOTE — ASSESSMENT & PLAN NOTE
"Patient is doing ok on the therapy.  He has completed the chemotherapy with carbo and is on alimta/pembro.  Doing better with this.  No sign of AI disease.  Will continue therapy moving forward but note is made of PET scan findiings.      He has a "mixed response" on the PET and this needs to be investigated.  Biopsy will be done next week and will see where this takes us.  May need change of therapy.  Discussed this today.   "

## 2024-01-18 ENCOUNTER — INFUSION (OUTPATIENT)
Dept: INFUSION THERAPY | Facility: HOSPITAL | Age: 79
End: 2024-01-18
Payer: MEDICARE

## 2024-01-18 VITALS
WEIGHT: 173 LBS | DIASTOLIC BLOOD PRESSURE: 70 MMHG | HEIGHT: 77 IN | OXYGEN SATURATION: 100 % | SYSTOLIC BLOOD PRESSURE: 114 MMHG | BODY MASS INDEX: 20.43 KG/M2 | HEART RATE: 85 BPM | RESPIRATION RATE: 20 BRPM | TEMPERATURE: 97 F

## 2024-01-18 DIAGNOSIS — C34.12 MALIGNANT NEOPLASM OF UPPER LOBE OF LEFT LUNG: ICD-10-CM

## 2024-01-18 DIAGNOSIS — C34.92 NSCLC OF LEFT LUNG: Primary | ICD-10-CM

## 2024-01-18 PROCEDURE — 25000003 PHARM REV CODE 250: Performed by: NURSE PRACTITIONER

## 2024-01-18 PROCEDURE — 63600175 PHARM REV CODE 636 W HCPCS: Performed by: NURSE PRACTITIONER

## 2024-01-18 PROCEDURE — 96361 HYDRATE IV INFUSION ADD-ON: CPT

## 2024-01-18 PROCEDURE — 96374 THER/PROPH/DIAG INJ IV PUSH: CPT

## 2024-01-18 RX ORDER — HEPARIN 100 UNIT/ML
500 SYRINGE INTRAVENOUS
Status: CANCELLED | OUTPATIENT
Start: 2024-01-18

## 2024-01-18 RX ORDER — ONDANSETRON HYDROCHLORIDE 2 MG/ML
8 INJECTION, SOLUTION INTRAVENOUS ONCE
Status: CANCELLED
Start: 2024-01-19 | End: 2024-01-19

## 2024-01-18 RX ORDER — ONDANSETRON HYDROCHLORIDE 2 MG/ML
8 INJECTION, SOLUTION INTRAVENOUS ONCE
Status: COMPLETED | OUTPATIENT
Start: 2024-01-18 | End: 2024-01-18

## 2024-01-18 RX ORDER — SODIUM CHLORIDE 0.9 % (FLUSH) 0.9 %
10 SYRINGE (ML) INJECTION
Status: CANCELLED | OUTPATIENT
Start: 2024-01-18

## 2024-01-18 RX ORDER — SODIUM CHLORIDE 0.9 % (FLUSH) 0.9 %
10 SYRINGE (ML) INJECTION
Status: DISCONTINUED | OUTPATIENT
Start: 2024-01-18 | End: 2024-01-18 | Stop reason: HOSPADM

## 2024-01-18 RX ORDER — HEPARIN 100 UNIT/ML
500 SYRINGE INTRAVENOUS
Status: DISCONTINUED | OUTPATIENT
Start: 2024-01-18 | End: 2024-01-18 | Stop reason: HOSPADM

## 2024-01-18 RX ADMIN — SODIUM CHLORIDE 1000 ML: 9 INJECTION, SOLUTION INTRAVENOUS at 12:01

## 2024-01-18 RX ADMIN — ONDANSETRON 8 MG: 2 INJECTION INTRAMUSCULAR; INTRAVENOUS at 12:01

## 2024-01-18 RX ADMIN — HEPARIN 500 UNITS: 100 SYRINGE at 01:01

## 2024-01-22 ENCOUNTER — HOSPITAL ENCOUNTER (OUTPATIENT)
Dept: RADIOLOGY | Facility: HOSPITAL | Age: 79
Discharge: HOME | End: 2024-01-22
Attending: INTERNAL MEDICINE
Payer: MEDICARE

## 2024-01-22 DIAGNOSIS — R59.0 LAD (LYMPHADENOPATHY), SUPRACLAVICULAR: ICD-10-CM

## 2024-01-22 PROCEDURE — 88305 TISSUE EXAM BY PATHOLOGIST: CPT | Mod: TC | Performed by: PATHOLOGY

## 2024-01-22 PROCEDURE — 25000003 PHARM REV CODE 250: Performed by: RADIOLOGY

## 2024-01-22 PROCEDURE — 27000342 US BIOPSY LYMPH NODES (XPD)

## 2024-01-22 RX ORDER — LIDOCAINE HYDROCHLORIDE 10 MG/ML
INJECTION, SOLUTION EPIDURAL; INFILTRATION; INTRACAUDAL; PERINEURAL
Status: COMPLETED | OUTPATIENT
Start: 2024-01-22 | End: 2024-01-22

## 2024-01-22 RX ADMIN — LIDOCAINE HYDROCHLORIDE 6 ML: 10 INJECTION, SOLUTION EPIDURAL; INFILTRATION; INTRACAUDAL; PERINEURAL at 09:01

## 2024-01-22 NOTE — PLAN OF CARE
Ambulated to US .  Id'd x 2 pt identifiers.  AAO x 3. ENNIS x4. Resp REU.   Denies pain or nausea. All questions answered.

## 2024-01-22 NOTE — PLAN OF CARE
Tolerated well.  Gauze/tegaderm dressing to site- left supraclavicular.  Denies pain or nausea.  Resp  REU, ENNIS x 4.  Verbal and written discharge instructions given and understanding verbalized.'  Ambulatory. Discharged to home via private vehicle

## 2024-01-23 RX ORDER — SODIUM CHLORIDE 0.9 % (FLUSH) 0.9 %
10 SYRINGE (ML) INJECTION
Status: CANCELLED | OUTPATIENT
Start: 2024-01-24

## 2024-01-23 RX ORDER — PROCHLORPERAZINE EDISYLATE 5 MG/ML
5 INJECTION INTRAMUSCULAR; INTRAVENOUS ONCE AS NEEDED
Status: CANCELLED | OUTPATIENT
Start: 2024-01-24

## 2024-01-23 RX ORDER — HEPARIN 100 UNIT/ML
500 SYRINGE INTRAVENOUS
Status: CANCELLED | OUTPATIENT
Start: 2024-01-24

## 2024-01-23 RX ORDER — EPINEPHRINE 0.3 MG/.3ML
0.3 INJECTION SUBCUTANEOUS ONCE AS NEEDED
Status: CANCELLED | OUTPATIENT
Start: 2024-01-24

## 2024-01-23 RX ORDER — DIPHENHYDRAMINE HYDROCHLORIDE 50 MG/ML
50 INJECTION INTRAMUSCULAR; INTRAVENOUS ONCE AS NEEDED
Status: CANCELLED | OUTPATIENT
Start: 2024-01-24

## 2024-01-24 ENCOUNTER — INFUSION (OUTPATIENT)
Dept: INFUSION THERAPY | Facility: HOSPITAL | Age: 79
End: 2024-01-24
Attending: INTERNAL MEDICINE
Payer: MEDICARE

## 2024-01-24 VITALS
HEIGHT: 77 IN | SYSTOLIC BLOOD PRESSURE: 116 MMHG | BODY MASS INDEX: 20.46 KG/M2 | HEART RATE: 64 BPM | TEMPERATURE: 98 F | WEIGHT: 173.31 LBS | OXYGEN SATURATION: 100 % | RESPIRATION RATE: 16 BRPM | DIASTOLIC BLOOD PRESSURE: 64 MMHG

## 2024-01-24 DIAGNOSIS — C34.12 MALIGNANT NEOPLASM OF UPPER LOBE OF LEFT LUNG: Primary | ICD-10-CM

## 2024-01-24 PROCEDURE — 25000003 PHARM REV CODE 250: Performed by: NURSE PRACTITIONER

## 2024-01-24 PROCEDURE — 63600175 PHARM REV CODE 636 W HCPCS: Mod: JZ,JG | Performed by: NURSE PRACTITIONER

## 2024-01-24 PROCEDURE — 96411 CHEMO IV PUSH ADDL DRUG: CPT

## 2024-01-24 PROCEDURE — A4216 STERILE WATER/SALINE, 10 ML: HCPCS | Performed by: NURSE PRACTITIONER

## 2024-01-24 PROCEDURE — 96413 CHEMO IV INFUSION 1 HR: CPT

## 2024-01-24 PROCEDURE — 96367 TX/PROPH/DG ADDL SEQ IV INF: CPT

## 2024-01-24 RX ORDER — EPINEPHRINE 0.3 MG/.3ML
0.3 INJECTION SUBCUTANEOUS ONCE AS NEEDED
Status: DISCONTINUED | OUTPATIENT
Start: 2024-01-24 | End: 2024-01-24 | Stop reason: HOSPADM

## 2024-01-24 RX ORDER — SODIUM CHLORIDE 0.9 % (FLUSH) 0.9 %
10 SYRINGE (ML) INJECTION
Status: DISCONTINUED | OUTPATIENT
Start: 2024-01-24 | End: 2024-01-24 | Stop reason: HOSPADM

## 2024-01-24 RX ORDER — DIPHENHYDRAMINE HYDROCHLORIDE 50 MG/ML
50 INJECTION INTRAMUSCULAR; INTRAVENOUS ONCE AS NEEDED
Status: DISCONTINUED | OUTPATIENT
Start: 2024-01-24 | End: 2024-01-24 | Stop reason: HOSPADM

## 2024-01-24 RX ORDER — HEPARIN 100 UNIT/ML
500 SYRINGE INTRAVENOUS
Status: DISCONTINUED | OUTPATIENT
Start: 2024-01-24 | End: 2024-01-24 | Stop reason: HOSPADM

## 2024-01-24 RX ADMIN — HEPARIN 500 UNITS: 100 SYRINGE at 12:01

## 2024-01-24 RX ADMIN — DEXAMETHASONE SODIUM PHOSPHATE 8 MG: 4 INJECTION, SOLUTION INTRA-ARTICULAR; INTRALESIONAL; INTRAMUSCULAR; INTRAVENOUS; SOFT TISSUE at 11:01

## 2024-01-24 RX ADMIN — SODIUM CHLORIDE 200 MG: 9 INJECTION, SOLUTION INTRAVENOUS at 10:01

## 2024-01-24 RX ADMIN — PEMETREXED DISODIUM 1075 MG: 500 INJECTION, POWDER, LYOPHILIZED, FOR SOLUTION INTRAVENOUS at 11:01

## 2024-01-24 RX ADMIN — SODIUM CHLORIDE, PRESERVATIVE FREE 10 ML: 5 INJECTION INTRAVENOUS at 12:01

## 2024-01-24 NOTE — PLAN OF CARE
Problem: Activity Intolerance  Goal: Enhanced Capacity and Energy  1/24/2024 1148 by Jason Villanueva, MELISSA  Outcome: Ongoing, Progressing  1/24/2024 1027 by Jason Villanueva RN  Outcome: Ongoing, Progressing  Intervention: Optimize Activity Tolerance  1/24/2024 1148 by Jason Villanueva, RN  Flowsheets (Taken 1/24/2024 1148)  Self-Care Promotion: independence encouraged  Activity Management: Ambulated -L4  Environmental Support: calm environment promoted  1/24/2024 1027 by Jason Villanueav, RN  Flowsheets (Taken 1/24/2024 1027)  Self-Care Promotion: independence encouraged  Activity Management: Ambulated -L4  Environmental Support: calm environment promoted

## 2024-01-24 NOTE — PLAN OF CARE
Problem: Activity Intolerance  Goal: Enhanced Capacity and Energy  Outcome: Ongoing, Progressing  Intervention: Optimize Activity Tolerance  Flowsheets (Taken 1/24/2024 1027)  Self-Care Promotion: independence encouraged  Activity Management: Ambulated -L4  Environmental Support: calm environment promoted

## 2024-01-25 ENCOUNTER — INFUSION (OUTPATIENT)
Dept: INFUSION THERAPY | Facility: HOSPITAL | Age: 79
End: 2024-01-25
Payer: MEDICARE

## 2024-01-25 VITALS
HEART RATE: 57 BPM | HEIGHT: 77 IN | TEMPERATURE: 97 F | WEIGHT: 173 LBS | BODY MASS INDEX: 20.43 KG/M2 | SYSTOLIC BLOOD PRESSURE: 132 MMHG | DIASTOLIC BLOOD PRESSURE: 65 MMHG | OXYGEN SATURATION: 100 % | RESPIRATION RATE: 18 BRPM

## 2024-01-25 DIAGNOSIS — C34.92 NSCLC OF LEFT LUNG: Primary | ICD-10-CM

## 2024-01-25 DIAGNOSIS — C34.12 MALIGNANT NEOPLASM OF UPPER LOBE OF LEFT LUNG: ICD-10-CM

## 2024-01-25 DIAGNOSIS — R52 ACUTE PAIN: ICD-10-CM

## 2024-01-25 PROCEDURE — 96361 HYDRATE IV INFUSION ADD-ON: CPT

## 2024-01-25 PROCEDURE — 25000003 PHARM REV CODE 250: Performed by: NURSE PRACTITIONER

## 2024-01-25 PROCEDURE — 63600175 PHARM REV CODE 636 W HCPCS: Performed by: NURSE PRACTITIONER

## 2024-01-25 PROCEDURE — 96374 THER/PROPH/DIAG INJ IV PUSH: CPT

## 2024-01-25 RX ORDER — HEPARIN 100 UNIT/ML
500 SYRINGE INTRAVENOUS
Status: DISCONTINUED | OUTPATIENT
Start: 2024-01-25 | End: 2024-01-25 | Stop reason: HOSPADM

## 2024-01-25 RX ORDER — ONDANSETRON HYDROCHLORIDE 2 MG/ML
8 INJECTION, SOLUTION INTRAVENOUS ONCE
Status: CANCELLED
Start: 2024-01-26 | End: 2024-01-26

## 2024-01-25 RX ORDER — SODIUM CHLORIDE 0.9 % (FLUSH) 0.9 %
10 SYRINGE (ML) INJECTION
Status: DISCONTINUED | OUTPATIENT
Start: 2024-01-25 | End: 2024-01-25 | Stop reason: HOSPADM

## 2024-01-25 RX ORDER — ONDANSETRON HYDROCHLORIDE 2 MG/ML
8 INJECTION, SOLUTION INTRAVENOUS ONCE
Status: COMPLETED | OUTPATIENT
Start: 2024-01-25 | End: 2024-01-25

## 2024-01-25 RX ORDER — HEPARIN 100 UNIT/ML
500 SYRINGE INTRAVENOUS
Status: CANCELLED | OUTPATIENT
Start: 2024-01-25

## 2024-01-25 RX ORDER — SODIUM CHLORIDE 0.9 % (FLUSH) 0.9 %
10 SYRINGE (ML) INJECTION
Status: CANCELLED | OUTPATIENT
Start: 2024-01-25

## 2024-01-25 RX ORDER — TEMAZEPAM 30 MG/1
30 CAPSULE ORAL NIGHTLY PRN
Qty: 30 CAPSULE | Refills: 2 | Status: SHIPPED | OUTPATIENT
Start: 2024-01-25 | End: 2024-04-24 | Stop reason: SDUPTHER

## 2024-01-25 RX ADMIN — HEPARIN 500 UNITS: 100 SYRINGE at 01:01

## 2024-01-25 RX ADMIN — ONDANSETRON 8 MG: 2 INJECTION INTRAMUSCULAR; INTRAVENOUS at 12:01

## 2024-01-25 RX ADMIN — SODIUM CHLORIDE 1000 ML: 9 INJECTION, SOLUTION INTRAVENOUS at 12:01

## 2024-01-25 NOTE — PLAN OF CARE
Problem: Adult Inpatient Plan of Care  Goal: Optimal Comfort and Wellbeing  Outcome: Ongoing, Progressing  Intervention: Provide Person-Centered Care  Flowsheets (Taken 1/25/2024 1252)  Trust Relationship/Rapport:   care explained   choices provided   emotional support provided   empathic listening provided   questions answered   questions encouraged   reassurance provided   thoughts/feelings acknowledged

## 2024-01-28 RX ORDER — HYDROCODONE BITARTRATE AND ACETAMINOPHEN 10; 325 MG/1; MG/1
1 TABLET ORAL EVERY 6 HOURS PRN
Qty: 60 TABLET | Refills: 0 | Status: SHIPPED | OUTPATIENT
Start: 2024-01-28 | End: 2024-03-04 | Stop reason: SDUPTHER

## 2024-02-01 ENCOUNTER — INFUSION (OUTPATIENT)
Dept: INFUSION THERAPY | Facility: HOSPITAL | Age: 79
End: 2024-02-01
Payer: MEDICARE

## 2024-02-01 VITALS
TEMPERATURE: 98 F | WEIGHT: 172 LBS | DIASTOLIC BLOOD PRESSURE: 64 MMHG | HEIGHT: 77 IN | BODY MASS INDEX: 20.31 KG/M2 | RESPIRATION RATE: 18 BRPM | SYSTOLIC BLOOD PRESSURE: 121 MMHG | HEART RATE: 84 BPM | OXYGEN SATURATION: 98 %

## 2024-02-01 DIAGNOSIS — C34.12 MALIGNANT NEOPLASM OF UPPER LOBE OF LEFT LUNG: ICD-10-CM

## 2024-02-01 DIAGNOSIS — C34.92 NSCLC OF LEFT LUNG: Primary | ICD-10-CM

## 2024-02-01 PROCEDURE — 96523 IRRIG DRUG DELIVERY DEVICE: CPT

## 2024-02-01 PROCEDURE — 63600175 PHARM REV CODE 636 W HCPCS: Performed by: NURSE PRACTITIONER

## 2024-02-01 PROCEDURE — 96365 THER/PROPH/DIAG IV INF INIT: CPT

## 2024-02-01 PROCEDURE — 25000003 PHARM REV CODE 250: Performed by: NURSE PRACTITIONER

## 2024-02-01 RX ORDER — SODIUM CHLORIDE 0.9 % (FLUSH) 0.9 %
10 SYRINGE (ML) INJECTION
Status: CANCELLED | OUTPATIENT
Start: 2024-02-01

## 2024-02-01 RX ORDER — HEPARIN 100 UNIT/ML
500 SYRINGE INTRAVENOUS
Status: DISCONTINUED | OUTPATIENT
Start: 2024-02-01 | End: 2024-02-01 | Stop reason: HOSPADM

## 2024-02-01 RX ORDER — HEPARIN 100 UNIT/ML
500 SYRINGE INTRAVENOUS
Status: CANCELLED | OUTPATIENT
Start: 2024-02-01

## 2024-02-01 RX ORDER — ONDANSETRON HYDROCHLORIDE 2 MG/ML
8 INJECTION, SOLUTION INTRAVENOUS ONCE
Status: COMPLETED | OUTPATIENT
Start: 2024-02-01 | End: 2024-02-01

## 2024-02-01 RX ORDER — SODIUM CHLORIDE 0.9 % (FLUSH) 0.9 %
10 SYRINGE (ML) INJECTION
Status: DISCONTINUED | OUTPATIENT
Start: 2024-02-01 | End: 2024-02-01 | Stop reason: HOSPADM

## 2024-02-01 RX ORDER — ONDANSETRON HYDROCHLORIDE 2 MG/ML
8 INJECTION, SOLUTION INTRAVENOUS ONCE
Status: CANCELLED
Start: 2024-02-02 | End: 2024-02-02

## 2024-02-01 RX ADMIN — SODIUM CHLORIDE 1000 ML: 9 INJECTION, SOLUTION INTRAVENOUS at 01:02

## 2024-02-01 RX ADMIN — HEPARIN 500 UNITS: 100 SYRINGE at 02:02

## 2024-02-01 RX ADMIN — ONDANSETRON 8 MG: 2 INJECTION INTRAMUSCULAR; INTRAVENOUS at 01:02

## 2024-02-01 NOTE — PLAN OF CARE
Problem: Adult Inpatient Plan of Care  Goal: Optimal Comfort and Wellbeing  Outcome: Ongoing, Progressing  Intervention: Provide Person-Centered Care  Flowsheets (Taken 2/1/2024 0284)  Trust Relationship/Rapport:   care explained   choices provided   emotional support provided   empathic listening provided   questions answered   questions encouraged   reassurance provided   thoughts/feelings acknowledged

## 2024-02-06 ENCOUNTER — OFFICE VISIT (OUTPATIENT)
Dept: HEMATOLOGY/ONCOLOGY | Facility: CLINIC | Age: 79
End: 2024-02-06
Payer: MEDICARE

## 2024-02-06 ENCOUNTER — PATIENT MESSAGE (OUTPATIENT)
Dept: HEMATOLOGY/ONCOLOGY | Facility: CLINIC | Age: 79
End: 2024-02-06

## 2024-02-06 VITALS
BODY MASS INDEX: 20.27 KG/M2 | TEMPERATURE: 98 F | RESPIRATION RATE: 16 BRPM | HEART RATE: 89 BPM | DIASTOLIC BLOOD PRESSURE: 65 MMHG | WEIGHT: 170.88 LBS | SYSTOLIC BLOOD PRESSURE: 128 MMHG

## 2024-02-06 DIAGNOSIS — C34.92 NSCLC OF LEFT LUNG: ICD-10-CM

## 2024-02-06 DIAGNOSIS — R35.1 NOCTURIA: ICD-10-CM

## 2024-02-06 DIAGNOSIS — C34.12 MALIGNANT NEOPLASM OF UPPER LOBE OF LEFT LUNG: Primary | ICD-10-CM

## 2024-02-06 PROCEDURE — 99214 OFFICE O/P EST MOD 30 MIN: CPT | Mod: S$PBB,,, | Performed by: NURSE PRACTITIONER

## 2024-02-06 PROCEDURE — 99214 OFFICE O/P EST MOD 30 MIN: CPT | Performed by: NURSE PRACTITIONER

## 2024-02-06 NOTE — PROGRESS NOTES
PROGRESS NOTE    Subjective:       Patient ID: Akil Guzman is a 78 y.o. male.    8/7/2023-CT chest without:  1. Large left pleural effusion with dependent left lower lobe atelectasis.  2. Poorly defined mass-like infiltrate involving the periphery of the left upper lobe extending to the left hilum.  Underlying parenchymal mass is not excludable, however, evaluation is limited due to lack of intravenous contrast.  Correlate clinically with possible fever and/or elevated white count.  3. Bilateral soft tissue pulmonary nodules measuring up to 11 mm.  Follow-up per Fleischner guidelines.  For multiple solid nodules with any 6 mm or greater, Fleischner Society guidelines recommend follow up with non-contrast chest CT at 3-6 months and 18-24 months after discovery.  4. Partially calcified left upper lobe pulmonary nodule may represent granulomatous change and scarring.  5. 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  6. Questionable left hilar lymphadenopathy.  However, evaluation is again limited due to lack of intravenous contrast.  7. Small hiatal hernia.    8/17/2023 Left Pleural Fluid:  Positive for adenocarcinoma most c/w lung primary    9/14/2023-PET:  1. FDG avid nodular thickening involving origin of the lingular bronchus with adjacent FDG avid 20 mm nodular density, suspicious for primary pulmonary malignancy.  2. Moderate left pleural effusion with scattered foci of FDG uptake along the parietal pleura of concern for malignant pleural effusion.  3. Left chest wall FDG avid mass resulting in lytic destruction and pathologic fractures of left 10th and 11th ribs, characteristic of metastatic disease.  4. Diffuse prominent FDG activity throughout the intramedullary compartments of the axial and proximal appendicular skeleton is nonspecific. This can be seen with pharmacologic bone marrow stimulation although diffuse metastatic  disease can also be considered. Metastatic disease is felt less likely given history.    9/14/2023-MRI Brain  Negative for mets    Stage IVB-Adenocarcinoma of the lung    PLAN:   Carbo/Pem/Pem ChemoIO therapy    Carbo/Pem/Pem:  Cycle 1: 9/13/2023  Cycle 2: 10/4/2023  Cycle 3: 10/25/2023  Cycle 4: 11/15/2023  Cycle 5: 1/3/2024  Cycle 6: 1/24/2024  Cycle 7: 2/14/2024- Due        Chief Complaint:  Stage IVB lung carcinoma follow up    History of Present Illness:   Akil Guzman is a 78 y.o. male who presents for follow up of lung cancer.     Mr. Guzman is doing ok today.  He tolerated the last cycle of therapy better than previous.     Patient states he is up all night urinating. He does take his flomax at night.    Family and Social history reviewed and is unchanged from 9/1/2023             Current Outpatient Medications:     dexAMETHasone (DECADRON) 4 MG Tab, 2 tablets twice a day the day before and after each chemo, Disp: 32 tablet, Rfl: 5    duke's soln (benadryl 30 mL, mylanta 30 mL, LIDOcaine 30 mL, nystatin 30 mL) 120mL, Take 10 mLs by mouth 4 (four) times daily., Disp: 120 mL, Rfl: 5    finasteride (PROSCAR) 5 mg tablet, TAKE 1 TABLET BY MOUTH EVERY DAY FOR PROSTATE, Disp: 90 tablet, Rfl: 3    folic acid (FOLVITE) 1 MG tablet, Take 1 tablet (1 mg total) by mouth once daily., Disp: 100 tablet, Rfl: 3    HYDROcodone-acetaminophen (NORCO)  mg per tablet, Take 1 tablet by mouth every 6 (six) hours as needed for Pain., Disp: 60 tablet, Rfl: 0    ipratropium (ATROVENT) 42 mcg (0.06 %) nasal spray, USE 1 SPRAY in each nostril TWICE DAILY THANK YOU!, Disp: , Rfl:     magnesium oxide (MAG-OX) 400 mg (241.3 mg magnesium) tablet, Take 1 tablet (400 mg total) by mouth once daily., Disp: 30 tablet, Rfl: 11    ondansetron (ZOFRAN) 8 MG tablet, Take 1 tablet (8 mg total) by mouth every 8 (eight) hours as needed., Disp: 30 tablet, Rfl: 5    promethazine (PHENERGAN) 25 MG tablet, Take 1 tablet (25 mg total) by mouth every  4 to 6 hours as needed., Disp: 30 tablet, Rfl: 5    sildenafiL (VIAGRA) 100 MG tablet, Take 1 tablet (100 mg total) by mouth daily as needed for Erectile Dysfunction., Disp: 30 tablet, Rfl: 11    tamsulosin (FLOMAX) 0.4 mg Cap, Take 1 capsule (0.4 mg total) by mouth once daily., Disp: 90 capsule, Rfl: 3    temazepam (RESTORIL) 30 mg capsule, Take 1 capsule (30 mg total) by mouth nightly as needed for Insomnia. TAKE NIGHTLY AS NEEDED, Disp: 30 capsule, Rfl: 2    tiZANidine (ZANAFLEX) 4 MG tablet, Take 1 tablet (4 mg total) by mouth every 12 (twelve) hours as needed (muscle spasms/ pain)., Disp: 40 tablet, Rfl: 0    Current Facility-Administered Medications:     0.9%  NaCl infusion (for blood administration), , Intravenous, Once, Allie Shelley NP-C, Stopped at 10/23/23 1445    Facility-Administered Medications Ordered in Other Visits:     heparin, porcine (PF) 100 unit/mL injection flush 500 Units, 500 Units, Intravenous, PRN, Allie Shelley NP-C, 500 Units at 12/14/23 1422    sodium chloride 0.9% flush 10 mL, 10 mL, Intravenous, PRN, Allie Shelley NP-C, 10 mL at 12/14/23 1426        Objective:       Physical Examination:     /65   Pulse 89   Temp 98.3 °F (36.8 °C)   Resp 16   Wt 77.5 kg (170 lb 14.4 oz)   BMI 20.27 kg/m²     Physical Exam  Constitutional:       Appearance: Normal appearance.   HENT:      Head: Normocephalic and atraumatic.      Nose: Nose normal.      Mouth/Throat:      Mouth: Mucous membranes are moist.   Eyes:      General: No scleral icterus.     Conjunctiva/sclera: Conjunctivae normal.   Cardiovascular:      Rate and Rhythm: Normal rate.   Pulmonary:      Effort: Pulmonary effort is normal.   Abdominal:      General: Abdomen is flat.   Skin:     General: Skin is warm and dry.   Neurological:      General: No focal deficit present.      Mental Status: He is alert and oriented to person, place, and time.   Psychiatric:         Mood and Affect: Mood normal.         Behavior:  Behavior normal.         Labs:   No results found for this or any previous visit (from the past 336 hour(s)).    CMP  Sodium   Date Value Ref Range Status   01/22/2024 136 136 - 145 mmol/L Final     Potassium   Date Value Ref Range Status   01/22/2024 4.0 3.5 - 5.1 mmol/L Final     Chloride   Date Value Ref Range Status   01/22/2024 100 95 - 110 mmol/L Final     CO2   Date Value Ref Range Status   01/22/2024 29 23 - 29 mmol/L Final     Glucose   Date Value Ref Range Status   01/22/2024 129 (H) 70 - 110 mg/dL Final     BUN   Date Value Ref Range Status   01/22/2024 15 8 - 23 mg/dL Final     Creatinine   Date Value Ref Range Status   01/22/2024 1.0 0.5 - 1.4 mg/dL Final     Calcium   Date Value Ref Range Status   01/22/2024 9.5 8.7 - 10.5 mg/dL Final     Total Protein   Date Value Ref Range Status   01/22/2024 6.8 6.0 - 8.4 g/dL Final     Albumin   Date Value Ref Range Status   01/22/2024 3.7 3.5 - 5.2 g/dL Final     Total Bilirubin   Date Value Ref Range Status   01/22/2024 0.3 0.1 - 1.0 mg/dL Final     Comment:     For infants and newborns, interpretation of results should be based  on gestational age, weight and in agreement with clinical  observations.    Premature Infant recommended reference ranges:  Up to 24 hours.............<8.0 mg/dL  Up to 48 hours............<12.0 mg/dL  3-5 days..................<15.0 mg/dL  6-29 days.................<15.0 mg/dL       Alkaline Phosphatase   Date Value Ref Range Status   01/22/2024 84 55 - 135 U/L Final     AST   Date Value Ref Range Status   01/22/2024 16 10 - 40 U/L Final     ALT   Date Value Ref Range Status   01/22/2024 12 10 - 44 U/L Final     Anion Gap   Date Value Ref Range Status   01/22/2024 7 (L) 8 - 16 mmol/L Final     eGFR if    Date Value Ref Range Status   11/02/2021 56.4 (A) >60 mL/min/1.73 m^2 Final     eGFR if non    Date Value Ref Range Status   11/02/2021 48.8 (A) >60 mL/min/1.73 m^2 Final     Comment:     Calculation used  to obtain the estimated glomerular filtration  rate (eGFR) is the CKD-EPI equation.        Lab Results   Component Value Date    CEA 1.6 01/03/2024     Lab Results   Component Value Date    PSA 1.0 06/21/2023    PSA 1.2 11/02/2021    PSA 1.3 08/10/2020         Assessment/Plan:     Problem List Items Addressed This Visit          Oncology    Malignant neoplasm of upper lobe of left lung - Primary    Relevant Orders    CT Chest Abdomen Pelvis W W/O Contrast (XPD)     Met Lung Cancer- CT CAP eval for treatment response and possible rebiopsy  Cancer related pain- Continue Norco PRN  Nocturia- Switch Flomax to AM    Discussion:     No follow-ups on file.      Electronically signed by Allie Shelley, MSN, APRN, AGNP-C, OCN      Answers submitted by the patient for this visit:  Review of Systems Questionnaire (Submitted on 1/30/2024)  appetite change : No  unexpected weight change: No  mouth sores: No  visual disturbance: No  cough: No  shortness of breath: Yes  chest pain: No  abdominal pain: No  diarrhea: Yes  frequency: Yes  back pain: No  rash: No  headaches: No  adenopathy: No  nervous/ anxious: No

## 2024-02-07 ENCOUNTER — TELEPHONE (OUTPATIENT)
Dept: FAMILY MEDICINE | Facility: CLINIC | Age: 79
End: 2024-02-07
Payer: MEDICARE

## 2024-02-07 RX ORDER — SODIUM CHLORIDE 0.9 % (FLUSH) 0.9 %
10 SYRINGE (ML) INJECTION
OUTPATIENT
Start: 2024-02-07

## 2024-02-07 RX ORDER — HEPARIN 100 UNIT/ML
500 SYRINGE INTRAVENOUS
OUTPATIENT
Start: 2024-02-07

## 2024-02-07 NOTE — TELEPHONE ENCOUNTER
----- Message from Gretta Padilla RN sent at 2/7/2024  1:14 PM CST -----  Dr Ervin -     I apologize but I must impose once more. Our port flush orders also will not transfer over. Can I trouble you to also sign the second set of port flush orders if we put them in?  ----- Message -----  From: Nola Ervin MD  Sent: 2/7/2024  12:09 PM CST  To: Gretta Padilla, RN    Hey there,   I have re-signed his fluid and zofran therapy plan. Let me know if that works for him to get his treatment close to home. -Dr. YOUNGBLOOD  ----- Message -----  From: Isa Denney LPN  Sent: 2/7/2024  12:04 PM CST  To: Nola Ervin MD      ----- Message -----  From: Gretta Padilla RN  Sent: 2/7/2024  12:03 PM CST  To: ANGI Pitts; Aziza HARRISON Staff    Hello - I am reaching out on the behalf of Dr Torres and ANGI Pitts. Our mutual patient has been getting IVF and IV nausea medications at [facility] and was were notified today that as Dr Torres and Allie do not have privileges at [facility], we can no longer have him get the IVF and IV nausea medications. Would this be something that Dr Ervin would be comfortable signing off on so he can continue to get his infusions at [facility]? The patient has a difficult time doing the drive weekly and would prefer to remain near his home.

## 2024-02-07 NOTE — TELEPHONE ENCOUNTER
----- Message from Isa Denney LPN sent at 2/7/2024 12:04 PM CST -----    ----- Message -----  From: Gretta Padilla RN  Sent: 2/7/2024  12:03 PM CST  To: ELIAS PittsC; Aziza HARRISON Staff    Hello - I am reaching out on the behalf of Dr Torres and ANGI Pitts. Our mutual patient has been getting IVF and IV nausea medications at [facility] and was were notified today that as Dr Torres and Allie do not have privileges at [facility], we can no longer have him get the IVF and IV nausea medications. Would this be something that Dr Ervin would be comfortable signing off on so he can continue to get his infusions at [facility]? The patient has a difficult time doing the drive weekly and would prefer to remain near his home.

## 2024-02-08 ENCOUNTER — INFUSION (OUTPATIENT)
Dept: INFUSION THERAPY | Facility: HOSPITAL | Age: 79
End: 2024-02-08
Payer: MEDICARE

## 2024-02-08 VITALS
WEIGHT: 172 LBS | HEART RATE: 84 BPM | SYSTOLIC BLOOD PRESSURE: 103 MMHG | TEMPERATURE: 98 F | HEIGHT: 77 IN | OXYGEN SATURATION: 97 % | BODY MASS INDEX: 20.31 KG/M2 | DIASTOLIC BLOOD PRESSURE: 62 MMHG | RESPIRATION RATE: 17 BRPM

## 2024-02-08 DIAGNOSIS — C34.92 NSCLC OF LEFT LUNG: Primary | ICD-10-CM

## 2024-02-08 DIAGNOSIS — C34.12 MALIGNANT NEOPLASM OF UPPER LOBE OF LEFT LUNG: ICD-10-CM

## 2024-02-08 PROCEDURE — 25000003 PHARM REV CODE 250: Performed by: FAMILY MEDICINE

## 2024-02-08 PROCEDURE — 63600175 PHARM REV CODE 636 W HCPCS: Performed by: FAMILY MEDICINE

## 2024-02-08 PROCEDURE — A4216 STERILE WATER/SALINE, 10 ML: HCPCS | Performed by: FAMILY MEDICINE

## 2024-02-08 RX ORDER — SODIUM CHLORIDE 0.9 % (FLUSH) 0.9 %
10 SYRINGE (ML) INJECTION
Status: CANCELLED | OUTPATIENT
Start: 2024-02-08

## 2024-02-08 RX ORDER — ONDANSETRON HYDROCHLORIDE 2 MG/ML
8 INJECTION, SOLUTION INTRAVENOUS ONCE
Status: COMPLETED | OUTPATIENT
Start: 2024-02-08 | End: 2024-02-08

## 2024-02-08 RX ORDER — ONDANSETRON HYDROCHLORIDE 2 MG/ML
8 INJECTION, SOLUTION INTRAVENOUS ONCE
Status: CANCELLED
Start: 2024-02-09 | End: 2024-02-09

## 2024-02-08 RX ORDER — HEPARIN 100 UNIT/ML
500 SYRINGE INTRAVENOUS
Status: CANCELLED | OUTPATIENT
Start: 2024-02-08

## 2024-02-08 RX ORDER — SODIUM CHLORIDE 0.9 % (FLUSH) 0.9 %
10 SYRINGE (ML) INJECTION
Status: DISCONTINUED | OUTPATIENT
Start: 2024-02-08 | End: 2024-02-08 | Stop reason: HOSPADM

## 2024-02-08 RX ORDER — HEPARIN 100 UNIT/ML
500 SYRINGE INTRAVENOUS
Status: DISCONTINUED | OUTPATIENT
Start: 2024-02-08 | End: 2024-02-08 | Stop reason: HOSPADM

## 2024-02-08 RX ADMIN — SODIUM CHLORIDE 1000 ML: 9 INJECTION, SOLUTION INTRAVENOUS at 01:02

## 2024-02-08 RX ADMIN — Medication 10 ML: at 02:02

## 2024-02-08 RX ADMIN — HEPARIN 500 UNITS: 100 SYRINGE at 02:02

## 2024-02-08 RX ADMIN — ONDANSETRON 8 MG: 2 INJECTION INTRAMUSCULAR; INTRAVENOUS at 12:02

## 2024-02-12 ENCOUNTER — LAB VISIT (OUTPATIENT)
Dept: LAB | Facility: HOSPITAL | Age: 79
End: 2024-02-12
Attending: NURSE PRACTITIONER
Payer: MEDICARE

## 2024-02-12 DIAGNOSIS — R53.83 FATIGUE, UNSPECIFIED TYPE: ICD-10-CM

## 2024-02-12 DIAGNOSIS — C34.92 NSCLC OF LEFT LUNG: ICD-10-CM

## 2024-02-12 LAB
ALBUMIN SERPL BCP-MCNC: 3 G/DL (ref 3.5–5.2)
ALP SERPL-CCNC: 76 U/L (ref 55–135)
ALT SERPL W/O P-5'-P-CCNC: 11 U/L (ref 10–44)
ANION GAP SERPL CALC-SCNC: 13 MMOL/L (ref 8–16)
AST SERPL-CCNC: 17 U/L (ref 10–40)
BASOPHILS # BLD AUTO: 0.01 K/UL (ref 0–0.2)
BASOPHILS NFR BLD: 0.1 % (ref 0–1.9)
BILIRUB SERPL-MCNC: 0.2 MG/DL (ref 0.1–1)
BUN SERPL-MCNC: 23 MG/DL (ref 8–23)
CALCIUM SERPL-MCNC: 9.6 MG/DL (ref 8.7–10.5)
CHLORIDE SERPL-SCNC: 101 MMOL/L (ref 95–110)
CO2 SERPL-SCNC: 24 MMOL/L (ref 23–29)
CREAT SERPL-MCNC: 0.9 MG/DL (ref 0.5–1.4)
DIFFERENTIAL METHOD BLD: ABNORMAL
EOSINOPHIL # BLD AUTO: 0 K/UL (ref 0–0.5)
EOSINOPHIL NFR BLD: 0.1 % (ref 0–8)
ERYTHROCYTE [DISTWIDTH] IN BLOOD BY AUTOMATED COUNT: 15.5 % (ref 11.5–14.5)
EST. GFR  (NO RACE VARIABLE): >60 ML/MIN/1.73 M^2
GLUCOSE SERPL-MCNC: 119 MG/DL (ref 70–110)
HCT VFR BLD AUTO: 25.5 % (ref 40–54)
HGB BLD-MCNC: 8.3 G/DL (ref 14–18)
IMM GRANULOCYTES # BLD AUTO: 0.09 K/UL (ref 0–0.04)
IMM GRANULOCYTES NFR BLD AUTO: 1.2 % (ref 0–0.5)
LYMPHOCYTES # BLD AUTO: 1.3 K/UL (ref 1–4.8)
LYMPHOCYTES NFR BLD: 16.8 % (ref 18–48)
MAGNESIUM SERPL-MCNC: 1.8 MG/DL (ref 1.6–2.6)
MCH RBC QN AUTO: 31.6 PG (ref 27–31)
MCHC RBC AUTO-ENTMCNC: 32.5 G/DL (ref 32–36)
MCV RBC AUTO: 97 FL (ref 82–98)
MONOCYTES # BLD AUTO: 1.3 K/UL (ref 0.3–1)
MONOCYTES NFR BLD: 17.3 % (ref 4–15)
NEUTROPHILS # BLD AUTO: 5 K/UL (ref 1.8–7.7)
NEUTROPHILS NFR BLD: 64.5 % (ref 38–73)
NRBC BLD-RTO: 0 /100 WBC
PLATELET # BLD AUTO: 393 K/UL (ref 150–450)
PMV BLD AUTO: 9.2 FL (ref 9.2–12.9)
POTASSIUM SERPL-SCNC: 4.5 MMOL/L (ref 3.5–5.1)
PROT SERPL-MCNC: 6.9 G/DL (ref 6–8.4)
RBC # BLD AUTO: 2.63 M/UL (ref 4.6–6.2)
SODIUM SERPL-SCNC: 138 MMOL/L (ref 136–145)
WBC # BLD AUTO: 7.69 K/UL (ref 3.9–12.7)

## 2024-02-12 PROCEDURE — 80053 COMPREHEN METABOLIC PANEL: CPT | Performed by: NURSE PRACTITIONER

## 2024-02-12 PROCEDURE — 36415 COLL VENOUS BLD VENIPUNCTURE: CPT | Performed by: NURSE PRACTITIONER

## 2024-02-12 PROCEDURE — 85025 COMPLETE CBC W/AUTO DIFF WBC: CPT | Performed by: NURSE PRACTITIONER

## 2024-02-12 PROCEDURE — 83735 ASSAY OF MAGNESIUM: CPT | Performed by: NURSE PRACTITIONER

## 2024-02-12 RX ORDER — CYANOCOBALAMIN 1000 UG/ML
1000 INJECTION, SOLUTION INTRAMUSCULAR; SUBCUTANEOUS
Status: CANCELLED | OUTPATIENT
Start: 2024-02-14

## 2024-02-12 RX ORDER — HEPARIN 100 UNIT/ML
500 SYRINGE INTRAVENOUS
Status: CANCELLED | OUTPATIENT
Start: 2024-02-14

## 2024-02-12 RX ORDER — EPINEPHRINE 0.3 MG/.3ML
0.3 INJECTION SUBCUTANEOUS ONCE AS NEEDED
Status: CANCELLED | OUTPATIENT
Start: 2024-02-14

## 2024-02-12 RX ORDER — SODIUM CHLORIDE 0.9 % (FLUSH) 0.9 %
10 SYRINGE (ML) INJECTION
Status: CANCELLED | OUTPATIENT
Start: 2024-02-14

## 2024-02-12 RX ORDER — PROCHLORPERAZINE EDISYLATE 5 MG/ML
5 INJECTION INTRAMUSCULAR; INTRAVENOUS ONCE AS NEEDED
Status: CANCELLED | OUTPATIENT
Start: 2024-02-14

## 2024-02-12 RX ORDER — DIPHENHYDRAMINE HYDROCHLORIDE 50 MG/ML
50 INJECTION INTRAMUSCULAR; INTRAVENOUS ONCE AS NEEDED
Status: CANCELLED | OUTPATIENT
Start: 2024-02-14

## 2024-02-14 ENCOUNTER — INFUSION (OUTPATIENT)
Dept: INFUSION THERAPY | Facility: HOSPITAL | Age: 79
End: 2024-02-14
Attending: INTERNAL MEDICINE
Payer: MEDICARE

## 2024-02-14 ENCOUNTER — DOCUMENTATION ONLY (OUTPATIENT)
Dept: HEMATOLOGY/ONCOLOGY | Facility: CLINIC | Age: 79
End: 2024-02-14

## 2024-02-14 VITALS
WEIGHT: 166.31 LBS | RESPIRATION RATE: 15 BRPM | HEIGHT: 77 IN | OXYGEN SATURATION: 99 % | BODY MASS INDEX: 19.64 KG/M2 | DIASTOLIC BLOOD PRESSURE: 72 MMHG | TEMPERATURE: 97 F | SYSTOLIC BLOOD PRESSURE: 115 MMHG | HEART RATE: 64 BPM

## 2024-02-14 DIAGNOSIS — C34.12 MALIGNANT NEOPLASM OF UPPER LOBE OF LEFT LUNG: Primary | ICD-10-CM

## 2024-02-14 DIAGNOSIS — E03.2 HYPOTHYROIDISM DUE TO DRUGS: ICD-10-CM

## 2024-02-14 LAB — TSH SERPL DL<=0.005 MIU/L-ACNC: 0.81 UIU/ML (ref 0.34–5.6)

## 2024-02-14 PROCEDURE — 25000003 PHARM REV CODE 250: Performed by: INTERNAL MEDICINE

## 2024-02-14 PROCEDURE — 96372 THER/PROPH/DIAG INJ SC/IM: CPT | Mod: 59

## 2024-02-14 PROCEDURE — 84443 ASSAY THYROID STIM HORMONE: CPT | Performed by: INTERNAL MEDICINE

## 2024-02-14 PROCEDURE — 96413 CHEMO IV INFUSION 1 HR: CPT

## 2024-02-14 PROCEDURE — 63600175 PHARM REV CODE 636 W HCPCS: Performed by: INTERNAL MEDICINE

## 2024-02-14 PROCEDURE — A4216 STERILE WATER/SALINE, 10 ML: HCPCS | Performed by: INTERNAL MEDICINE

## 2024-02-14 PROCEDURE — 96411 CHEMO IV PUSH ADDL DRUG: CPT

## 2024-02-14 PROCEDURE — 96367 TX/PROPH/DG ADDL SEQ IV INF: CPT

## 2024-02-14 RX ORDER — SODIUM CHLORIDE 0.9 % (FLUSH) 0.9 %
10 SYRINGE (ML) INJECTION
Status: DISCONTINUED | OUTPATIENT
Start: 2024-02-14 | End: 2024-02-14 | Stop reason: HOSPADM

## 2024-02-14 RX ORDER — DIPHENHYDRAMINE HYDROCHLORIDE 50 MG/ML
50 INJECTION INTRAMUSCULAR; INTRAVENOUS ONCE AS NEEDED
Status: DISCONTINUED | OUTPATIENT
Start: 2024-02-14 | End: 2024-02-14 | Stop reason: HOSPADM

## 2024-02-14 RX ORDER — PROCHLORPERAZINE EDISYLATE 5 MG/ML
5 INJECTION INTRAMUSCULAR; INTRAVENOUS ONCE AS NEEDED
Status: DISCONTINUED | OUTPATIENT
Start: 2024-02-14 | End: 2024-02-14 | Stop reason: HOSPADM

## 2024-02-14 RX ORDER — HEPARIN 100 UNIT/ML
500 SYRINGE INTRAVENOUS
Status: DISCONTINUED | OUTPATIENT
Start: 2024-02-14 | End: 2024-02-14 | Stop reason: HOSPADM

## 2024-02-14 RX ORDER — EPINEPHRINE 0.3 MG/.3ML
0.3 INJECTION SUBCUTANEOUS ONCE AS NEEDED
Status: DISCONTINUED | OUTPATIENT
Start: 2024-02-14 | End: 2024-02-14 | Stop reason: HOSPADM

## 2024-02-14 RX ORDER — CYANOCOBALAMIN 1000 UG/ML
1000 INJECTION, SOLUTION INTRAMUSCULAR; SUBCUTANEOUS
Status: COMPLETED | OUTPATIENT
Start: 2024-02-14 | End: 2024-02-14

## 2024-02-14 RX ADMIN — SODIUM CHLORIDE, PRESERVATIVE FREE 10 ML: 5 INJECTION INTRAVENOUS at 11:02

## 2024-02-14 RX ADMIN — PEMETREXED DISODIUM 1075 MG: 500 INJECTION, POWDER, LYOPHILIZED, FOR SOLUTION INTRAVENOUS at 11:02

## 2024-02-14 RX ADMIN — CYANOCOBALAMIN 1000 MCG: 1000 INJECTION, SOLUTION INTRAMUSCULAR at 10:02

## 2024-02-14 RX ADMIN — DEXAMETHASONE SODIUM PHOSPHATE 8 MG: 4 INJECTION, SOLUTION INTRA-ARTICULAR; INTRALESIONAL; INTRAMUSCULAR; INTRAVENOUS; SOFT TISSUE at 11:02

## 2024-02-14 RX ADMIN — SODIUM CHLORIDE 200 MG: 9 INJECTION, SOLUTION INTRAVENOUS at 10:02

## 2024-02-14 RX ADMIN — HEPARIN 500 UNITS: 100 SYRINGE at 11:02

## 2024-02-14 NOTE — PROGRESS NOTES
Medical Nutrition Therapy Oncology Progress Note      Patient's PCP:Nola Ervin MD  Referring Provider: No ref. provider found  Subjective:        Patient ID: Akil Guzman is a 78 y.o. male.    Chief Complaint: Unintentional Weight Loss    Past Medical History:   Diagnosis Date    Allergy     SEASON    Basal cell carcinoma 2009    L ear    Cancer     Hx colon     Pneumonia, unspecified organism 07/2023       Past Surgical History:   Procedure Laterality Date    CATARACT EXTRACTION Bilateral 2022    COLON SURGERY  2000    1ft. sigmoid removed    COLONOSCOPY N/A 09/28/2020    Procedure: COLONOSCOPY;  Surgeon: Ty Pollock MD;  Location: [facility] ENDO;  Service: General;  Laterality: N/A;    INSERTION OF TUNNELED CENTRAL VENOUS CATHETER (CVC) WITH SUBCUTANEOUS PORT N/A 9/11/2023    Procedure: [id number];  Surgeon: Antwon Peres Jr., MD;  Location: [facility] OR;  Service: General;  Laterality: N/A;       Social History     Socioeconomic History    Marital status:    Tobacco Use    Smoking status: Former     Current packs/day: 0.25     Average packs/day: 0.3 packs/day for 15.4 years (3.9 ttl pk-yrs)     Types: Cigarettes     Start date: 9/6/2008    Smokeless tobacco: Never   Substance and Sexual Activity    Alcohol use: Yes     Alcohol/week: 2.0 standard drinks of alcohol     Types: 2 Drinks containing 0.5 oz of alcohol per week     Comment: 2 mixed drinks WEEK    Drug use: No    Sexual activity: Yes     Partners: Female     Social Determinants of Health     Financial Resource Strain: Low Risk  (1/12/2024)    Overall Financial Resource Strain (CARDIA)     Difficulty of Paying Living Expenses: Not hard at all   Food Insecurity: No Food Insecurity (1/12/2024)    Hunger Vital Sign     Worried About Running Out of Food in the Last Year: Never true     Ran Out of Food in the Last Year: Never true   Transportation Needs: No Transportation Needs (1/12/2024)     PRAPARE - Transportation     Lack of Transportation (Medical): No     Lack of Transportation (Non-Medical): No   Physical Activity: Insufficiently Active (1/12/2024)    Exercise Vital Sign     Days of Exercise per Week: 3 days     Minutes of Exercise per Session: 30 min   Stress: No Stress Concern Present (1/12/2024)    [employer] [place] [employer] - Occupational Stress Questionnaire     Feeling of Stress : Only a little   Recent Concern: Stress - Stress Concern Present (11/7/2023)    [employer] [place] [employer] - Occupational Stress Questionnaire     Feeling of Stress : To some extent   Social Connections: Unknown (1/12/2024)    Social Connection and Isolation Panel [NHANES]     Frequency of Communication with Friends and Family: Twice a week     Frequency of Social Gatherings with Friends and Family: Twice a week     Active Member of Clubs or Organizations: No     Attends Club or Organization Meetings: Never     Marital Status:    Housing Stability: Low Risk  (1/12/2024)    Housing Stability Vital Sign     Unable to Pay for Housing in the Last Year: No     Number of Places Lived in the Last Year: 1     Unstable Housing in the Last Year: No       Family History   Problem Relation Age of Onset    Cancer Mother     Brain cancer Mother     Cancer Brother     Macular degeneration Brother     Pancreatic cancer Brother     Melanoma Neg Hx        Review of patient's allergies indicates:   Allergen Reactions    Penicillins      Other reaction(s): Rash  50 YEARS AGO         Current Outpatient Medications:     dexAMETHasone (DECADRON) 4 MG Tab, 2 tablets twice a day the day before and after each chemo, Disp: 32 tablet, Rfl: 5    duke's soln (benadryl 30 mL, mylanta 30 mL, LIDOcaine 30 mL, nystatin 30 mL) 120mL, Take 10 mLs by mouth 4 (four) times daily., Disp: 120 mL, Rfl: 5    finasteride (PROSCAR) 5 mg tablet, TAKE 1 TABLET BY MOUTH EVERY DAY FOR PROSTATE, Disp: 90 tablet, Rfl: 3    folic  acid (FOLVITE) 1 MG tablet, Take 1 tablet (1 mg total) by mouth once daily., Disp: 100 tablet, Rfl: 3    HYDROcodone-acetaminophen (NORCO)  mg per tablet, Take 1 tablet by mouth every 6 (six) hours as needed for Pain., Disp: 60 tablet, Rfl: 0    ipratropium (ATROVENT) 42 mcg (0.06 %) nasal spray, USE 1 SPRAY in each nostril TWICE DAILY THANK YOU!, Disp: , Rfl:     magnesium oxide (MAG-OX) 400 mg (241.3 mg magnesium) tablet, Take 1 tablet (400 mg total) by mouth once daily., Disp: 30 tablet, Rfl: 11    ondansetron (ZOFRAN) 8 MG tablet, Take 1 tablet (8 mg total) by mouth every 8 (eight) hours as needed., Disp: 30 tablet, Rfl: 5    promethazine (PHENERGAN) 25 MG tablet, Take 1 tablet (25 mg total) by mouth every 4 to 6 hours as needed., Disp: 30 tablet, Rfl: 5    sildenafiL (VIAGRA) 100 MG tablet, Take 1 tablet (100 mg total) by mouth daily as needed for Erectile Dysfunction., Disp: 30 tablet, Rfl: 11    tamsulosin (FLOMAX) 0.4 mg Cap, Take 1 capsule (0.4 mg total) by mouth once daily., Disp: 90 capsule, Rfl: 3    temazepam (RESTORIL) 30 mg capsule, Take 1 capsule (30 mg total) by mouth nightly as needed for Insomnia. TAKE NIGHTLY AS NEEDED, Disp: 30 capsule, Rfl: 2    tiZANidine (ZANAFLEX) 4 MG tablet, Take 1 tablet (4 mg total) by mouth every 12 (twelve) hours as needed (muscle spasms/ pain)., Disp: 40 tablet, Rfl: 0    Current Facility-Administered Medications:     0.9%  NaCl infusion (for blood administration), , Intravenous, Once, Allie Shelley NP-C, Stopped at 10/23/23 1445    Facility-Administered Medications Ordered in Other Visits:     heparin, porcine (PF) 100 unit/mL injection flush 500 Units, 500 Units, Intravenous, PRN, Allie Shelley NP-C, 500 Units at 12/14/23 1422    sodium chloride 0.9% flush 10 mL, 10 mL, Intravenous, PRN, Allie Shelley NP-C, 10 mL at 12/14/23 1426    All medications and past history have been reviewed.    OP NSCLC pembrolizumab PEMEtrexed CARBOplatin (AUC) Q3W followed  by maintenance pembrolizumab PEMEtrexed Q3W      Treatment Goal:   Control      Status:   Active      Start Date:   9/13/2023      End Date:   9/24/2025 (Planned)      Provider:   Reynaldo Torres MD      Chemotherapy:   CARBOplatin (PARAPLATIN) 495 mg in sodium chloride 0.9% 334.5 mL chemo infusion, 495 mg (100 % of original dose 496.5 mg), Intravenous, Clinic/HOD 1 time, 4 of 4 cycles    Dose modification:   (original dose 496.5 mg, Cycle 1)    Administration: 495 mg (9/13/2023), 465 mg (10/4/2023), 540 mg (11/15/2023), 435 mg (10/25/2023)        PEMEtrexed disodium (ALIMTA) 1,075 mg in sodium chloride 0.9% SolP 100 mL chemo infusion, 500 mg/m2 = 1,075 mg, Intravenous, Clinic/HOD 1 time, 7 of 35 cycles    Administration: 1,075 mg (9/13/2023), 1,075 mg (10/4/2023), 1,075 mg (11/15/2023), 1,075 mg (1/3/2024), 1,075 mg (2/14/2024), 1,075 mg (10/25/2023), 1,075 mg (1/24/2024)      Objective:      Wt Readings from Last 20 Encounters:   10/04/23 78.9 kg (173 lb 14.4 oz)   09/22/23 79.1 kg (174 lb 6.4 oz)   09/21/23 77.1 kg (170 lb)   09/14/23 83.9 kg (185 lb)   09/14/23 84.4 kg (185 lb 15.3 oz)   09/13/23 83 kg (183 lb)   09/11/23 84.8 kg (187 lb)   09/07/23 85.1 kg (187 lb 9.6 oz)   09/06/23 86.2 kg (190 lb)   09/01/23 84.9 kg (187 lb 1.6 oz)   08/09/23 87 kg (191 lb 12.8 oz)   08/03/23 87.2 kg (192 lb 3.2 oz)   08/02/23 87 kg (191 lb 12.8 oz)   07/06/23 89.4 kg (197 lb 1.5 oz)   06/22/23 87.5 kg (192 lb 12.8 oz)   10/06/22 91.5 kg (201 lb 12.8 oz)   09/07/22 92.1 kg (203 lb)   04/11/22 94.5 kg (208 lb 5.4 oz)   02/01/22 95.7 kg (211 lb)   10/26/21 94.6 kg (208 lb 8 oz)       Last Labs:  Last Labs:  Glucose   Date Value Ref Range Status   02/12/2024 119 (H) 70 - 110 mg/dL Final   01/22/2024 129 (H) 70 - 110 mg/dL Final     BUN   Date Value Ref Range Status   02/12/2024 23 8 - 23 mg/dL Final   01/22/2024 15 8 - 23 mg/dL Final     Creatinine   Date Value Ref Range Status   02/12/2024 0.9 0.5 - 1.4 mg/dL Final  "  01/22/2024 1.0 0.5 - 1.4 mg/dL Final     Sodium   Date Value Ref Range Status   02/12/2024 138 136 - 145 mmol/L Final   01/22/2024 136 136 - 145 mmol/L Final     Potassium   Date Value Ref Range Status   02/12/2024 4.5 3.5 - 5.1 mmol/L Final   01/22/2024 4.0 3.5 - 5.1 mmol/L Final     Phosphorus   Date Value Ref Range Status   12/04/2014 3.7 2.7 - 4.5 mg/dL Final   07/21/2014 3.6 2.7 - 4.5 mg/dL Final     Calcium   Date Value Ref Range Status   02/12/2024 9.6 8.7 - 10.5 mg/dL Final   01/22/2024 9.5 8.7 - 10.5 mg/dL Final     No results found for: "PREALBUMIN"  Total Protein   Date Value Ref Range Status   02/12/2024 6.9 6.0 - 8.4 g/dL Final   01/22/2024 6.8 6.0 - 8.4 g/dL Final     Cholesterol   Date Value Ref Range Status   06/21/2023 163 120 - 199 mg/dL Final     Comment:     The National Cholesterol Education Program (NCEP) has set the  following guidelines (reference ranges) for Cholesterol:  Optimal.....................<200 mg/dL  Borderline High.............200-239 mg/dL  High........................> or = 240 mg/dL     11/02/2021 195 120 - 199 mg/dL Final     Comment:     The National Cholesterol Education Program (NCEP) has set the  following guidelines (reference ranges) for Cholesterol:  Optimal.....................<200 mg/dL  Borderline High.............200-239 mg/dL  High........................> or = 240 mg/dL       No results found for: "HGBA1C"  Hemoglobin   Date Value Ref Range Status   02/12/2024 8.3 (L) 14.0 - 18.0 g/dL Final   01/22/2024 9.5 (L) 14.0 - 18.0 g/dL Final     Hematocrit   Date Value Ref Range Status   02/12/2024 25.5 (L) 40.0 - 54.0 % Final   01/22/2024 29.9 (L) 40.0 - 54.0 % Final     Iron   Date Value Ref Range Status   09/25/2020 117 45 - 160 ug/dL Final   12/04/2014 116 45 - 160 ug/dL Final     No components found for: "FROLATE"  No results found for: "[id number]"  WBC   Date Value Ref Range Status   02/12/2024 7.69 3.90 - 12.70 K/uL Final   01/22/2024 6.49 3.90 - 12.70 K/uL " Final       Assessment:     Nutrition/Diet History     Patient Reported Diet/Restrictions/Preferences: regular diet  Food Allergies: NKFA  Factors Affecting Nutritional Intake: none noted    Estimated/Assessed Needs     Weight Used For Calorie Calculations: 79 kg (173 lb)  Energy Calorie Requirements (kcal): 6603-0841 kcal/day   Energy Need Method: 30-35 Kcal/kg  Protein Requirements:  g/day   Protein Need Method: 1.2-1.5 g/kg  Fluid Requirements: 2000 ml/day  Estimated Fluid Requirement Method: 1ml/kcal      Nutrition Support  N/A    Evaluation of Received Nutrient/Fluid Intake     Calorie Intake: not meeting needs  Protein Intake: not meeting needs  Fluid Intake: meeting needs  Tolerance: tolerating  % Intake of Estimated Energy Needs: 50 %      Nutrition Diagnosis Related to (Etiology) As Evidenced By (Signs/Symptoms)   Inadequate energy intake Decreased ability to consume sufficient energy Decreased intake, unintentional weight loss     RD Notes  Mr. Akil Guzman is a 79y/o male with lung cancer and personal history of colon cancer currently receiving carboplatin and alimta. Pt reports good appetite and intake but that he eats a small breakfast then snacks during the day and sometimes eats dinner. He does supplement with protein shakes and protein bars occasionally. His snacks consist mostly of fruit and nuts. Pt reports he did not take his antiemetics as prescribed after his first cycle and had several days of N/V. He is working on increasing his hydration. CW: 173#    2/14: Pt reports he has bene eating 2-3 small meals per day and his usually intake includes nuts, dried fruits, pb/celery, stew and chicken salad. He supplements with muscle milk and aims for two per day. He does not have much of a appeitte but does try to make himself eat. He reports good hydration. CW: 166#    Nutrition Intervention:      Nutrition Intervention Energy-modified diet, Protein-modified diet, and Schedule of food/fluids    Goals/Expected Outcomes Initiate small, frequent meals and snacks with high calorie/protein food choices to meet estimated nutritional needs and prevent unintentional weight loss   Progress Progressing towards goal     Nutrition Intervention Medical food supplement: Commercial beverage   Goals/Expected Outcomes Initiate high calorie ONS at least once daily to assist in meeting estimated nutritional needs   Progress Progressing towards goal     Plan  Discussed importance of weight and hydration maintenance  Discussed examples of high calorie/protein foods  Continue small, frequent meals and snacks q 3 hrs. Recommended setting alarm to remind himself to eat  Continue ONS at least once daily  Provided RD contact info. Encouraged pt to call with questions or concerns    Monitoring/Evaluation:     Monitor: po intake, weight, BMs    Next Visit: f/u as needed      I have explained and the patient understands all of  the current recommendation(s). I have answered all of their questions to the best of my ability and to their complete satisfaction.   The patient is to continue with the current management plan.    Electronically signed by: Toma Waddell MBA, LEXIEN, LDN

## 2024-02-14 NOTE — PLAN OF CARE
Problem: Fatigue  Goal: Improved Activity Tolerance  Outcome: Ongoing, Progressing  Intervention: Promote Improved Energy  Flowsheets (Taken 2/14/2024 1013)  Fatigue Management:   fatigue-related activity identified   frequent rest breaks encouraged   paced activity encouraged  Sleep/Rest Enhancement:   regular sleep/rest pattern promoted   relaxation techniques promoted  Activity Management: Ambulated -L4

## 2024-02-15 ENCOUNTER — INFUSION (OUTPATIENT)
Dept: INFUSION THERAPY | Facility: HOSPITAL | Age: 79
End: 2024-02-15
Payer: MEDICARE

## 2024-02-15 VITALS
RESPIRATION RATE: 20 BRPM | SYSTOLIC BLOOD PRESSURE: 126 MMHG | HEART RATE: 87 BPM | OXYGEN SATURATION: 100 % | DIASTOLIC BLOOD PRESSURE: 62 MMHG | BODY MASS INDEX: 20.07 KG/M2 | WEIGHT: 170 LBS | TEMPERATURE: 98 F | HEIGHT: 77 IN

## 2024-02-15 DIAGNOSIS — C34.12 MALIGNANT NEOPLASM OF UPPER LOBE OF LEFT LUNG: ICD-10-CM

## 2024-02-15 DIAGNOSIS — C34.92 NSCLC OF LEFT LUNG: Primary | ICD-10-CM

## 2024-02-15 PROCEDURE — 96523 IRRIG DRUG DELIVERY DEVICE: CPT

## 2024-02-15 PROCEDURE — 25000003 PHARM REV CODE 250: Performed by: FAMILY MEDICINE

## 2024-02-15 PROCEDURE — 96365 THER/PROPH/DIAG IV INF INIT: CPT

## 2024-02-15 PROCEDURE — 63600175 PHARM REV CODE 636 W HCPCS: Performed by: FAMILY MEDICINE

## 2024-02-15 RX ORDER — HEPARIN 100 UNIT/ML
500 SYRINGE INTRAVENOUS
Status: DISCONTINUED | OUTPATIENT
Start: 2024-02-15 | End: 2024-02-15 | Stop reason: HOSPADM

## 2024-02-15 RX ORDER — HEPARIN 100 UNIT/ML
500 SYRINGE INTRAVENOUS
OUTPATIENT
Start: 2024-02-15

## 2024-02-15 RX ORDER — SODIUM CHLORIDE 0.9 % (FLUSH) 0.9 %
10 SYRINGE (ML) INJECTION
OUTPATIENT
Start: 2024-02-15

## 2024-02-15 RX ORDER — SODIUM CHLORIDE 0.9 % (FLUSH) 0.9 %
10 SYRINGE (ML) INJECTION
Status: DISCONTINUED | OUTPATIENT
Start: 2024-02-15 | End: 2024-02-15 | Stop reason: HOSPADM

## 2024-02-15 RX ORDER — ONDANSETRON HYDROCHLORIDE 2 MG/ML
8 INJECTION, SOLUTION INTRAVENOUS ONCE
Start: 2024-02-16 | End: 2024-02-16

## 2024-02-15 RX ORDER — ONDANSETRON HYDROCHLORIDE 2 MG/ML
8 INJECTION, SOLUTION INTRAVENOUS ONCE
Status: COMPLETED | OUTPATIENT
Start: 2024-02-15 | End: 2024-02-15

## 2024-02-15 RX ADMIN — ONDANSETRON 8 MG: 2 INJECTION INTRAMUSCULAR; INTRAVENOUS at 01:02

## 2024-02-15 RX ADMIN — SODIUM CHLORIDE 1000 ML: 0.9 INJECTION, SOLUTION INTRAVENOUS at 12:02

## 2024-02-15 RX ADMIN — HEPARIN 500 UNITS: 100 SYRINGE at 02:02

## 2024-02-15 NOTE — PLAN OF CARE
Problem: Adult Inpatient Plan of Care  Goal: Optimal Comfort and Wellbeing  Outcome: Ongoing, Progressing     Problem: Adult Inpatient Plan of Care  Goal: Optimal Comfort and Wellbeing  Outcome: Ongoing, Progressing  Intervention: Provide Person-Centered Care  Flowsheets (Taken 2/15/2024 1303)  Trust Relationship/Rapport:   care explained   choices provided   emotional support provided   empathic listening provided   questions answered   questions encouraged   reassurance provided   thoughts/feelings acknowledged

## 2024-02-23 ENCOUNTER — HOSPITAL ENCOUNTER (OUTPATIENT)
Dept: RADIOLOGY | Facility: HOSPITAL | Age: 79
Discharge: HOME | End: 2024-02-23
Attending: NURSE PRACTITIONER
Payer: MEDICARE

## 2024-02-23 DIAGNOSIS — C34.12 MALIGNANT NEOPLASM OF UPPER LOBE OF LEFT LUNG: ICD-10-CM

## 2024-02-23 PROCEDURE — 25500020 PHARM REV CODE 255: Performed by: NURSE PRACTITIONER

## 2024-02-23 PROCEDURE — 74177 CT ABD & PELVIS W/CONTRAST: CPT | Mod: TC

## 2024-02-23 RX ADMIN — IOHEXOL 100 ML: 350 INJECTION, SOLUTION INTRAVENOUS at 10:02

## 2024-02-27 ENCOUNTER — OFFICE VISIT (OUTPATIENT)
Dept: HEMATOLOGY/ONCOLOGY | Facility: CLINIC | Age: 79
End: 2024-02-27
Payer: MEDICARE

## 2024-02-27 VITALS
RESPIRATION RATE: 16 BRPM | WEIGHT: 164.31 LBS | DIASTOLIC BLOOD PRESSURE: 55 MMHG | SYSTOLIC BLOOD PRESSURE: 115 MMHG | BODY MASS INDEX: 19.48 KG/M2 | HEART RATE: 81 BPM

## 2024-02-27 DIAGNOSIS — C34.12 MALIGNANT NEOPLASM OF UPPER LOBE OF LEFT LUNG: Primary | ICD-10-CM

## 2024-02-27 PROCEDURE — 99213 OFFICE O/P EST LOW 20 MIN: CPT | Performed by: INTERNAL MEDICINE

## 2024-02-27 PROCEDURE — 99214 OFFICE O/P EST MOD 30 MIN: CPT | Mod: S$PBB,,, | Performed by: INTERNAL MEDICINE

## 2024-02-27 RX ORDER — PREDNISONE 20 MG/1
20 TABLET ORAL DAILY
Qty: 30 TABLET | Refills: 0 | Status: SHIPPED | OUTPATIENT
Start: 2024-02-27 | End: 2024-04-02

## 2024-02-27 NOTE — ASSESSMENT & PLAN NOTE
Patient has increasing issues with fatigue and notes severe weakness and some diarrhea issues recently.  I am not sure if this is the alimta or the pembro.  I will give a short break from therapy and try prednisone 20mg daily.  Will see him again in 3 weeks and see about resuming one of these agents.  Will likely try to resume pembro.  Discussed with patient in detail.

## 2024-02-27 NOTE — PROGRESS NOTES
PROGRESS NOTE    Subjective:       Patient ID: Akil Guzman is a 78 y.o. male.    8/7/2023-CT chest without:  1. Large left pleural effusion with dependent left lower lobe atelectasis.  2. Poorly defined mass-like infiltrate involving the periphery of the left upper lobe extending to the left hilum.  Underlying parenchymal mass is not excludable, however, evaluation is limited due to lack of intravenous contrast.  Correlate clinically with possible fever and/or elevated white count.  3. Bilateral soft tissue pulmonary nodules measuring up to 11 mm.  Follow-up per Fleischner guidelines.  For multiple solid nodules with any 6 mm or greater, Fleischner Society guidelines recommend follow up with non-contrast chest CT at 3-6 months and 18-24 months after discovery.  4. Partially calcified left upper lobe pulmonary nodule may represent granulomatous change and scarring.  5. 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  6. Questionable left hilar lymphadenopathy.  However, evaluation is again limited due to lack of intravenous contrast.  7. Small hiatal hernia.    8/17/2023 Left Pleural Fluid:  Positive for adenocarcinoma most c/w lung primary    9/14/2023-PET:  1. FDG avid nodular thickening involving origin of the lingular bronchus with adjacent FDG avid 20 mm nodular density, suspicious for primary pulmonary malignancy.  2. Moderate left pleural effusion with scattered foci of FDG uptake along the parietal pleura of concern for malignant pleural effusion.  3. Left chest wall FDG avid mass resulting in lytic destruction and pathologic fractures of left 10th and 11th ribs, characteristic of metastatic disease.  4. Diffuse prominent FDG activity throughout the intramedullary compartments of the axial and proximal appendicular skeleton is nonspecific. This can be seen with pharmacologic bone marrow stimulation although diffuse metastatic  disease can also be considered. Metastatic disease is felt less likely given history.    9/14/2023-MRI Brain  Negative for mets    Stage IVB-Adenocarcinoma of the lung    PLAN:   Carbo/Pem/Pem ChemoIO therapy    Carbo/Pem/Pem:  Cycle 1: 9/13/2023  Cycle 2: 10/4/2023  Cycle 3: 10/25/2023  Cycle 4: 11/15/2023  Cycle 5: 1/3/2024  Cycle 6: 1/24/2023  Cycle 7: 2/14/2024  Cycle 8: 3/6/2024-due    CT CAP-2/23/2024:  Stable post radiation changes and atelectasis within the lingula with mild wall thickening surrounding the lingula bronchus with no discrete mass     Stable groundglass nodular opacities within the left upper lobe suggestive of infectious or inflammatory process     Small left pleural effusion     Stable lytic and sclerotic lesions involving the posterior lateral left 10th and 11th ribs compatible with treated metastasis. There is resolution of the chest wall mass     Stable 9 mm left supraclavicular node     Emphysematous changes throughout the lungs     No evidence of metastatic disease within the abdomen or pelvis    Chief Complaint:  No chief complaint on file.  Stage IVB lung carcinoma follow up    History of Present Illness:   Akil Guzman is a 78 y.o. male who presents for follow up of lung cancer.     Mr. Guzman complains of increasing fatigue with the maintenance therapy.   No sob, no diarrhea.       Family and Social history reviewed and is unchanged from 9/1/2023             Current Outpatient Medications:     dexAMETHasone (DECADRON) 4 MG Tab, 2 tablets twice a day the day before and after each chemo, Disp: 32 tablet, Rfl: 5    duke's soln (benadryl 30 mL, mylanta 30 mL, LIDOcaine 30 mL, nystatin 30 mL) 120mL, Take 10 mLs by mouth 4 (four) times daily., Disp: 120 mL, Rfl: 5    finasteride (PROSCAR) 5 mg tablet, TAKE 1 TABLET BY MOUTH EVERY DAY FOR PROSTATE, Disp: 90 tablet, Rfl: 3    folic acid (FOLVITE) 1 MG tablet, Take 1 tablet (1 mg total) by mouth once daily., Disp: 100 tablet, Rfl: 3     HYDROcodone-acetaminophen (NORCO)  mg per tablet, Take 1 tablet by mouth every 6 (six) hours as needed for Pain., Disp: 60 tablet, Rfl: 0    ipratropium (ATROVENT) 42 mcg (0.06 %) nasal spray, USE 1 SPRAY in each nostril TWICE DAILY THANK YOU!, Disp: , Rfl:     magnesium oxide (MAG-OX) 400 mg (241.3 mg magnesium) tablet, Take 1 tablet (400 mg total) by mouth once daily., Disp: 30 tablet, Rfl: 11    ondansetron (ZOFRAN) 8 MG tablet, Take 1 tablet (8 mg total) by mouth every 8 (eight) hours as needed., Disp: 30 tablet, Rfl: 5    predniSONE (DELTASONE) 20 MG tablet, Take 1 tablet (20 mg total) by mouth once daily., Disp: 30 tablet, Rfl: 0    promethazine (PHENERGAN) 25 MG tablet, Take 1 tablet (25 mg total) by mouth every 4 to 6 hours as needed., Disp: 30 tablet, Rfl: 5    sildenafiL (VIAGRA) 100 MG tablet, Take 1 tablet (100 mg total) by mouth daily as needed for Erectile Dysfunction., Disp: 30 tablet, Rfl: 11    tamsulosin (FLOMAX) 0.4 mg Cap, Take 1 capsule (0.4 mg total) by mouth once daily., Disp: 90 capsule, Rfl: 3    tiZANidine (ZANAFLEX) 4 MG tablet, Take 1 tablet (4 mg total) by mouth every 12 (twelve) hours as needed (muscle spasms/ pain)., Disp: 40 tablet, Rfl: 0    Current Facility-Administered Medications:     0.9%  NaCl infusion (for blood administration), , Intravenous, Once, Allie Shelley NP-C, Stopped at 10/23/23 1445    Facility-Administered Medications Ordered in Other Visits:     heparin, porcine (PF) 100 unit/mL injection flush 500 Units, 500 Units, Intravenous, PRN, Allie Shelley NP-WILLY, 500 Units at 12/14/23 1422    sodium chloride 0.9% flush 10 mL, 10 mL, Intravenous, PRN, Allie Shelley NP-C, 10 mL at 12/14/23 1426        Objective:       Physical Examination:     BP (!) 115/55   Pulse 81   Resp 16   Wt 74.5 kg (164 lb 4.8 oz)   BMI 19.48 kg/m²     Physical Exam  Constitutional:       Appearance: Normal appearance.   HENT:      Head: Normocephalic and atraumatic.   Eyes:       General: No scleral icterus.     Conjunctiva/sclera: Conjunctivae normal.   Cardiovascular:      Rate and Rhythm: Normal rate.   Pulmonary:      Effort: Pulmonary effort is normal.   Abdominal:      General: Abdomen is flat.   Neurological:      General: No focal deficit present.      Mental Status: He is alert and oriented to person, place, and time.   Psychiatric:         Mood and Affect: Mood normal.         Behavior: Behavior normal.         Labs:   No results found for this or any previous visit (from the past 336 hour(s)).    CMP  Sodium   Date Value Ref Range Status   02/12/2024 138 136 - 145 mmol/L Final     Potassium   Date Value Ref Range Status   02/12/2024 4.5 3.5 - 5.1 mmol/L Final     Chloride   Date Value Ref Range Status   02/12/2024 101 95 - 110 mmol/L Final     CO2   Date Value Ref Range Status   02/12/2024 24 23 - 29 mmol/L Final     Glucose   Date Value Ref Range Status   02/12/2024 119 (H) 70 - 110 mg/dL Final     BUN   Date Value Ref Range Status   02/12/2024 23 8 - 23 mg/dL Final     Creatinine   Date Value Ref Range Status   02/12/2024 0.9 0.5 - 1.4 mg/dL Final     Calcium   Date Value Ref Range Status   02/12/2024 9.6 8.7 - 10.5 mg/dL Final     Total Protein   Date Value Ref Range Status   02/12/2024 6.9 6.0 - 8.4 g/dL Final     Albumin   Date Value Ref Range Status   02/12/2024 3.0 (L) 3.5 - 5.2 g/dL Final     Total Bilirubin   Date Value Ref Range Status   02/12/2024 0.2 0.1 - 1.0 mg/dL Final     Comment:     For infants and newborns, interpretation of results should be based  on gestational age, weight and in agreement with clinical  observations.    Premature Infant recommended reference ranges:  Up to 24 hours.............<8.0 mg/dL  Up to 48 hours............<12.0 mg/dL  3-5 days..................<15.0 mg/dL  6-29 days.................<15.0 mg/dL       Alkaline Phosphatase   Date Value Ref Range Status   02/12/2024 76 55 - 135 U/L Final     AST   Date Value Ref Range Status    02/12/2024 17 10 - 40 U/L Final     ALT   Date Value Ref Range Status   02/12/2024 11 10 - 44 U/L Final     Anion Gap   Date Value Ref Range Status   02/12/2024 13 8 - 16 mmol/L Final     eGFR if    Date Value Ref Range Status   11/02/2021 56.4 (A) >60 mL/min/1.73 m^2 Final     eGFR if non    Date Value Ref Range Status   11/02/2021 48.8 (A) >60 mL/min/1.73 m^2 Final     Comment:     Calculation used to obtain the estimated glomerular filtration  rate (eGFR) is the CKD-EPI equation.        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  I am not sure if this is the alimta or the pembro.  I will give a short break from therapy and try prednisone 20mg daily.  Will see him again in 3 weeks and see about resuming one of these agents.  Will likely try to resume pembro.  Discussed with patient in detail.           Relevant Medications    predniSONE (DELTASONE) 20 MG tablet     Discussion:     Follow up in about 3 weeks (around 3/19/2024).      Electronically signed by Reynaldo Ball

## 2024-03-04 ENCOUNTER — PATIENT MESSAGE (OUTPATIENT)
Dept: HEMATOLOGY/ONCOLOGY | Facility: CLINIC | Age: 79
End: 2024-03-04

## 2024-03-04 ENCOUNTER — LAB VISIT (OUTPATIENT)
Dept: LAB | Facility: HOSPITAL | Age: 79
End: 2024-03-04
Attending: NURSE PRACTITIONER
Payer: MEDICARE

## 2024-03-04 DIAGNOSIS — C34.12 MALIGNANT NEOPLASM OF UPPER LOBE OF LEFT LUNG: ICD-10-CM

## 2024-03-04 DIAGNOSIS — C34.92 NSCLC OF LEFT LUNG: ICD-10-CM

## 2024-03-04 DIAGNOSIS — R53.83 FATIGUE, UNSPECIFIED TYPE: ICD-10-CM

## 2024-03-04 DIAGNOSIS — R52 ACUTE PAIN: ICD-10-CM

## 2024-03-04 LAB
ALBUMIN SERPL BCP-MCNC: 3.1 G/DL (ref 3.5–5.2)
ALP SERPL-CCNC: 66 U/L (ref 55–135)
ALT SERPL W/O P-5'-P-CCNC: 19 U/L (ref 10–44)
ANION GAP SERPL CALC-SCNC: 11 MMOL/L (ref 8–16)
AST SERPL-CCNC: 19 U/L (ref 10–40)
BASOPHILS # BLD AUTO: 0.02 K/UL (ref 0–0.2)
BASOPHILS NFR BLD: 0.2 % (ref 0–1.9)
BILIRUB SERPL-MCNC: 0.2 MG/DL (ref 0.1–1)
BUN SERPL-MCNC: 26 MG/DL (ref 8–23)
CALCIUM SERPL-MCNC: 9.5 MG/DL (ref 8.7–10.5)
CHLORIDE SERPL-SCNC: 102 MMOL/L (ref 95–110)
CO2 SERPL-SCNC: 25 MMOL/L (ref 23–29)
CREAT SERPL-MCNC: 1 MG/DL (ref 0.5–1.4)
DIFFERENTIAL METHOD BLD: ABNORMAL
EOSINOPHIL # BLD AUTO: 0 K/UL (ref 0–0.5)
EOSINOPHIL NFR BLD: 0.1 % (ref 0–8)
ERYTHROCYTE [DISTWIDTH] IN BLOOD BY AUTOMATED COUNT: 17.2 % (ref 11.5–14.5)
EST. GFR  (NO RACE VARIABLE): >60 ML/MIN/1.73 M^2
GLUCOSE SERPL-MCNC: 133 MG/DL (ref 70–110)
HCT VFR BLD AUTO: 25.1 % (ref 40–54)
HGB BLD-MCNC: 8 G/DL (ref 14–18)
IMM GRANULOCYTES # BLD AUTO: 0.14 K/UL (ref 0–0.04)
IMM GRANULOCYTES NFR BLD AUTO: 1.7 % (ref 0–0.5)
LYMPHOCYTES # BLD AUTO: 1 K/UL (ref 1–4.8)
LYMPHOCYTES NFR BLD: 12.7 % (ref 18–48)
MAGNESIUM SERPL-MCNC: 1.7 MG/DL (ref 1.6–2.6)
MCH RBC QN AUTO: 32 PG (ref 27–31)
MCHC RBC AUTO-ENTMCNC: 31.9 G/DL (ref 32–36)
MCV RBC AUTO: 100 FL (ref 82–98)
MONOCYTES # BLD AUTO: 0.5 K/UL (ref 0.3–1)
MONOCYTES NFR BLD: 6.1 % (ref 4–15)
NEUTROPHILS # BLD AUTO: 6.5 K/UL (ref 1.8–7.7)
NEUTROPHILS NFR BLD: 79.2 % (ref 38–73)
NRBC BLD-RTO: 0 /100 WBC
PLATELET # BLD AUTO: 343 K/UL (ref 150–450)
PMV BLD AUTO: 8.9 FL (ref 9.2–12.9)
POTASSIUM SERPL-SCNC: 4.6 MMOL/L (ref 3.5–5.1)
PROT SERPL-MCNC: 6.6 G/DL (ref 6–8.4)
RBC # BLD AUTO: 2.5 M/UL (ref 4.6–6.2)
SODIUM SERPL-SCNC: 138 MMOL/L (ref 136–145)
WBC # BLD AUTO: 8.21 K/UL (ref 3.9–12.7)

## 2024-03-04 PROCEDURE — 85025 COMPLETE CBC W/AUTO DIFF WBC: CPT | Performed by: NURSE PRACTITIONER

## 2024-03-04 PROCEDURE — 36415 COLL VENOUS BLD VENIPUNCTURE: CPT | Performed by: NURSE PRACTITIONER

## 2024-03-04 PROCEDURE — 83735 ASSAY OF MAGNESIUM: CPT | Performed by: NURSE PRACTITIONER

## 2024-03-04 PROCEDURE — 80053 COMPREHEN METABOLIC PANEL: CPT | Performed by: NURSE PRACTITIONER

## 2024-03-04 RX ORDER — HYDROCODONE BITARTRATE AND ACETAMINOPHEN 10; 325 MG/1; MG/1
1 TABLET ORAL EVERY 6 HOURS PRN
Qty: 60 TABLET | Refills: 0 | Status: SHIPPED | OUTPATIENT
Start: 2024-03-04 | End: 2024-04-22 | Stop reason: SDUPTHER

## 2024-03-11 ENCOUNTER — PATIENT MESSAGE (OUTPATIENT)
Dept: HEMATOLOGY/ONCOLOGY | Facility: CLINIC | Age: 79
End: 2024-03-11

## 2024-03-13 DIAGNOSIS — R11.0 NAUSEA: Primary | ICD-10-CM

## 2024-03-13 RX ORDER — ONDANSETRON 8 MG/1
8 TABLET, ORALLY DISINTEGRATING ORAL ONCE
Qty: 1 TABLET | Refills: 0 | Status: SHIPPED | OUTPATIENT
Start: 2024-03-13 | End: 2024-03-13

## 2024-03-21 ENCOUNTER — PATIENT MESSAGE (OUTPATIENT)
Dept: UROLOGY | Facility: CLINIC | Age: 79
End: 2024-03-21
Payer: MEDICARE

## 2024-03-22 DIAGNOSIS — R35.1 NOCTURIA: Primary | ICD-10-CM

## 2024-03-25 ENCOUNTER — LAB VISIT (OUTPATIENT)
Dept: LAB | Facility: HOSPITAL | Age: 79
End: 2024-03-25
Attending: NURSE PRACTITIONER
Payer: MEDICARE

## 2024-03-25 DIAGNOSIS — R35.1 NOCTURIA: ICD-10-CM

## 2024-03-25 PROCEDURE — 87086 URINE CULTURE/COLONY COUNT: CPT | Performed by: NURSE PRACTITIONER

## 2024-03-26 LAB — BACTERIA UR CULT: NO GROWTH

## 2024-03-28 NOTE — PROGRESS NOTES
PROGRESS NOTE    Subjective:       Patient ID: Akil Guzman is a 78 y.o. male.    8/7/2023-CT chest without:  1. Large left pleural effusion with dependent left lower lobe atelectasis.  2. Poorly defined mass-like infiltrate involving the periphery of the left upper lobe extending to the left hilum.  Underlying parenchymal mass is not excludable, however, evaluation is limited due to lack of intravenous contrast.  Correlate clinically with possible fever and/or elevated white count.  3. Bilateral soft tissue pulmonary nodules measuring up to 11 mm.  Follow-up per Fleischner guidelines.  For multiple solid nodules with any 6 mm or greater, Fleischner Society guidelines recommend follow up with non-contrast chest CT at 3-6 months and 18-24 months after discovery.  4. Partially calcified left upper lobe pulmonary nodule may represent granulomatous change and scarring.  5. 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  6. Questionable left hilar lymphadenopathy.  However, evaluation is again limited due to lack of intravenous contrast.  7. Small hiatal hernia.    8/17/2023 Left Pleural Fluid:  Positive for adenocarcinoma most c/w lung primary    9/14/2023-PET:  1. FDG avid nodular thickening involving origin of the lingular bronchus with adjacent FDG avid 20 mm nodular density, suspicious for primary pulmonary malignancy.  2. Moderate left pleural effusion with scattered foci of FDG uptake along the parietal pleura of concern for malignant pleural effusion.  3. Left chest wall FDG avid mass resulting in lytic destruction and pathologic fractures of left 10th and 11th ribs, characteristic of metastatic disease.  4. Diffuse prominent FDG activity throughout the intramedullary compartments of the axial and proximal appendicular skeleton is nonspecific. This can be seen with pharmacologic bone marrow stimulation although diffuse metastatic  disease can also be considered. Metastatic disease is felt less likely given history.    9/14/2023-MRI Brain  Negative for mets    Stage IVB-Adenocarcinoma of the lung    PLAN:   Carbo/Pem/Pem ChemoIO therapy    Carbo/Pem/Pem:  Cycle 1: 9/13/2023  Cycle 2: 10/4/2023  Cycle 3: 10/25/2023  Cycle 4: 11/15/2023  Cycle 5: 1/3/2024  Cycle 6: 1/24/2023  Cycle 7: 2/14/2024  Cycle 8: 3/6/2024      CT CAP-2/23/2024:  Stable post radiation changes and atelectasis within the lingula with mild wall thickening surrounding the lingula bronchus with no discrete mass     Stable groundglass nodular opacities within the left upper lobe suggestive of infectious or inflammatory process     Small left pleural effusion     Stable lytic and sclerotic lesions involving the posterior lateral left 10th and 11th ribs compatible with treated metastasis. There is resolution of the chest wall mass     Stable 9 mm left supraclavicular node     Emphysematous changes throughout the lungs     No evidence of metastatic disease within the abdomen or pelvis    Chief Complaint:  Stage IVB lung carcinoma follow up    History of Present Illness:   Akil Guzman is a 78 y.o. male who presents for follow up of lung cancer.     Mr. Guzman complains of increasing fatigue with the maintenance therapy.   No sob, no diarrhea. He has been on the prednisone 20 mg daily and is not sure if the delay in treatment or the prednisone is helping with the fatigue.    Patient requesting to possibly extend treatment to every 6 weeks instead of every 3 weeks.    He continues to have midback pain and is taking the Norco.      Family and Social history reviewed and is unchanged from 9/1/2023       Current Outpatient Medications:     duke's soln (benadryl 30 mL, mylanta 30 mL, LIDOcaine 30 mL, nystatin 30 mL) 120mL, Take 10 mLs by mouth 4 (four) times daily., Disp: 120 mL, Rfl: 5    finasteride (PROSCAR) 5 mg tablet, TAKE 1 TABLET BY MOUTH EVERY DAY FOR PROSTATE, Disp: 90 tablet,  "Rfl: 3    folic acid (FOLVITE) 1 MG tablet, Take 1 tablet (1 mg total) by mouth once daily., Disp: 100 tablet, Rfl: 3    HYDROcodone-acetaminophen (NORCO)  mg per tablet, Take 1 tablet by mouth every 6 (six) hours as needed for Pain., Disp: 60 tablet, Rfl: 0    ipratropium (ATROVENT) 42 mcg (0.06 %) nasal spray, USE 1 SPRAY in each nostril TWICE DAILY THANK YOU!, Disp: , Rfl:     magnesium oxide (MAG-OX) 400 mg (241.3 mg magnesium) tablet, Take 1 tablet (400 mg total) by mouth once daily., Disp: 30 tablet, Rfl: 11    ondansetron (ZOFRAN) 8 MG tablet, Take 1 tablet (8 mg total) by mouth every 8 (eight) hours as needed., Disp: 30 tablet, Rfl: 5    promethazine (PHENERGAN) 25 MG tablet, Take 1 tablet (25 mg total) by mouth every 4 to 6 hours as needed., Disp: 30 tablet, Rfl: 5    sildenafiL (VIAGRA) 100 MG tablet, Take 1 tablet (100 mg total) by mouth daily as needed for Erectile Dysfunction., Disp: 30 tablet, Rfl: 11    tamsulosin (FLOMAX) 0.4 mg Cap, Take 1 capsule (0.4 mg total) by mouth once daily., Disp: 90 capsule, Rfl: 3    tiZANidine (ZANAFLEX) 4 MG tablet, Take 1 tablet (4 mg total) by mouth every 12 (twelve) hours as needed (muscle spasms/ pain)., Disp: 40 tablet, Rfl: 0    cyanocobalamin 1,000 mcg/mL injection, Inject 1 mL (1,000 mcg total) into the skin every 30 days., Disp: 3 mL, Rfl: 3    predniSONE (DELTASONE) 10 MG tablet, Take 1 tablet (10 mg total) by mouth once daily., Disp: 14 tablet, Rfl: 0    syringe with needle 1 mL 25 gauge x 1" Syrg, 1 each by Misc.(Non-Drug; Combo Route) route every 30 days., Disp: 3 each, Rfl: 3    Current Facility-Administered Medications:     0.9%  NaCl infusion (for blood administration), , Intravenous, Once, Allie Shelley NP-C, Stopped at 10/23/23 2038    Facility-Administered Medications Ordered in Other Visits:     diphenhydrAMINE injection 50 mg, 50 mg, Intravenous, Once PRN, Allie Shelley, NP-C    EPINEPHrine (EPIPEN) 0.3 mg/0.3 mL pen injection 0.3 mg, 0.3 " "mg, Intramuscular, Once PRN, Allie Shelley, NP-C    heparin, porcine (PF) 100 unit/mL injection flush 500 Units, 500 Units, Intravenous, PRN, Allie Shelley, NP-C, 500 Units at 12/14/23 1422    heparin, porcine (PF) 100 unit/mL injection flush 500 Units, 500 Units, Intravenous, PRN, Jessica Shelleye, NP-C, 500 Units at 04/02/24 1457    hydrocortisone sodium succinate injection 100 mg, 100 mg, Intravenous, Once PRN, Allie Shelley, NP-C    sodium chloride 0.9% 250 mL flush bag, , Intravenous, PRN, Karsten, Allie, NP-C    sodium chloride 0.9% flush 10 mL, 10 mL, Intravenous, PRN, Karsten, Allie, NP-C, 10 mL at 12/14/23 1426    sodium chloride 0.9% flush 10 mL, 10 mL, Intravenous, PRN, Allie Shelley, NP-C    Review of Systems   Constitutional:  Positive for malaise/fatigue.   Respiratory:  Positive for shortness of breath. Negative for cough.    Cardiovascular:  Negative for chest pain.   Gastrointestinal:  Negative for abdominal pain and diarrhea.   Genitourinary:  Positive for frequency.   Musculoskeletal:  Negative for back pain.   Skin:  Negative for rash.   Neurological:  Negative for headaches.   Psychiatric/Behavioral:  The patient is not nervous/anxious.          Objective:       Physical Examination:     /60   Pulse 86   Temp 98.1 °F (36.7 °C)   Resp 18   Ht 6' 5" (1.956 m)   Wt 77.6 kg (171 lb)   BMI 20.28 kg/m²     Physical Exam  Constitutional:       Appearance: Normal appearance.   HENT:      Head: Normocephalic and atraumatic.      Right Ear: External ear normal.      Left Ear: External ear normal.      Nose: Nose normal.      Mouth/Throat:      Mouth: Mucous membranes are moist.   Eyes:      General: No scleral icterus.     Conjunctiva/sclera: Conjunctivae normal.   Cardiovascular:      Rate and Rhythm: Normal rate.   Pulmonary:      Effort: Pulmonary effort is normal.   Abdominal:      General: Abdomen is flat.   Skin:     General: Skin is warm.   Neurological:      General: No focal " deficit present.      Mental Status: He is alert and oriented to person, place, and time.   Psychiatric:         Mood and Affect: Mood normal.         Behavior: Behavior normal.         Labs:   Recent Results (from the past 336 hour(s))   CBC Auto Differential    Collection Time: 04/01/24 12:15 PM   Result Value Ref Range    WBC 8.16 3.90 - 12.70 K/uL    Hemoglobin 9.8 (L) 14.0 - 18.0 g/dL    Hematocrit 30.2 (L) 40.0 - 54.0 %    Platelets 209 150 - 450 K/uL       CMP  Sodium   Date Value Ref Range Status   04/01/2024 137 136 - 145 mmol/L Final     Potassium   Date Value Ref Range Status   04/01/2024 4.5 3.5 - 5.1 mmol/L Final     Chloride   Date Value Ref Range Status   04/01/2024 104 95 - 110 mmol/L Final     CO2   Date Value Ref Range Status   04/01/2024 22 (L) 23 - 29 mmol/L Final     Glucose   Date Value Ref Range Status   04/01/2024 270 (H) 70 - 110 mg/dL Final     BUN   Date Value Ref Range Status   04/01/2024 25 (H) 8 - 23 mg/dL Final     Creatinine   Date Value Ref Range Status   04/01/2024 1.0 0.5 - 1.4 mg/dL Final     Calcium   Date Value Ref Range Status   04/01/2024 9.5 8.7 - 10.5 mg/dL Final     Total Protein   Date Value Ref Range Status   04/01/2024 6.7 6.0 - 8.4 g/dL Final     Albumin   Date Value Ref Range Status   04/01/2024 3.4 (L) 3.5 - 5.2 g/dL Final     Total Bilirubin   Date Value Ref Range Status   04/01/2024 0.3 0.1 - 1.0 mg/dL Final     Comment:     For infants and newborns, interpretation of results should be based  on gestational age, weight and in agreement with clinical  observations.    Premature Infant recommended reference ranges:  Up to 24 hours.............<8.0 mg/dL  Up to 48 hours............<12.0 mg/dL  3-5 days..................<15.0 mg/dL  6-29 days.................<15.0 mg/dL       Alkaline Phosphatase   Date Value Ref Range Status   04/01/2024 70 55 - 135 U/L Final     AST   Date Value Ref Range Status   04/01/2024 14 10 - 40 U/L Final     ALT   Date Value Ref Range Status  "  04/01/2024 15 10 - 44 U/L Final     Anion Gap   Date Value Ref Range Status   04/01/2024 11 8 - 16 mmol/L Final     eGFR if    Date Value Ref Range Status   11/02/2021 56.4 (A) >60 mL/min/1.73 m^2 Final     eGFR if non    Date Value Ref Range Status   11/02/2021 48.8 (A) >60 mL/min/1.73 m^2 Final     Comment:     Calculation used to obtain the estimated glomerular filtration  rate (eGFR) is the CKD-EPI equation.        Lab Results   Component Value Date    CEA 1.6 01/03/2024     Lab Results   Component Value Date    PSA 1.0 06/21/2023    PSA 1.2 11/02/2021    PSA 1.3 08/10/2020           Assessment/Plan:     Problem List Items Addressed This Visit          Oncology    Malignant neoplasm of upper lobe of left lung - Primary    Relevant Medications    cyanocobalamin 1,000 mcg/mL injection    syringe with needle 1 mL 25 gauge x 1" Syrg    predniSONE (DELTASONE) 10 MG tablet    Other Relevant Orders    NM PET CT FDG Skull Base to Mid Thigh    Anemia in neoplastic disease    Cancer associated pain       Orthopedic    Muscle weakness     Other Visit Diagnoses       Fatigue, unspecified type              Metastatic Lunga Cancer- Continue wioth Keytruda; D/c Alimta; PET and MD follow up in 4-5 weeks.  2. Weakness- Prednisone 10 mg tablets x 1 week then decreaset o 5 mg daily x 1 week  3. Fatigue- Encouraged activity.  4. Anemia- Continue labs  5. Cancer related pain- Continue Norco PRN  Discussion:     Follow up in about 4 weeks (around 4/30/2024) for with Dr. Torres.      Electronically signed by Allie Shelley, MSN, APRN, AGNP-C, OCN          "

## 2024-03-30 DIAGNOSIS — C34.12 MALIGNANT NEOPLASM OF UPPER LOBE OF LEFT LUNG: ICD-10-CM

## 2024-04-01 ENCOUNTER — LAB VISIT (OUTPATIENT)
Dept: LAB | Facility: HOSPITAL | Age: 79
End: 2024-04-01
Attending: NURSE PRACTITIONER
Payer: MEDICARE

## 2024-04-01 ENCOUNTER — TELEPHONE (OUTPATIENT)
Dept: INFUSION THERAPY | Facility: HOSPITAL | Age: 79
End: 2024-04-01

## 2024-04-01 DIAGNOSIS — C34.92 NSCLC OF LEFT LUNG: Primary | ICD-10-CM

## 2024-04-01 DIAGNOSIS — C34.92 NSCLC OF LEFT LUNG: ICD-10-CM

## 2024-04-01 DIAGNOSIS — R53.83 FATIGUE, UNSPECIFIED TYPE: ICD-10-CM

## 2024-04-01 LAB
ALBUMIN SERPL BCP-MCNC: 3.4 G/DL (ref 3.5–5.2)
ALP SERPL-CCNC: 70 U/L (ref 55–135)
ALT SERPL W/O P-5'-P-CCNC: 15 U/L (ref 10–44)
ANION GAP SERPL CALC-SCNC: 11 MMOL/L (ref 8–16)
AST SERPL-CCNC: 14 U/L (ref 10–40)
BASOPHILS # BLD AUTO: 0.02 K/UL (ref 0–0.2)
BASOPHILS NFR BLD: 0.2 % (ref 0–1.9)
BILIRUB SERPL-MCNC: 0.3 MG/DL (ref 0.1–1)
BUN SERPL-MCNC: 25 MG/DL (ref 8–23)
CALCIUM SERPL-MCNC: 9.5 MG/DL (ref 8.7–10.5)
CHLORIDE SERPL-SCNC: 104 MMOL/L (ref 95–110)
CO2 SERPL-SCNC: 22 MMOL/L (ref 23–29)
CREAT SERPL-MCNC: 1 MG/DL (ref 0.5–1.4)
DIFFERENTIAL METHOD BLD: ABNORMAL
EOSINOPHIL # BLD AUTO: 0 K/UL (ref 0–0.5)
EOSINOPHIL NFR BLD: 0.1 % (ref 0–8)
ERYTHROCYTE [DISTWIDTH] IN BLOOD BY AUTOMATED COUNT: 16.1 % (ref 11.5–14.5)
EST. GFR  (NO RACE VARIABLE): >60 ML/MIN/1.73 M^2
GLUCOSE SERPL-MCNC: 270 MG/DL (ref 70–110)
HCT VFR BLD AUTO: 30.2 % (ref 40–54)
HGB BLD-MCNC: 9.8 G/DL (ref 14–18)
IMM GRANULOCYTES # BLD AUTO: 0.06 K/UL (ref 0–0.04)
IMM GRANULOCYTES NFR BLD AUTO: 0.7 % (ref 0–0.5)
LYMPHOCYTES # BLD AUTO: 0.7 K/UL (ref 1–4.8)
LYMPHOCYTES NFR BLD: 9.1 % (ref 18–48)
MAGNESIUM SERPL-MCNC: 1.7 MG/DL (ref 1.6–2.6)
MCH RBC QN AUTO: 32.8 PG (ref 27–31)
MCHC RBC AUTO-ENTMCNC: 32.5 G/DL (ref 32–36)
MCV RBC AUTO: 101 FL (ref 82–98)
MONOCYTES # BLD AUTO: 0.1 K/UL (ref 0.3–1)
MONOCYTES NFR BLD: 1.6 % (ref 4–15)
NEUTROPHILS # BLD AUTO: 7.2 K/UL (ref 1.8–7.7)
NEUTROPHILS NFR BLD: 88.3 % (ref 38–73)
NRBC BLD-RTO: 0 /100 WBC
PLATELET # BLD AUTO: 209 K/UL (ref 150–450)
PMV BLD AUTO: 8.8 FL (ref 9.2–12.9)
POTASSIUM SERPL-SCNC: 4.5 MMOL/L (ref 3.5–5.1)
PROT SERPL-MCNC: 6.7 G/DL (ref 6–8.4)
RBC # BLD AUTO: 2.99 M/UL (ref 4.6–6.2)
SODIUM SERPL-SCNC: 137 MMOL/L (ref 136–145)
TSH SERPL DL<=0.005 MIU/L-ACNC: 0.81 UIU/ML (ref 0.4–4)
WBC # BLD AUTO: 8.16 K/UL (ref 3.9–12.7)

## 2024-04-01 PROCEDURE — 83735 ASSAY OF MAGNESIUM: CPT | Performed by: NURSE PRACTITIONER

## 2024-04-01 PROCEDURE — 36415 COLL VENOUS BLD VENIPUNCTURE: CPT | Performed by: NURSE PRACTITIONER

## 2024-04-01 PROCEDURE — 80053 COMPREHEN METABOLIC PANEL: CPT | Performed by: NURSE PRACTITIONER

## 2024-04-01 PROCEDURE — 85025 COMPLETE CBC W/AUTO DIFF WBC: CPT | Performed by: NURSE PRACTITIONER

## 2024-04-01 PROCEDURE — 84443 ASSAY THYROID STIM HORMONE: CPT | Performed by: NURSE PRACTITIONER

## 2024-04-01 NOTE — TELEPHONE ENCOUNTER
Patient informed that MARILIN Sehlley NP wants to see him prior to treatment tomorrow so we can move his chemo until after he's seen by Allie at 1pm. Patient voiced understanding.

## 2024-04-02 ENCOUNTER — OFFICE VISIT (OUTPATIENT)
Dept: HEMATOLOGY/ONCOLOGY | Facility: CLINIC | Age: 79
End: 2024-04-02
Payer: MEDICARE

## 2024-04-02 ENCOUNTER — INFUSION (OUTPATIENT)
Dept: INFUSION THERAPY | Facility: HOSPITAL | Age: 79
End: 2024-04-02
Attending: INTERNAL MEDICINE
Payer: MEDICARE

## 2024-04-02 VITALS
HEART RATE: 86 BPM | SYSTOLIC BLOOD PRESSURE: 134 MMHG | RESPIRATION RATE: 18 BRPM | BODY MASS INDEX: 20.19 KG/M2 | WEIGHT: 171 LBS | DIASTOLIC BLOOD PRESSURE: 60 MMHG | TEMPERATURE: 98 F | HEIGHT: 77 IN

## 2024-04-02 VITALS
HEART RATE: 52 BPM | BODY MASS INDEX: 20.18 KG/M2 | SYSTOLIC BLOOD PRESSURE: 120 MMHG | TEMPERATURE: 97 F | RESPIRATION RATE: 18 BRPM | WEIGHT: 170.88 LBS | HEIGHT: 77 IN | DIASTOLIC BLOOD PRESSURE: 70 MMHG

## 2024-04-02 DIAGNOSIS — C34.12 MALIGNANT NEOPLASM OF UPPER LOBE OF LEFT LUNG: Primary | ICD-10-CM

## 2024-04-02 DIAGNOSIS — G89.3 CANCER ASSOCIATED PAIN: ICD-10-CM

## 2024-04-02 DIAGNOSIS — M62.81 MUSCLE WEAKNESS: ICD-10-CM

## 2024-04-02 DIAGNOSIS — R53.83 FATIGUE, UNSPECIFIED TYPE: ICD-10-CM

## 2024-04-02 DIAGNOSIS — D63.0 ANEMIA IN NEOPLASTIC DISEASE: ICD-10-CM

## 2024-04-02 PROCEDURE — 99214 OFFICE O/P EST MOD 30 MIN: CPT | Mod: 25 | Performed by: NURSE PRACTITIONER

## 2024-04-02 PROCEDURE — 96413 CHEMO IV INFUSION 1 HR: CPT

## 2024-04-02 PROCEDURE — 63600175 PHARM REV CODE 636 W HCPCS: Performed by: NURSE PRACTITIONER

## 2024-04-02 PROCEDURE — 25000003 PHARM REV CODE 250: Performed by: NURSE PRACTITIONER

## 2024-04-02 PROCEDURE — 99214 OFFICE O/P EST MOD 30 MIN: CPT | Mod: S$PBB,,, | Performed by: NURSE PRACTITIONER

## 2024-04-02 RX ORDER — HEPARIN 100 UNIT/ML
500 SYRINGE INTRAVENOUS
Status: CANCELLED | OUTPATIENT
Start: 2024-04-02

## 2024-04-02 RX ORDER — CYANOCOBALAMIN 1000 UG/ML
1000 INJECTION, SOLUTION INTRAMUSCULAR; SUBCUTANEOUS
Qty: 3 ML | Refills: 3 | Status: SHIPPED | OUTPATIENT
Start: 2024-04-02

## 2024-04-02 RX ORDER — SYRINGE WITH NEEDLE, 1 ML 27GX1/2"
1 SYRINGE, EMPTY DISPOSABLE MISCELLANEOUS
Qty: 3 EACH | Refills: 3 | Status: SHIPPED | OUTPATIENT
Start: 2024-04-02

## 2024-04-02 RX ORDER — EPINEPHRINE 0.3 MG/.3ML
0.3 INJECTION SUBCUTANEOUS ONCE AS NEEDED
Status: DISCONTINUED | OUTPATIENT
Start: 2024-04-02 | End: 2024-04-02 | Stop reason: HOSPADM

## 2024-04-02 RX ORDER — SODIUM CHLORIDE 0.9 % (FLUSH) 0.9 %
10 SYRINGE (ML) INJECTION
Status: DISCONTINUED | OUTPATIENT
Start: 2024-04-02 | End: 2024-04-02 | Stop reason: HOSPADM

## 2024-04-02 RX ORDER — PROCHLORPERAZINE EDISYLATE 5 MG/ML
5 INJECTION INTRAMUSCULAR; INTRAVENOUS ONCE AS NEEDED
Status: CANCELLED | OUTPATIENT
Start: 2024-04-02

## 2024-04-02 RX ORDER — PREDNISONE 10 MG/1
10 TABLET ORAL DAILY
Qty: 14 TABLET | Refills: 0 | Status: SHIPPED | OUTPATIENT
Start: 2024-04-02 | End: 2024-04-17 | Stop reason: SDUPTHER

## 2024-04-02 RX ORDER — SODIUM CHLORIDE 0.9 % (FLUSH) 0.9 %
10 SYRINGE (ML) INJECTION
Status: CANCELLED | OUTPATIENT
Start: 2024-04-02

## 2024-04-02 RX ORDER — HEPARIN 100 UNIT/ML
500 SYRINGE INTRAVENOUS
Status: DISCONTINUED | OUTPATIENT
Start: 2024-04-02 | End: 2024-04-02 | Stop reason: HOSPADM

## 2024-04-02 RX ORDER — DIPHENHYDRAMINE HYDROCHLORIDE 50 MG/ML
50 INJECTION INTRAMUSCULAR; INTRAVENOUS ONCE AS NEEDED
Status: CANCELLED | OUTPATIENT
Start: 2024-04-02

## 2024-04-02 RX ORDER — DIPHENHYDRAMINE HYDROCHLORIDE 50 MG/ML
50 INJECTION INTRAMUSCULAR; INTRAVENOUS ONCE AS NEEDED
Status: DISCONTINUED | OUTPATIENT
Start: 2024-04-02 | End: 2024-04-02 | Stop reason: HOSPADM

## 2024-04-02 RX ORDER — EPINEPHRINE 0.3 MG/.3ML
0.3 INJECTION SUBCUTANEOUS ONCE AS NEEDED
Status: CANCELLED | OUTPATIENT
Start: 2024-04-02

## 2024-04-02 RX ADMIN — HEPARIN 500 UNITS: 100 SYRINGE at 02:04

## 2024-04-02 RX ADMIN — SODIUM CHLORIDE 200 MG: 9 INJECTION, SOLUTION INTRAVENOUS at 02:04

## 2024-04-02 NOTE — PLAN OF CARE
Problem: Fatigue  Goal: Improved Activity Tolerance  Intervention: Promote Improved Energy  Flowsheets (Taken 4/2/2024 1508)  Fatigue Management: fatigue-related activity identified  Activity Management: Ambulated -L4

## 2024-04-04 RX ORDER — PREDNISONE 20 MG/1
20 TABLET ORAL DAILY
Qty: 30 TABLET | Refills: 0 | OUTPATIENT
Start: 2024-04-04

## 2024-04-15 ENCOUNTER — LAB VISIT (OUTPATIENT)
Dept: LAB | Facility: HOSPITAL | Age: 79
End: 2024-04-15
Attending: NURSE PRACTITIONER
Payer: MEDICARE

## 2024-04-15 DIAGNOSIS — R53.83 FATIGUE, UNSPECIFIED TYPE: ICD-10-CM

## 2024-04-15 DIAGNOSIS — C34.92 NSCLC OF LEFT LUNG: ICD-10-CM

## 2024-04-15 LAB
ALBUMIN SERPL BCP-MCNC: 3.5 G/DL (ref 3.5–5.2)
ALP SERPL-CCNC: 84 U/L (ref 55–135)
ALT SERPL W/O P-5'-P-CCNC: 17 U/L (ref 10–44)
ANION GAP SERPL CALC-SCNC: 11 MMOL/L (ref 8–16)
AST SERPL-CCNC: 16 U/L (ref 10–40)
BASOPHILS # BLD AUTO: 0.02 K/UL (ref 0–0.2)
BASOPHILS NFR BLD: 0.3 % (ref 0–1.9)
BILIRUB SERPL-MCNC: 0.4 MG/DL (ref 0.1–1)
BUN SERPL-MCNC: 23 MG/DL (ref 8–23)
CALCIUM SERPL-MCNC: 9.7 MG/DL (ref 8.7–10.5)
CHLORIDE SERPL-SCNC: 102 MMOL/L (ref 95–110)
CO2 SERPL-SCNC: 23 MMOL/L (ref 23–29)
CREAT SERPL-MCNC: 1 MG/DL (ref 0.5–1.4)
DIFFERENTIAL METHOD BLD: ABNORMAL
EOSINOPHIL # BLD AUTO: 0 K/UL (ref 0–0.5)
EOSINOPHIL NFR BLD: 0.5 % (ref 0–8)
ERYTHROCYTE [DISTWIDTH] IN BLOOD BY AUTOMATED COUNT: 15.1 % (ref 11.5–14.5)
EST. GFR  (NO RACE VARIABLE): >60 ML/MIN/1.73 M^2
GLUCOSE SERPL-MCNC: 193 MG/DL (ref 70–110)
HCT VFR BLD AUTO: 32.3 % (ref 40–54)
HGB BLD-MCNC: 10.4 G/DL (ref 14–18)
IMM GRANULOCYTES # BLD AUTO: 0.02 K/UL (ref 0–0.04)
IMM GRANULOCYTES NFR BLD AUTO: 0.3 % (ref 0–0.5)
LYMPHOCYTES # BLD AUTO: 1.4 K/UL (ref 1–4.8)
LYMPHOCYTES NFR BLD: 22.3 % (ref 18–48)
MAGNESIUM SERPL-MCNC: 1.7 MG/DL (ref 1.6–2.6)
MCH RBC QN AUTO: 32.9 PG (ref 27–31)
MCHC RBC AUTO-ENTMCNC: 32.2 G/DL (ref 32–36)
MCV RBC AUTO: 102 FL (ref 82–98)
MONOCYTES # BLD AUTO: 0.5 K/UL (ref 0.3–1)
MONOCYTES NFR BLD: 7.8 % (ref 4–15)
NEUTROPHILS # BLD AUTO: 4.2 K/UL (ref 1.8–7.7)
NEUTROPHILS NFR BLD: 68.8 % (ref 38–73)
NRBC BLD-RTO: 0 /100 WBC
PLATELET # BLD AUTO: 225 K/UL (ref 150–450)
PMV BLD AUTO: 8.5 FL (ref 9.2–12.9)
POTASSIUM SERPL-SCNC: 3.9 MMOL/L (ref 3.5–5.1)
PROT SERPL-MCNC: 7.1 G/DL (ref 6–8.4)
RBC # BLD AUTO: 3.16 M/UL (ref 4.6–6.2)
SODIUM SERPL-SCNC: 136 MMOL/L (ref 136–145)
WBC # BLD AUTO: 6.15 K/UL (ref 3.9–12.7)

## 2024-04-15 PROCEDURE — 83735 ASSAY OF MAGNESIUM: CPT | Performed by: NURSE PRACTITIONER

## 2024-04-15 PROCEDURE — 85025 COMPLETE CBC W/AUTO DIFF WBC: CPT | Performed by: NURSE PRACTITIONER

## 2024-04-15 PROCEDURE — 36415 COLL VENOUS BLD VENIPUNCTURE: CPT | Performed by: NURSE PRACTITIONER

## 2024-04-15 PROCEDURE — 80053 COMPREHEN METABOLIC PANEL: CPT | Performed by: NURSE PRACTITIONER

## 2024-04-18 ENCOUNTER — HOSPITAL ENCOUNTER (OUTPATIENT)
Dept: RADIOLOGY | Facility: HOSPITAL | Age: 79
Discharge: HOME | End: 2024-04-18
Attending: NURSE PRACTITIONER
Payer: MEDICARE

## 2024-04-18 ENCOUNTER — PATIENT MESSAGE (OUTPATIENT)
Dept: HEMATOLOGY/ONCOLOGY | Facility: CLINIC | Age: 79
End: 2024-04-18

## 2024-04-18 VITALS — BODY MASS INDEX: 20.07 KG/M2 | WEIGHT: 170 LBS | HEIGHT: 77 IN

## 2024-04-18 DIAGNOSIS — C34.12 MALIGNANT NEOPLASM OF UPPER LOBE OF LEFT LUNG: ICD-10-CM

## 2024-04-18 LAB — GLUCOSE SERPL-MCNC: 91 MG/DL (ref 70–110)

## 2024-04-18 PROCEDURE — A9552 F18 FDG: HCPCS | Mod: PO | Performed by: NURSE PRACTITIONER

## 2024-04-18 PROCEDURE — 82962 GLUCOSE BLOOD TEST: CPT | Mod: PO

## 2024-04-18 PROCEDURE — 78815 PET IMAGE W/CT SKULL-THIGH: CPT | Mod: TC,59,PS,PO

## 2024-04-18 RX ORDER — FLUDEOXYGLUCOSE F18 500 MCI/ML
12.7 INJECTION INTRAVENOUS
Status: COMPLETED | OUTPATIENT
Start: 2024-04-18 | End: 2024-04-18

## 2024-04-18 RX ADMIN — FLUDEOXYGLUCOSE F-18 12.7 MILLICURIE: 500 INJECTION INTRAVENOUS at 11:04

## 2024-04-22 DIAGNOSIS — R52 ACUTE PAIN: ICD-10-CM

## 2024-04-22 DIAGNOSIS — C34.12 MALIGNANT NEOPLASM OF UPPER LOBE OF LEFT LUNG: ICD-10-CM

## 2024-04-23 RX ORDER — HYDROCODONE BITARTRATE AND ACETAMINOPHEN 10; 325 MG/1; MG/1
1 TABLET ORAL EVERY 6 HOURS PRN
Qty: 60 TABLET | Refills: 0 | Status: SHIPPED | OUTPATIENT
Start: 2024-04-23 | End: 2024-06-12

## 2024-04-24 DIAGNOSIS — C34.92 NSCLC OF LEFT LUNG: ICD-10-CM

## 2024-04-24 RX ORDER — TEMAZEPAM 30 MG/1
30 CAPSULE ORAL NIGHTLY PRN
Qty: 30 CAPSULE | Refills: 2 | Status: SHIPPED | OUTPATIENT
Start: 2024-04-24 | End: 2024-05-02 | Stop reason: SDUPTHER

## 2024-04-29 ENCOUNTER — PATIENT MESSAGE (OUTPATIENT)
Dept: HEMATOLOGY/ONCOLOGY | Facility: CLINIC | Age: 79
End: 2024-04-29

## 2024-05-02 DIAGNOSIS — C34.92 NSCLC OF LEFT LUNG: ICD-10-CM

## 2024-05-02 RX ORDER — TEMAZEPAM 30 MG/1
30 CAPSULE ORAL NIGHTLY PRN
Qty: 30 CAPSULE | Refills: 2 | Status: SHIPPED | OUTPATIENT
Start: 2024-05-02 | End: 2025-05-02

## 2024-05-08 DIAGNOSIS — R19.7 DIARRHEA, UNSPECIFIED TYPE: Primary | ICD-10-CM

## 2024-05-08 RX ORDER — DIPHENOXYLATE HYDROCHLORIDE AND ATROPINE SULFATE 2.5; .025 MG/1; MG/1
1 TABLET ORAL 4 TIMES DAILY PRN
Qty: 30 TABLET | Refills: 1 | Status: SHIPPED | OUTPATIENT
Start: 2024-05-08 | End: 2024-05-31 | Stop reason: SDUPTHER

## 2024-05-10 ENCOUNTER — LAB VISIT (OUTPATIENT)
Dept: LAB | Facility: HOSPITAL | Age: 79
End: 2024-05-10
Attending: NURSE PRACTITIONER
Payer: MEDICARE

## 2024-05-10 ENCOUNTER — OFFICE VISIT (OUTPATIENT)
Dept: HEMATOLOGY/ONCOLOGY | Facility: CLINIC | Age: 79
End: 2024-05-10
Payer: MEDICARE

## 2024-05-10 VITALS
RESPIRATION RATE: 16 BRPM | DIASTOLIC BLOOD PRESSURE: 55 MMHG | BODY MASS INDEX: 19.68 KG/M2 | TEMPERATURE: 98 F | SYSTOLIC BLOOD PRESSURE: 108 MMHG | WEIGHT: 166 LBS | HEART RATE: 74 BPM

## 2024-05-10 DIAGNOSIS — C34.12 MALIGNANT NEOPLASM OF UPPER LOBE OF LEFT LUNG: Primary | ICD-10-CM

## 2024-05-10 DIAGNOSIS — G89.3 CANCER ASSOCIATED PAIN: ICD-10-CM

## 2024-05-10 DIAGNOSIS — R19.7 DIARRHEA, UNSPECIFIED TYPE: ICD-10-CM

## 2024-05-10 LAB
C DIFF GDH STL QL: NEGATIVE
C DIFF TOX A+B STL QL IA: NEGATIVE

## 2024-05-10 PROCEDURE — 87046 STOOL CULTR AEROBIC BACT EA: CPT | Performed by: NURSE PRACTITIONER

## 2024-05-10 PROCEDURE — 87045 FECES CULTURE AEROBIC BACT: CPT | Performed by: NURSE PRACTITIONER

## 2024-05-10 PROCEDURE — 87427 SHIGA-LIKE TOXIN AG IA: CPT | Mod: 59 | Performed by: NURSE PRACTITIONER

## 2024-05-10 PROCEDURE — 87324 CLOSTRIDIUM AG IA: CPT | Performed by: NURSE PRACTITIONER

## 2024-05-10 PROCEDURE — 87449 NOS EACH ORGANISM AG IA: CPT | Performed by: NURSE PRACTITIONER

## 2024-05-10 PROCEDURE — 87209 SMEAR COMPLEX STAIN: CPT | Performed by: NURSE PRACTITIONER

## 2024-05-10 PROCEDURE — 87449 NOS EACH ORGANISM AG IA: CPT | Mod: 91 | Performed by: NURSE PRACTITIONER

## 2024-05-10 PROCEDURE — 99215 OFFICE O/P EST HI 40 MIN: CPT | Mod: S$PBB,,, | Performed by: INTERNAL MEDICINE

## 2024-05-10 NOTE — PROGRESS NOTES
PROGRESS NOTE    Subjective:       Patient ID: Akil Guzman is a 78 y.o. male.    8/7/2023-CT chest without:  1. Large left pleural effusion with dependent left lower lobe atelectasis.  2. Poorly defined mass-like infiltrate involving the periphery of the left upper lobe extending to the left hilum.  Underlying parenchymal mass is not excludable, however, evaluation is limited due to lack of intravenous contrast.  Correlate clinically with possible fever and/or elevated white count.  3. Bilateral soft tissue pulmonary nodules measuring up to 11 mm.  Follow-up per Fleischner guidelines.  For multiple solid nodules with any 6 mm or greater, Fleischner Society guidelines recommend follow up with non-contrast chest CT at 3-6 months and 18-24 months after discovery.  4. Partially calcified left upper lobe pulmonary nodule may represent granulomatous change and scarring.  5. 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  6. Questionable left hilar lymphadenopathy.  However, evaluation is again limited due to lack of intravenous contrast.  7. Small hiatal hernia.    8/17/2023 Left Pleural Fluid:  Positive for adenocarcinoma most c/w lung primary    9/14/2023-PET:  1. FDG avid nodular thickening involving origin of the lingular bronchus with adjacent FDG avid 20 mm nodular density, suspicious for primary pulmonary malignancy.  2. Moderate left pleural effusion with scattered foci of FDG uptake along the parietal pleura of concern for malignant pleural effusion.  3. Left chest wall FDG avid mass resulting in lytic destruction and pathologic fractures of left 10th and 11th ribs, characteristic of metastatic disease.  4. Diffuse prominent FDG activity throughout the intramedullary compartments of the axial and proximal appendicular skeleton is nonspecific. This can be seen with pharmacologic bone marrow stimulation although diffuse metastatic  disease can also be considered. Metastatic disease is felt less likely given history.    9/14/2023-MRI Brain  Negative for mets    Stage IVB-Adenocarcinoma of the lung    PLAN:   Carbo/Pem/Pem ChemoIO therapy    Carbo/Pem/Pem:  Cycle 1: 9/13/2023  Cycle 2: 10/4/2023  Cycle 3: 10/25/2023  Cycle 4: 11/15/2023  Cycle 5: 1/3/2024  Cycle 6: 1/24/2023  Cycle 7: 2/14/2024  Delay due to toxicity, fatigue,   Cycle 8: 4/2/2024 4/18/2024-PET  Mixed Response    CT CAP-2/23/2024:  Stable post radiation changes and atelectasis within the lingula with mild wall thickening surrounding the lingula bronchus with no discrete mass     Stable groundglass nodular opacities within the left upper lobe suggestive of infectious or inflammatory process     Small left pleural effusion     Stable lytic and sclerotic lesions involving the posterior lateral left 10th and 11th ribs compatible with treated metastasis. There is resolution of the chest wall mass     Stable 9 mm left supraclavicular node     Emphysematous changes throughout the lungs     No evidence of metastatic disease within the abdomen or pelvis    Chief Complaint:  No chief complaint on file.  Stage IVB lung carcinoma follow up    History of Present Illness:   Akil Guzman is a 78 y.o. male who presents for follow up of lung cancer.     Mr. Guzman complains diarrhea which has been going on for the last three weeks. No sob but still has fatigue.       Family and Social history reviewed and is unchanged from 9/1/2023             Current Outpatient Medications:     cyanocobalamin 1,000 mcg/mL injection, Inject 1 mL (1,000 mcg total) into the skin every 30 days., Disp: 3 mL, Rfl: 3    diphenoxylate-atropine 2.5-0.025 mg (LOMOTIL) 2.5-0.025 mg per tablet, Take 1 tablet by mouth 4 (four) times daily as needed for Diarrhea., Disp: 30 tablet, Rfl: 1    duke's soln (benadryl 30 mL, mylanta 30 mL, LIDOcaine 30 mL, nystatin 30 mL) 120mL, Take 10 mLs by mouth 4 (four) times daily., Disp:  "120 mL, Rfl: 5    finasteride (PROSCAR) 5 mg tablet, TAKE 1 TABLET BY MOUTH EVERY DAY FOR PROSTATE, Disp: 90 tablet, Rfl: 3    folic acid (FOLVITE) 1 MG tablet, Take 1 tablet (1 mg total) by mouth once daily., Disp: 100 tablet, Rfl: 3    HYDROcodone-acetaminophen (NORCO)  mg per tablet, Take 1 tablet by mouth every 6 (six) hours as needed for Pain., Disp: 60 tablet, Rfl: 0    ipratropium (ATROVENT) 42 mcg (0.06 %) nasal spray, USE 1 SPRAY in each nostril TWICE DAILY THANK YOU!, Disp: , Rfl:     magnesium oxide (MAG-OX) 400 mg (241.3 mg magnesium) tablet, Take 1 tablet (400 mg total) by mouth once daily., Disp: 30 tablet, Rfl: 11    ondansetron (ZOFRAN) 8 MG tablet, Take 1 tablet (8 mg total) by mouth every 8 (eight) hours as needed., Disp: 30 tablet, Rfl: 5    predniSONE (DELTASONE) 10 MG tablet, Take 1 tablet (10 mg total) by mouth once daily., Disp: 14 tablet, Rfl: 0    promethazine (PHENERGAN) 25 MG tablet, Take 1 tablet (25 mg total) by mouth every 4 to 6 hours as needed., Disp: 30 tablet, Rfl: 5    sildenafiL (VIAGRA) 100 MG tablet, Take 1 tablet (100 mg total) by mouth daily as needed for Erectile Dysfunction., Disp: 30 tablet, Rfl: 11    syringe with needle 1 mL 25 gauge x 1" Syrg, 1 each by Misc.(Non-Drug; Combo Route) route every 30 days., Disp: 3 each, Rfl: 3    tamsulosin (FLOMAX) 0.4 mg Cap, Take 1 capsule (0.4 mg total) by mouth once daily., Disp: 90 capsule, Rfl: 3    temazepam (RESTORIL) 30 mg capsule, Take 1 capsule (30 mg total) by mouth nightly as needed for Insomnia. TAKE NIGHTLY AS NEEDED, Disp: 30 capsule, Rfl: 2    tiZANidine (ZANAFLEX) 4 MG tablet, Take 1 tablet (4 mg total) by mouth every 12 (twelve) hours as needed (muscle spasms/ pain)., Disp: 40 tablet, Rfl: 0    Current Facility-Administered Medications:     0.9%  NaCl infusion (for blood administration), , Intravenous, Once, Allie Shelley NP-C, Stopped at 10/23/23 1445    Facility-Administered Medications Ordered in Other " Visits:     heparin, porcine (PF) 100 unit/mL injection flush 500 Units, 500 Units, Intravenous, PRN, Allie Shelley NP-WILLY, 500 Units at 12/14/23 1422    sodium chloride 0.9% flush 10 mL, 10 mL, Intravenous, PRN, Allie Shelley NP-C, 10 mL at 12/14/23 1426        Objective:       Physical Examination:     BP (!) 108/55   Pulse 74   Temp 98.3 °F (36.8 °C)   Resp 16   Wt 75.3 kg (166 lb)   BMI 19.68 kg/m²     Physical Exam  Constitutional:       Appearance: Normal appearance.   HENT:      Head: Normocephalic and atraumatic.   Eyes:      General: No scleral icterus.     Conjunctiva/sclera: Conjunctivae normal.   Cardiovascular:      Rate and Rhythm: Normal rate.   Pulmonary:      Effort: Pulmonary effort is normal.   Abdominal:      General: Abdomen is flat.   Neurological:      General: No focal deficit present.      Mental Status: He is alert and oriented to person, place, and time.   Psychiatric:         Mood and Affect: Mood normal.         Behavior: Behavior normal.         Labs:   No results found for this or any previous visit (from the past 336 hour(s)).    CMP  Sodium   Date Value Ref Range Status   04/15/2024 136 136 - 145 mmol/L Final     Potassium   Date Value Ref Range Status   04/15/2024 3.9 3.5 - 5.1 mmol/L Final     Chloride   Date Value Ref Range Status   04/15/2024 102 95 - 110 mmol/L Final     CO2   Date Value Ref Range Status   04/15/2024 23 23 - 29 mmol/L Final     Glucose   Date Value Ref Range Status   04/15/2024 193 (H) 70 - 110 mg/dL Final     BUN   Date Value Ref Range Status   04/15/2024 23 8 - 23 mg/dL Final     Creatinine   Date Value Ref Range Status   04/15/2024 1.0 0.5 - 1.4 mg/dL Final     Calcium   Date Value Ref Range Status   04/15/2024 9.7 8.7 - 10.5 mg/dL Final     Total Protein   Date Value Ref Range Status   04/15/2024 7.1 6.0 - 8.4 g/dL Final     Albumin   Date Value Ref Range Status   04/15/2024 3.5 3.5 - 5.2 g/dL Final     Total Bilirubin   Date Value Ref Range Status    04/15/2024 0.4 0.1 - 1.0 mg/dL Final     Comment:     For infants and newborns, interpretation of results should be based  on gestational age, weight and in agreement with clinical  observations.    Premature Infant recommended reference ranges:  Up to 24 hours.............<8.0 mg/dL  Up to 48 hours............<12.0 mg/dL  3-5 days..................<15.0 mg/dL  6-29 days.................<15.0 mg/dL       Alkaline Phosphatase   Date Value Ref Range Status   04/15/2024 84 55 - 135 U/L Final     AST   Date Value Ref Range Status   04/15/2024 16 10 - 40 U/L Final     ALT   Date Value Ref Range Status   04/15/2024 17 10 - 44 U/L Final     Anion Gap   Date Value Ref Range Status   04/15/2024 11 8 - 16 mmol/L Final     eGFR if    Date Value Ref Range Status   11/02/2021 56.4 (A) >60 mL/min/1.73 m^2 Final     eGFR if non    Date Value Ref Range Status   11/02/2021 48.8 (A) >60 mL/min/1.73 m^2 Final     Comment:     Calculation used to obtain the estimated glomerular filtration  rate (eGFR) is the CKD-EPI equation.        Lab Results   Component Value Date    CEA 1.6 01/03/2024     Lab Results   Component Value Date    PSA 1.0 06/21/2023    PSA 1.2 11/02/2021    PSA 1.3 08/10/2020           Assessment/Plan:     Problem List Items Addressed This Visit       Malignant neoplasm of upper lobe of left lung - Primary     Patient was started back on pembro single agent 4/2.  He has developed diarrhea which may be autoimmune in nature but not entirely sure.  PET scan shows a mixed response and I reviewed these images today.  The tumor burden is overall very low and I feel that holding the course and continuing with pembro is the best approach currentlty.  Discussed this today in detail.          Diarrhea     This may be AI but agree with stool sample which is pending at this time.  Will monitor.          Cancer associated pain     Patient has some right back pain which is unchanged.  Will continue  current care approach.             Discussion:     Follow up in about 3 weeks (around 5/31/2024) for np visit and 6 with me.      Electronically signed by Reynaldo Ball

## 2024-05-10 NOTE — ASSESSMENT & PLAN NOTE
Patient was started back on pembro single agent 4/2.  He has developed diarrhea which may be autoimmune in nature but not entirely sure.  PET scan shows a mixed response and I reviewed these images today.  The tumor burden is overall very low and I feel that holding the course and continuing with pembro is the best approach currentlty.  Discussed this today in detail.

## 2024-05-12 LAB — BACTERIA STL CULT: NORMAL

## 2024-05-13 ENCOUNTER — LAB VISIT (OUTPATIENT)
Dept: LAB | Facility: HOSPITAL | Age: 79
End: 2024-05-13
Attending: NURSE PRACTITIONER
Payer: MEDICARE

## 2024-05-13 DIAGNOSIS — R53.83 FATIGUE, UNSPECIFIED TYPE: ICD-10-CM

## 2024-05-13 DIAGNOSIS — C34.92 NSCLC OF LEFT LUNG: ICD-10-CM

## 2024-05-13 LAB
ALBUMIN SERPL BCP-MCNC: 3 G/DL (ref 3.5–5.2)
ALP SERPL-CCNC: 70 U/L (ref 55–135)
ALT SERPL W/O P-5'-P-CCNC: 11 U/L (ref 10–44)
ANION GAP SERPL CALC-SCNC: 11 MMOL/L (ref 8–16)
AST SERPL-CCNC: 16 U/L (ref 10–40)
BILIRUB SERPL-MCNC: 0.3 MG/DL (ref 0.1–1)
BUN SERPL-MCNC: 12 MG/DL (ref 8–23)
CALCIUM SERPL-MCNC: 9.6 MG/DL (ref 8.7–10.5)
CHLORIDE SERPL-SCNC: 100 MMOL/L (ref 95–110)
CO2 SERPL-SCNC: 23 MMOL/L (ref 23–29)
CREAT SERPL-MCNC: 1 MG/DL (ref 0.5–1.4)
E COLI SXT1 STL QL IA: NEGATIVE
E COLI SXT2 STL QL IA: NEGATIVE
EST. GFR  (NO RACE VARIABLE): >60 ML/MIN/1.73 M^2
GLUCOSE SERPL-MCNC: 122 MG/DL (ref 70–110)
POTASSIUM SERPL-SCNC: 3.9 MMOL/L (ref 3.5–5.1)
PROT SERPL-MCNC: 7 G/DL (ref 6–8.4)
SODIUM SERPL-SCNC: 134 MMOL/L (ref 136–145)

## 2024-05-13 PROCEDURE — 80053 COMPREHEN METABOLIC PANEL: CPT | Performed by: NURSE PRACTITIONER

## 2024-05-13 PROCEDURE — 36415 COLL VENOUS BLD VENIPUNCTURE: CPT | Performed by: NURSE PRACTITIONER

## 2024-05-14 ENCOUNTER — PATIENT MESSAGE (OUTPATIENT)
Dept: HEMATOLOGY/ONCOLOGY | Facility: CLINIC | Age: 79
End: 2024-05-14

## 2024-05-14 RX ORDER — HEPARIN 100 UNIT/ML
500 SYRINGE INTRAVENOUS
Status: CANCELLED | OUTPATIENT
Start: 2024-05-14

## 2024-05-14 RX ORDER — SODIUM CHLORIDE 0.9 % (FLUSH) 0.9 %
10 SYRINGE (ML) INJECTION
Status: CANCELLED | OUTPATIENT
Start: 2024-05-14

## 2024-05-14 RX ORDER — EPINEPHRINE 0.3 MG/.3ML
0.3 INJECTION SUBCUTANEOUS ONCE AS NEEDED
Status: CANCELLED | OUTPATIENT
Start: 2024-05-14

## 2024-05-14 RX ORDER — DIPHENHYDRAMINE HYDROCHLORIDE 50 MG/ML
50 INJECTION INTRAMUSCULAR; INTRAVENOUS ONCE AS NEEDED
Status: CANCELLED | OUTPATIENT
Start: 2024-05-14

## 2024-05-15 LAB — O+P STL MICRO: NORMAL

## 2024-05-22 ENCOUNTER — TELEPHONE (OUTPATIENT)
Facility: CLINIC | Age: 79
End: 2024-05-22
Payer: MEDICARE

## 2024-05-22 NOTE — TELEPHONE ENCOUNTER
----- Message from ANGI Pitts sent at 5/21/2024  2:35 PM CDT -----    ----- Message -----  From: Analisa Joyce  Sent: 5/20/2024   2:40 PM CDT  To: Karsten [facility] Staff    Pt would like a call back regarding diarrhea.      [phone]

## 2024-05-22 NOTE — TELEPHONE ENCOUNTER
Spoke with pt. Diarrhea is getting better. Only going about once a day at this time. Pt instructed to contact us if it gets worse. He verbalized understanding.

## 2024-05-23 ENCOUNTER — TELEPHONE (OUTPATIENT)
Dept: INFUSION THERAPY | Facility: HOSPITAL | Age: 79
End: 2024-05-23

## 2024-05-23 NOTE — TELEPHONE ENCOUNTER
Spoke with patient regarding the diarrhea. He stated he has diarrhea every morning, but it has greatly improved. Denies any abdominal cramping

## 2024-05-24 ENCOUNTER — INFUSION (OUTPATIENT)
Dept: INFUSION THERAPY | Facility: HOSPITAL | Age: 79
End: 2024-05-24
Attending: INTERNAL MEDICINE
Payer: MEDICARE

## 2024-05-24 VITALS
WEIGHT: 166.31 LBS | HEIGHT: 77 IN | OXYGEN SATURATION: 99 % | HEART RATE: 90 BPM | BODY MASS INDEX: 19.64 KG/M2 | RESPIRATION RATE: 15 BRPM | SYSTOLIC BLOOD PRESSURE: 101 MMHG | DIASTOLIC BLOOD PRESSURE: 57 MMHG | TEMPERATURE: 97 F

## 2024-05-24 DIAGNOSIS — C34.12 MALIGNANT NEOPLASM OF UPPER LOBE OF LEFT LUNG: Primary | ICD-10-CM

## 2024-05-24 DIAGNOSIS — E03.2 HYPOTHYROIDISM DUE TO DRUGS: ICD-10-CM

## 2024-05-24 LAB — TSH SERPL DL<=0.005 MIU/L-ACNC: 0.97 UIU/ML (ref 0.34–5.6)

## 2024-05-24 PROCEDURE — A4216 STERILE WATER/SALINE, 10 ML: HCPCS | Performed by: INTERNAL MEDICINE

## 2024-05-24 PROCEDURE — 96413 CHEMO IV INFUSION 1 HR: CPT

## 2024-05-24 PROCEDURE — 63600175 PHARM REV CODE 636 W HCPCS: Performed by: INTERNAL MEDICINE

## 2024-05-24 PROCEDURE — 25000003 PHARM REV CODE 250: Performed by: INTERNAL MEDICINE

## 2024-05-24 PROCEDURE — 84443 ASSAY THYROID STIM HORMONE: CPT | Performed by: INTERNAL MEDICINE

## 2024-05-24 RX ORDER — EPINEPHRINE 0.3 MG/.3ML
0.3 INJECTION SUBCUTANEOUS ONCE AS NEEDED
Status: DISCONTINUED | OUTPATIENT
Start: 2024-05-24 | End: 2024-05-24 | Stop reason: HOSPADM

## 2024-05-24 RX ORDER — SODIUM CHLORIDE 0.9 % (FLUSH) 0.9 %
10 SYRINGE (ML) INJECTION
Status: DISCONTINUED | OUTPATIENT
Start: 2024-05-24 | End: 2024-05-24 | Stop reason: HOSPADM

## 2024-05-24 RX ORDER — DIPHENHYDRAMINE HYDROCHLORIDE 50 MG/ML
50 INJECTION INTRAMUSCULAR; INTRAVENOUS ONCE AS NEEDED
Status: DISCONTINUED | OUTPATIENT
Start: 2024-05-24 | End: 2024-05-24 | Stop reason: HOSPADM

## 2024-05-24 RX ORDER — HEPARIN 100 UNIT/ML
500 SYRINGE INTRAVENOUS
Status: DISCONTINUED | OUTPATIENT
Start: 2024-05-24 | End: 2024-05-24 | Stop reason: HOSPADM

## 2024-05-24 RX ADMIN — HEPARIN 500 UNITS: 100 SYRINGE at 11:05

## 2024-05-24 RX ADMIN — SODIUM CHLORIDE, PRESERVATIVE FREE 10 ML: 5 INJECTION INTRAVENOUS at 11:05

## 2024-05-24 RX ADMIN — SODIUM CHLORIDE 200 MG: 9 INJECTION, SOLUTION INTRAVENOUS at 10:05

## 2024-05-24 NOTE — PLAN OF CARE
Problem: Fatigue  Goal: Improved Activity Tolerance  Outcome: Progressing  Intervention: Promote Improved Energy  Flowsheets (Taken 5/24/2024 1009)  Fatigue Management:   fatigue-related activity identified   paced activity encouraged   frequent rest breaks encouraged  Sleep/Rest Enhancement:   relaxation techniques promoted   regular sleep/rest pattern promoted  Activity Management: Ambulated -L4  Environmental Support: rest periods encouraged

## 2024-05-25 DIAGNOSIS — N13.8 BPH WITH OBSTRUCTION/LOWER URINARY TRACT SYMPTOMS: ICD-10-CM

## 2024-05-25 DIAGNOSIS — N40.1 BPH WITH OBSTRUCTION/LOWER URINARY TRACT SYMPTOMS: ICD-10-CM

## 2024-05-27 RX ORDER — TAMSULOSIN HYDROCHLORIDE 0.4 MG/1
0.4 CAPSULE ORAL DAILY
Qty: 90 CAPSULE | Refills: 3 | Status: SHIPPED | OUTPATIENT
Start: 2024-05-27 | End: 2025-05-27

## 2024-05-31 DIAGNOSIS — R19.7 DIARRHEA, UNSPECIFIED TYPE: ICD-10-CM

## 2024-05-31 RX ORDER — DIPHENOXYLATE HYDROCHLORIDE AND ATROPINE SULFATE 2.5; .025 MG/1; MG/1
1 TABLET ORAL 4 TIMES DAILY PRN
Qty: 30 TABLET | Refills: 1 | Status: SHIPPED | OUTPATIENT
Start: 2024-05-31 | End: 2024-06-11 | Stop reason: SDUPTHER

## 2024-06-03 ENCOUNTER — PATIENT MESSAGE (OUTPATIENT)
Facility: CLINIC | Age: 79
End: 2024-06-03
Payer: MEDICARE

## 2024-06-11 DIAGNOSIS — R19.7 DIARRHEA, UNSPECIFIED TYPE: ICD-10-CM

## 2024-06-11 DIAGNOSIS — C34.12 MALIGNANT NEOPLASM OF UPPER LOBE OF LEFT LUNG: ICD-10-CM

## 2024-06-11 DIAGNOSIS — R52 ACUTE PAIN: ICD-10-CM

## 2024-06-11 RX ORDER — DIPHENOXYLATE HYDROCHLORIDE AND ATROPINE SULFATE 2.5; .025 MG/1; MG/1
1 TABLET ORAL 4 TIMES DAILY PRN
Qty: 30 TABLET | Refills: 3 | Status: SHIPPED | OUTPATIENT
Start: 2024-06-11 | End: 2024-06-21

## 2024-06-12 RX ORDER — HYDROCODONE BITARTRATE AND ACETAMINOPHEN 10; 325 MG/1; MG/1
1 TABLET ORAL EVERY 6 HOURS PRN
Qty: 60 TABLET | Refills: 0 | Status: SHIPPED | OUTPATIENT
Start: 2024-06-12

## 2024-06-13 ENCOUNTER — LAB VISIT (OUTPATIENT)
Dept: LAB | Facility: HOSPITAL | Age: 79
End: 2024-06-13
Attending: NURSE PRACTITIONER
Payer: MEDICARE

## 2024-06-13 DIAGNOSIS — C34.92 NSCLC OF LEFT LUNG: ICD-10-CM

## 2024-06-13 DIAGNOSIS — R53.83 FATIGUE, UNSPECIFIED TYPE: ICD-10-CM

## 2024-06-13 DIAGNOSIS — E83.42 HYPOMAGNESEMIA: ICD-10-CM

## 2024-06-13 LAB
ALBUMIN SERPL BCP-MCNC: 2.8 G/DL (ref 3.5–5.2)
ALP SERPL-CCNC: 65 U/L (ref 55–135)
ALT SERPL W/O P-5'-P-CCNC: 11 U/L (ref 10–44)
ANION GAP SERPL CALC-SCNC: 11 MMOL/L (ref 8–16)
AST SERPL-CCNC: 14 U/L (ref 10–40)
BASOPHILS # BLD AUTO: 0.05 K/UL (ref 0–0.2)
BASOPHILS NFR BLD: 0.6 % (ref 0–1.9)
BILIRUB SERPL-MCNC: 0.4 MG/DL (ref 0.1–1)
BUN SERPL-MCNC: 17 MG/DL (ref 8–23)
CALCIUM SERPL-MCNC: 10 MG/DL (ref 8.7–10.5)
CHLORIDE SERPL-SCNC: 98 MMOL/L (ref 95–110)
CO2 SERPL-SCNC: 25 MMOL/L (ref 23–29)
CREAT SERPL-MCNC: 1 MG/DL (ref 0.5–1.4)
DIFFERENTIAL METHOD BLD: ABNORMAL
EOSINOPHIL # BLD AUTO: 0.7 K/UL (ref 0–0.5)
EOSINOPHIL NFR BLD: 8.8 % (ref 0–8)
ERYTHROCYTE [DISTWIDTH] IN BLOOD BY AUTOMATED COUNT: 13.4 % (ref 11.5–14.5)
EST. GFR  (NO RACE VARIABLE): >60 ML/MIN/1.73 M^2
GLUCOSE SERPL-MCNC: 141 MG/DL (ref 70–110)
HCT VFR BLD AUTO: 30 % (ref 40–54)
HGB BLD-MCNC: 9.7 G/DL (ref 14–18)
IMM GRANULOCYTES # BLD AUTO: 0.03 K/UL (ref 0–0.04)
IMM GRANULOCYTES NFR BLD AUTO: 0.4 % (ref 0–0.5)
LYMPHOCYTES # BLD AUTO: 1.6 K/UL (ref 1–4.8)
LYMPHOCYTES NFR BLD: 18.5 % (ref 18–48)
MAGNESIUM SERPL-MCNC: 1.3 MG/DL (ref 1.6–2.6)
MCH RBC QN AUTO: 30 PG (ref 27–31)
MCHC RBC AUTO-ENTMCNC: 32.3 G/DL (ref 32–36)
MCV RBC AUTO: 93 FL (ref 82–98)
MONOCYTES # BLD AUTO: 1.3 K/UL (ref 0.3–1)
MONOCYTES NFR BLD: 15.1 % (ref 4–15)
NEUTROPHILS # BLD AUTO: 4.8 K/UL (ref 1.8–7.7)
NEUTROPHILS NFR BLD: 56.6 % (ref 38–73)
NRBC BLD-RTO: 0 /100 WBC
PLATELET # BLD AUTO: 358 K/UL (ref 150–450)
PMV BLD AUTO: 8.4 FL (ref 9.2–12.9)
POTASSIUM SERPL-SCNC: 4.2 MMOL/L (ref 3.5–5.1)
PROT SERPL-MCNC: 6.7 G/DL (ref 6–8.4)
RBC # BLD AUTO: 3.23 M/UL (ref 4.6–6.2)
SODIUM SERPL-SCNC: 134 MMOL/L (ref 136–145)
WBC # BLD AUTO: 8.42 K/UL (ref 3.9–12.7)

## 2024-06-13 PROCEDURE — 85025 COMPLETE CBC W/AUTO DIFF WBC: CPT | Performed by: NURSE PRACTITIONER

## 2024-06-13 PROCEDURE — 80053 COMPREHEN METABOLIC PANEL: CPT | Performed by: NURSE PRACTITIONER

## 2024-06-13 PROCEDURE — 36415 COLL VENOUS BLD VENIPUNCTURE: CPT | Performed by: NURSE PRACTITIONER

## 2024-06-13 PROCEDURE — 83735 ASSAY OF MAGNESIUM: CPT | Performed by: NURSE PRACTITIONER

## 2024-06-13 RX ORDER — HEPARIN 100 UNIT/ML
500 SYRINGE INTRAVENOUS
Status: CANCELLED | OUTPATIENT
Start: 2024-06-14

## 2024-06-13 RX ORDER — SODIUM CHLORIDE 0.9 % (FLUSH) 0.9 %
10 SYRINGE (ML) INJECTION
Status: CANCELLED | OUTPATIENT
Start: 2024-06-14

## 2024-06-13 RX ORDER — EPINEPHRINE 0.3 MG/.3ML
0.3 INJECTION SUBCUTANEOUS ONCE AS NEEDED
Status: CANCELLED | OUTPATIENT
Start: 2024-06-14

## 2024-06-13 RX ORDER — CYANOCOBALAMIN 1000 UG/ML
1000 INJECTION, SOLUTION INTRAMUSCULAR; SUBCUTANEOUS
Status: CANCELLED | OUTPATIENT
Start: 2024-06-14

## 2024-06-13 RX ORDER — LANOLIN ALCOHOL/MO/W.PET/CERES
400 CREAM (GRAM) TOPICAL 2 TIMES DAILY
Qty: 60 TABLET | Refills: 3 | Status: SHIPPED | OUTPATIENT
Start: 2024-06-13

## 2024-06-13 RX ORDER — DIPHENHYDRAMINE HYDROCHLORIDE 50 MG/ML
50 INJECTION INTRAMUSCULAR; INTRAVENOUS ONCE AS NEEDED
Status: CANCELLED | OUTPATIENT
Start: 2024-06-14

## 2024-06-13 NOTE — TELEPHONE ENCOUNTER
----- Message from [facility] JODI Abdullahi sent at 6/13/2024  3:17 PM CDT -----  Please make sure patient is taking mag oxide orally twice a day.  ----- Message -----  From: [facility]  Sent: 6/13/2024  11:46 AM CDT    To: ANGI Pitts        I spoke to Akil. He has not been taking the daily Mag Ox but he will resume taking it twice daily until he gets his next lab done prior to the next chemo. A new script was sent to his pharmacy, [facility] in [place].

## 2024-06-14 ENCOUNTER — INFUSION (OUTPATIENT)
Dept: INFUSION THERAPY | Facility: HOSPITAL | Age: 79
End: 2024-06-14
Attending: INTERNAL MEDICINE
Payer: MEDICARE

## 2024-06-14 VITALS
OXYGEN SATURATION: 94 % | BODY MASS INDEX: 18.91 KG/M2 | HEIGHT: 77 IN | SYSTOLIC BLOOD PRESSURE: 93 MMHG | TEMPERATURE: 97 F | RESPIRATION RATE: 18 BRPM | DIASTOLIC BLOOD PRESSURE: 55 MMHG | HEART RATE: 64 BPM | WEIGHT: 160.19 LBS

## 2024-06-14 DIAGNOSIS — C34.12 MALIGNANT NEOPLASM OF UPPER LOBE OF LEFT LUNG: Primary | ICD-10-CM

## 2024-06-14 PROCEDURE — 96372 THER/PROPH/DIAG INJ SC/IM: CPT | Mod: 59

## 2024-06-14 PROCEDURE — 96413 CHEMO IV INFUSION 1 HR: CPT

## 2024-06-14 PROCEDURE — 63600175 PHARM REV CODE 636 W HCPCS: Performed by: INTERNAL MEDICINE

## 2024-06-14 PROCEDURE — 25000003 PHARM REV CODE 250: Performed by: INTERNAL MEDICINE

## 2024-06-14 PROCEDURE — A4216 STERILE WATER/SALINE, 10 ML: HCPCS | Performed by: INTERNAL MEDICINE

## 2024-06-14 RX ORDER — CYANOCOBALAMIN 1000 UG/ML
1000 INJECTION, SOLUTION INTRAMUSCULAR; SUBCUTANEOUS
Status: COMPLETED | OUTPATIENT
Start: 2024-06-14 | End: 2024-06-14

## 2024-06-14 RX ORDER — SODIUM CHLORIDE 0.9 % (FLUSH) 0.9 %
10 SYRINGE (ML) INJECTION
Status: DISCONTINUED | OUTPATIENT
Start: 2024-06-14 | End: 2024-06-14 | Stop reason: HOSPADM

## 2024-06-14 RX ORDER — HEPARIN 100 UNIT/ML
500 SYRINGE INTRAVENOUS
Status: DISCONTINUED | OUTPATIENT
Start: 2024-06-14 | End: 2024-06-14 | Stop reason: HOSPADM

## 2024-06-14 RX ADMIN — CYANOCOBALAMIN 1000 MCG: 1000 INJECTION, SOLUTION INTRAMUSCULAR at 10:06

## 2024-06-14 RX ADMIN — HEPARIN 500 UNITS: 100 SYRINGE at 10:06

## 2024-06-14 RX ADMIN — SODIUM CHLORIDE, PRESERVATIVE FREE 10 ML: 5 INJECTION INTRAVENOUS at 10:06

## 2024-06-14 RX ADMIN — SODIUM CHLORIDE 200 MG: 9 INJECTION, SOLUTION INTRAVENOUS at 10:06

## 2024-06-19 ENCOUNTER — TELEPHONE (OUTPATIENT)
Facility: CLINIC | Age: 79
End: 2024-06-19
Payer: MEDICARE

## 2024-06-19 NOTE — TELEPHONE ENCOUNTER
Hagan patient to confirm dosis of Magnesium 400 mg PO twice a day, patient did not answer, LVM to call back.

## 2024-06-21 NOTE — PROGRESS NOTES
PROGRESS NOTE    Subjective:       Patient ID: Akil Guzman is a 78 y.o. male.    8/7/2023-CT chest without:  1. Large left pleural effusion with dependent left lower lobe atelectasis.  2. Poorly defined mass-like infiltrate involving the periphery of the left upper lobe extending to the left hilum.  Underlying parenchymal mass is not excludable, however, evaluation is limited due to lack of intravenous contrast.  Correlate clinically with possible fever and/or elevated white count.  3. Bilateral soft tissue pulmonary nodules measuring up to 11 mm.  Follow-up per Fleischner guidelines.  For multiple solid nodules with any 6 mm or greater, Fleischner Society guidelines recommend follow up with non-contrast chest CT at 3-6 months and 18-24 months after discovery.  4. Partially calcified left upper lobe pulmonary nodule may represent granulomatous change and scarring.  5. 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  6. Questionable left hilar lymphadenopathy.  However, evaluation is again limited due to lack of intravenous contrast.  7. Small hiatal hernia.    8/17/2023 Left Pleural Fluid:  Positive for adenocarcinoma most c/w lung primary    9/14/2023-PET:  1. FDG avid nodular thickening involving origin of the lingular bronchus with adjacent FDG avid 20 mm nodular density, suspicious for primary pulmonary malignancy.  2. Moderate left pleural effusion with scattered foci of FDG uptake along the parietal pleura of concern for malignant pleural effusion.  3. Left chest wall FDG avid mass resulting in lytic destruction and pathologic fractures of left 10th and 11th ribs, characteristic of metastatic disease.  4. Diffuse prominent FDG activity throughout the intramedullary compartments of the axial and proximal appendicular skeleton is nonspecific. This can be seen with pharmacologic bone marrow stimulation although diffuse metastatic  disease can also be considered. Metastatic disease is felt less likely given history.    9/14/2023-MRI Brain  Negative for mets    Stage IVB-Adenocarcinoma of the lung    PLAN:   Carbo/Pem/Pem ChemoIO therapy    Carbo/Pem/Pem:  Cycle 1: 9/13/2023  Cycle 2: 10/4/2023  Cycle 3: 10/25/2023  Cycle 4: 11/15/2023  Cycle 5: 1/3/2024  Cycle 6: 1/24/2023  Cycle 7: 2/14/2024  Delay due to toxicity, fatigue,   Cycle 8: 4/2/2024 4/18/2024-PET  Mixed Response    CT CAP-2/23/2024:  Stable post radiation changes and atelectasis within the lingula with mild wall thickening surrounding the lingula bronchus with no discrete mass     Stable groundglass nodular opacities within the left upper lobe suggestive of infectious or inflammatory process     Small left pleural effusion     Stable lytic and sclerotic lesions involving the posterior lateral left 10th and 11th ribs compatible with treated metastasis. There is resolution of the chest wall mass     Stable 9 mm left supraclavicular node     Emphysematous changes throughout the lungs     No evidence of metastatic disease within the abdomen or pelvis    Chief Complaint:  Stage IVB lung carcinoma follow up    History of Present Illness:   Akil Guzman is a 78 y.o. male who presents for follow up of lung cancer.     Mr. Guzman complains continued fatigue and anorexia. He continues to lose weight.     He also continues to have intermittent diarrhea helped by the Imodium. He has a rash on his back and has been using hydrocortisone with little relief.      Family and Social history reviewed and is unchanged from 9/1/2023      Current Outpatient Medications:     cyanocobalamin 1,000 mcg/mL injection, Inject 1 mL (1,000 mcg total) into the skin every 30 days., Disp: 3 mL, Rfl: 3    duke's soln (benadryl 30 mL, mylanta 30 mL, LIDOcaine 30 mL, nystatin 30 mL) 120mL, Take 10 mLs by mouth 4 (four) times daily., Disp: 120 mL, Rfl: 5    finasteride (PROSCAR) 5 mg tablet, TAKE 1 TABLET BY MOUTH  "EVERY DAY FOR PROSTATE, Disp: 90 tablet, Rfl: 3    folic acid (FOLVITE) 1 MG tablet, Take 1 tablet (1 mg total) by mouth once daily., Disp: 100 tablet, Rfl: 3    HYDROcodone-acetaminophen (NORCO)  mg per tablet, Take 1 tablet by mouth every 6 (six) hours as needed for Pain., Disp: 60 tablet, Rfl: 0    ipratropium (ATROVENT) 42 mcg (0.06 %) nasal spray, USE 1 SPRAY in each nostril TWICE DAILY THANK YOU!, Disp: , Rfl:     magnesium oxide (MAG-OX) 400 mg (241.3 mg magnesium) tablet, Take 1 tablet (400 mg total) by mouth 2 (two) times daily., Disp: 60 tablet, Rfl: 3    ondansetron (ZOFRAN) 8 MG tablet, Take 1 tablet (8 mg total) by mouth every 8 (eight) hours as needed., Disp: 30 tablet, Rfl: 5    predniSONE (DELTASONE) 10 MG tablet, Take 1 tablet (10 mg total) by mouth once daily., Disp: 14 tablet, Rfl: 0    promethazine (PHENERGAN) 25 MG tablet, Take 1 tablet (25 mg total) by mouth every 4 to 6 hours as needed., Disp: 30 tablet, Rfl: 5    sildenafiL (VIAGRA) 100 MG tablet, Take 1 tablet (100 mg total) by mouth daily as needed for Erectile Dysfunction., Disp: 30 tablet, Rfl: 11    syringe with needle 1 mL 25 gauge x 1" Syrg, 1 each by Misc.(Non-Drug; Combo Route) route every 30 days., Disp: 3 each, Rfl: 3    tamsulosin (FLOMAX) 0.4 mg Cap, Take 1 capsule (0.4 mg total) by mouth once daily., Disp: 90 capsule, Rfl: 3    temazepam (RESTORIL) 30 mg capsule, Take 1 capsule (30 mg total) by mouth nightly as needed for Insomnia. TAKE NIGHTLY AS NEEDED, Disp: 30 capsule, Rfl: 2    tiZANidine (ZANAFLEX) 4 MG tablet, Take 1 tablet (4 mg total) by mouth every 12 (twelve) hours as needed (muscle spasms/ pain)., Disp: 40 tablet, Rfl: 0    megestroL (MEGACE) 400 mg/10 mL (40 mg/mL) Susp, Take 10 mLs (400 mg total) by mouth 2 (two) times daily., Disp: 300 mL, Rfl: 11    triamcinolone acetonide 0.1% (KENALOG) 0.1 % cream, Apply topically 2 (two) times daily., Disp: 453.6 g, Rfl: 2    Current Facility-Administered Medications:    " " 0.9%  NaCl infusion (for blood administration), , Intravenous, Once, Allie Shelley NP-C, Stopped at 10/23/23 1445    Facility-Administered Medications Ordered in Other Visits:     heparin, porcine (PF) 100 unit/mL injection flush 500 Units, 500 Units, Intravenous, PRN, Allie Shelley NP-C, 500 Units at 12/14/23 1422    sodium chloride 0.9% flush 10 mL, 10 mL, Intravenous, PRN, Allie Shelley NP-C, 10 mL at 12/14/23 1426        Objective:       Physical Examination:     Temp 97.8 °F (36.6 °C)   Resp 16   Ht 6' 5" (1.956 m)   Wt 71.5 kg (157 lb 9.6 oz)   SpO2 96%   BMI 18.69 kg/m²     Physical Exam  Constitutional:       Appearance: Normal appearance.   HENT:      Head: Normocephalic and atraumatic.      Right Ear: External ear normal.      Left Ear: External ear normal.      Nose: Nose normal.      Mouth/Throat:      Mouth: Mucous membranes are moist.   Eyes:      General: No scleral icterus.     Conjunctiva/sclera: Conjunctivae normal.   Cardiovascular:      Rate and Rhythm: Normal rate.   Pulmonary:      Effort: Pulmonary effort is normal.   Abdominal:      General: Abdomen is flat.   Musculoskeletal:      Right lower leg: No edema.      Left lower leg: No edema.   Skin:     General: Skin is warm and dry.      Findings: Rash present.      Comments: Rash to back   Neurological:      General: No focal deficit present.      Mental Status: He is alert and oriented to person, place, and time.   Psychiatric:         Mood and Affect: Mood normal.         Behavior: Behavior normal.         Labs:   Recent Results (from the past 336 hour(s))   CBC Auto Differential    Collection Time: 06/13/24 11:43 AM   Result Value Ref Range    WBC 8.42 3.90 - 12.70 K/uL    Hemoglobin 9.7 (L) 14.0 - 18.0 g/dL    Hematocrit 30.0 (L) 40.0 - 54.0 %    Platelets 358 150 - 450 K/uL       CMP  Sodium   Date Value Ref Range Status   06/13/2024 134 (L) 136 - 145 mmol/L Final     Potassium   Date Value Ref Range Status   06/13/2024 4.2 " 3.5 - 5.1 mmol/L Final     Chloride   Date Value Ref Range Status   06/13/2024 98 95 - 110 mmol/L Final     CO2   Date Value Ref Range Status   06/13/2024 25 23 - 29 mmol/L Final     Glucose   Date Value Ref Range Status   06/13/2024 141 (H) 70 - 110 mg/dL Final     BUN   Date Value Ref Range Status   06/13/2024 17 8 - 23 mg/dL Final     Creatinine   Date Value Ref Range Status   06/13/2024 1.0 0.5 - 1.4 mg/dL Final     Calcium   Date Value Ref Range Status   06/13/2024 10.0 8.7 - 10.5 mg/dL Final     Total Protein   Date Value Ref Range Status   06/13/2024 6.7 6.0 - 8.4 g/dL Final     Albumin   Date Value Ref Range Status   06/13/2024 2.8 (L) 3.5 - 5.2 g/dL Final     Total Bilirubin   Date Value Ref Range Status   06/13/2024 0.4 0.1 - 1.0 mg/dL Final     Comment:     For infants and newborns, interpretation of results should be based  on gestational age, weight and in agreement with clinical  observations.    Premature Infant recommended reference ranges:  Up to 24 hours.............<8.0 mg/dL  Up to 48 hours............<12.0 mg/dL  3-5 days..................<15.0 mg/dL  6-29 days.................<15.0 mg/dL       Alkaline Phosphatase   Date Value Ref Range Status   06/13/2024 65 55 - 135 U/L Final     AST   Date Value Ref Range Status   06/13/2024 14 10 - 40 U/L Final     ALT   Date Value Ref Range Status   06/13/2024 11 10 - 44 U/L Final     Anion Gap   Date Value Ref Range Status   06/13/2024 11 8 - 16 mmol/L Final     eGFR if    Date Value Ref Range Status   11/02/2021 56.4 (A) >60 mL/min/1.73 m^2 Final     eGFR if non    Date Value Ref Range Status   11/02/2021 48.8 (A) >60 mL/min/1.73 m^2 Final     Comment:     Calculation used to obtain the estimated glomerular filtration  rate (eGFR) is the CKD-EPI equation.        Lab Results   Component Value Date    CEA 1.6 01/03/2024     Lab Results   Component Value Date    PSA 1.0 06/21/2023    PSA 1.2 11/02/2021    PSA 1.3 08/10/2020            Assessment/Plan:     Problem List Items Addressed This Visit          Oncology    NSCLC of left lung    Malignant neoplasm of upper lobe of left lung - Primary    Relevant Orders    FERRITIN    FOLATE    Vitamin B12    Iron and TIBC    Ambulatory referral/consult to Gastroenterology    CT Chest Abdomen Pelvis W W/O Contrast (XPD)    Anemia in neoplastic disease       GI    Diarrhea    Relevant Orders    Ambulatory referral/consult to Gastroenterology     Other Visit Diagnoses       Fatigue, unspecified type        Relevant Orders    FERRITIN    FOLATE    Vitamin B12    Iron and TIBC    Ambulatory referral/consult to Gastroenterology    CT Chest Abdomen Pelvis W W/O Contrast (XPD)    Anorexia        Relevant Medications    megestroL (MEGACE) 400 mg/10 mL (40 mg/mL) Susp    Drug-induced folate deficiency anemia        Relevant Orders    FOLATE    Vitamin B12    Rash        Relevant Medications    triamcinolone acetonide 0.1% (KENALOG) 0.1 % cream          Lung Cancer- Patient wants to get his Keytruda every 6 weeks. Will get up to date CT CAP;   Diarrhea- Continue Imodium; Amb ref to GI  Anorexia- Start Megace BID  Fatigue- Iron studies, folate and B12 with next lab draw  5. Rash- Triamcinolone BID and Zyrtec daily    Discussion:     Follow up in about 4 weeks (around 7/22/2024) for with Dr. Torres.      Electronically signed by Allie Shelley, MSN, APRN, AGNP-C, OCN

## 2024-06-24 ENCOUNTER — TELEPHONE (OUTPATIENT)
Dept: GASTROENTEROLOGY | Facility: CLINIC | Age: 79
End: 2024-06-24
Payer: MEDICARE

## 2024-06-24 ENCOUNTER — OFFICE VISIT (OUTPATIENT)
Facility: CLINIC | Age: 79
End: 2024-06-24
Payer: MEDICARE

## 2024-06-24 VITALS
OXYGEN SATURATION: 96 % | TEMPERATURE: 98 F | HEIGHT: 77 IN | BODY MASS INDEX: 18.61 KG/M2 | WEIGHT: 157.63 LBS | RESPIRATION RATE: 16 BRPM

## 2024-06-24 DIAGNOSIS — R21 RASH: ICD-10-CM

## 2024-06-24 DIAGNOSIS — R19.7 DIARRHEA, UNSPECIFIED TYPE: ICD-10-CM

## 2024-06-24 DIAGNOSIS — C34.12 MALIGNANT NEOPLASM OF UPPER LOBE OF LEFT LUNG: Primary | ICD-10-CM

## 2024-06-24 DIAGNOSIS — R53.83 FATIGUE, UNSPECIFIED TYPE: ICD-10-CM

## 2024-06-24 DIAGNOSIS — D52.1 DRUG-INDUCED FOLATE DEFICIENCY ANEMIA: ICD-10-CM

## 2024-06-24 DIAGNOSIS — R63.0 ANOREXIA: ICD-10-CM

## 2024-06-24 DIAGNOSIS — D63.0 ANEMIA IN NEOPLASTIC DISEASE: ICD-10-CM

## 2024-06-24 DIAGNOSIS — C34.92 NSCLC OF LEFT LUNG: ICD-10-CM

## 2024-06-24 PROCEDURE — G2211 COMPLEX E/M VISIT ADD ON: HCPCS | Mod: S$PBB,,, | Performed by: NURSE PRACTITIONER

## 2024-06-24 PROCEDURE — 99214 OFFICE O/P EST MOD 30 MIN: CPT | Mod: PBBFAC,PN | Performed by: NURSE PRACTITIONER

## 2024-06-24 PROCEDURE — 99999 PR PBB SHADOW E&M-EST. PATIENT-LVL IV: CPT | Mod: PBBFAC,,, | Performed by: NURSE PRACTITIONER

## 2024-06-24 PROCEDURE — 99215 OFFICE O/P EST HI 40 MIN: CPT | Mod: S$PBB,,, | Performed by: NURSE PRACTITIONER

## 2024-06-24 RX ORDER — MEGESTROL ACETATE 40 MG/ML
400 SUSPENSION ORAL 2 TIMES DAILY
Qty: 300 ML | Refills: 11 | Status: SHIPPED | OUTPATIENT
Start: 2024-06-24 | End: 2025-06-24

## 2024-06-24 RX ORDER — TRIAMCINOLONE ACETONIDE 1 MG/G
CREAM TOPICAL 2 TIMES DAILY
Qty: 453.6 G | Refills: 2 | Status: SHIPPED | OUTPATIENT
Start: 2024-06-24

## 2024-06-27 ENCOUNTER — TELEPHONE (OUTPATIENT)
Dept: GASTROENTEROLOGY | Facility: CLINIC | Age: 79
End: 2024-06-27
Payer: MEDICARE

## 2024-06-27 NOTE — TELEPHONE ENCOUNTER
Call placed to Ritu Thomas in [facility] to message received. Advised his appt was is now scheduled for 7/2 at 2p. He verbalized understanding. No further issues noted.

## 2024-06-27 NOTE — TELEPHONE ENCOUNTER
----- Message from Gabi Kim sent at 6/27/2024 12:13 PM CDT -----  Regarding: advise  Contact: pt  Type: Needs Medical Advice  Who Called:  patient  Symptoms (please be specific):  Lengthy period of diarrhea  How long has patient had these symptoms:    Pharmacy name and phone #:    Best Call Back Number: [phone]    Additional Information: needs to move apt 7/3 to 7/2 sp he doesn't have to make two trips call to advise

## 2024-06-28 ENCOUNTER — TELEPHONE (OUTPATIENT)
Facility: CLINIC | Age: 79
End: 2024-06-28
Payer: MEDICARE

## 2024-06-28 ENCOUNTER — PATIENT MESSAGE (OUTPATIENT)
Facility: CLINIC | Age: 79
End: 2024-06-28
Payer: MEDICARE

## 2024-06-28 DIAGNOSIS — C34.12 MALIGNANT NEOPLASM OF UPPER LOBE OF LEFT LUNG: Primary | ICD-10-CM

## 2024-06-28 RX ORDER — ALBUTEROL SULFATE 90 UG/1
2 AEROSOL, METERED RESPIRATORY (INHALATION) EVERY 6 HOURS PRN
Qty: 18 G | Refills: 5 | Status: SHIPPED | OUTPATIENT
Start: 2024-06-28

## 2024-06-28 NOTE — TELEPHONE ENCOUNTER
----- Message from Elena Rincon LPN sent at 6/28/2024  1:55 PM CDT -----    ----- Message -----  From: Analisa Joyce  Sent: 6/28/2024   1:35 PM CDT  To: Karsten Kelley Staff    Pt's wife Patricia is asking for something to help with his breathing like an inhaler or something. Please send to [facility] in [facility] [phone]

## 2024-07-02 ENCOUNTER — HOSPITAL ENCOUNTER (OUTPATIENT)
Dept: RADIOLOGY | Facility: HOSPITAL | Age: 79
Discharge: HOME | End: 2024-07-02
Attending: NURSE PRACTITIONER
Payer: MEDICARE

## 2024-07-02 ENCOUNTER — OFFICE VISIT (OUTPATIENT)
Dept: GASTROENTEROLOGY | Facility: CLINIC | Age: 79
End: 2024-07-02
Payer: MEDICARE

## 2024-07-02 VITALS
WEIGHT: 160.94 LBS | BODY MASS INDEX: 19.08 KG/M2 | SYSTOLIC BLOOD PRESSURE: 101 MMHG | DIASTOLIC BLOOD PRESSURE: 54 MMHG | HEART RATE: 91 BPM

## 2024-07-02 DIAGNOSIS — C34.12 MALIGNANT NEOPLASM OF UPPER LOBE OF LEFT LUNG: ICD-10-CM

## 2024-07-02 DIAGNOSIS — R19.4 CHANGE IN BOWEL HABITS: ICD-10-CM

## 2024-07-02 DIAGNOSIS — R63.4 WEIGHT LOSS: ICD-10-CM

## 2024-07-02 DIAGNOSIS — Z86.010 HISTORY OF COLON POLYPS: ICD-10-CM

## 2024-07-02 DIAGNOSIS — Z85.038 HISTORY OF COLON CANCER: ICD-10-CM

## 2024-07-02 DIAGNOSIS — R19.7 DIARRHEA, UNSPECIFIED TYPE: Primary | ICD-10-CM

## 2024-07-02 DIAGNOSIS — R53.83 FATIGUE, UNSPECIFIED TYPE: ICD-10-CM

## 2024-07-02 PROCEDURE — 99999 PR PBB SHADOW E&M-EST. PATIENT-LVL V: CPT | Mod: PBBFAC,,,

## 2024-07-02 PROCEDURE — 25500020 PHARM REV CODE 255

## 2024-07-02 PROCEDURE — 71260 CT THORAX DX C+: CPT | Mod: TC

## 2024-07-02 PROCEDURE — 74177 CT ABD & PELVIS W/CONTRAST: CPT | Mod: TC

## 2024-07-02 PROCEDURE — 99215 OFFICE O/P EST HI 40 MIN: CPT | Mod: PBBFAC,PN

## 2024-07-02 PROCEDURE — 71260 CT THORAX DX C+: CPT | Mod: 26,,, | Performed by: RADIOLOGY

## 2024-07-02 PROCEDURE — 74177 CT ABD & PELVIS W/CONTRAST: CPT | Mod: 26,,, | Performed by: RADIOLOGY

## 2024-07-02 PROCEDURE — A9698 NON-RAD CONTRAST MATERIALNOC: HCPCS

## 2024-07-02 RX ADMIN — IOHEXOL 1000 ML: 9 SOLUTION ORAL at 12:07

## 2024-07-02 RX ADMIN — IOHEXOL 75 ML: 350 INJECTION, SOLUTION INTRAVENOUS at 12:07

## 2024-07-02 NOTE — PROGRESS NOTES
Please notify the patient that their scan is overall stable. Looks like possible Pneumonia left lung start Levaquin daily x 7 days Mucinex BID and Albuterl Inhaler every 6 hours. The area of the rib fractures was there previously.

## 2024-07-02 NOTE — PROGRESS NOTES
Subjective:       Patient ID: Akil Guzman is a 79 y.o. male Body mass index is 19.08 kg/m².    Chief Complaint: diarrhea.    This patient is new to me.  Referring Provider: Allie Shelley for diarrhea.  Established patient of Dr. Pollock .     GI Problem  The primary symptoms include weight loss (pt states was 185lbs in 2023 when dx w/ Stage IVB-Adenocarcinoma of the lung  currently on 160lbs states currently on megace and appetite has improved significantly, states last dose of chemo in June on Maintance therapy) and diarrhea (pt reports diarrhea started end of april was having 3-4 Bms max per day, started on imodium then on Lomotil rates stool type 6-7 on bristol stool scale stool tests in 5/24 normal). Primary symptoms do not include fever, fatigue, abdominal pain, nausea, vomiting, melena, hematemesis, jaundice, hematochezia, dysuria, myalgias or arthralgias.   Onset: Patient reports yesterday had 3 BMs has not had a BM today, patient states took 1-2 doses of Lomotil only took one yesterday,no longer taking imodium pt stats feels as if diarrhea is improving. Diarrhea characteristics: denies any antbx use recently, changes in medications, travel, or ill contacts. Daily occurrences: no antbx use, no changes in medications, denies suspect food intake, diego.   The illness does not include dysphagia, bloating or constipation. Associated symptoms comments: Patient states prior to diarrhea starting had a BM daily or every other day and the stools were soft formed  -Last colonoscopy as listed below, repeat colonoscopy due in 2023    Procedure Date: 9/28/2020  Attending MD: Ty Pollock MD  Procedure:   Colonoscopy  Impression: - The colonic anastomosis is normal.                        - One 7 mm polyp in the ascending colon, removed                        with a hot snare. Resected and retrieved.                        - Two 2 to 6 mm polyps in the descending colon, in                        the  proximal descending colon and in the distal                        descending colon, removed with a hot biopsy                        forceps. Resected and retrieved.                        - Diverticulosis in the descending colon.    ASCENDING COLON POLYP, BIOPSY:  Tubular adenoma.  Negative for high-grade dysplasia or malignancy.  2.  DESCENDING COLON POLYP, BIOPSY:  Small tubular adenoma.  Negative for high-grade dysplasia or malignancy.  3.  SIGMOID COLON POLYP, BIOPSY:  Tubular adenoma.  Negative for high-grade dysplasia or malignancy.  . Significant associated medical issues include bowel resection (reports hx of colon cancer in 1999 and states had part of his sigmoid removed in 2000). Associated medical issues do not include inflammatory bowel disease, GERD (denies GERD symptoms), gallstones, liver disease, alcohol abuse, PUD, gastric bypass, irritable bowel syndrome, hemorrhoids or diverticulitis.       Review of Systems   Constitutional:  Positive for weight loss (pt states was 185lbs in 2023 when dx w/ Stage IVB-Adenocarcinoma of the lung  currently on 160lbs states currently on megace and appetite has improved significantly, states last dose of chemo in June on Maintance therapy). Negative for fatigue and fever.   Gastrointestinal:  Positive for diarrhea (pt reports diarrhea started end of april was having 3-4 Bms max per day, started on imodium then on Lomotil rates stool type 6-7 on bristol stool scale stool tests in 5/24 normal). Negative for abdominal distention, abdominal pain, anal bleeding, bloating, blood in stool, constipation, dysphagia, hematemesis, hematochezia, jaundice, melena, nausea, rectal pain and vomiting.   Genitourinary:  Negative for dysuria.   Musculoskeletal:  Negative for arthralgias and myalgias.         No LMP for male patient.  Past Medical History:   Diagnosis Date    Allergy     SEASON    Basal cell carcinoma 2009    L ear    Cancer     Hx colon     Colon cancer     states dx  in 1999    Colon polyp     Pneumonia, unspecified organism 07/2023     Past Surgical History:   Procedure Laterality Date    CATARACT EXTRACTION Bilateral 2022    COLON SURGERY  2000    1ft. sigmoid removed    COLONOSCOPY N/A 09/28/2020    Procedure: COLONOSCOPY;  Surgeon: Ty Pollock MD;  Location: [facility];  Service: General;  Laterality: N/A;    INSERTION OF TUNNELED CENTRAL VENOUS CATHETER (CVC) WITH SUBCUTANEOUS PORT N/A 09/11/2023    Procedure: [id number];  Surgeon: Antwon Peres Jr., MD;  Location: [facility] OR;  Service: General;  Laterality: N/A;     Family History   Problem Relation Name Age of Onset    Cancer Mother      Brain cancer Mother      Cancer Brother      Macular degeneration Brother      Pancreatic cancer Brother      Melanoma Neg Hx      Colon cancer Neg Hx       Social History     Tobacco Use    Smoking status: Former     Current packs/day: 0.25     Average packs/day: 0.3 packs/day for 15.8 years (4.0 ttl pk-yrs)     Types: Cigarettes     Start date: 9/6/2008    Smokeless tobacco: Never   Substance Use Topics    Alcohol use: Yes     Alcohol/week: 2.0 standard drinks of alcohol     Types: 2 Drinks containing 0.5 oz of alcohol per week     Comment: 2 mixed drinks WEEK    Drug use: No     Wt Readings from Last 10 Encounters:   07/02/24 73 kg (160 lb 15 oz)   06/24/24 71.5 kg (157 lb 9.6 oz)   06/14/24 72.7 kg (160 lb 3.2 oz)   05/24/24 75.4 kg (166 lb 4.8 oz)   05/10/24 75.3 kg (166 lb)   04/18/24 77.1 kg (170 lb)   04/02/24 77.5 kg (170 lb 14.4 oz)   04/02/24 77.6 kg (171 lb)   02/27/24 74.5 kg (164 lb 4.8 oz)   02/15/24 77.1 kg (170 lb)     Lab Results   Component Value Date    WBC 8.42 06/13/2024    HGB 9.7 (L) 06/13/2024    HCT 30.0 (L) 06/13/2024    MCV 93 06/13/2024     06/13/2024     CMP  Sodium   Date Value Ref Range Status   06/13/2024 134 (L) 136 - 145 mmol/L Final     Potassium   Date Value Ref Range Status   06/13/2024 4.2 3.5 - 5.1 mmol/L Final  "    Chloride   Date Value Ref Range Status   06/13/2024 98 95 - 110 mmol/L Final     CO2   Date Value Ref Range Status   06/13/2024 25 23 - 29 mmol/L Final     Glucose   Date Value Ref Range Status   06/13/2024 141 (H) 70 - 110 mg/dL Final     BUN   Date Value Ref Range Status   06/13/2024 17 8 - 23 mg/dL Final     Creatinine   Date Value Ref Range Status   06/13/2024 1.0 0.5 - 1.4 mg/dL Final     Calcium   Date Value Ref Range Status   06/13/2024 10.0 8.7 - 10.5 mg/dL Final     Total Protein   Date Value Ref Range Status   06/13/2024 6.7 6.0 - 8.4 g/dL Final     Albumin   Date Value Ref Range Status   06/13/2024 2.8 (L) 3.5 - 5.2 g/dL Final     Total Bilirubin   Date Value Ref Range Status   06/13/2024 0.4 0.1 - 1.0 mg/dL Final     Comment:     For infants and newborns, interpretation of results should be based  on gestational age, weight and in agreement with clinical  observations.    Premature Infant recommended reference ranges:  Up to 24 hours.............<8.0 mg/dL  Up to 48 hours............<12.0 mg/dL  3-5 days..................<15.0 mg/dL  6-29 days.................<15.0 mg/dL       Alkaline Phosphatase   Date Value Ref Range Status   06/13/2024 65 55 - 135 U/L Final     AST   Date Value Ref Range Status   06/13/2024 14 10 - 40 U/L Final     ALT   Date Value Ref Range Status   06/13/2024 11 10 - 44 U/L Final     Anion Gap   Date Value Ref Range Status   06/13/2024 11 8 - 16 mmol/L Final     eGFR if    Date Value Ref Range Status   11/02/2021 56.4 (A) >60 mL/min/1.73 m^2 Final     eGFR if non    Date Value Ref Range Status   11/02/2021 48.8 (A) >60 mL/min/1.73 m^2 Final     Comment:     Calculation used to obtain the estimated glomerular filtration  rate (eGFR) is the CKD-EPI equation.        Lab Results   Component Value Date    LIPASE 55 01/05/2010     No results found for: "LIPASERES"  Lab Results   Component Value Date    TSH 0.969 05/24/2024       Reviewed prior medical " records including office visit Allie Shelley NP 6/24/24 radiology report of PET scan 04/02/2024, CT chest abdomen 02/06/2024 & endoscopy history (see surgical history/procedures).    Objective:      Physical Exam  Vitals and nursing note reviewed.   Constitutional:       Appearance: Normal appearance. He is normal weight.   Cardiovascular:      Rate and Rhythm: Normal rate and regular rhythm.      Heart sounds: Normal heart sounds.   Pulmonary:      Breath sounds: Normal breath sounds.   Abdominal:      General: Bowel sounds are normal.      Palpations: Abdomen is soft.      Tenderness: There is no abdominal tenderness.   Skin:     General: Skin is warm and dry.      Coloration: Skin is not jaundiced.   Neurological:      Mental Status: He is alert and oriented to person, place, and time.   Psychiatric:         Mood and Affect: Mood normal.         Behavior: Behavior normal.         Assessment:       1. Diarrhea, unspecified type    2. History of colon cancer    3. History of colon polyps    4. Weight loss    5. Malignant neoplasm of upper lobe of left lung        Plan:       Diarrhea, unspecified type  -     Stop Lomotil and imodium to further assess if diarrhea has improved  - recommend OTC probiotic, such as Florastor or Culturelle, taken as directed on packaging  - avoid lactose, alcohol, & caffeine  - avoid known triggers  -recommended to schedule Colonoscopy to further investigate symptom , discussed procedure with the patient, including risks and benefits, patient verbalized understanding but declined procedure at the moment    Change in bowel habits   -recommended to schedule Colonoscopy to further investigate symptom , discussed procedure with the patient, including risks and benefits, patient verbalized understanding but declined procedure at the moment    History of colon cancer, History of colon polyps   -recommended to schedule Colonoscopy to further investigate symptom , discussed procedure with the  patient, including risks and benefits, patient verbalized understanding but declined procedure at the moment    Weight loss   - encouraged PO intake and daily calorie counts to ensure adequate nutrition is taken in, recommend at least 2,000 calories a day  - recommend nutritional drinks, such as Boost, Ensure or Glucerna, to supplement nutrition needs   -pt with hx of lung cancer on megace    Malignant neoplasm of upper lobe of left lung  -     continue to follow up with Hematology Oncology      Follow up if symptoms worsen or fail to improve.      If no improvement in symptoms or symptoms worsen, call/follow-up at clinic or go to ER.       [facility]  [facility] [place]     Dictation software program was used for this note. Please expect some simple typographical  errors in this note.    Encounter includes face to face time and non-face to face time preparing to see the patient (eg, review of tests), obtaining and/or reviewing separately obtained history, documenting clinical information in the electronic or other health record, independently interpreting results (not separately reported) and communicating results to the patient/family/caregiver, or care coordination (not separately reported).

## 2024-07-03 DIAGNOSIS — C34.92 NSCLC OF LEFT LUNG: Primary | ICD-10-CM

## 2024-07-03 RX ORDER — LEVOFLOXACIN 500 MG/1
500 TABLET, FILM COATED ORAL DAILY
COMMUNITY
End: 2024-07-03 | Stop reason: SDUPTHER

## 2024-07-03 RX ORDER — LEVOFLOXACIN 500 MG/1
500 TABLET, FILM COATED ORAL DAILY
Qty: 7 TABLET | Refills: 0 | Status: SHIPPED | OUTPATIENT
Start: 2024-07-03

## 2024-07-03 NOTE — TELEPHONE ENCOUNTER
----- Message from ANGI Pitts sent at 7/2/2024  5:14 PM CDT -----  Please notify the patient that their scan is overall stable. Looks like possible Pneumonia left lung start Levaquin daily x 7 days Mucinex BID and Albuterl Inhaler every 6 hours. The area of the rib fractures was there previously.        I spoke to Alin. I explained all of the above. He voiced understanding of all. Levaquin will be sent to his Pharmacy of choice which is [facility] in [place], [place]

## 2024-07-09 ENCOUNTER — TELEPHONE (OUTPATIENT)
Facility: CLINIC | Age: 79
End: 2024-07-09
Payer: MEDICARE

## 2024-07-09 DIAGNOSIS — N52.01 ERECTILE DYSFUNCTION DUE TO ARTERIAL INSUFFICIENCY: ICD-10-CM

## 2024-07-09 RX ORDER — SILDENAFIL 100 MG/1
100 TABLET, FILM COATED ORAL DAILY PRN
Qty: 30 TABLET | Refills: 11 | Status: SHIPPED | OUTPATIENT
Start: 2024-07-09

## 2024-07-11 ENCOUNTER — PATIENT MESSAGE (OUTPATIENT)
Facility: CLINIC | Age: 79
End: 2024-07-11
Payer: MEDICARE

## 2024-07-11 DIAGNOSIS — C34.12 MALIGNANT NEOPLASM OF UPPER LOBE OF LEFT LUNG: Primary | ICD-10-CM

## 2024-07-11 DIAGNOSIS — J18.9 PNEUMONIA DUE TO INFECTIOUS ORGANISM, UNSPECIFIED LATERALITY, UNSPECIFIED PART OF LUNG: ICD-10-CM

## 2024-07-11 RX ORDER — ALBUTEROL SULFATE 0.83 MG/ML
2.5 SOLUTION RESPIRATORY (INHALATION) EVERY 6 HOURS PRN
Qty: 60 EACH | Refills: 3 | Status: SHIPPED | OUTPATIENT
Start: 2024-07-11 | End: 2025-07-11

## 2024-07-12 RX ORDER — ALBUTEROL SULFATE 0.83 MG/ML
2.5 SOLUTION RESPIRATORY (INHALATION) EVERY 6 HOURS PRN
Qty: 60 EACH | Refills: 3 | OUTPATIENT
Start: 2024-07-12 | End: 2025-07-12

## 2024-07-18 ENCOUNTER — OFFICE VISIT (OUTPATIENT)
Facility: CLINIC | Age: 79
End: 2024-07-18
Payer: MEDICARE

## 2024-07-18 VITALS
WEIGHT: 160 LBS | SYSTOLIC BLOOD PRESSURE: 117 MMHG | RESPIRATION RATE: 18 BRPM | DIASTOLIC BLOOD PRESSURE: 69 MMHG | HEART RATE: 91 BPM | TEMPERATURE: 97 F | HEIGHT: 77 IN | BODY MASS INDEX: 18.89 KG/M2

## 2024-07-18 DIAGNOSIS — C34.12 MALIGNANT NEOPLASM OF UPPER LOBE OF LEFT LUNG: ICD-10-CM

## 2024-07-18 DIAGNOSIS — R52 ACUTE PAIN: ICD-10-CM

## 2024-07-18 DIAGNOSIS — C34.12 MALIGNANT NEOPLASM OF UPPER LOBE OF LEFT LUNG: Primary | ICD-10-CM

## 2024-07-18 DIAGNOSIS — G89.3 CANCER ASSOCIATED PAIN: ICD-10-CM

## 2024-07-18 PROCEDURE — G2211 COMPLEX E/M VISIT ADD ON: HCPCS | Mod: S$PBB,,, | Performed by: INTERNAL MEDICINE

## 2024-07-18 PROCEDURE — 99215 OFFICE O/P EST HI 40 MIN: CPT | Mod: S$PBB,,, | Performed by: INTERNAL MEDICINE

## 2024-07-18 PROCEDURE — 99215 OFFICE O/P EST HI 40 MIN: CPT | Mod: PBBFAC,PN | Performed by: INTERNAL MEDICINE

## 2024-07-18 PROCEDURE — 99999 PR PBB SHADOW E&M-EST. PATIENT-LVL V: CPT | Mod: PBBFAC,,, | Performed by: INTERNAL MEDICINE

## 2024-07-18 NOTE — ASSESSMENT & PLAN NOTE
Patient is doing ok.  His CT scan shows increase scar/inflammation but no clear cancer growth.  This could be cancer but this is not clear.  I discussed continuing therapy and will get a PET scan done in the next two months.  He would like to continue therapy for now and we again discussed pneumonitis risk and ongoing risk o/w.  Continue lab monitoring and will see him again in four weeks.

## 2024-07-18 NOTE — PROGRESS NOTES
PROGRESS NOTE    Subjective:       Patient ID: Akil Guzman is a 79 y.o. male.    8/7/2023-CT chest without:  1. Large left pleural effusion with dependent left lower lobe atelectasis.  2. Poorly defined mass-like infiltrate involving the periphery of the left upper lobe extending to the left hilum.  Underlying parenchymal mass is not excludable, however, evaluation is limited due to lack of intravenous contrast.  Correlate clinically with possible fever and/or elevated white count.  3. Bilateral soft tissue pulmonary nodules measuring up to 11 mm.  Follow-up per Fleischner guidelines.  For multiple solid nodules with any 6 mm or greater, Fleischner Society guidelines recommend follow up with non-contrast chest CT at 3-6 months and 18-24 months after discovery.  4. Partially calcified left upper lobe pulmonary nodule may represent granulomatous change and scarring.  5. 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  6. Questionable left hilar lymphadenopathy.  However, evaluation is again limited due to lack of intravenous contrast.  7. Small hiatal hernia.    8/17/2023 Left Pleural Fluid:  Positive for adenocarcinoma most c/w lung primary    9/14/2023-PET:  1. FDG avid nodular thickening involving origin of the lingular bronchus with adjacent FDG avid 20 mm nodular density, suspicious for primary pulmonary malignancy.  2. Moderate left pleural effusion with scattered foci of FDG uptake along the parietal pleura of concern for malignant pleural effusion.  3. Left chest wall FDG avid mass resulting in lytic destruction and pathologic fractures of left 10th and 11th ribs, characteristic of metastatic disease.  4. Diffuse prominent FDG activity throughout the intramedullary compartments of the axial and proximal appendicular skeleton is nonspecific. This can be seen with pharmacologic bone marrow stimulation although diffuse metastatic  disease can also be considered. Metastatic disease is felt less likely given history.    9/14/2023-MRI Brain  Negative for mets    Stage IVB-Adenocarcinoma of the lung    PLAN:   Carbo/Pem/Pem ChemoIO therapy    Carbo/Pem/Pem:  Cycle 1: 9/13/2023  Cycle 2: 10/4/2023  Cycle 3: 10/25/2023  Cycle 4: 11/15/2023  Cycle 5: 1/3/2024  Cycle 6: 1/24/2023  Cycle 7: 2/14/2024  Delay due to toxicity, fatigue,   Cycle 8: 4/2/2024 4/18/2024-PET  Mixed Response    CT CAP-2/23/2024:  Stable post radiation changes and atelectasis within the lingula with mild wall thickening surrounding the lingula bronchus with no discrete mass     Stable groundglass nodular opacities within the left upper lobe suggestive of infectious or inflammatory process     Small left pleural effusion     Stable lytic and sclerotic lesions involving the posterior lateral left 10th and 11th ribs compatible with treated metastasis. There is resolution of the chest wall mass     Stable 9 mm left supraclavicular node     Emphysematous changes throughout the lungs     No evidence of metastatic disease within the abdomen or pelvis    Chief Complaint:  No chief complaint on file.  Stage IVB lung carcinoma follow up    History of Present Illness:   Akil Guzman is a 79 y.o. male who presents for follow up of lung cancer.     Mr. Guzman is doing ok today.  Continues with sob without significant changes.        Family and Social history reviewed and is unchanged from 9/1/2023             Current Outpatient Medications:     albuterol (PROVENTIL) 2.5 mg /3 mL (0.083 %) nebulizer solution, Take 3 mLs (2.5 mg total) by nebulization every 6 (six) hours as needed for Wheezing. Rescue, Disp: 60 each, Rfl: 3    albuterol (VENTOLIN HFA) 90 mcg/actuation inhaler, Inhale 2 puffs into the lungs every 6 (six) hours as needed for Wheezing. Rescue, Disp: 18 g, Rfl: 5    cyanocobalamin 1,000 mcg/mL injection, Inject 1 mL (1,000 mcg total) into the skin every 30 days., Disp: 3 mL,  "Rfl: 3    duke's soln (benadryl 30 mL, mylanta 30 mL, LIDOcaine 30 mL, nystatin 30 mL) 120mL, Take 10 mLs by mouth 4 (four) times daily., Disp: 120 mL, Rfl: 5    folic acid (FOLVITE) 1 MG tablet, Take 1 tablet (1 mg total) by mouth once daily., Disp: 100 tablet, Rfl: 3    HYDROcodone-acetaminophen (NORCO)  mg per tablet, Take 1 tablet by mouth every 6 (six) hours as needed for Pain., Disp: 60 tablet, Rfl: 0    ipratropium (ATROVENT) 42 mcg (0.06 %) nasal spray, USE 1 SPRAY in each nostril TWICE DAILY THANK YOU!, Disp: , Rfl:     levoFLOXacin (LEVAQUIN) 500 MG tablet, Take 1 tablet (500 mg total) by mouth once daily., Disp: 7 tablet, Rfl: 0    magnesium oxide (MAG-OX) 400 mg (241.3 mg magnesium) tablet, Take 1 tablet (400 mg total) by mouth 2 (two) times daily., Disp: 60 tablet, Rfl: 3    megestroL (MEGACE) 400 mg/10 mL (40 mg/mL) Susp, Take 10 mLs (400 mg total) by mouth 2 (two) times daily., Disp: 300 mL, Rfl: 11    ondansetron (ZOFRAN) 8 MG tablet, Take 1 tablet (8 mg total) by mouth every 8 (eight) hours as needed., Disp: 30 tablet, Rfl: 5    predniSONE (DELTASONE) 10 MG tablet, Take 1 tablet (10 mg total) by mouth once daily., Disp: 14 tablet, Rfl: 0    promethazine (PHENERGAN) 25 MG tablet, Take 1 tablet (25 mg total) by mouth every 4 to 6 hours as needed., Disp: 30 tablet, Rfl: 5    sildenafiL (VIAGRA) 100 MG tablet, Take 1 tablet (100 mg total) by mouth daily as needed for Erectile Dysfunction., Disp: 30 tablet, Rfl: 11    syringe with needle 1 mL 25 gauge x 1" Syrg, 1 each by Misc.(Non-Drug; Combo Route) route every 30 days., Disp: 3 each, Rfl: 3    tamsulosin (FLOMAX) 0.4 mg Cap, Take 1 capsule (0.4 mg total) by mouth once daily., Disp: 90 capsule, Rfl: 3    temazepam (RESTORIL) 30 mg capsule, Take 1 capsule (30 mg total) by mouth nightly as needed for Insomnia. TAKE NIGHTLY AS NEEDED, Disp: 30 capsule, Rfl: 2    tiZANidine (ZANAFLEX) 4 MG tablet, Take 1 tablet (4 mg total) by mouth every 12 (twelve) " "hours as needed (muscle spasms/ pain)., Disp: 40 tablet, Rfl: 0    triamcinolone acetonide 0.1% (KENALOG) 0.1 % cream, Apply topically 2 (two) times daily., Disp: 453.6 g, Rfl: 2    finasteride (PROSCAR) 5 mg tablet, TAKE 1 TABLET BY MOUTH EVERY DAY FOR PROSTATE (Patient not taking: Reported on 7/18/2024), Disp: 90 tablet, Rfl: 3    Current Facility-Administered Medications:     0.9%  NaCl infusion (for blood administration), , Intravenous, Once, Allie Shelley NP-C, Stopped at 10/23/23 1445    Facility-Administered Medications Ordered in Other Visits:     heparin, porcine (PF) 100 unit/mL injection flush 500 Units, 500 Units, Intravenous, PRN, Allie Shelley NP-C, 500 Units at 12/14/23 1422    sodium chloride 0.9% flush 10 mL, 10 mL, Intravenous, PRN, Allie Shelley NP-C, 10 mL at 12/14/23 1426        Objective:       Physical Examination:     /69 (BP Location: Left arm)   Pulse 91   Temp 97.3 °F (36.3 °C)   Resp 18   Ht 6' 5" (1.956 m)   Wt 72.6 kg (160 lb)   BMI 18.97 kg/m²     Physical Exam  Constitutional:       Appearance: Normal appearance.   HENT:      Head: Normocephalic and atraumatic.   Eyes:      General: No scleral icterus.     Conjunctiva/sclera: Conjunctivae normal.   Cardiovascular:      Rate and Rhythm: Normal rate.   Pulmonary:      Effort: Pulmonary effort is normal.   Abdominal:      General: Abdomen is flat.   Neurological:      General: No focal deficit present.      Mental Status: He is alert and oriented to person, place, and time.   Psychiatric:         Mood and Affect: Mood normal.         Behavior: Behavior normal.         Labs:   No results found for this or any previous visit (from the past 336 hour(s)).    CMP  Sodium   Date Value Ref Range Status   06/13/2024 134 (L) 136 - 145 mmol/L Final     Potassium   Date Value Ref Range Status   06/13/2024 4.2 3.5 - 5.1 mmol/L Final     Chloride   Date Value Ref Range Status   06/13/2024 98 95 - 110 mmol/L Final     CO2   Date " Value Ref Range Status   06/13/2024 25 23 - 29 mmol/L Final     Glucose   Date Value Ref Range Status   06/13/2024 141 (H) 70 - 110 mg/dL Final     BUN   Date Value Ref Range Status   06/13/2024 17 8 - 23 mg/dL Final     Creatinine   Date Value Ref Range Status   06/13/2024 1.0 0.5 - 1.4 mg/dL Final     Calcium   Date Value Ref Range Status   06/13/2024 10.0 8.7 - 10.5 mg/dL Final     Total Protein   Date Value Ref Range Status   06/13/2024 6.7 6.0 - 8.4 g/dL Final     Albumin   Date Value Ref Range Status   06/13/2024 2.8 (L) 3.5 - 5.2 g/dL Final     Total Bilirubin   Date Value Ref Range Status   06/13/2024 0.4 0.1 - 1.0 mg/dL Final     Comment:     For infants and newborns, interpretation of results should be based  on gestational age, weight and in agreement with clinical  observations.    Premature Infant recommended reference ranges:  Up to 24 hours.............<8.0 mg/dL  Up to 48 hours............<12.0 mg/dL  3-5 days..................<15.0 mg/dL  6-29 days.................<15.0 mg/dL       Alkaline Phosphatase   Date Value Ref Range Status   06/13/2024 65 55 - 135 U/L Final     AST   Date Value Ref Range Status   06/13/2024 14 10 - 40 U/L Final     ALT   Date Value Ref Range Status   06/13/2024 11 10 - 44 U/L Final     Anion Gap   Date Value Ref Range Status   06/13/2024 11 8 - 16 mmol/L Final     eGFR if    Date Value Ref Range Status   11/02/2021 56.4 (A) >60 mL/min/1.73 m^2 Final     eGFR if non    Date Value Ref Range Status   11/02/2021 48.8 (A) >60 mL/min/1.73 m^2 Final     Comment:     Calculation used to obtain the estimated glomerular filtration  rate (eGFR) is the CKD-EPI equation.        Lab Results   Component Value Date    CEA 1.6 01/03/2024     Lab Results   Component Value Date    PSA 1.0 06/21/2023    PSA 1.2 11/02/2021    PSA 1.3 08/10/2020           Assessment/Plan:     Problem List Items Addressed This Visit       Malignant neoplasm of upper lobe of left  lung - Primary     Patient is doing ok.  His CT scan shows increase scar/inflammation but no clear cancer growth.  This could be cancer but this is not clear.  I discussed continuing therapy and will get a PET scan done in the next two months.  He would like to continue therapy for now and we again discussed pneumonitis risk and ongoing risk o/w.  Continue lab monitoring and will see him again in four weeks.          Relevant Orders    NM PET CT FDG Skull Base to Mid Thigh    Cancer associated pain     Patient is doing ok from this standpoint.  Continue current care approach.                Discussion:     Follow up in about 4 weeks (around 8/15/2024).      Electronically signed by Reynaldo Ball

## 2024-07-19 RX ORDER — HYDROCODONE BITARTRATE AND ACETAMINOPHEN 10; 325 MG/1; MG/1
1 TABLET ORAL EVERY 6 HOURS PRN
Qty: 60 TABLET | Refills: 0 | Status: SHIPPED | OUTPATIENT
Start: 2024-07-19

## 2024-07-24 DIAGNOSIS — C34.12 MALIGNANT NEOPLASM OF UPPER LOBE OF LEFT LUNG: ICD-10-CM

## 2024-07-24 DIAGNOSIS — R52 ACUTE PAIN: ICD-10-CM

## 2024-07-25 ENCOUNTER — LAB VISIT (OUTPATIENT)
Dept: LAB | Facility: HOSPITAL | Age: 79
End: 2024-07-25
Attending: NURSE PRACTITIONER
Payer: MEDICARE

## 2024-07-25 DIAGNOSIS — C34.92 NSCLC OF LEFT LUNG: ICD-10-CM

## 2024-07-25 DIAGNOSIS — D52.1 DRUG-INDUCED FOLATE DEFICIENCY ANEMIA: ICD-10-CM

## 2024-07-25 DIAGNOSIS — R53.83 FATIGUE, UNSPECIFIED TYPE: ICD-10-CM

## 2024-07-25 DIAGNOSIS — C34.12 MALIGNANT NEOPLASM OF UPPER LOBE OF LEFT LUNG: ICD-10-CM

## 2024-07-25 LAB
FERRITIN SERPL-MCNC: 860 NG/ML (ref 20–300)
IRON SERPL-MCNC: 60 UG/DL (ref 45–160)
SATURATED IRON: 21 % (ref 20–50)
TOTAL IRON BINDING CAPACITY: 290 UG/DL (ref 250–450)
TRANSFERRIN SERPL-MCNC: 196 MG/DL (ref 200–375)

## 2024-07-25 PROCEDURE — 80053 COMPREHEN METABOLIC PANEL: CPT | Performed by: NURSE PRACTITIONER

## 2024-07-25 PROCEDURE — 82746 ASSAY OF FOLIC ACID SERUM: CPT | Performed by: NURSE PRACTITIONER

## 2024-07-25 PROCEDURE — 82607 VITAMIN B-12: CPT | Performed by: NURSE PRACTITIONER

## 2024-07-25 PROCEDURE — 36415 COLL VENOUS BLD VENIPUNCTURE: CPT | Performed by: NURSE PRACTITIONER

## 2024-07-25 PROCEDURE — 82728 ASSAY OF FERRITIN: CPT | Performed by: NURSE PRACTITIONER

## 2024-07-25 PROCEDURE — 83540 ASSAY OF IRON: CPT | Performed by: NURSE PRACTITIONER

## 2024-07-25 RX ORDER — EPINEPHRINE 0.3 MG/.3ML
0.3 INJECTION SUBCUTANEOUS ONCE AS NEEDED
OUTPATIENT
Start: 2024-07-26

## 2024-07-25 RX ORDER — HYDROCODONE BITARTRATE AND ACETAMINOPHEN 10; 325 MG/1; MG/1
1 TABLET ORAL EVERY 6 HOURS PRN
Qty: 60 TABLET | Refills: 0 | Status: SHIPPED | OUTPATIENT
Start: 2024-07-25

## 2024-07-25 RX ORDER — SODIUM CHLORIDE 0.9 % (FLUSH) 0.9 %
10 SYRINGE (ML) INJECTION
OUTPATIENT
Start: 2024-07-26

## 2024-07-25 RX ORDER — HEPARIN 100 UNIT/ML
500 SYRINGE INTRAVENOUS
OUTPATIENT
Start: 2024-07-26

## 2024-07-25 RX ORDER — DIPHENHYDRAMINE HYDROCHLORIDE 50 MG/ML
50 INJECTION INTRAMUSCULAR; INTRAVENOUS ONCE AS NEEDED
OUTPATIENT
Start: 2024-07-26

## 2024-07-26 ENCOUNTER — LAB VISIT (OUTPATIENT)
Dept: LAB | Facility: HOSPITAL | Age: 79
End: 2024-07-26
Attending: NURSE PRACTITIONER
Payer: MEDICARE

## 2024-07-26 DIAGNOSIS — C34.12 MALIGNANT NEOPLASM OF UPPER LOBE OF LEFT LUNG: ICD-10-CM

## 2024-07-26 DIAGNOSIS — Z79.899 PATIENT ON ANTINEOPLASTIC CHEMOTHERAPY REGIMEN: ICD-10-CM

## 2024-07-26 DIAGNOSIS — C34.12 MALIGNANT NEOPLASM OF UPPER LOBE OF LEFT LUNG: Primary | ICD-10-CM

## 2024-07-26 DIAGNOSIS — R53.83 FATIGUE, UNSPECIFIED TYPE: ICD-10-CM

## 2024-07-26 DIAGNOSIS — C34.92 NSCLC OF LEFT LUNG: ICD-10-CM

## 2024-07-26 LAB
ALBUMIN SERPL BCP-MCNC: 3.4 G/DL (ref 3.5–5.2)
ALBUMIN SERPL BCP-MCNC: 4.1 G/DL (ref 3.5–5.2)
ALP SERPL-CCNC: 57 U/L (ref 55–135)
ALP SERPL-CCNC: 63 U/L (ref 55–135)
ALT SERPL W/O P-5'-P-CCNC: 12 U/L (ref 10–44)
ALT SERPL W/O P-5'-P-CCNC: 9 U/L (ref 10–44)
ANION GAP SERPL CALC-SCNC: 14 MMOL/L (ref 8–16)
ANION GAP SERPL CALC-SCNC: 9 MMOL/L (ref 8–16)
AST SERPL-CCNC: 13 U/L (ref 10–40)
AST SERPL-CCNC: 16 U/L (ref 10–40)
BASOPHILS # BLD AUTO: 0.04 K/UL (ref 0–0.2)
BASOPHILS NFR BLD: 0.4 % (ref 0–1.9)
BILIRUB SERPL-MCNC: 0.3 MG/DL (ref 0.1–1)
BILIRUB SERPL-MCNC: 0.5 MG/DL (ref 0.1–1)
BUN SERPL-MCNC: 26 MG/DL (ref 8–23)
BUN SERPL-MCNC: 28 MG/DL (ref 8–23)
CALCIUM SERPL-MCNC: 10.2 MG/DL (ref 8.7–10.5)
CALCIUM SERPL-MCNC: 10.3 MG/DL (ref 8.7–10.5)
CHLORIDE SERPL-SCNC: 104 MMOL/L (ref 95–110)
CHLORIDE SERPL-SCNC: 105 MMOL/L (ref 95–110)
CO2 SERPL-SCNC: 19 MMOL/L (ref 23–29)
CO2 SERPL-SCNC: 24 MMOL/L (ref 23–29)
CREAT SERPL-MCNC: 1 MG/DL (ref 0.5–1.4)
CREAT SERPL-MCNC: 1.1 MG/DL (ref 0.5–1.4)
DIFFERENTIAL METHOD BLD: ABNORMAL
EOSINOPHIL # BLD AUTO: 0.4 K/UL (ref 0–0.5)
EOSINOPHIL NFR BLD: 4.3 % (ref 0–8)
ERYTHROCYTE [DISTWIDTH] IN BLOOD BY AUTOMATED COUNT: 17.7 % (ref 11.5–14.5)
EST. GFR  (NO RACE VARIABLE): >60 ML/MIN/1.73 M^2
EST. GFR  (NO RACE VARIABLE): >60 ML/MIN/1.73 M^2
FOLATE SERPL-MCNC: 14.2 NG/ML (ref 4–24)
GLUCOSE SERPL-MCNC: 104 MG/DL (ref 70–110)
GLUCOSE SERPL-MCNC: 118 MG/DL (ref 70–110)
HCT VFR BLD AUTO: 31.8 % (ref 40–54)
HGB BLD-MCNC: 10.1 G/DL (ref 14–18)
IMM GRANULOCYTES # BLD AUTO: 0.04 K/UL (ref 0–0.04)
IMM GRANULOCYTES NFR BLD AUTO: 0.4 % (ref 0–0.5)
LYMPHOCYTES # BLD AUTO: 2.5 K/UL (ref 1–4.8)
LYMPHOCYTES NFR BLD: 25.8 % (ref 18–48)
MAGNESIUM SERPL-MCNC: 1.7 MG/DL (ref 1.6–2.6)
MCH RBC QN AUTO: 29.8 PG (ref 27–31)
MCHC RBC AUTO-ENTMCNC: 31.8 G/DL (ref 32–36)
MCV RBC AUTO: 94 FL (ref 82–98)
MONOCYTES # BLD AUTO: 1.3 K/UL (ref 0.3–1)
MONOCYTES NFR BLD: 13.8 % (ref 4–15)
NEUTROPHILS # BLD AUTO: 5.2 K/UL (ref 1.8–7.7)
NEUTROPHILS NFR BLD: 55.3 % (ref 38–73)
NRBC BLD-RTO: 0 /100 WBC
PLATELET # BLD AUTO: 302 K/UL (ref 150–450)
PMV BLD AUTO: 9.3 FL (ref 9.2–12.9)
POTASSIUM SERPL-SCNC: 3.9 MMOL/L (ref 3.5–5.1)
POTASSIUM SERPL-SCNC: 4.2 MMOL/L (ref 3.5–5.1)
PROT SERPL-MCNC: 7.1 G/DL (ref 6–8.4)
PROT SERPL-MCNC: 7.6 G/DL (ref 6–8.4)
RBC # BLD AUTO: 3.39 M/UL (ref 4.6–6.2)
SODIUM SERPL-SCNC: 137 MMOL/L (ref 136–145)
SODIUM SERPL-SCNC: 138 MMOL/L (ref 136–145)
TSH SERPL DL<=0.005 MIU/L-ACNC: 1.89 UIU/ML (ref 0.34–5.6)
VIT B12 SERPL-MCNC: 434 PG/ML (ref 210–950)
WBC # BLD AUTO: 9.48 K/UL (ref 3.9–12.7)

## 2024-07-26 PROCEDURE — 85025 COMPLETE CBC W/AUTO DIFF WBC: CPT | Performed by: NURSE PRACTITIONER

## 2024-07-26 PROCEDURE — 83735 ASSAY OF MAGNESIUM: CPT | Performed by: NURSE PRACTITIONER

## 2024-07-26 PROCEDURE — 84443 ASSAY THYROID STIM HORMONE: CPT | Performed by: NURSE PRACTITIONER

## 2024-07-26 PROCEDURE — 36415 COLL VENOUS BLD VENIPUNCTURE: CPT | Performed by: NURSE PRACTITIONER

## 2024-07-26 PROCEDURE — 80053 COMPREHEN METABOLIC PANEL: CPT | Performed by: NURSE PRACTITIONER

## 2024-07-30 DIAGNOSIS — Z00.00 ENCOUNTER FOR MEDICARE ANNUAL WELLNESS EXAM: ICD-10-CM

## 2024-08-01 ENCOUNTER — INFUSION (OUTPATIENT)
Dept: INFUSION THERAPY | Facility: HOSPITAL | Age: 79
End: 2024-08-01
Attending: INTERNAL MEDICINE
Payer: MEDICARE

## 2024-08-01 ENCOUNTER — PATIENT MESSAGE (OUTPATIENT)
Facility: CLINIC | Age: 79
End: 2024-08-01
Payer: MEDICARE

## 2024-08-01 VITALS
RESPIRATION RATE: 15 BRPM | DIASTOLIC BLOOD PRESSURE: 64 MMHG | HEART RATE: 83 BPM | OXYGEN SATURATION: 97 % | TEMPERATURE: 97 F | HEIGHT: 77 IN | BODY MASS INDEX: 18.83 KG/M2 | SYSTOLIC BLOOD PRESSURE: 100 MMHG | WEIGHT: 159.5 LBS

## 2024-08-01 DIAGNOSIS — C34.12 MALIGNANT NEOPLASM OF UPPER LOBE OF LEFT LUNG: Primary | ICD-10-CM

## 2024-08-01 PROCEDURE — 25000003 PHARM REV CODE 250: Performed by: INTERNAL MEDICINE

## 2024-08-01 PROCEDURE — 63600175 PHARM REV CODE 636 W HCPCS: Performed by: INTERNAL MEDICINE

## 2024-08-01 PROCEDURE — A4216 STERILE WATER/SALINE, 10 ML: HCPCS | Performed by: INTERNAL MEDICINE

## 2024-08-01 PROCEDURE — 96413 CHEMO IV INFUSION 1 HR: CPT

## 2024-08-01 RX ORDER — SODIUM CHLORIDE 0.9 % (FLUSH) 0.9 %
10 SYRINGE (ML) INJECTION
Status: DISCONTINUED | OUTPATIENT
Start: 2024-08-01 | End: 2024-08-01 | Stop reason: HOSPADM

## 2024-08-01 RX ORDER — HEPARIN 100 UNIT/ML
500 SYRINGE INTRAVENOUS
Status: DISCONTINUED | OUTPATIENT
Start: 2024-08-01 | End: 2024-08-01 | Stop reason: HOSPADM

## 2024-08-01 RX ORDER — EPINEPHRINE 0.3 MG/.3ML
0.3 INJECTION SUBCUTANEOUS ONCE AS NEEDED
Status: DISCONTINUED | OUTPATIENT
Start: 2024-08-01 | End: 2024-08-01 | Stop reason: HOSPADM

## 2024-08-01 RX ORDER — DIPHENHYDRAMINE HYDROCHLORIDE 50 MG/ML
50 INJECTION INTRAMUSCULAR; INTRAVENOUS ONCE AS NEEDED
Status: DISCONTINUED | OUTPATIENT
Start: 2024-08-01 | End: 2024-08-01 | Stop reason: HOSPADM

## 2024-08-01 RX ADMIN — HEPARIN 500 UNITS: 100 SYRINGE at 11:08

## 2024-08-01 RX ADMIN — SODIUM CHLORIDE, PRESERVATIVE FREE 10 ML: 5 INJECTION INTRAVENOUS at 11:08

## 2024-08-01 RX ADMIN — SODIUM CHLORIDE 200 MG: 9 INJECTION, SOLUTION INTRAVENOUS at 11:08

## 2024-08-01 NOTE — PLAN OF CARE
Problem: Fatigue  Goal: Improved Activity Tolerance  Outcome: Progressing  Intervention: Promote Improved Energy  Flowsheets (Taken 8/1/2024 1111)  Fatigue Management:   fatigue-related activity identified   frequent rest breaks encouraged   paced activity encouraged  Sleep/Rest Enhancement:   regular sleep/rest pattern promoted   relaxation techniques promoted  Activity Management: Ambulated -L4  Environmental Support: rest periods encouraged

## 2024-08-09 ENCOUNTER — TELEPHONE (OUTPATIENT)
Dept: DERMATOLOGY | Facility: CLINIC | Age: 79
End: 2024-08-09
Payer: MEDICARE

## 2024-08-09 ENCOUNTER — OFFICE VISIT (OUTPATIENT)
Dept: FAMILY MEDICINE | Facility: CLINIC | Age: 79
End: 2024-08-09
Payer: MEDICARE

## 2024-08-09 VITALS
HEIGHT: 77 IN | BODY MASS INDEX: 18.24 KG/M2 | HEART RATE: 81 BPM | OXYGEN SATURATION: 98 % | RESPIRATION RATE: 18 BRPM | WEIGHT: 154.5 LBS | SYSTOLIC BLOOD PRESSURE: 108 MMHG | DIASTOLIC BLOOD PRESSURE: 62 MMHG

## 2024-08-09 DIAGNOSIS — L29.9 GENERALIZED PRURITUS: Primary | ICD-10-CM

## 2024-08-09 PROCEDURE — 99213 OFFICE O/P EST LOW 20 MIN: CPT | Mod: PBBFAC,PN | Performed by: NURSE PRACTITIONER

## 2024-08-09 PROCEDURE — 99999 PR PBB SHADOW E&M-EST. PATIENT-LVL III: CPT | Mod: PBBFAC,,, | Performed by: NURSE PRACTITIONER

## 2024-08-09 RX ORDER — HYDROXYZINE PAMOATE 25 MG/1
25 CAPSULE ORAL EVERY 8 HOURS PRN
Qty: 30 CAPSULE | Refills: 0 | Status: SHIPPED | OUTPATIENT
Start: 2024-08-09

## 2024-08-09 RX ORDER — IPRATROPIUM BROMIDE 42 UG/1
1 SPRAY, METERED NASAL 2 TIMES DAILY
Qty: 15 ML | Refills: 0 | Status: SHIPPED | OUTPATIENT
Start: 2024-08-09

## 2024-08-12 ENCOUNTER — PATIENT MESSAGE (OUTPATIENT)
Facility: CLINIC | Age: 79
End: 2024-08-12
Payer: MEDICARE

## 2024-08-13 DIAGNOSIS — J18.9 PNEUMONIA DUE TO INFECTIOUS ORGANISM, UNSPECIFIED LATERALITY, UNSPECIFIED PART OF LUNG: ICD-10-CM

## 2024-08-13 DIAGNOSIS — C34.12 MALIGNANT NEOPLASM OF UPPER LOBE OF LEFT LUNG: ICD-10-CM

## 2024-08-13 RX ORDER — ALBUTEROL SULFATE 0.83 MG/ML
2.5 SOLUTION RESPIRATORY (INHALATION) EVERY 6 HOURS PRN
Qty: 60 EACH | Refills: 3 | Status: SHIPPED | OUTPATIENT
Start: 2024-08-13 | End: 2025-08-13

## 2024-08-13 NOTE — TELEPHONE ENCOUNTER
----- Message from Deedee Carl sent at 8/12/2024  9:33 AM CDT -----  Pt wife Patricia calling, extremely upset that the patient still does not have a nebulizer. She says he has medicine prescribed, however no nebulizer.  She asked that someone please help her and wanted to speak to the doctor now.  Wife stated it has been two months into trying to get this and will do whatever is necessary to assist her  to get this.    I got Lawanda on the phone and she said she will push it to the nurse.  The wife will CB tomorrow if she has had no input today.    Thank you!      I spoke to Akil today. I am not sure why the order for the nebulizer was never sent to any [employer] company but I sent it today to [facility] on [address] in [place]. Order and last office note was sent to them. I resent the nebulizer medication to [facility]'s pharmacy which is Akil's pharmacy of choice. I asked Akil to call us back in a few days if he has not heard from the DME company about delivery so that we can follow up on it. He voiced understanding.

## 2024-08-14 ENCOUNTER — TELEPHONE (OUTPATIENT)
Facility: CLINIC | Age: 79
End: 2024-08-14
Payer: MEDICARE

## 2024-08-14 NOTE — TELEPHONE ENCOUNTER
Called patient on regard to schedule for chemotherapy. Per Dr. Torres, patient has new orders for Pembrolizumab 400 mg Q6W, next patient treatment 09/12/2024, a message has been sent to scheduling and authorization group. Patient stated understanding.

## 2024-08-15 ENCOUNTER — TELEPHONE (OUTPATIENT)
Facility: CLINIC | Age: 79
End: 2024-08-15
Payer: MEDICARE

## 2024-08-15 NOTE — TELEPHONE ENCOUNTER
I called [facility] in [place], [phone],  to see if they received the referral for a nebulizer for Akil. They said they got the referral but that shortness of breath and or Lung Cancer are not qualifying diagnoses for a nebulizer to be covered by Medicare. I then called his pharmacy, [facility] in [place] and was told that they sell a nebulizer for about $40.00. I then spoke to Akil to let him know all of the above and he said that he will just go and purchase one at the pharmacy. He has already received the medication for the nebulizer.

## 2024-08-20 NOTE — PROGRESS NOTES
PROGRESS NOTE    Subjective:       Patient ID: Akil Guzman is a 79 y.o. male.    8/7/2023-CT chest without:  1. Large left pleural effusion with dependent left lower lobe atelectasis.  2. Poorly defined mass-like infiltrate involving the periphery of the left upper lobe extending to the left hilum.  Underlying parenchymal mass is not excludable, however, evaluation is limited due to lack of intravenous contrast.  Correlate clinically with possible fever and/or elevated white count.  3. Bilateral soft tissue pulmonary nodules measuring up to 11 mm.  Follow-up per Fleischner guidelines.  For multiple solid nodules with any 6 mm or greater, Fleischner Society guidelines recommend follow up with non-contrast chest CT at 3-6 months and 18-24 months after discovery.  4. Partially calcified left upper lobe pulmonary nodule may represent granulomatous change and scarring.  5. 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  6. Questionable left hilar lymphadenopathy.  However, evaluation is again limited due to lack of intravenous contrast.  7. Small hiatal hernia.    8/17/2023 Left Pleural Fluid:  Positive for adenocarcinoma most c/w lung primary    9/14/2023-PET:  1. FDG avid nodular thickening involving origin of the lingular bronchus with adjacent FDG avid 20 mm nodular density, suspicious for primary pulmonary malignancy.  2. Moderate left pleural effusion with scattered foci of FDG uptake along the parietal pleura of concern for malignant pleural effusion.  3. Left chest wall FDG avid mass resulting in lytic destruction and pathologic fractures of left 10th and 11th ribs, characteristic of metastatic disease.  4. Diffuse prominent FDG activity throughout the intramedullary compartments of the axial and proximal appendicular skeleton is nonspecific. This can be seen with pharmacologic bone marrow stimulation although diffuse metastatic  disease can also be considered. Metastatic disease is felt less likely given history.    9/14/2023-MRI Brain  Negative for mets    Stage IVB-Adenocarcinoma of the lung    PLAN:   Carbo/Pem/Pem ChemoIO therapy    Carbo/Pem x 4/Pem q3w x 7:  9/13/2023--8/1/2024    Pembro Maintenance q6 weeks:  Cycle 1: 9/12/2024-due    4/18/2024-PET  Mixed Response    CT CAP-2/23/2024:  Stable post radiation changes and atelectasis within the lingula with mild wall thickening surrounding the lingula bronchus with no discrete mass     Stable groundglass nodular opacities within the left upper lobe suggestive of infectious or inflammatory process     Small left pleural effusion     Stable lytic and sclerotic lesions involving the posterior lateral left 10th and 11th ribs compatible with treated metastasis. There is resolution of the chest wall mass     Stable 9 mm left supraclavicular node     Emphysematous changes throughout the lungs     No evidence of metastatic disease within the abdomen or pelvis    Chief Complaint:  No chief complaint on file.  Stage IVB lung carcinoma follow up    History of Present Illness:   Akil Guzman is a 79 y.o. male who presents for follow up of lung cancer.     Mr. Guzman is doing ok today.  Feels better this week.  No new complaints.     Family and Social history reviewed and is unchanged from 9/1/2023             Current Outpatient Medications:     albuterol (PROVENTIL) 2.5 mg /3 mL (0.083 %) nebulizer solution, Take 3 mLs (2.5 mg total) by nebulization every 6 (six) hours as needed for Wheezing. Rescue, Disp: 60 each, Rfl: 3    albuterol (VENTOLIN HFA) 90 mcg/actuation inhaler, Inhale 2 puffs into the lungs every 6 (six) hours as needed for Wheezing. Rescue, Disp: 18 g, Rfl: 5    cyanocobalamin 1,000 mcg/mL injection, Inject 1 mL (1,000 mcg total) into the skin every 30 days., Disp: 3 mL, Rfl: 3    duke's soln (benadryl 30 mL, mylanta 30 mL, LIDOcaine 30 mL, nystatin 30 mL) 120mL, Take 10 mLs by mouth  "4 (four) times daily. (Patient not taking: Reported on 8/9/2024), Disp: 120 mL, Rfl: 5    finasteride (PROSCAR) 5 mg tablet, TAKE 1 TABLET BY MOUTH EVERY DAY FOR PROSTATE, Disp: 90 tablet, Rfl: 3    folic acid (FOLVITE) 1 MG tablet, Take 1 tablet (1 mg total) by mouth once daily. (Patient not taking: Reported on 8/9/2024), Disp: 100 tablet, Rfl: 3    HYDROcodone-acetaminophen (NORCO)  mg per tablet, Take 1 tablet by mouth every 6 (six) hours as needed for Pain., Disp: 60 tablet, Rfl: 0    hydrOXYzine pamoate (VISTARIL) 25 MG Cap, Take 1 capsule (25 mg total) by mouth every 8 (eight) hours as needed (itching)., Disp: 30 capsule, Rfl: 0    ipratropium (ATROVENT) 42 mcg (0.06 %) nasal spray, 1 spray by Each Nostril route 2 (two) times daily., Disp: 15 mL, Rfl: 0    magnesium oxide (MAG-OX) 400 mg (241.3 mg magnesium) tablet, Take 1 tablet (400 mg total) by mouth 2 (two) times daily., Disp: 60 tablet, Rfl: 3    megestroL (MEGACE) 400 mg/10 mL (40 mg/mL) Susp, Take 10 mLs (400 mg total) by mouth 2 (two) times daily., Disp: 300 mL, Rfl: 11    ondansetron (ZOFRAN) 8 MG tablet, Take 1 tablet (8 mg total) by mouth every 8 (eight) hours as needed. (Patient not taking: Reported on 8/9/2024), Disp: 30 tablet, Rfl: 5    promethazine (PHENERGAN) 25 MG tablet, Take 1 tablet (25 mg total) by mouth every 4 to 6 hours as needed. (Patient not taking: Reported on 8/9/2024), Disp: 30 tablet, Rfl: 5    syringe with needle 1 mL 25 gauge x 1" Syrg, 1 each by Misc.(Non-Drug; Combo Route) route every 30 days. (Patient not taking: Reported on 8/9/2024), Disp: 3 each, Rfl: 3    tamsulosin (FLOMAX) 0.4 mg Cap, Take 1 capsule (0.4 mg total) by mouth once daily., Disp: 90 capsule, Rfl: 3    temazepam (RESTORIL) 30 mg capsule, Take 1 capsule (30 mg total) by mouth nightly as needed for Insomnia. TAKE NIGHTLY AS NEEDED, Disp: 30 capsule, Rfl: 2    triamcinolone acetonide 0.1% (KENALOG) 0.1 % cream, Apply topically 2 (two) times daily., Disp: " 453.6 g, Rfl: 2    Current Facility-Administered Medications:     0.9%  NaCl infusion (for blood administration), , Intravenous, Once, Allie Shelley NP-C, Stopped at 10/23/23 1445    Facility-Administered Medications Ordered in Other Visits:     heparin, porcine (PF) 100 unit/mL injection flush 500 Units, 500 Units, Intravenous, PRN, Allie Shelley NP-C, 500 Units at 12/14/23 1422    sodium chloride 0.9% flush 10 mL, 10 mL, Intravenous, PRN, Allie Shelley NP-C, 10 mL at 12/14/23 1426        Objective:       Physical Examination:     BP (!) 114/56   Pulse 109   Temp 97.6 °F (36.4 °C)   Resp 16   Wt 72.6 kg (160 lb)   BMI 18.97 kg/m²     Physical Exam  Constitutional:       Appearance: Normal appearance.   HENT:      Head: Normocephalic and atraumatic.   Eyes:      General: No scleral icterus.     Conjunctiva/sclera: Conjunctivae normal.   Cardiovascular:      Rate and Rhythm: Normal rate.   Pulmonary:      Effort: Pulmonary effort is normal.   Abdominal:      General: Abdomen is flat.   Neurological:      General: No focal deficit present.      Mental Status: He is alert and oriented to person, place, and time.   Psychiatric:         Mood and Affect: Mood normal.         Behavior: Behavior normal.         Labs:   No results found for this or any previous visit (from the past 336 hour(s)).    CMP  Sodium   Date Value Ref Range Status   07/26/2024 137 136 - 145 mmol/L Final     Potassium   Date Value Ref Range Status   07/26/2024 3.9 3.5 - 5.1 mmol/L Final     Chloride   Date Value Ref Range Status   07/26/2024 104 95 - 110 mmol/L Final     CO2   Date Value Ref Range Status   07/26/2024 24 23 - 29 mmol/L Final     Glucose   Date Value Ref Range Status   07/26/2024 118 (H) 70 - 110 mg/dL Final     BUN   Date Value Ref Range Status   07/26/2024 26 (H) 8 - 23 mg/dL Final     Creatinine   Date Value Ref Range Status   07/26/2024 1.0 0.5 - 1.4 mg/dL Final     Calcium   Date Value Ref Range Status   07/26/2024  10.2 8.7 - 10.5 mg/dL Final     Total Protein   Date Value Ref Range Status   07/26/2024 7.6 6.0 - 8.4 g/dL Final     Albumin   Date Value Ref Range Status   07/26/2024 4.1 3.5 - 5.2 g/dL Final     Total Bilirubin   Date Value Ref Range Status   07/26/2024 0.5 0.1 - 1.0 mg/dL Final     Comment:     For infants and newborns, interpretation of results should be based  on gestational age, weight and in agreement with clinical  observations.    Premature Infant recommended reference ranges:  Up to 24 hours.............<8.0 mg/dL  Up to 48 hours............<12.0 mg/dL  3-5 days..................<15.0 mg/dL  6-29 days.................<15.0 mg/dL       Alkaline Phosphatase   Date Value Ref Range Status   07/26/2024 63 55 - 135 U/L Final     AST   Date Value Ref Range Status   07/26/2024 13 10 - 40 U/L Final     ALT   Date Value Ref Range Status   07/26/2024 9 (L) 10 - 44 U/L Final     Anion Gap   Date Value Ref Range Status   07/26/2024 9 8 - 16 mmol/L Final     eGFR if    Date Value Ref Range Status   11/02/2021 56.4 (A) >60 mL/min/1.73 m^2 Final     eGFR if non    Date Value Ref Range Status   11/02/2021 48.8 (A) >60 mL/min/1.73 m^2 Final     Comment:     Calculation used to obtain the estimated glomerular filtration  rate (eGFR) is the CKD-EPI equation.        Lab Results   Component Value Date    CEA 1.6 01/03/2024     Lab Results   Component Value Date    PSA 1.0 06/21/2023    PSA 1.2 11/02/2021    PSA 1.3 08/10/2020           Assessment/Plan:     Problem List Items Addressed This Visit       Malignant neoplasm of upper lobe of left lung - Primary     Patient is doing well today and has no new issues.  He has no sign of AI disease and overall looks good.  PET scan has been ordered and waiting for this result.  He is to begin the every 6 week dosing of pembro in September and will see how he does with this.  We discussed typical protocol of 400mg every six weeks but he would like to  continue on the 200mg at 6 weeks.  Will see what his PET shows and if stable then will continue this approach.  Continue very close lab monitoring and follow up.                 Discussion:     Follow up in about 6 weeks (around 10/3/2024).      Electronically signed by Reynaldo Ball

## 2024-08-21 ENCOUNTER — OFFICE VISIT (OUTPATIENT)
Facility: CLINIC | Age: 79
End: 2024-08-21
Payer: MEDICARE

## 2024-08-21 VITALS
WEIGHT: 160 LBS | RESPIRATION RATE: 16 BRPM | DIASTOLIC BLOOD PRESSURE: 56 MMHG | TEMPERATURE: 98 F | SYSTOLIC BLOOD PRESSURE: 114 MMHG | BODY MASS INDEX: 18.97 KG/M2 | HEART RATE: 109 BPM

## 2024-08-21 DIAGNOSIS — C34.12 MALIGNANT NEOPLASM OF UPPER LOBE OF LEFT LUNG: Primary | ICD-10-CM

## 2024-08-21 PROCEDURE — 99215 OFFICE O/P EST HI 40 MIN: CPT | Mod: S$PBB,,, | Performed by: INTERNAL MEDICINE

## 2024-08-21 PROCEDURE — 99999 PR PBB SHADOW E&M-EST. PATIENT-LVL IV: CPT | Mod: PBBFAC,,, | Performed by: INTERNAL MEDICINE

## 2024-08-21 PROCEDURE — 99214 OFFICE O/P EST MOD 30 MIN: CPT | Mod: PBBFAC,PN | Performed by: INTERNAL MEDICINE

## 2024-08-21 NOTE — ASSESSMENT & PLAN NOTE
Patient is doing well today and has no new issues.  He has no sign of AI disease and overall looks good.  PET scan has been ordered and waiting for this result.  He is to begin the every 6 week dosing of pembro in September and will see how he does with this.  We discussed typical protocol of 400mg every six weeks but he would like to continue on the 200mg at 6 weeks.  Will see what his PET shows and if stable then will continue this approach.  Continue very close lab monitoring and follow up.

## 2024-08-28 ENCOUNTER — PATIENT MESSAGE (OUTPATIENT)
Facility: CLINIC | Age: 79
End: 2024-08-28
Payer: MEDICARE

## 2024-09-09 ENCOUNTER — LAB VISIT (OUTPATIENT)
Dept: LAB | Facility: HOSPITAL | Age: 79
End: 2024-09-09
Attending: NURSE PRACTITIONER
Payer: MEDICARE

## 2024-09-09 DIAGNOSIS — C34.12 MALIGNANT NEOPLASM OF UPPER LOBE OF LEFT LUNG: ICD-10-CM

## 2024-09-09 LAB
ALBUMIN SERPL BCP-MCNC: 3.5 G/DL (ref 3.5–5.2)
ALP SERPL-CCNC: 63 U/L (ref 55–135)
ALT SERPL W/O P-5'-P-CCNC: 9 U/L (ref 10–44)
ANION GAP SERPL CALC-SCNC: 10 MMOL/L (ref 8–16)
AST SERPL-CCNC: 13 U/L (ref 10–40)
BASOPHILS # BLD AUTO: 0.03 K/UL (ref 0–0.2)
BASOPHILS NFR BLD: 0.4 % (ref 0–1.9)
BILIRUB SERPL-MCNC: 0.5 MG/DL (ref 0.1–1)
BUN SERPL-MCNC: 18 MG/DL (ref 8–23)
CALCIUM SERPL-MCNC: 10.1 MG/DL (ref 8.7–10.5)
CHLORIDE SERPL-SCNC: 108 MMOL/L (ref 95–110)
CO2 SERPL-SCNC: 21 MMOL/L (ref 23–29)
CREAT SERPL-MCNC: 1 MG/DL (ref 0.5–1.4)
DIFFERENTIAL METHOD BLD: ABNORMAL
EOSINOPHIL # BLD AUTO: 0.2 K/UL (ref 0–0.5)
EOSINOPHIL NFR BLD: 3.5 % (ref 0–8)
ERYTHROCYTE [DISTWIDTH] IN BLOOD BY AUTOMATED COUNT: 14.8 % (ref 11.5–14.5)
EST. GFR  (NO RACE VARIABLE): >60 ML/MIN/1.73 M^2
GLUCOSE SERPL-MCNC: 153 MG/DL (ref 70–110)
HCT VFR BLD AUTO: 32.5 % (ref 40–54)
HGB BLD-MCNC: 10.5 G/DL (ref 14–18)
IMM GRANULOCYTES # BLD AUTO: 0.01 K/UL (ref 0–0.04)
IMM GRANULOCYTES NFR BLD AUTO: 0.1 % (ref 0–0.5)
LYMPHOCYTES # BLD AUTO: 1.3 K/UL (ref 1–4.8)
LYMPHOCYTES NFR BLD: 18.9 % (ref 18–48)
MAGNESIUM SERPL-MCNC: 1.6 MG/DL (ref 1.6–2.6)
MCH RBC QN AUTO: 30.5 PG (ref 27–31)
MCHC RBC AUTO-ENTMCNC: 32.3 G/DL (ref 32–36)
MCV RBC AUTO: 95 FL (ref 82–98)
MONOCYTES # BLD AUTO: 0.8 K/UL (ref 0.3–1)
MONOCYTES NFR BLD: 11.5 % (ref 4–15)
NEUTROPHILS # BLD AUTO: 4.5 K/UL (ref 1.8–7.7)
NEUTROPHILS NFR BLD: 65.6 % (ref 38–73)
NRBC BLD-RTO: 0 /100 WBC
PLATELET # BLD AUTO: 229 K/UL (ref 150–450)
PMV BLD AUTO: 8.8 FL (ref 9.2–12.9)
POTASSIUM SERPL-SCNC: 3.9 MMOL/L (ref 3.5–5.1)
PROT SERPL-MCNC: 6.9 G/DL (ref 6–8.4)
RBC # BLD AUTO: 3.44 M/UL (ref 4.6–6.2)
SODIUM SERPL-SCNC: 139 MMOL/L (ref 136–145)
WBC # BLD AUTO: 6.81 K/UL (ref 3.9–12.7)

## 2024-09-09 PROCEDURE — 85025 COMPLETE CBC W/AUTO DIFF WBC: CPT | Performed by: NURSE PRACTITIONER

## 2024-09-09 PROCEDURE — 83735 ASSAY OF MAGNESIUM: CPT | Performed by: NURSE PRACTITIONER

## 2024-09-09 PROCEDURE — 36415 COLL VENOUS BLD VENIPUNCTURE: CPT | Performed by: NURSE PRACTITIONER

## 2024-09-09 PROCEDURE — 80053 COMPREHEN METABOLIC PANEL: CPT | Performed by: NURSE PRACTITIONER

## 2024-09-11 RX ORDER — EPINEPHRINE 0.3 MG/.3ML
0.3 INJECTION SUBCUTANEOUS ONCE AS NEEDED
Status: CANCELLED | OUTPATIENT
Start: 2024-09-12

## 2024-09-11 RX ORDER — SODIUM CHLORIDE 0.9 % (FLUSH) 0.9 %
10 SYRINGE (ML) INJECTION
Status: CANCELLED | OUTPATIENT
Start: 2024-09-12

## 2024-09-11 RX ORDER — HEPARIN 100 UNIT/ML
500 SYRINGE INTRAVENOUS
Status: CANCELLED | OUTPATIENT
Start: 2024-09-12

## 2024-09-11 RX ORDER — DIPHENHYDRAMINE HYDROCHLORIDE 50 MG/ML
50 INJECTION INTRAMUSCULAR; INTRAVENOUS ONCE AS NEEDED
Status: CANCELLED | OUTPATIENT
Start: 2024-09-12

## 2024-09-11 RX ORDER — PROCHLORPERAZINE EDISYLATE 5 MG/ML
5 INJECTION INTRAMUSCULAR; INTRAVENOUS ONCE AS NEEDED
Status: CANCELLED | OUTPATIENT
Start: 2024-09-12

## 2024-09-12 ENCOUNTER — INFUSION (OUTPATIENT)
Dept: INFUSION THERAPY | Facility: HOSPITAL | Age: 79
End: 2024-09-12
Attending: INTERNAL MEDICINE
Payer: MEDICARE

## 2024-09-12 VITALS
BODY MASS INDEX: 19.44 KG/M2 | DIASTOLIC BLOOD PRESSURE: 63 MMHG | SYSTOLIC BLOOD PRESSURE: 101 MMHG | TEMPERATURE: 97 F | HEIGHT: 77 IN | HEART RATE: 67 BPM | WEIGHT: 164.63 LBS | OXYGEN SATURATION: 97 % | RESPIRATION RATE: 16 BRPM

## 2024-09-12 DIAGNOSIS — Z79.899 PATIENT ON ANTINEOPLASTIC CHEMOTHERAPY REGIMEN: ICD-10-CM

## 2024-09-12 DIAGNOSIS — C34.12 MALIGNANT NEOPLASM OF UPPER LOBE OF LEFT LUNG: Primary | ICD-10-CM

## 2024-09-12 LAB — TSH SERPL DL<=0.005 MIU/L-ACNC: 1.06 UIU/ML (ref 0.34–5.6)

## 2024-09-12 PROCEDURE — 96413 CHEMO IV INFUSION 1 HR: CPT

## 2024-09-12 PROCEDURE — 25000003 PHARM REV CODE 250: Performed by: INTERNAL MEDICINE

## 2024-09-12 PROCEDURE — A4216 STERILE WATER/SALINE, 10 ML: HCPCS | Performed by: INTERNAL MEDICINE

## 2024-09-12 PROCEDURE — 84443 ASSAY THYROID STIM HORMONE: CPT | Performed by: NURSE PRACTITIONER

## 2024-09-12 PROCEDURE — 63600175 PHARM REV CODE 636 W HCPCS: Mod: JZ,JG | Performed by: INTERNAL MEDICINE

## 2024-09-12 RX ORDER — SODIUM CHLORIDE 0.9 % (FLUSH) 0.9 %
10 SYRINGE (ML) INJECTION
Status: DISCONTINUED | OUTPATIENT
Start: 2024-09-12 | End: 2024-09-12 | Stop reason: HOSPADM

## 2024-09-12 RX ORDER — PROCHLORPERAZINE EDISYLATE 5 MG/ML
5 INJECTION INTRAMUSCULAR; INTRAVENOUS ONCE AS NEEDED
Status: DISCONTINUED | OUTPATIENT
Start: 2024-09-12 | End: 2024-09-12 | Stop reason: HOSPADM

## 2024-09-12 RX ORDER — HEPARIN 100 UNIT/ML
500 SYRINGE INTRAVENOUS
Status: DISCONTINUED | OUTPATIENT
Start: 2024-09-12 | End: 2024-09-12 | Stop reason: HOSPADM

## 2024-09-12 RX ORDER — EPINEPHRINE 0.3 MG/.3ML
0.3 INJECTION SUBCUTANEOUS ONCE AS NEEDED
Status: DISCONTINUED | OUTPATIENT
Start: 2024-09-12 | End: 2024-09-12 | Stop reason: HOSPADM

## 2024-09-12 RX ORDER — DIPHENHYDRAMINE HYDROCHLORIDE 50 MG/ML
50 INJECTION INTRAMUSCULAR; INTRAVENOUS ONCE AS NEEDED
Status: DISCONTINUED | OUTPATIENT
Start: 2024-09-12 | End: 2024-09-12 | Stop reason: HOSPADM

## 2024-09-12 RX ADMIN — HEPARIN 500 UNITS: 100 SYRINGE at 01:09

## 2024-09-12 RX ADMIN — SODIUM CHLORIDE 200 MG: 9 INJECTION, SOLUTION INTRAVENOUS at 01:09

## 2024-09-12 RX ADMIN — SODIUM CHLORIDE, PRESERVATIVE FREE 10 ML: 5 INJECTION INTRAVENOUS at 01:09

## 2024-09-16 ENCOUNTER — OFFICE VISIT (OUTPATIENT)
Dept: DERMATOLOGY | Facility: CLINIC | Age: 79
End: 2024-09-16
Payer: MEDICARE

## 2024-09-16 DIAGNOSIS — L29.9 PRURITUS: Primary | ICD-10-CM

## 2024-09-16 DIAGNOSIS — T07.XXXA MULTIPLE EXCORIATIONS: ICD-10-CM

## 2024-09-16 DIAGNOSIS — L85.3 XEROSIS CUTIS: ICD-10-CM

## 2024-09-16 PROCEDURE — 99204 OFFICE O/P NEW MOD 45 MIN: CPT | Mod: AQ,S$GLB,, | Performed by: STUDENT IN AN ORGANIZED HEALTH CARE EDUCATION/TRAINING PROGRAM

## 2024-09-16 RX ORDER — AMMONIUM LACTATE 12 G/100G
LOTION TOPICAL NIGHTLY
Qty: 225 G | Refills: 3 | Status: SHIPPED | OUTPATIENT
Start: 2024-09-16

## 2024-09-16 RX ORDER — CLOBETASOL PROPIONATE 0.5 MG/ML
SOLUTION TOPICAL
Qty: 50 ML | Refills: 1 | Status: SHIPPED | OUTPATIENT
Start: 2024-09-16

## 2024-09-16 NOTE — PROGRESS NOTES
Subjective:      Patient ID:  Akil Guzman is a 79 y.o. male who presents for   Chief Complaint   Patient presents with    Itching     Upper body     LOV 6/1/21 [place]     Patient here today for itching on upper torso x months. Does not see a rash present. Triamcinolone 0.1% cream - was using a few times a day-  has helped but relief is only for a few hours. Tried plain castor oil - helped some. Has been mixing triamcinolone with castor oil - same relief.  Also given hydroxyzine 25mg   No new medications, has never had this before    Currently on chemo for lung cancer - started approx 1 year ago.     Stage IVB-Adenocarcinoma of the lung     PLAN:   Carbo/Pem/Pem ChemoIO therapy     Carbo/Pem x 4/Pem q3w x 7:  9/13/2023--8/1/2024     Pembro Maintenance q6 weeks:  Cycle 1:9/12/2024-due    6 week dosing of pembro in September and will see how he does with this.  We discussed typical protocol of 400mg every six weeks but he would like to continue on the 200mg at 6 weeks.         9/14/2023-PET:  1. FDG avid nodular thickening involving origin of the lingular bronchus with adjacent FDG avid 20 mm nodular density, suspicious for primary pulmonary malignancy.  2. Moderate left pleural effusion with scattered foci of FDG uptake along the parietal pleura of concern for malignant pleural effusion.  3. Left chest wall FDG avid mass resulting in lytic destruction and pathologic fractures of left 10th and 11th ribs, characteristic of metastatic disease.  4. Diffuse prominent FDG activity throughout the intramedullary compartments of the axial and proximal appendicular skeleton is nonspecific. This can be seen with pharmacologic bone marrow stimulation although diffuse metastatic disease can also be considered. Metastatic disease is felt less likely given history.      Current Outpatient Medications:   ·  albuterol (PROVENTIL) 2.5 mg /3 mL (0.083 %) nebulizer solution, Take 3 mLs (2.5 mg total) by nebulization every 6 (six)  "hours as needed for Wheezing. Rescue, Disp: 60 each, Rfl: 3  ·  albuterol (VENTOLIN HFA) 90 mcg/actuation inhaler, Inhale 2 puffs into the lungs every 6 (six) hours as needed for Wheezing. Rescue, Disp: 18 g, Rfl: 5  ·  cyanocobalamin 1,000 mcg/mL injection, Inject 1 mL (1,000 mcg total) into the skin every 30 days., Disp: 3 mL, Rfl: 3  ·  finasteride (PROSCAR) 5 mg tablet, TAKE 1 TABLET BY MOUTH EVERY DAY FOR PROSTATE, Disp: 90 tablet, Rfl: 3  ·  HYDROcodone-acetaminophen (NORCO)  mg per tablet, Take 1 tablet by mouth every 6 (six) hours as needed for Pain., Disp: 60 tablet, Rfl: 0    ·  ipratropium (ATROVENT) 42 mcg (0.06 %) nasal spray, 1 spray by Each Nostril route 2 (two) times daily., Disp: 15 mL, Rfl: 0  ·  magnesium oxide (MAG-OX) 400 mg (241.3 mg magnesium) tablet, Take 1 tablet (400 mg total) by mouth 2 (two) times daily., Disp: 60 tablet, Rfl: 3  ·  temazepam (RESTORIL) 30 mg capsule, Take 1 capsule (30 mg total) by mouth nightly as needed for Insomnia. TAKE NIGHTLY AS NEEDED, Disp: 30 capsule, Rfl: 2  ·  triamcinolone acetonide 0.1% (KENALOG) 0.1 % cream, Apply topically 2 (two) times daily., Disp: 453.6 g, Rfl: 2    ·  folic acid (FOLVITE) 1 MG tablet, Take 1 tablet (1 mg total) by mouth once daily. (Patient not taking: Reported on 8/9/2024), Disp: 100 tablet, Rfl: 3  ·  promethazine (PHENERGAN) 25 MG tablet, Take 1 tablet (25 mg total) by mouth every 4 to 6 hours as needed. (Patient not taking: Reported on 8/9/2024), Disp: 30 tablet, Rfl: 5  ·  syringe with needle 1 mL 25 gauge x 1" Syrg, 1 each by Misc.(Non-Drug; Combo Route) route every 30 days. (Patient not taking: Reported on 8/9/2024), Disp: 3 each, Rfl: 3    Current Facility-Administered Medications:   ·  0.9%  NaCl infusion (for blood administration), , Intravenous, Once, Allie Shelley NP-C, Stopped at 10/23/23 1445    Facility-Administered Medications Ordered in Other Visits:   ·  heparin, porcine (PF) 100 unit/mL injection flush 500 " Units, 500 Units, Intravenous, PRN, Allie Shelley NP-C, 500 Units at 12/14/23 1422  ·  sodium chloride 0.9% flush 10 mL, 10 mL, Intravenous, PRN, Allie Shelley NP-C, 10 mL at 12/14/23 1426        Review of Systems   Constitutional:  Negative for fever, chills and fatigue.   Respiratory:  Negative for cough and shortness of breath.    Gastrointestinal:  Negative for nausea and vomiting.   Skin:  Positive for itching.       Objective:   Physical Exam   Constitutional: He appears well-developed and well-nourished.   Neurological: He is alert and oriented to person, place, and time.   Psychiatric: He has a normal mood and affect.   Skin:   Areas Examined (abnormalities noted in diagram):   Head / Face Inspection Performed  Neck Inspection Performed  Chest / Axilla Inspection Performed  Abdomen Inspection Performed  Back Inspection Performed  RUE Inspected  LUE Inspection Performed            Diagram Legend     Erythematous scaling macule/papule c/w actinic keratosis       Vascular papule c/w angioma      Pigmented verrucoid papule/plaque c/w seborrheic keratosis      Yellow umbilicated papule c/w sebaceous hyperplasia      Irregularly shaped tan macule c/w lentigo     1-2 mm smooth white papules consistent with Milia      Movable subcutaneous cyst with punctum c/w epidermal inclusion cyst      Subcutaneous movable cyst c/w pilar cyst      Firm pink to brown papule c/w dermatofibroma      Pedunculated fleshy papule(s) c/w skin tag(s)      Evenly pigmented macule c/w junctional nevus     Mildly variegated pigmented, slightly irregular-bordered macule c/w mildly atypical nevus      Flesh colored to evenly pigmented papule c/w intradermal nevus       Pink pearly papule/plaque c/w basal cell carcinoma      Erythematous hyperkeratotic cursted plaque c/w SCC      Surgical scar with no sign of skin cancer recurrence      Open and closed comedones      Inflammatory papules and pustules      Verrucoid papule consistent  consistent with wart     Erythematous eczematous patches and plaques     Dystrophic onycholytic nail with subungual debris c/w onychomycosis     Umbilicated papule    Erythematous-base heme-crusted tan verrucoid plaque consistent with inflamed seborrheic keratosis     Erythematous Silvery Scaling Plaque c/w Psoriasis     See annotation      Assessment / Plan:        Pruritus  Multiple excoriations  Xerosis cutis  -     clobetasoL (TEMOVATE) 0.05 % external solution; Mix into jar of cerave and apply twice daily to AA  Dispense: 50 mL; Refill: 1  -     ammonium lactate (LAC-HYDRIN) 12 % lotion; Apply topically every evening.  Dispense: 225 g; Refill: 3  No redness, mild eruption today, biopsy will likely not be helpful at this point  No new medications, recent labs WNL  Very xerotic  Apply ammonium lactate nightly on torso  Mix entire bottle of clobetasol solution into jar of cerave cream and apply all over twice daily   On pembrolizumab, possible culprit? Will monitor       4 weeks- he will message if any eruption appears  No follow-ups on file.

## 2024-09-16 NOTE — PATIENT INSTRUCTIONS
Apply ammonium lactate nightly on torso  Mix entire bottle of clobetasol solution into jar of cerave cream and apply all over twice daily

## 2024-09-24 ENCOUNTER — HOSPITAL ENCOUNTER (OUTPATIENT)
Dept: RADIOLOGY | Facility: HOSPITAL | Age: 79
Discharge: HOME | End: 2024-09-24
Attending: CHIROPRACTOR
Payer: MEDICARE

## 2024-09-24 DIAGNOSIS — M54.50 LUMBAR PAIN: ICD-10-CM

## 2024-09-24 DIAGNOSIS — M54.50 LUMBAR PAIN: Primary | ICD-10-CM

## 2024-09-24 PROCEDURE — 72100 X-RAY EXAM L-S SPINE 2/3 VWS: CPT | Mod: 26,,, | Performed by: RADIOLOGY

## 2024-09-24 PROCEDURE — 72202 X-RAY EXAM SI JOINTS 3/> VWS: CPT | Mod: TC

## 2024-09-24 PROCEDURE — 72100 X-RAY EXAM L-S SPINE 2/3 VWS: CPT | Mod: TC

## 2024-09-24 PROCEDURE — 72170 X-RAY EXAM OF PELVIS: CPT | Mod: 26,,, | Performed by: RADIOLOGY

## 2024-09-24 PROCEDURE — 72170 X-RAY EXAM OF PELVIS: CPT | Mod: TC

## 2024-09-24 PROCEDURE — 72202 X-RAY EXAM SI JOINTS 3/> VWS: CPT | Mod: 26,,, | Performed by: RADIOLOGY

## 2024-10-02 ENCOUNTER — HOSPITAL ENCOUNTER (OUTPATIENT)
Dept: RADIOLOGY | Facility: HOSPITAL | Age: 79
Discharge: HOME | End: 2024-10-02
Attending: INTERNAL MEDICINE
Payer: MEDICARE

## 2024-10-02 VITALS — BODY MASS INDEX: 18.89 KG/M2 | WEIGHT: 160 LBS | HEIGHT: 77 IN

## 2024-10-02 DIAGNOSIS — C34.12 MALIGNANT NEOPLASM OF UPPER LOBE OF LEFT LUNG: ICD-10-CM

## 2024-10-02 LAB — POCT GLUCOSE: 107 MG/DL (ref 70–110)

## 2024-10-02 PROCEDURE — A9552 F18 FDG: HCPCS | Mod: PO | Performed by: INTERNAL MEDICINE

## 2024-10-02 PROCEDURE — 82962 GLUCOSE BLOOD TEST: CPT | Mod: PO

## 2024-10-02 PROCEDURE — 78815 PET IMAGE W/CT SKULL-THIGH: CPT | Mod: TC,PO

## 2024-10-02 PROCEDURE — 78815 PET IMAGE W/CT SKULL-THIGH: CPT | Mod: 26,PS,, | Performed by: RADIOLOGY

## 2024-10-02 RX ORDER — FLUDEOXYGLUCOSE F18 500 MCI/ML
13 INJECTION INTRAVENOUS
Status: COMPLETED | OUTPATIENT
Start: 2024-10-02 | End: 2024-10-02

## 2024-10-02 RX ADMIN — FLUDEOXYGLUCOSE F-18 13 MILLICURIE: 500 INJECTION INTRAVENOUS at 09:10

## 2024-10-03 ENCOUNTER — OFFICE VISIT (OUTPATIENT)
Facility: CLINIC | Age: 79
End: 2024-10-03
Payer: MEDICARE

## 2024-10-03 VITALS
SYSTOLIC BLOOD PRESSURE: 121 MMHG | OXYGEN SATURATION: 96 % | WEIGHT: 159.31 LBS | DIASTOLIC BLOOD PRESSURE: 58 MMHG | HEART RATE: 80 BPM | TEMPERATURE: 98 F | BODY MASS INDEX: 18.89 KG/M2 | RESPIRATION RATE: 18 BRPM

## 2024-10-03 DIAGNOSIS — C34.12 MALIGNANT NEOPLASM OF UPPER LOBE OF LEFT LUNG: Primary | ICD-10-CM

## 2024-10-03 PROCEDURE — 99215 OFFICE O/P EST HI 40 MIN: CPT | Mod: S$PBB,,, | Performed by: INTERNAL MEDICINE

## 2024-10-03 PROCEDURE — 99213 OFFICE O/P EST LOW 20 MIN: CPT | Mod: PBBFAC,PN | Performed by: INTERNAL MEDICINE

## 2024-10-03 PROCEDURE — G2211 COMPLEX E/M VISIT ADD ON: HCPCS | Mod: S$PBB,,, | Performed by: INTERNAL MEDICINE

## 2024-10-03 PROCEDURE — 99999 PR PBB SHADOW E&M-EST. PATIENT-LVL III: CPT | Mod: PBBFAC,,, | Performed by: INTERNAL MEDICINE

## 2024-10-03 NOTE — ASSESSMENT & PLAN NOTE
I viewed patients pet scan with him and it seems clear to me that he has progression.  The areas in the lung are new and treatment with radiation raised as a cause but he has never had radiation.      I discussed the use of liquid biopsy to see if there are any actionable mutations and refer to rads for L1 met treatment as this is causing discomfort.      Will have him back with this testing.

## 2024-10-03 NOTE — PROGRESS NOTES
PROGRESS NOTE    Subjective:       Patient ID: Akil Guzman is a 79 y.o. male.    8/7/2023-CT chest without:  1. Large left pleural effusion with dependent left lower lobe atelectasis.  2. Poorly defined mass-like infiltrate involving the periphery of the left upper lobe extending to the left hilum.  Underlying parenchymal mass is not excludable, however, evaluation is limited due to lack of intravenous contrast.  Correlate clinically with possible fever and/or elevated white count.  3. Bilateral soft tissue pulmonary nodules measuring up to 11 mm.  Follow-up per Fleischner guidelines.  For multiple solid nodules with any 6 mm or greater, Fleischner Society guidelines recommend follow up with non-contrast chest CT at 3-6 months and 18-24 months after discovery.  4. Partially calcified left upper lobe pulmonary nodule may represent granulomatous change and scarring.  5. 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  6. Questionable left hilar lymphadenopathy.  However, evaluation is again limited due to lack of intravenous contrast.  7. Small hiatal hernia.    8/17/2023 Left Pleural Fluid:  Positive for adenocarcinoma most c/w lung primary    9/14/2023-PET:  1. FDG avid nodular thickening involving origin of the lingular bronchus with adjacent FDG avid 20 mm nodular density, suspicious for primary pulmonary malignancy.  2. Moderate left pleural effusion with scattered foci of FDG uptake along the parietal pleura of concern for malignant pleural effusion.  3. Left chest wall FDG avid mass resulting in lytic destruction and pathologic fractures of left 10th and 11th ribs, characteristic of metastatic disease.  4. Diffuse prominent FDG activity throughout the intramedullary compartments of the axial and proximal appendicular skeleton is nonspecific. This can be seen with pharmacologic bone marrow stimulation although diffuse metastatic  disease can also be considered. Metastatic disease is felt less likely given history.    9/14/2023-MRI Brain  Negative for mets    Stage IVB-Adenocarcinoma of the lung    PLAN:   Carbo/Pem/Pem ChemoIO therapy    Carbo/Pem x 4/Pem q3w x 7:  9/13/2023--8/1/2024    Pembro Maintenance q6 weeks(on 200mg dose due to tolerance issues):  Cycle 1: 9/12/2024  Cycle 2: 10/24/2024-due    4/18/2024-PET  Mixed Response    CT CAP-2/23/2024:  Stable post radiation changes and atelectasis within the lingula with mild wall thickening surrounding the lingula bronchus with no discrete mass     Stable groundglass nodular opacities within the left upper lobe suggestive of infectious or inflammatory process     Small left pleural effusion     Stable lytic and sclerotic lesions involving the posterior lateral left 10th and 11th ribs compatible with treated metastasis. There is resolution of the chest wall mass     Stable 9 mm left supraclavicular node     Emphysematous changes throughout the lungs     No evidence of metastatic disease within the abdomen or pelvis    Chief Complaint:  No chief complaint on file.  Stage IVB lung carcinoma follow up    History of Present Illness:   Akil Guzman is a 79 y.o. male who presents for follow up of lung cancer.     Mr. Guzman is doing ok today.  Feels better this week.  No new complaints.     Family and Social history reviewed and is unchanged from 9/1/2023             Current Outpatient Medications:     albuterol (PROVENTIL) 2.5 mg /3 mL (0.083 %) nebulizer solution, Take 3 mLs (2.5 mg total) by nebulization every 6 (six) hours as needed for Wheezing. Rescue, Disp: 60 each, Rfl: 3    albuterol (VENTOLIN HFA) 90 mcg/actuation inhaler, Inhale 2 puffs into the lungs every 6 (six) hours as needed for Wheezing. Rescue, Disp: 18 g, Rfl: 5    ammonium lactate (LAC-HYDRIN) 12 % lotion, Apply topically every evening., Disp: 225 g, Rfl: 3    clobetasoL (TEMOVATE) 0.05 % external solution, Mix into jar of  "cerave and apply twice daily to AA, Disp: 50 mL, Rfl: 1    cyanocobalamin 1,000 mcg/mL injection, Inject 1 mL (1,000 mcg total) into the skin every 30 days., Disp: 3 mL, Rfl: 3    duke's soln (benadryl 30 mL, mylanta 30 mL, LIDOcaine 30 mL, nystatin 30 mL) 120mL, Take 10 mLs by mouth 4 (four) times daily. (Patient not taking: Reported on 8/9/2024), Disp: 120 mL, Rfl: 5    finasteride (PROSCAR) 5 mg tablet, TAKE 1 TABLET BY MOUTH EVERY DAY FOR PROSTATE, Disp: 90 tablet, Rfl: 3    folic acid (FOLVITE) 1 MG tablet, Take 1 tablet (1 mg total) by mouth once daily. (Patient not taking: Reported on 8/9/2024), Disp: 100 tablet, Rfl: 3    HYDROcodone-acetaminophen (NORCO)  mg per tablet, Take 1 tablet by mouth every 6 (six) hours as needed for Pain., Disp: 60 tablet, Rfl: 0    hydrOXYzine pamoate (VISTARIL) 25 MG Cap, Take 1 capsule (25 mg total) by mouth every 8 (eight) hours as needed (itching)., Disp: 30 capsule, Rfl: 0    ipratropium (ATROVENT) 42 mcg (0.06 %) nasal spray, 1 spray by Each Nostril route 2 (two) times daily., Disp: 15 mL, Rfl: 0    magnesium oxide (MAG-OX) 400 mg (241.3 mg magnesium) tablet, Take 1 tablet (400 mg total) by mouth 2 (two) times daily., Disp: 60 tablet, Rfl: 3    megestroL (MEGACE) 400 mg/10 mL (40 mg/mL) Susp, Take 10 mLs (400 mg total) by mouth 2 (two) times daily., Disp: 300 mL, Rfl: 11    promethazine (PHENERGAN) 25 MG tablet, Take 1 tablet (25 mg total) by mouth every 4 to 6 hours as needed. (Patient not taking: Reported on 8/9/2024), Disp: 30 tablet, Rfl: 5    syringe with needle 1 mL 25 gauge x 1" Syrg, 1 each by Misc.(Non-Drug; Combo Route) route every 30 days. (Patient not taking: Reported on 8/9/2024), Disp: 3 each, Rfl: 3    tamsulosin (FLOMAX) 0.4 mg Cap, Take 1 capsule (0.4 mg total) by mouth once daily., Disp: 90 capsule, Rfl: 3    temazepam (RESTORIL) 30 mg capsule, Take 1 capsule (30 mg total) by mouth nightly as needed for Insomnia. TAKE NIGHTLY AS NEEDED, Disp: 30 " capsule, Rfl: 2    triamcinolone acetonide 0.1% (KENALOG) 0.1 % cream, Apply topically 2 (two) times daily., Disp: 453.6 g, Rfl: 2    Current Facility-Administered Medications:     0.9%  NaCl infusion (for blood administration), , Intravenous, Once, Allie Shelley NP-WILLY, Stopped at 10/23/23 1445    Facility-Administered Medications Ordered in Other Visits:     heparin, porcine (PF) 100 unit/mL injection flush 500 Units, 500 Units, Intravenous, PRN, Allie Shelley NP-C, 500 Units at 12/14/23 1422    sodium chloride 0.9% flush 10 mL, 10 mL, Intravenous, PRN, Allie Shelley NP-C, 10 mL at 12/14/23 1426        Objective:       Physical Examination:     BP (!) 121/58   Pulse 80   Temp 97.9 °F (36.6 °C)   Resp 18   Wt 72.3 kg (159 lb 4.8 oz)   SpO2 96%   BMI 18.89 kg/m²     Physical Exam  Constitutional:       Appearance: Normal appearance.   HENT:      Head: Normocephalic and atraumatic.   Eyes:      General: No scleral icterus.     Conjunctiva/sclera: Conjunctivae normal.   Cardiovascular:      Rate and Rhythm: Normal rate.   Pulmonary:      Effort: Pulmonary effort is normal.   Abdominal:      General: Abdomen is flat.   Neurological:      General: No focal deficit present.      Mental Status: He is alert and oriented to person, place, and time.   Psychiatric:         Mood and Affect: Mood normal.         Behavior: Behavior normal.         Labs:   No results found for this or any previous visit (from the past 2 weeks).    CMP  Sodium   Date Value Ref Range Status   09/09/2024 139 136 - 145 mmol/L Final     Potassium   Date Value Ref Range Status   09/09/2024 3.9 3.5 - 5.1 mmol/L Final     Chloride   Date Value Ref Range Status   09/09/2024 108 95 - 110 mmol/L Final     CO2   Date Value Ref Range Status   09/09/2024 21 (L) 23 - 29 mmol/L Final     Glucose   Date Value Ref Range Status   09/09/2024 153 (H) 70 - 110 mg/dL Final     BUN   Date Value Ref Range Status   09/09/2024 18 8 - 23 mg/dL Final      Creatinine   Date Value Ref Range Status   09/09/2024 1.0 0.5 - 1.4 mg/dL Final     Calcium   Date Value Ref Range Status   09/09/2024 10.1 8.7 - 10.5 mg/dL Final     Total Protein   Date Value Ref Range Status   09/09/2024 6.9 6.0 - 8.4 g/dL Final     Albumin   Date Value Ref Range Status   09/09/2024 3.5 3.5 - 5.2 g/dL Final     Total Bilirubin   Date Value Ref Range Status   09/09/2024 0.5 0.1 - 1.0 mg/dL Final     Comment:     For infants and newborns, interpretation of results should be based  on gestational age, weight and in agreement with clinical  observations.    Premature Infant recommended reference ranges:  Up to 24 hours.............<8.0 mg/dL  Up to 48 hours............<12.0 mg/dL  3-5 days..................<15.0 mg/dL  6-29 days.................<15.0 mg/dL       Alkaline Phosphatase   Date Value Ref Range Status   09/09/2024 63 55 - 135 U/L Final     AST   Date Value Ref Range Status   09/09/2024 13 10 - 40 U/L Final     ALT   Date Value Ref Range Status   09/09/2024 9 (L) 10 - 44 U/L Final     Anion Gap   Date Value Ref Range Status   09/09/2024 10 8 - 16 mmol/L Final     eGFR if    Date Value Ref Range Status   11/02/2021 56.4 (A) >60 mL/min/1.73 m^2 Final     eGFR if non    Date Value Ref Range Status   11/02/2021 48.8 (A) >60 mL/min/1.73 m^2 Final     Comment:     Calculation used to obtain the estimated glomerular filtration  rate (eGFR) is the CKD-EPI equation.        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  The areas in the lung are new and treatment with radiation raised as a cause but he has never had radiation.       I discussed the use of liquid biopsy to see if there are any actionable mutations and refer to rads for L1 met treatment as this is causing discomfort.      Will have him back with this testing.          Relevant Orders    Ambulatory referral/consult to Radiation Oncology             Discussion:     Follow up in about 4 weeks (around 10/31/2024).      Electronically signed by Reynaldo Ball

## 2024-10-07 ENCOUNTER — PATIENT MESSAGE (OUTPATIENT)
Facility: CLINIC | Age: 79
End: 2024-10-07
Payer: MEDICARE

## 2024-10-14 ENCOUNTER — DOCUMENTATION ONLY (OUTPATIENT)
Dept: RADIATION ONCOLOGY | Facility: CLINIC | Age: 79
End: 2024-10-14

## 2024-10-14 ENCOUNTER — OFFICE VISIT (OUTPATIENT)
Dept: RADIATION ONCOLOGY | Facility: CLINIC | Age: 79
End: 2024-10-14
Payer: MEDICARE

## 2024-10-14 VITALS
DIASTOLIC BLOOD PRESSURE: 54 MMHG | HEIGHT: 60 IN | HEART RATE: 109 BPM | WEIGHT: 154.38 LBS | BODY MASS INDEX: 30.31 KG/M2 | SYSTOLIC BLOOD PRESSURE: 108 MMHG | OXYGEN SATURATION: 94 % | RESPIRATION RATE: 18 BRPM

## 2024-10-14 DIAGNOSIS — C34.12 MALIGNANT NEOPLASM OF UPPER LOBE OF LEFT LUNG: ICD-10-CM

## 2024-10-14 PROCEDURE — G2211 COMPLEX E/M VISIT ADD ON: HCPCS | Mod: S$GLB,,, | Performed by: RADIOLOGY

## 2024-10-14 PROCEDURE — 99215 OFFICE O/P EST HI 40 MIN: CPT | Mod: S$GLB,,, | Performed by: RADIOLOGY

## 2024-10-14 NOTE — PROGRESS NOTES
Akil Guzman  [id number]  1945  10/14/2024    DIAGNOSIS: Stage IVB NSCLC    HISTORY OF PRESENT ILLNESS:   Mr. Guzman is a 79M undergoing systemic treatment for metastatic NSCLC who recently underwent repeat PET imaging that shows mixed response with a new large L1 vertebral body lesion.  And on questioning, endorses recent increase in low back pain with one fall precipitated by possible pain-guarding with twisting  doing yardwork.  He denies any radiating pain, incontinence/rentention, paralysis, parasthesias, or focal deficits.    Review of systems otherwise negative unless indicated in HPI/interval history.    Past Medical History:   Diagnosis Date    Allergy     SEASON    Basal cell carcinoma 2009    L ear    Cancer     Hx colon     Colon cancer     states dx in 1999    Colon polyp     Pneumonia, unspecified organism 07/2023     Past Surgical History:   Procedure Laterality Date    CATARACT EXTRACTION Bilateral 2022    COLON SURGERY  2000    1ft. sigmoid removed    COLONOSCOPY N/A 09/28/2020    Procedure: COLONOSCOPY;  Surgeon: Ty Pollock MD;  Location: [facility] ENDO;  Service: General;  Laterality: N/A;    INSERTION OF TUNNELED CENTRAL VENOUS CATHETER (CVC) WITH SUBCUTANEOUS PORT N/A 09/11/2023    Procedure: [id number];  Surgeon: Antwon Peres Jr., MD;  Location: [facility] OR;  Service: General;  Laterality: N/A;     Social History     Socioeconomic History    Marital status:    Tobacco Use    Smoking status: Former     Current packs/day: 0.25     Average packs/day: 0.3 packs/day for 16.1 years (4.0 ttl pk-yrs)     Types: Cigarettes     Start date: 9/6/2008    Smokeless tobacco: Never   Substance and Sexual Activity    Alcohol use: Yes     Alcohol/week: 2.0 standard drinks of alcohol     Types: 2 Drinks containing 0.5 oz of alcohol per week     Comment: 2 mixed drinks WEEK    Drug use: No    Sexual activity: Yes     Partners: Female     Social Drivers of Health     Financial Resource  Strain: Low Risk  (1/12/2024)    Overall Financial Resource Strain (CARDIA)     Difficulty of Paying Living Expenses: Not hard at all   Food Insecurity: No Food Insecurity (1/12/2024)    Hunger Vital Sign     Worried About Running Out of Food in the Last Year: Never true     Ran Out of Food in the Last Year: Never true   Transportation Needs: No Transportation Needs (1/12/2024)    PRAPARE - Transportation     Lack of Transportation (Medical): No     Lack of Transportation (Non-Medical): No   Physical Activity: Insufficiently Active (1/12/2024)    Exercise Vital Sign     Days of Exercise per Week: 3 days     Minutes of Exercise per Session: 30 min   Stress: No Stress Concern Present (1/12/2024)    [employer] [place] - Occupational Stress Questionnaire     Feeling of Stress : Only a little   Recent Concern: Stress - Stress Concern Present (11/7/2023)    [place] [place] - Occupational Stress Questionnaire     Feeling of Stress : To some extent   Housing Stability: Low Risk  (1/12/2024)    Housing Stability Vital Sign     Unable to Pay for Housing in the Last Year: No     Number of Places Lived in the Last Year: 1     Unstable Housing in the Last Year: No     Family History   Problem Relation Name Age of Onset    Cancer Mother      Brain cancer Mother      Cancer Brother      Macular degeneration Brother      Pancreatic cancer Brother      Melanoma Neg Hx      Colon cancer Neg Hx       Medication List with Changes/Refills   Current Medications    ALBUTEROL (PROVENTIL) 2.5 MG /3 ML (0.083 %) NEBULIZER SOLUTION    Take 3 mLs (2.5 mg total) by nebulization every 6 (six) hours as needed for Wheezing. Rescue    ALBUTEROL (VENTOLIN HFA) 90 MCG/ACTUATION INHALER    Inhale 2 puffs into the lungs every 6 (six) hours as needed for Wheezing. Rescue    AMMONIUM LACTATE (LAC-HYDRIN) 12 % LOTION    Apply topically every evening.    CLOBETASOL (TEMOVATE) 0.05 % EXTERNAL SOLUTION    Mix  "into jar of cerave and apply twice daily to AA    CYANOCOBALAMIN 1,000 MCG/ML INJECTION    Inject 1 mL (1,000 mcg total) into the skin every 30 days.    DUKE'S SOLN (BENADRYL 30 ML, MYLANTA 30 ML, LIDOCAINE 30 ML, NYSTATIN 30 ML) 120ML    Take 10 mLs by mouth 4 (four) times daily.    FINASTERIDE (PROSCAR) 5 MG TABLET    TAKE 1 TABLET BY MOUTH EVERY DAY FOR PROSTATE    FOLIC ACID (FOLVITE) 1 MG TABLET    Take 1 tablet (1 mg total) by mouth once daily.    HYDROCODONE-ACETAMINOPHEN (NORCO)  MG PER TABLET    Take 1 tablet by mouth every 6 (six) hours as needed for Pain.    HYDROXYZINE PAMOATE (VISTARIL) 25 MG CAP    Take 1 capsule (25 mg total) by mouth every 8 (eight) hours as needed (itching).    IPRATROPIUM (ATROVENT) 42 MCG (0.06 %) NASAL SPRAY    1 spray by Each Nostril route 2 (two) times daily.    MAGNESIUM OXIDE (MAG-OX) 400 MG (241.3 MG MAGNESIUM) TABLET    Take 1 tablet (400 mg total) by mouth 2 (two) times daily.    MEGESTROL (MEGACE) 400 MG/10 ML (40 MG/ML) SUSP    Take 10 mLs (400 mg total) by mouth 2 (two) times daily.    PROMETHAZINE (PHENERGAN) 25 MG TABLET    Take 1 tablet (25 mg total) by mouth every 4 to 6 hours as needed.    SYRINGE WITH NEEDLE 1 ML 25 GAUGE X 1" SYRG    1 each by Misc.(Non-Drug; Combo Route) route every 30 days.    TAMSULOSIN (FLOMAX) 0.4 MG CAP    Take 1 capsule (0.4 mg total) by mouth once daily.    TEMAZEPAM (RESTORIL) 30 MG CAPSULE    Take 1 capsule (30 mg total) by mouth nightly as needed for Insomnia. TAKE NIGHTLY AS NEEDED    TRIAMCINOLONE ACETONIDE 0.1% (KENALOG) 0.1 % CREAM    Apply topically 2 (two) times daily.     Review of patient's allergies indicates:   Allergen Reactions    Penicillins      Other reaction(s): Rash  50 YEARS AGO         QUALITY OF LIFE: 80%- Normal Activity with Effort: Some Symptoms of Disease    There were no vitals filed for this visit.  There is no height or weight on file to calculate BMI.    PHYSICAL EXAM:  GENERAL: alert; in no apparent " distress.   HEAD: normocephalic, atraumatic.  EYES: pupils are equal, round, reactive to light and accommodation. Sclera anicteric. Conjunctiva not injected.   NOSE/THROAT: no nasal erythema or rhinorrhea. Oropharynx pink, without erythema, ulcerations or thrush.   NECK: no cervical motion rigidity; supple with no masses.  CHEST: Patient is speaking comfortably on room air with normal work of breathing without using accessory muscles of respiration.  CARDIOVASCULAR: regular rate and rhythm  ABDOMEN: soft, nontender, nondistended.   MUSCULOSKELETAL: mild tenderness to palpation along the L-spine; Normal range of motion.  NEUROLOGIC: cranial nerves II-XII intact bilaterally. Strength 5/5 in bilateral upper and lower extremities. No sensory deficits appreciated. Normal gait.  LYMPHATIC: no cervical, supraclavicular or axillary adenopathy appreciated.   EXTREMITIES: no clubbing, cyanosis, edema.  SKIN: no erythema, rashes, ulcerations noted.       ASSESSMENT: Akil Guzman is a male with stage IVB NSCLC w/ new painful L1 vertebral body met    PLAN:   - 30 Gy in 10 fxns to L1 lesion  - continue f/u re: systemic therapy thereafter w/ MedOnc (continuing IO during RT ok)    The patient verbalized understanding of the above plan, risks, benefits, and details, and informed consent was obtained.  We will proceed with RTP-CT imaging with plans to initiate RT w/in the next 1-2 weeks.    All questions answered and contact information provided. Patient understands free to call us anytime with any questions or concerns regarding radiation therapy.    I have personally seen and evaluated this patient with a moderate to high complexity diagnosis.     PHYSICIAN: Esteban Esparza III, MD

## 2024-10-14 NOTE — PROGRESS NOTES
Pt here for rad thy consult  Pt consented and info given  Pt scheduled to return for ct planning.

## 2024-10-17 ENCOUNTER — HOSPITAL ENCOUNTER (INPATIENT)
Facility: HOSPITAL | Age: 79
LOS: 1 days | Discharge: HOME | DRG: 189 | End: 2024-10-18
Attending: EMERGENCY MEDICINE | Admitting: INTERNAL MEDICINE
Payer: MEDICARE

## 2024-10-17 DIAGNOSIS — M62.81 MUSCLE WEAKNESS: ICD-10-CM

## 2024-10-17 DIAGNOSIS — Z85.038 HISTORY OF COLON CANCER: ICD-10-CM

## 2024-10-17 DIAGNOSIS — J96.01 ACUTE HYPOXIC RESPIRATORY FAILURE: ICD-10-CM

## 2024-10-17 DIAGNOSIS — C34.12 MALIGNANT NEOPLASM OF UPPER LOBE OF LEFT LUNG: ICD-10-CM

## 2024-10-17 DIAGNOSIS — C34.90 MALIGNANT NEOPLASM OF LUNG, UNSPECIFIED LATERALITY, UNSPECIFIED PART OF LUNG: Primary | ICD-10-CM

## 2024-10-17 DIAGNOSIS — I10 HYPERTENSION, UNSPECIFIED TYPE: ICD-10-CM

## 2024-10-17 DIAGNOSIS — Z85.038 PERSONAL HISTORY OF COLON CANCER, STAGE I: ICD-10-CM

## 2024-10-17 DIAGNOSIS — J91.0 MALIGNANT PLEURAL EFFUSION: ICD-10-CM

## 2024-10-17 DIAGNOSIS — H25.10 NUCLEAR SCLEROSIS, UNSPECIFIED LATERALITY: ICD-10-CM

## 2024-10-17 DIAGNOSIS — R06.02 SHORTNESS OF BREATH: ICD-10-CM

## 2024-10-17 DIAGNOSIS — D72.829 LEUKOCYTOSIS, UNSPECIFIED TYPE: ICD-10-CM

## 2024-10-17 DIAGNOSIS — L20.9 MILD ATOPIC DERMATITIS: ICD-10-CM

## 2024-10-17 DIAGNOSIS — R06.02 SOB (SHORTNESS OF BREATH): ICD-10-CM

## 2024-10-17 DIAGNOSIS — J18.9 COMMUNITY ACQUIRED PNEUMONIA OF RIGHT UPPER LOBE OF LUNG: ICD-10-CM

## 2024-10-17 DIAGNOSIS — R79.81 LOW OXYGEN SATURATION: ICD-10-CM

## 2024-10-17 DIAGNOSIS — L03.031 PARONYCHIA OF THIRD TOE OF RIGHT FOOT: ICD-10-CM

## 2024-10-17 DIAGNOSIS — R52 ACUTE PAIN: ICD-10-CM

## 2024-10-17 DIAGNOSIS — H40.013 OPEN ANGLE WITH BORDERLINE FINDINGS AND LOW GLAUCOMA RISK IN BOTH EYES: ICD-10-CM

## 2024-10-17 DIAGNOSIS — R19.7 DIARRHEA, UNSPECIFIED TYPE: ICD-10-CM

## 2024-10-17 DIAGNOSIS — L29.9 GENERALIZED PRURITUS: ICD-10-CM

## 2024-10-17 DIAGNOSIS — C34.92 NSCLC OF LEFT LUNG: ICD-10-CM

## 2024-10-17 DIAGNOSIS — J98.4 PNEUMONITIS: ICD-10-CM

## 2024-10-17 LAB
ALBUMIN SERPL BCP-MCNC: 2.9 G/DL (ref 3.5–5.2)
ALP SERPL-CCNC: 75 U/L (ref 40–150)
ALT SERPL W/O P-5'-P-CCNC: 12 U/L (ref 10–44)
ANION GAP SERPL CALC-SCNC: 14 MMOL/L (ref 8–16)
AST SERPL-CCNC: 15 U/L (ref 10–40)
BASOPHILS # BLD AUTO: 0.04 K/UL (ref 0–0.2)
BASOPHILS NFR BLD: 0.3 % (ref 0–1.9)
BILIRUB SERPL-MCNC: 0.3 MG/DL (ref 0.1–1)
BUN SERPL-MCNC: 24 MG/DL (ref 8–23)
CALCIUM SERPL-MCNC: 9.8 MG/DL (ref 8.7–10.5)
CHLORIDE SERPL-SCNC: 102 MMOL/L (ref 95–110)
CO2 SERPL-SCNC: 19 MMOL/L (ref 23–29)
CREAT SERPL-MCNC: 1 MG/DL (ref 0.5–1.4)
D DIMER PPP IA.FEU-MCNC: 2.35 MG/L FEU
DIFFERENTIAL METHOD BLD: ABNORMAL
EOSINOPHIL # BLD AUTO: 0.1 K/UL (ref 0–0.5)
EOSINOPHIL NFR BLD: 0.5 % (ref 0–8)
ERYTHROCYTE [DISTWIDTH] IN BLOOD BY AUTOMATED COUNT: 12.6 % (ref 11.5–14.5)
EST. GFR  (NO RACE VARIABLE): >60 ML/MIN/1.73 M^2
GLUCOSE SERPL-MCNC: 122 MG/DL (ref 70–110)
HCT VFR BLD AUTO: 30.5 % (ref 40–54)
HGB BLD-MCNC: 10.4 G/DL (ref 14–18)
IMM GRANULOCYTES # BLD AUTO: 0.1 K/UL (ref 0–0.04)
IMM GRANULOCYTES NFR BLD AUTO: 0.8 % (ref 0–0.5)
INFLUENZA A, MOLECULAR: NEGATIVE
INFLUENZA B, MOLECULAR: NEGATIVE
LYMPHOCYTES # BLD AUTO: 0.8 K/UL (ref 1–4.8)
LYMPHOCYTES NFR BLD: 6.3 % (ref 18–48)
MCH RBC QN AUTO: 30.5 PG (ref 27–31)
MCHC RBC AUTO-ENTMCNC: 34.1 G/DL (ref 32–36)
MCV RBC AUTO: 89 FL (ref 82–98)
MONOCYTES # BLD AUTO: 2.1 K/UL (ref 0.3–1)
MONOCYTES NFR BLD: 15.6 % (ref 4–15)
NEUTROPHILS # BLD AUTO: 10.1 K/UL (ref 1.8–7.7)
NEUTROPHILS NFR BLD: 76.5 % (ref 38–73)
NRBC BLD-RTO: 0 /100 WBC
PLATELET # BLD AUTO: 402 K/UL (ref 150–450)
PLATELET BLD QL SMEAR: ABNORMAL
PMV BLD AUTO: 8.5 FL (ref 9.2–12.9)
POTASSIUM SERPL-SCNC: 3.7 MMOL/L (ref 3.5–5.1)
PROT SERPL-MCNC: 6.8 G/DL (ref 6–8.4)
RBC # BLD AUTO: 3.41 M/UL (ref 4.6–6.2)
SARS-COV-2 RDRP RESP QL NAA+PROBE: NEGATIVE
SODIUM SERPL-SCNC: 135 MMOL/L (ref 136–145)
SPECIMEN SOURCE: NORMAL
TROPONIN I SERPL DL<=0.01 NG/ML-MCNC: <0.006 NG/ML (ref 0–0.03)
WBC # BLD AUTO: 13.12 K/UL (ref 3.9–12.7)

## 2024-10-17 PROCEDURE — 80053 COMPREHEN METABOLIC PANEL: CPT | Performed by: NURSE PRACTITIONER

## 2024-10-17 PROCEDURE — 99900035 HC TECH TIME PER 15 MIN (STAT)

## 2024-10-17 PROCEDURE — 85025 COMPLETE CBC W/AUTO DIFF WBC: CPT | Performed by: NURSE PRACTITIONER

## 2024-10-17 PROCEDURE — 87502 INFLUENZA DNA AMP PROBE: CPT | Performed by: NURSE PRACTITIONER

## 2024-10-17 PROCEDURE — 36415 COLL VENOUS BLD VENIPUNCTURE: CPT | Performed by: INTERNAL MEDICINE

## 2024-10-17 PROCEDURE — 99900031 HC PATIENT EDUCATION (STAT)

## 2024-10-17 PROCEDURE — 25000003 PHARM REV CODE 250: Performed by: INTERNAL MEDICINE

## 2024-10-17 PROCEDURE — 93005 ELECTROCARDIOGRAM TRACING: CPT

## 2024-10-17 PROCEDURE — 71275 CT ANGIOGRAPHY CHEST: CPT | Mod: 26,,, | Performed by: RADIOLOGY

## 2024-10-17 PROCEDURE — 63600175 PHARM REV CODE 636 W HCPCS: Performed by: INTERNAL MEDICINE

## 2024-10-17 PROCEDURE — 94760 N-INVAS EAR/PLS OXIMETRY 1: CPT

## 2024-10-17 PROCEDURE — 11000001 HC ACUTE MED/SURG PRIVATE ROOM

## 2024-10-17 PROCEDURE — 71275 CT ANGIOGRAPHY CHEST: CPT | Mod: TC

## 2024-10-17 PROCEDURE — 21400001 HC TELEMETRY ROOM

## 2024-10-17 PROCEDURE — S0179 MEGESTROL 20 MG: HCPCS | Performed by: INTERNAL MEDICINE

## 2024-10-17 PROCEDURE — 84484 ASSAY OF TROPONIN QUANT: CPT | Performed by: NURSE PRACTITIONER

## 2024-10-17 PROCEDURE — 93010 ELECTROCARDIOGRAM REPORT: CPT | Mod: ,,, | Performed by: INTERNAL MEDICINE

## 2024-10-17 PROCEDURE — 94618 PULMONARY STRESS TESTING: CPT

## 2024-10-17 PROCEDURE — 96365 THER/PROPH/DIAG IV INF INIT: CPT

## 2024-10-17 PROCEDURE — 94761 N-INVAS EAR/PLS OXIMETRY MLT: CPT

## 2024-10-17 PROCEDURE — 25500020 PHARM REV CODE 255: Performed by: EMERGENCY MEDICINE

## 2024-10-17 PROCEDURE — 25000003 PHARM REV CODE 250: Performed by: NURSE PRACTITIONER

## 2024-10-17 PROCEDURE — 96361 HYDRATE IV INFUSION ADD-ON: CPT

## 2024-10-17 PROCEDURE — 27000221 HC OXYGEN, UP TO 24 HOURS

## 2024-10-17 PROCEDURE — 94799 UNLISTED PULMONARY SVC/PX: CPT

## 2024-10-17 PROCEDURE — 87040 BLOOD CULTURE FOR BACTERIA: CPT | Performed by: INTERNAL MEDICINE

## 2024-10-17 PROCEDURE — 63600175 PHARM REV CODE 636 W HCPCS: Performed by: NURSE PRACTITIONER

## 2024-10-17 PROCEDURE — 85379 FIBRIN DEGRADATION QUANT: CPT | Performed by: NURSE PRACTITIONER

## 2024-10-17 PROCEDURE — 99406 BEHAV CHNG SMOKING 3-10 MIN: CPT

## 2024-10-17 PROCEDURE — 87635 SARS-COV-2 COVID-19 AMP PRB: CPT | Performed by: NURSE PRACTITIONER

## 2024-10-17 PROCEDURE — 99285 EMERGENCY DEPT VISIT HI MDM: CPT | Mod: 25

## 2024-10-17 RX ORDER — VANCOMYCIN 2 GRAM/500 ML IN 0.9 % SODIUM CHLORIDE INTRAVENOUS
2000
Status: DISCONTINUED | OUTPATIENT
Start: 2024-10-17 | End: 2024-10-18 | Stop reason: HOSPADM

## 2024-10-17 RX ORDER — PROMETHAZINE HYDROCHLORIDE 12.5 MG/1
25 TABLET ORAL EVERY 6 HOURS PRN
Status: DISCONTINUED | OUTPATIENT
Start: 2024-10-17 | End: 2024-10-18 | Stop reason: HOSPADM

## 2024-10-17 RX ORDER — POLYETHYLENE GLYCOL 3350 17 G/17G
17 POWDER, FOR SOLUTION ORAL DAILY
Status: DISCONTINUED | OUTPATIENT
Start: 2024-10-18 | End: 2024-10-18 | Stop reason: HOSPADM

## 2024-10-17 RX ORDER — IPRATROPIUM BROMIDE AND ALBUTEROL SULFATE 2.5; .5 MG/3ML; MG/3ML
3 SOLUTION RESPIRATORY (INHALATION) EVERY 4 HOURS PRN
Status: DISCONTINUED | OUTPATIENT
Start: 2024-10-17 | End: 2024-10-18 | Stop reason: HOSPADM

## 2024-10-17 RX ORDER — ACETAMINOPHEN 325 MG/1
650 TABLET ORAL EVERY 4 HOURS PRN
Status: DISCONTINUED | OUTPATIENT
Start: 2024-10-17 | End: 2024-10-18 | Stop reason: HOSPADM

## 2024-10-17 RX ORDER — HYDROCODONE BITARTRATE AND ACETAMINOPHEN 10; 325 MG/1; MG/1
1 TABLET ORAL EVERY 6 HOURS PRN
Status: DISCONTINUED | OUTPATIENT
Start: 2024-10-17 | End: 2024-10-18 | Stop reason: HOSPADM

## 2024-10-17 RX ORDER — HYDROXYZINE PAMOATE 25 MG/1
25 CAPSULE ORAL EVERY 8 HOURS PRN
Status: DISCONTINUED | OUTPATIENT
Start: 2024-10-17 | End: 2024-10-18 | Stop reason: HOSPADM

## 2024-10-17 RX ORDER — SODIUM CHLORIDE 0.9 % (FLUSH) 0.9 %
10 SYRINGE (ML) INJECTION EVERY 12 HOURS PRN
Status: DISCONTINUED | OUTPATIENT
Start: 2024-10-17 | End: 2024-10-18 | Stop reason: HOSPADM

## 2024-10-17 RX ORDER — ZOLPIDEM TARTRATE 5 MG/1
5 TABLET ORAL NIGHTLY PRN
Status: DISCONTINUED | OUTPATIENT
Start: 2024-10-17 | End: 2024-10-18 | Stop reason: HOSPADM

## 2024-10-17 RX ORDER — ONDANSETRON HYDROCHLORIDE 2 MG/ML
4 INJECTION, SOLUTION INTRAVENOUS EVERY 8 HOURS PRN
Status: DISCONTINUED | OUTPATIENT
Start: 2024-10-17 | End: 2024-10-18 | Stop reason: HOSPADM

## 2024-10-17 RX ORDER — LANOLIN ALCOHOL/MO/W.PET/CERES
400 CREAM (GRAM) TOPICAL 2 TIMES DAILY
Status: DISCONTINUED | OUTPATIENT
Start: 2024-10-17 | End: 2024-10-18 | Stop reason: HOSPADM

## 2024-10-17 RX ORDER — IPRATROPIUM BROMIDE 42 UG/1
1 SPRAY, METERED NASAL 2 TIMES DAILY
Status: DISCONTINUED | OUTPATIENT
Start: 2024-10-17 | End: 2024-10-18 | Stop reason: HOSPADM

## 2024-10-17 RX ORDER — MEGESTROL ACETATE 40 MG/ML
400 SUSPENSION ORAL 2 TIMES DAILY
Status: DISCONTINUED | OUTPATIENT
Start: 2024-10-17 | End: 2024-10-18 | Stop reason: HOSPADM

## 2024-10-17 RX ORDER — ENOXAPARIN SODIUM 100 MG/ML
40 INJECTION SUBCUTANEOUS EVERY 12 HOURS
Status: DISCONTINUED | OUTPATIENT
Start: 2024-10-17 | End: 2024-10-18 | Stop reason: HOSPADM

## 2024-10-17 RX ORDER — FINASTERIDE 5 MG/1
10 TABLET, FILM COATED ORAL DAILY
Status: DISCONTINUED | OUTPATIENT
Start: 2024-10-18 | End: 2024-10-18 | Stop reason: HOSPADM

## 2024-10-17 RX ORDER — LEVOFLOXACIN 5 MG/ML
750 INJECTION, SOLUTION INTRAVENOUS
Status: DISCONTINUED | OUTPATIENT
Start: 2024-10-17 | End: 2024-10-18 | Stop reason: SDUPTHER

## 2024-10-17 RX ORDER — TAMSULOSIN HYDROCHLORIDE 0.4 MG/1
0.4 CAPSULE ORAL DAILY
Status: DISCONTINUED | OUTPATIENT
Start: 2024-10-18 | End: 2024-10-18 | Stop reason: HOSPADM

## 2024-10-17 RX ADMIN — MAGNESIUM OXIDE 400 MG: 400 TABLET ORAL at 09:10

## 2024-10-17 RX ADMIN — METHYLPREDNISOLONE SODIUM SUCCINATE 60 MG: 40 INJECTION, POWDER, FOR SOLUTION INTRAMUSCULAR; INTRAVENOUS at 11:10

## 2024-10-17 RX ADMIN — GENTAMICIN SULFATE 210 MG: 40 INJECTION, SOLUTION INTRAMUSCULAR; INTRAVENOUS at 07:10

## 2024-10-17 RX ADMIN — MEGESTROL ACETATE 400 MG: 40 SUSPENSION ORAL at 09:10

## 2024-10-17 RX ADMIN — SODIUM CHLORIDE 500 ML: 9 INJECTION, SOLUTION INTRAVENOUS at 02:10

## 2024-10-17 RX ADMIN — METHYLPREDNISOLONE SODIUM SUCCINATE 60 MG: 40 INJECTION, POWDER, FOR SOLUTION INTRAMUSCULAR; INTRAVENOUS at 07:10

## 2024-10-17 RX ADMIN — IOHEXOL 75 ML: 350 INJECTION, SOLUTION INTRAVENOUS at 02:10

## 2024-10-17 RX ADMIN — ENOXAPARIN SODIUM 40 MG: 40 INJECTION SUBCUTANEOUS at 09:10

## 2024-10-17 RX ADMIN — ZOLPIDEM TARTRATE 5 MG: 5 TABLET ORAL at 09:10

## 2024-10-17 RX ADMIN — AZITHROMYCIN MONOHYDRATE 500 MG: 500 INJECTION, POWDER, LYOPHILIZED, FOR SOLUTION INTRAVENOUS at 04:10

## 2024-10-17 RX ADMIN — LEVOFLOXACIN 750 MG: 750 INJECTION, SOLUTION INTRAVENOUS at 09:10

## 2024-10-17 RX ADMIN — SODIUM CHLORIDE 2000 MG: 9 INJECTION, SOLUTION INTRAVENOUS at 07:10

## 2024-10-17 NOTE — ED NOTES
Attempted to contact case management for home oxygen tank pending results of PE study. No answer. Message left.

## 2024-10-17 NOTE — ED NOTES
Respiratory contacted to perform walk assessment for patient qualification to evaluate for home oxygen.

## 2024-10-17 NOTE — ED PROVIDER NOTES
CHIEF COMPLAINT  Chief Complaint   Patient presents with    Shortness of Breath     Shortness of breath worse over the last 2 days. Hx of lung CA. Last chemo mid September.       HISTORY OF PRESENT ILLNESS  Akil Guzman is a 79 y.o. male with PMH lung cancer who presents to ER for evaluation of SOB that got worse for the past 2 days. No other specific aggravating or relieving factors otherwise.      PAST MEDICAL HISTORY  Past Medical History:   Diagnosis Date    Allergy     SEASON    Basal cell carcinoma 2009    L ear    Cancer     Hx colon     Colon cancer     states dx in 1999    Colon polyp     Pneumonia, unspecified organism 07/2023       CURRENT MEDICATIONS      Current Facility-Administered Medications:     0.9%  NaCl infusion (for blood administration), , Intravenous, Once, Allie Shelley NP-C, Stopped at 10/23/23 1445    azithromycin (ZITHROMAX) 500 mg in D5W 250 mL  IVPB (admixture device), 500 mg, Intravenous, ED 1 Time, Jennifer De La Paz NP, Last Rate: 250 mL/hr at 10/17/24 1613, 500 mg at 10/17/24 1613    Current Outpatient Medications:     albuterol (PROVENTIL) 2.5 mg /3 mL (0.083 %) nebulizer solution, Take 3 mLs (2.5 mg total) by nebulization every 6 (six) hours as needed for Wheezing. Rescue, Disp: 60 each, Rfl: 3    albuterol (VENTOLIN HFA) 90 mcg/actuation inhaler, Inhale 2 puffs into the lungs every 6 (six) hours as needed for Wheezing. Rescue, Disp: 18 g, Rfl: 5    ammonium lactate (LAC-HYDRIN) 12 % lotion, Apply topically every evening., Disp: 225 g, Rfl: 3    clobetasoL (TEMOVATE) 0.05 % external solution, Mix into jar of cerave and apply twice daily to AA, Disp: 50 mL, Rfl: 1    cyanocobalamin 1,000 mcg/mL injection, Inject 1 mL (1,000 mcg total) into the skin every 30 days., Disp: 3 mL, Rfl: 3    duke's soln (benadryl 30 mL, mylanta 30 mL, LIDOcaine 30 mL, nystatin 30 mL) 120mL, Take 10 mLs by mouth 4 (four) times daily. (Patient not taking: Reported on 8/9/2024), Disp: 120 mL, Rfl: 5     "finasteride (PROSCAR) 5 mg tablet, TAKE 1 TABLET BY MOUTH EVERY DAY FOR PROSTATE, Disp: 90 tablet, Rfl: 3    folic acid (FOLVITE) 1 MG tablet, Take 1 tablet (1 mg total) by mouth once daily. (Patient not taking: Reported on 8/9/2024), Disp: 100 tablet, Rfl: 3    HYDROcodone-acetaminophen (NORCO)  mg per tablet, Take 1 tablet by mouth every 6 (six) hours as needed for Pain., Disp: 60 tablet, Rfl: 0    hydrOXYzine pamoate (VISTARIL) 25 MG Cap, Take 1 capsule (25 mg total) by mouth every 8 (eight) hours as needed (itching)., Disp: 30 capsule, Rfl: 0    ipratropium (ATROVENT) 42 mcg (0.06 %) nasal spray, 1 spray by Each Nostril route 2 (two) times daily., Disp: 15 mL, Rfl: 0    magnesium oxide (MAG-OX) 400 mg (241.3 mg magnesium) tablet, Take 1 tablet (400 mg total) by mouth 2 (two) times daily., Disp: 60 tablet, Rfl: 3    megestroL (MEGACE) 400 mg/10 mL (40 mg/mL) Susp, Take 10 mLs (400 mg total) by mouth 2 (two) times daily., Disp: 300 mL, Rfl: 11    promethazine (PHENERGAN) 25 MG tablet, Take 1 tablet (25 mg total) by mouth every 4 to 6 hours as needed. (Patient not taking: Reported on 8/9/2024), Disp: 30 tablet, Rfl: 5    syringe with needle 1 mL 25 gauge x 1" Syrg, 1 each by Misc.(Non-Drug; Combo Route) route every 30 days. (Patient not taking: Reported on 8/9/2024), Disp: 3 each, Rfl: 3    tamsulosin (FLOMAX) 0.4 mg Cap, Take 1 capsule (0.4 mg total) by mouth once daily., Disp: 90 capsule, Rfl: 3    temazepam (RESTORIL) 30 mg capsule, Take 1 capsule (30 mg total) by mouth nightly as needed for Insomnia. TAKE NIGHTLY AS NEEDED, Disp: 30 capsule, Rfl: 2    triamcinolone acetonide 0.1% (KENALOG) 0.1 % cream, Apply topically 2 (two) times daily., Disp: 453.6 g, Rfl: 2    Facility-Administered Medications Ordered in Other Encounters:     heparin, porcine (PF) 100 unit/mL injection flush 500 Units, 500 Units, Intravenous, PRN, Allie Shelley NP-C, 500 Units at 12/14/23 1422    sodium chloride 0.9% flush 10 mL, " 10 mL, Intravenous, PRN, Allie Shelley NP-C, 10 mL at 12/14/23 1426    ALLERGIES    Review of patient's allergies indicates:   Allergen Reactions    Penicillins      Other reaction(s): Rash  50 YEARS AGO         SURGICAL HISTORY    Past Surgical History:   Procedure Laterality Date    CATARACT EXTRACTION Bilateral 2022    COLON SURGERY  2000    1ft. sigmoid removed    COLONOSCOPY N/A 09/28/2020    Procedure: COLONOSCOPY;  Surgeon: Ty Pollock MD;  Location: [facility] ENDO;  Service: General;  Laterality: N/A;    INSERTION OF TUNNELED CENTRAL VENOUS CATHETER (CVC) WITH SUBCUTANEOUS PORT N/A 09/11/2023    Procedure: [id number];  Surgeon: Antwon Peres Jr., MD;  Location: [facility] OR;  Service: General;  Laterality: N/A;       SOCIAL HISTORY    Social History     Socioeconomic History    Marital status:    Tobacco Use    Smoking status: Former     Current packs/day: 0.25     Average packs/day: 0.3 packs/day for 16.1 years (4.0 ttl pk-yrs)     Types: Cigarettes     Start date: 9/6/2008    Smokeless tobacco: Never   Substance and Sexual Activity    Alcohol use: Yes     Alcohol/week: 2.0 standard drinks of alcohol     Types: 2 Drinks containing 0.5 oz of alcohol per week     Comment: 2 mixed drinks WEEK    Drug use: No    Sexual activity: Yes     Partners: Female     Social Drivers of Health     Financial Resource Strain: Low Risk  (1/12/2024)    Overall Financial Resource Strain (CARDIA)     Difficulty of Paying Living Expenses: Not hard at all   Food Insecurity: No Food Insecurity (1/12/2024)    Hunger Vital Sign     Worried About Running Out of Food in the Last Year: Never true     Ran Out of Food in the Last Year: Never true   Transportation Needs: No Transportation Needs (1/12/2024)    PRAPARE - Transportation     Lack of Transportation (Medical): No     Lack of Transportation (Non-Medical): No   Physical Activity: Insufficiently Active (1/12/2024)    Exercise Vital Sign     Days of  "Exercise per Week: 3 days     Minutes of Exercise per Session: 30 min   Stress: No Stress Concern Present (1/12/2024)    [employer] - Occupational Stress Questionnaire     Feeling of Stress : Only a little   Recent Concern: Stress - Stress Concern Present (11/7/2023)    [employer] - Occupational Stress Questionnaire     Feeling of Stress : To some extent   Housing Stability: Low Risk  (1/12/2024)    Housing Stability Vital Sign     Unable to Pay for Housing in the Last Year: No     Number of Places Lived in the Last Year: 1     Unstable Housing in the Last Year: No       FAMILY HISTORY    Family History   Problem Relation Name Age of Onset    Cancer Mother      Brain cancer Mother      Cancer Brother      Macular degeneration Brother      Pancreatic cancer Brother      Prostate cancer Brother      Melanoma Neg Hx      Colon cancer Neg Hx         REVIEW OF SYSTEMS    Review of Systems   Respiratory:  Positive for shortness of breath.      All other systems reviewed and are negative    VITAL SIGNS:   /60   Pulse 75   Temp 97.8 °F (36.6 °C) (Oral)   Resp (!) 24   Ht 6' 5" (1.956 m)   Wt 70 kg (154 lb 6.4 oz)   SpO2 95%   BMI 18.31 kg/m²      Physical Exam  Constitutional:       Appearance: Normal appearance.   HENT:      Head: Normocephalic.   Cardiovascular:      Rate and Rhythm: Normal rate.   Pulmonary:      Effort: Pulmonary effort is normal. No respiratory distress.      Breath sounds: Examination of the left-lower field reveals decreased breath sounds. Decreased breath sounds present.   Abdominal:      Palpations: Abdomen is soft.   Musculoskeletal:         General: Normal range of motion.   Skin:     General: Skin is warm.      Capillary Refill: Capillary refill takes less than 2 seconds.   Neurological:      General: No focal deficit present.      Mental Status: He is alert.      GCS: GCS eye subscore is 4. GCS verbal subscore is 5. GCS " motor subscore is 6.   Psychiatric:         Attention and Perception: Attention normal.         Mood and Affect: Mood normal.         Speech: Speech normal.       Vitals and nursing note reviewed.     LABS    Labs Reviewed   CBC W/ AUTO DIFFERENTIAL - Abnormal       Result Value    WBC 13.12 (*)     RBC 3.41 (*)     Hemoglobin 10.4 (*)     Hematocrit 30.5 (*)     MCV 89      MCH 30.5      MCHC 34.1      RDW 12.6      Platelets 402      MPV 8.5 (*)     Immature Granulocytes 0.8 (*)     Gran # (ANC) 10.1 (*)     Immature Grans (Abs) 0.10 (*)     Lymph # 0.8 (*)     Mono # 2.1 (*)     Eos # 0.1      Baso # 0.04      nRBC 0      Gran % 76.5 (*)     Lymph % 6.3 (*)     Mono % 15.6 (*)     Eosinophil % 0.5      Basophil % 0.3      Platelet Estimate Increased (*)     Differential Method Automated     COMPREHENSIVE METABOLIC PANEL - Abnormal    Sodium 135 (*)     Potassium 3.7      Chloride 102      CO2 19 (*)     Glucose 122 (*)     BUN 24 (*)     Creatinine 1.0      Calcium 9.8      Total Protein 6.8      Albumin 2.9 (*)     Total Bilirubin 0.3      Alkaline Phosphatase 75      AST 15      ALT 12      eGFR >60.0      Anion Gap 14     D DIMER, QUANTITATIVE - Abnormal    D-Dimer 2.35 (*)    INFLUENZA A & B BY MOLECULAR    Influenza A, Molecular Negative      Influenza B, Molecular Negative      Flu A & B Source Nasal Swab     TROPONIN I    Troponin I <0.006     SARS-COV-2 RNA AMPLIFICATION, QUAL    SARS-CoV-2 RNA, Amplification, Qual Negative           EKG        RADIOLOGY    CTA Chest Non-Coronary (PE Studies)   Final Result      No evidence of a pulmonary embolus.      Worsening multifocal consolidation in the right lung, most pronounced in the upper lobe.  Differential possibilities include atypical infection as well as drug related pneumonitis in this patient receiving immunotherapy for lung carcinoma.      Findings in the left lung are not significantly changed, and may be due to a combination of tumor and pneumonitis.   There is no history of radiation to the thorax to account for these findings.      Pathologic compression fracture at L1.         Electronically signed by: Telma Dupree   Date:    10/17/2024   Time:    14:52            PROCEDURES    Procedures    Medications   azithromycin (ZITHROMAX) 500 mg in D5W 250 mL  IVPB (admixture device) (500 mg Intravenous New Bag 10/17/24 1613)   iohexoL (OMNIPAQUE 350) injection 75 mL (75 mLs Intravenous Given 10/17/24 1412)   sodium chloride 0.9% bolus 500 mL 500 mL (0 mLs Intravenous Stopped 10/17/24 1540)         ED Course as of 10/17/24 1624   Thu Oct 17, 2024   1420 RT checked for walking O2 sat, 85% on room air, requiring home oxygen  [GK]      ED Course User Index  [GK] Jennifer De La Paz NP         Medical Decision Making  Akil Guzman is a 79 y.o. male with PMH lung cancer who presents to ER for evaluation of SOB that got worse for the past 2 days. No other specific aggravating or relieving factors otherwise.    Differential Diagnosis includes but is not limited to: Acute Viral Syndrome, Influenza, COVID, Bronchitis, PE, Pneumonia. Differentials I have considered and consider less likely: sepsis, bacteremia    The patient is presenting for signs and symptoms most likely consistent with Pneumonia with ongoing lung cancer.  The pt presents with shortness of breath, and was found to have consolidation with elevated WBC.At this time the pt is requiring supplemental oxygen to maintain 02 sats with exertion, walking. Consulted DR. Bloom who agreed admission, consulted  [facility] medicine for admission.   disposition : admitted to hospital medicine      Problems Addressed:  Low oxygen saturation: acute illness or injury  Malignant neoplasm of lung, unspecified laterality, unspecified part of lung: chronic illness or injury  Pneumonitis: acute illness or injury  Shortness of breath: chronic illness or injury with exacerbation, progression, or side effects of  treatment    Amount and/or Complexity of Data Reviewed  Labs: ordered. Decision-making details documented in ED Course.  Radiology: ordered. Decision-making details documented in ED Course.    Risk  Prescription drug management.  Decision regarding hospitalization.           Discontinued Medications    No medications on file       New Prescriptions    No medications on file         DISPOSITION  Patient admitted to hospital medicine         FINAL IMPRESSION    1. Pneumonitis    2. Shortness of breath    3. Low oxygen saturation    4. Malignant neoplasm of lung, unspecified laterality, unspecified part of lung           Jennifer De La Paz, JODI  10/17/24 3222

## 2024-10-17 NOTE — CARE UPDATE
10/17/24 1410   Home Oxygen Qualification   $ Home O2 Qualification Tech time 15 minutes;Pulmonary Stress Test/6 min walk   Room Air SpO2 At Rest 95 %   Room Air SpO2 During Ambulation (!) 85 %   SpO2 During Ambulation on O2 94 %   Heart Rate on O2 110 bpm   Ambulation O2 LPM 2 LPM   SpO2 Post Ambulation 94 %   Post Ambulation Heart Rate 86 bpm   Post Ambulation O2 LPM 2 LPM

## 2024-10-17 NOTE — H&P
[facility] Emergency Dept    History & Physical      Patient Name: Akil Guzman  MRN: [id number]  Admission Date: 10/17/2024  Attending Physician: Abel Sabillon DO   Primary Care Provider: Nola Ervin MD         Patient information was obtained from patient, spouse/SO, and ER records.     Subjective:     Principal Problem:Community acquired pneumonia of right upper lobe of lung    Chief Complaint:   Chief Complaint   Patient presents with    Shortness of Breath     Shortness of breath worse over the last 2 days. Hx of lung CA. Last chemo mid September.        HPI:  79-year-old male who has noted over the past few days of increasing shortness of breath patient has a diagnosis of stage IV metastatic lung cancer.  And has been taking chemotherapy for a total of 14 months recently has been diagnosed with a pathological fracture in the lumbar spine.  And was planned for radiation therapy today past medical history includes basal cell carcinoma history of colon cancer back in 1999 ,stage IVB adenocarcinoma of the lung has been treated with carbo platinum since 09/13/2023 recent PET scan showed malignant neoplasm of the upper lobe of the left lung patient has had ongoing progression it was being evaluated for possible liquid biopsy for any actionable mutations recent fracture of L1.  His planning for radiation therapy currently on oral analgesics.  Workup in the emergency room revealed    WBCs at 13.12, hemoglobin 10.4 platelets of 402 sodium 135 potassium 3.7 CO2 of 19 anion gap of 14 BUN 24 creatinine of 1 troponins were negative    CTA of the chestWorsening multifocal consolidation in the right lung, most pronounced in the upper lobe.  Differential possibilities include atypical infection as well as drug related pneumonitis in this patient receiving immunotherapy for lung carcinoma.     Findings in the left lung are not significantly changed, and may be due to a combination of tumor and pneumonitis.  There  is no history of radiation to the thorax to account for these findings.     Pathologic compression fracture at L1.   Influenza a and B was obtained.    Case discussed with the patient and wife.  Offered them treatment here this hospital treat underlying pneumonia.  Oxygen therapy discussed if this was a worsening cancer related.  Also discussed that we did not have radiation to perform there.  But our goal was to treat underlying infection stabilized.  And inflammation patient was accepted admission to the hospital.  We will treat his stated as above    Past Medical History:   Diagnosis Date    Allergy     SEASON    Basal cell carcinoma 2009    L ear    Cancer     Hx colon     Colon cancer     states dx in 1999    Colon polyp     Pneumonia, unspecified organism 07/2023       Past Surgical History:   Procedure Laterality Date    CATARACT EXTRACTION Bilateral 2022    COLON SURGERY  2000    1ft. sigmoid removed    COLONOSCOPY N/A 09/28/2020    Procedure: COLONOSCOPY;  Surgeon: Ty Pollock MD;  Location: [facility] ENDO;  Service: General;  Laterality: N/A;    INSERTION OF TUNNELED CENTRAL VENOUS CATHETER (CVC) WITH SUBCUTANEOUS PORT N/A 09/11/2023    Procedure: [id number];  Surgeon: Antwon Peres Jr., MD;  Location: [facility] OR;  Service: General;  Laterality: N/A;       Review of patient's allergies indicates:   Allergen Reactions    Penicillins      Other reaction(s): Rash  50 YEARS AGO         Current Facility-Administered Medications on File Prior to Encounter   Medication    0.9%  NaCl infusion (for blood administration)    heparin, porcine (PF) 100 unit/mL injection flush 500 Units    sodium chloride 0.9% flush 10 mL     Current Outpatient Medications on File Prior to Encounter   Medication Sig    albuterol (PROVENTIL) 2.5 mg /3 mL (0.083 %) nebulizer solution Take 3 mLs (2.5 mg total) by nebulization every 6 (six) hours as needed for Wheezing. Rescue    albuterol (VENTOLIN HFA) 90  "mcg/actuation inhaler Inhale 2 puffs into the lungs every 6 (six) hours as needed for Wheezing. Rescue    ammonium lactate (LAC-HYDRIN) 12 % lotion Apply topically every evening.    clobetasoL (TEMOVATE) 0.05 % external solution Mix into jar of cerave and apply twice daily to AA    cyanocobalamin 1,000 mcg/mL injection Inject 1 mL (1,000 mcg total) into the skin every 30 days.    duke's soln (benadryl 30 mL, mylanta 30 mL, LIDOcaine 30 mL, nystatin 30 mL) 120mL Take 10 mLs by mouth 4 (four) times daily. (Patient not taking: Reported on 8/9/2024)    finasteride (PROSCAR) 5 mg tablet TAKE 1 TABLET BY MOUTH EVERY DAY FOR PROSTATE    folic acid (FOLVITE) 1 MG tablet Take 1 tablet (1 mg total) by mouth once daily. (Patient not taking: Reported on 8/9/2024)    HYDROcodone-acetaminophen (NORCO)  mg per tablet Take 1 tablet by mouth every 6 (six) hours as needed for Pain.    hydrOXYzine pamoate (VISTARIL) 25 MG Cap Take 1 capsule (25 mg total) by mouth every 8 (eight) hours as needed (itching).    ipratropium (ATROVENT) 42 mcg (0.06 %) nasal spray 1 spray by Each Nostril route 2 (two) times daily.    magnesium oxide (MAG-OX) 400 mg (241.3 mg magnesium) tablet Take 1 tablet (400 mg total) by mouth 2 (two) times daily.    megestroL (MEGACE) 400 mg/10 mL (40 mg/mL) Susp Take 10 mLs (400 mg total) by mouth 2 (two) times daily.    promethazine (PHENERGAN) 25 MG tablet Take 1 tablet (25 mg total) by mouth every 4 to 6 hours as needed. (Patient not taking: Reported on 8/9/2024)    syringe with needle 1 mL 25 gauge x 1" Syrg 1 each by Misc.(Non-Drug; Combo Route) route every 30 days. (Patient not taking: Reported on 8/9/2024)    tamsulosin (FLOMAX) 0.4 mg Cap Take 1 capsule (0.4 mg total) by mouth once daily.    temazepam (RESTORIL) 30 mg capsule Take 1 capsule (30 mg total) by mouth nightly as needed for Insomnia. TAKE NIGHTLY AS NEEDED    triamcinolone acetonide 0.1% (KENALOG) 0.1 % cream Apply topically 2 (two) times " daily.    [DISCONTINUED] dutasteride (AVODART) 0.5 mg capsule Take 1 capsule (0.5 mg total) by mouth once daily.     Family History       Problem Relation (Age of Onset)    Brain cancer Mother    Cancer Mother, Brother    Macular degeneration Brother    Pancreatic cancer Brother    Prostate cancer Brother          Tobacco Use    Smoking status: Former     Current packs/day: 0.25     Average packs/day: 0.3 packs/day for 16.1 years (4.0 ttl pk-yrs)     Types: Cigarettes     Start date: 9/6/2008    Smokeless tobacco: Never   Substance and Sexual Activity    Alcohol use: Yes     Alcohol/week: 2.0 standard drinks of alcohol     Types: 2 Drinks containing 0.5 oz of alcohol per week     Comment: 2 mixed drinks WEEK    Drug use: No    Sexual activity: Yes     Partners: Female     Review of Systems   Constitutional:  Positive for activity change and fatigue. Negative for fever.   HENT:  Positive for congestion.    Eyes: Negative.    Respiratory:  Positive for cough and shortness of breath.    Cardiovascular:  Negative for chest pain.   Gastrointestinal: Negative.    Endocrine: Negative.    Genitourinary: Negative.    Musculoskeletal:  Positive for back pain.   Skin: Negative.    Neurological:  Positive for weakness.   Hematological: Negative.    Psychiatric/Behavioral: Negative.       Objective:     Vital Signs (Most Recent):  Temp: 97.8 °F (36.6 °C) (10/17/24 1222)  Pulse: 75 (10/17/24 1505)  Resp: (!) 24 (10/17/24 1220)  BP: 123/60 (10/17/24 1219)  SpO2: 95 % (10/17/24 1505) Vital Signs (24h Range):  Temp:  [97.7 °F (36.5 °C)-97.8 °F (36.6 °C)] 97.8 °F (36.6 °C)  Pulse:  [] 75  Resp:  [24] 24  SpO2:  [91 %-95 %] 95 %  BP: (123)/(60) 123/60     Weight: 70 kg (154 lb 6.4 oz)  Body mass index is 18.31 kg/m².    Physical Exam  Constitutional:       Appearance: He is normal weight. He is ill-appearing.   HENT:      Head: Normocephalic and atraumatic.      Mouth/Throat:      Mouth: Mucous membranes are dry.   Eyes:       "Extraocular Movements: Extraocular movements intact.      Pupils: Pupils are equal, round, and reactive to light.   Cardiovascular:      Rate and Rhythm: Normal rate and regular rhythm.   Pulmonary:      Effort: Pulmonary effort is normal.      Breath sounds: Normal breath sounds.   Abdominal:      General: Abdomen is flat. Bowel sounds are normal.      Palpations: Abdomen is soft.   Musculoskeletal:         General: Normal range of motion.      Cervical back: Normal range of motion and neck supple.   Skin:     General: Skin is warm and dry.   Neurological:      General: No focal deficit present.      Mental Status: He is alert and oriented to person, place, and time.   Psychiatric:         Mood and Affect: Mood normal.           CRANIAL NERVES     CN III, IV, VI   Pupils are equal, round, and reactive to light.      Significant Labs: All pertinent labs within the past 24 hours have been reviewed.  Bilirubin:   Recent Labs   Lab 10/17/24  1308   BILITOT 0.3     Blood Culture: No results for input(s): "LABBLOO" in the last 48 hours.  BMP:   Recent Labs   Lab 10/17/24  1308   *   *   K 3.7      CO2 19*   BUN 24*   CREATININE 1.0   CALCIUM 9.8     CBC:   Recent Labs   Lab 10/17/24  1308   WBC 13.12*   HGB 10.4*   HCT 30.5*        CMP:   Recent Labs   Lab 10/17/24  1308   *   K 3.7      CO2 19*   *   BUN 24*   CREATININE 1.0   CALCIUM 9.8   PROT 6.8   ALBUMIN 2.9*   BILITOT 0.3   ALKPHOS 75   AST 15   ALT 12   ANIONGAP 14     Cardiac Markers: No results for input(s): "CKMB", "MYOGLOBIN", "BNP", "TROPISTAT" in the last 48 hours.  Lactic Acid: No results for input(s): "LACTATE" in the last 48 hours.  Lipase: No results for input(s): "LIPASE" in the last 48 hours.  Magnesium: No results for input(s): "MG" in the last 48 hours.    Significant Imaging: I have reviewed all pertinent imaging results/findings within the past 24 hours.    Assessment/Plan:   Acute hypoxic respiratory " failure patient was requiring oxygen on 2 L satting at 98% patient was developing severe dyspnea on exertion recommend checking echocardiogram.    Community-acquired pneumonia with a stage IV cancer this may be infiltrative disease.  He was worsening views on the PET scan recently.  He is continuing under treatment per oncology patient has a high risk of further deterioration.  But we will treat from an infectious cause inflammatory cause with antibiotics as well as steroids and nebulized therapy oxygen stabilized keep sats greater than 90% work with physical therapy on movement.     Levaquin    Gentamicin pharmacy to dose    Vancomycin pharmacy to dose    Dual nebulized therapy    Solu-Medrol 60 mg q.6 hours        Lung cancer stage IV B adenocarcinoma follow up with Oncology as scheduled      Pathological fracture of L1 we will treat with local analgesics steroids.  Pain control rescheduled radiation treatment for early next week    VT management patient was high risk start Lovenox 40 mg subQ q.day    Cancer associated pain we will just analgesics as indicated    Leukocytosis secondary to pneumonia and infiltrative disease of the lung    Hypertension treat accordingly.  We will follow    Hyperlipidemia      Patient was overall very Active pursuing aggressive treatment on his adenocarcinoma is unfortunately been complicated with pathological fracture.  And there appears to be spread on the PET scan.  He does have an underlying leukocytosis but no fevers.  So this is likely an early pneumonia complicated with metastatic disease patient will be treated with Solu-Medrol nebulized therapy broad-spectrum antibiotics.  Blood cultures will be obtained.  And oxygen therapy and goals to keep sats greater than 90%.  Active Diagnoses:    Diagnosis Date Noted POA    PRINCIPAL PROBLEM:  Community acquired pneumonia of right upper lobe of lung [J18.9] 10/17/2024 Yes    Acute hypoxic respiratory failure [J96.01] 10/17/2024 Yes     Cancer associated pain [G89.3] 01/16/2024 Yes    Leukocytosis [D72.829] 09/14/2023 Yes    Malignant neoplasm of upper lobe of left lung [C34.12] 09/01/2023 Yes    NSCLC of left lung [C34.92] 08/25/2023 Yes    HTN (hypertension) [I10] 08/01/2012 Yes    Hyperlipidemia [E78.5] 08/01/2012 Yes      Problems Resolved During this Admission:     VTE Risk Mitigation (From admission, onward)           Ordered     enoxaparin injection 40 mg  Every 12 hours         10/17/24 1652     IP VTE HIGH RISK PATIENT  Once         10/17/24 1652     Place sequential compression device  Until discontinued         10/17/24 1652                      Abel Sabillon DO  Department of Hospital Medicine   [place] - Emergency Dept

## 2024-10-17 NOTE — ED NOTES
Attempted to give handoff report to floor. RN taking report is with another patient. Expected return call.

## 2024-10-18 ENCOUNTER — TELEPHONE (OUTPATIENT)
Dept: FAMILY MEDICINE | Facility: CLINIC | Age: 79
End: 2024-10-18
Payer: MEDICARE

## 2024-10-18 ENCOUNTER — TELEPHONE (OUTPATIENT)
Dept: PULMONOLOGY | Facility: CLINIC | Age: 79
End: 2024-10-18
Payer: MEDICARE

## 2024-10-18 VITALS
HEART RATE: 79 BPM | DIASTOLIC BLOOD PRESSURE: 60 MMHG | RESPIRATION RATE: 21 BRPM | WEIGHT: 154 LBS | OXYGEN SATURATION: 95 % | SYSTOLIC BLOOD PRESSURE: 117 MMHG | TEMPERATURE: 98 F | BODY MASS INDEX: 18.18 KG/M2 | HEIGHT: 77 IN

## 2024-10-18 LAB
ANION GAP SERPL CALC-SCNC: 12 MMOL/L (ref 8–16)
AORTIC ROOT ANNULUS: 3.37 CM
AORTIC VALVE CUSP SEPERATION: 1.82 CM
ASCENDING AORTA: 3.11 CM
AV INDEX (PROSTH): 0.56
AV MEAN GRADIENT: 5.9 MMHG
AV PEAK GRADIENT: 10.2 MMHG
AV VALVE AREA BY VELOCITY RATIO: 2.3 CM²
AV VALVE AREA: 2.3 CM²
AV VELOCITY RATIO: 0.56
BASOPHILS # BLD AUTO: 0.01 K/UL (ref 0–0.2)
BASOPHILS NFR BLD: 0.1 % (ref 0–1.9)
BILIRUB UR QL STRIP: NEGATIVE
BSA FOR ECHO PROCEDURE: 1.95 M2
BUN SERPL-MCNC: 19 MG/DL (ref 8–23)
CALCIUM SERPL-MCNC: 9.4 MG/DL (ref 8.7–10.5)
CHLORIDE SERPL-SCNC: 104 MMOL/L (ref 95–110)
CLARITY UR: CLEAR
CO2 SERPL-SCNC: 17 MMOL/L (ref 23–29)
COLOR UR: YELLOW
CREAT SERPL-MCNC: 0.9 MG/DL (ref 0.5–1.4)
CV ECHO LV RWT: 0.39 CM
DIFFERENTIAL METHOD BLD: ABNORMAL
DOP CALC AO PEAK VEL: 1.6 M/S
DOP CALC AO VTI: 37.4 CM
DOP CALC LVOT AREA: 4.2 CM2
DOP CALC LVOT DIAMETER: 2.3 CM
DOP CALC LVOT PEAK VEL: 0.9 M/S
DOP CALC LVOT STROKE VOLUME: 87.6 CM3
DOP CALC MV VTI: 20.5 CM
DOP CALCLVOT PEAK VEL VTI: 21.1 CM
E WAVE DECELERATION TIME: 191.63 MSEC
E/A RATIO: 0.71
ECHO LV POSTERIOR WALL: 1.1 CM (ref 0.6–1.1)
EJECTION FRACTION: 53 %
EOSINOPHIL # BLD AUTO: 0 K/UL (ref 0–0.5)
EOSINOPHIL NFR BLD: 0 % (ref 0–8)
ERYTHROCYTE [DISTWIDTH] IN BLOOD BY AUTOMATED COUNT: 12.4 % (ref 11.5–14.5)
EST. GFR  (NO RACE VARIABLE): >60 ML/MIN/1.73 M^2
FRACTIONAL SHORTENING: 28.1 % (ref 28–44)
GENTAMICIN SERPL-MCNC: 3.2 UG/ML
GLOBAL LONGITUIDAL STRAIN: 19 %
GLUCOSE SERPL-MCNC: 141 MG/DL (ref 70–110)
GLUCOSE UR QL STRIP: NEGATIVE
HCT VFR BLD AUTO: 28.8 % (ref 40–54)
HGB BLD-MCNC: 9.8 G/DL (ref 14–18)
HGB UR QL STRIP: NEGATIVE
IMM GRANULOCYTES # BLD AUTO: 0.04 K/UL (ref 0–0.04)
IMM GRANULOCYTES NFR BLD AUTO: 0.5 % (ref 0–0.5)
INTERVENTRICULAR SEPTUM: 1.1 CM (ref 0.6–1.1)
KETONES UR QL STRIP: NEGATIVE
LA MAJOR: 2.76 CM
LA MINOR: 3.34 CM
LEFT ATRIUM AREA SYSTOLIC (APICAL 2 CHAMBER): 10.07 CM2
LEFT ATRIUM AREA SYSTOLIC (APICAL 4 CHAMBER): 8.75 CM2
LEFT ATRIUM SIZE: 3.38 CM
LEFT ATRIUM VOLUME INDEX MOD: 11.3 ML/M2
LEFT ATRIUM VOLUME MOD: 22.63 ML
LEFT INTERNAL DIMENSION IN SYSTOLE: 4.1 CM (ref 2.1–4)
LEFT VENTRICLE DIASTOLIC VOLUME INDEX: 80.19 ML/M2
LEFT VENTRICLE DIASTOLIC VOLUME: 160.37 ML
LEFT VENTRICLE END SYSTOLIC VOLUME APICAL 2 CHAMBER: 24.94 ML
LEFT VENTRICLE END SYSTOLIC VOLUME APICAL 4 CHAMBER: 19.69 ML
LEFT VENTRICLE MASS INDEX: 128.4 G/M2
LEFT VENTRICLE SYSTOLIC VOLUME INDEX: 37.8 ML/M2
LEFT VENTRICLE SYSTOLIC VOLUME: 75.58 ML
LEFT VENTRICULAR INTERNAL DIMENSION IN DIASTOLE: 5.7 CM (ref 3.5–6)
LEFT VENTRICULAR MASS: 256.7 G
LEUKOCYTE ESTERASE UR QL STRIP: NEGATIVE
LVED V (TEICH): 160.37 ML
LVES V (TEICH): 75.58 ML
LVOT MG: 2.03 MMHG
LVOT MV: 0.68 CM/S
LYMPHOCYTES # BLD AUTO: 0.7 K/UL (ref 1–4.8)
LYMPHOCYTES NFR BLD: 9.1 % (ref 18–48)
MAGNESIUM SERPL-MCNC: 1.7 MG/DL (ref 1.6–2.6)
MCH RBC QN AUTO: 30.6 PG (ref 27–31)
MCHC RBC AUTO-ENTMCNC: 34 G/DL (ref 32–36)
MCV RBC AUTO: 90 FL (ref 82–98)
MONOCYTES # BLD AUTO: 0.1 K/UL (ref 0.3–1)
MONOCYTES NFR BLD: 1.1 % (ref 4–15)
MV MEAN GRADIENT: 2 MMHG
MV PEAK A VEL: 0.94 M/S
MV PEAK E VEL: 0.67 M/S
MV PEAK GRADIENT: 5 MMHG
MV STENOSIS PRESSURE HALF TIME: 45.95 MS
MV VALVE AREA BY CONTINUITY EQUATION: 4.27 CM2
MV VALVE AREA P 1/2 METHOD: 4.79 CM2
NEUTROPHILS # BLD AUTO: 6.5 K/UL (ref 1.8–7.7)
NEUTROPHILS NFR BLD: 89.2 % (ref 38–73)
NITRITE UR QL STRIP: NEGATIVE
NRBC BLD-RTO: 0 /100 WBC
OHS CV RV/LV RATIO: 0.35 CM
PH UR STRIP: 6 [PH] (ref 5–8)
PHOSPHATE SERPL-MCNC: 3.8 MG/DL (ref 2.7–4.5)
PISA MRMAX VEL: 4.3 M/S
PISA TR MAX VEL: 2.27 M/S
PLATELET # BLD AUTO: 408 K/UL (ref 150–450)
PMV BLD AUTO: 9.1 FL (ref 9.2–12.9)
POTASSIUM SERPL-SCNC: 4.6 MMOL/L (ref 3.5–5.1)
PROT UR QL STRIP: NEGATIVE
PV MV: 0.69 M/S
PV PEAK GRADIENT: 3 MMHG
PV PEAK VELOCITY: 0.93 M/S
RA MAJOR: 3.08 CM
RA PRESSURE ESTIMATED: 3 MMHG
RA WIDTH: 3.14 CM
RBC # BLD AUTO: 3.2 M/UL (ref 4.6–6.2)
RIGHT VENTRICLE DIASTOLIC BASEL DIMENSION: 2 CM
RIGHT VENTRICLE DIASTOLIC LENGTH: 3.8 CM
RIGHT VENTRICLE DIASTOLIC MID DIMENSION: 1.5 CM
RIGHT VENTRICULAR END-DIASTOLIC DIMENSION: 2.42 CM
RIGHT VENTRICULAR LENGTH IN DIASTOLE (APICAL 4-CHAMBER VIEW): 3.8 CM
RV MID DIAMA: 1.51 CM
RV TB RVSP: 5 MMHG
SINUS: 2.82 CM
SODIUM SERPL-SCNC: 133 MMOL/L (ref 136–145)
SP GR UR STRIP: 1.01 (ref 1–1.03)
STJ: 3.13 CM
TR MAX PG: 21 MMHG
TRICUSPID ANNULAR PLANE SYSTOLIC EXCURSION: 2.5 CM
TV REST PULMONARY ARTERY PRESSURE: 24 MMHG
URN SPEC COLLECT METH UR: NORMAL
UROBILINOGEN UR STRIP-ACNC: NEGATIVE EU/DL
VANCOMYCIN SERPL-MCNC: 14.3 UG/ML
WBC # BLD AUTO: 7.29 K/UL (ref 3.9–12.7)
Z-SCORE OF LEFT VENTRICULAR DIMENSION IN END DIASTOLE: -0.23
Z-SCORE OF LEFT VENTRICULAR DIMENSION IN END SYSTOLE: 1.08

## 2024-10-18 PROCEDURE — 81003 URINALYSIS AUTO W/O SCOPE: CPT | Performed by: INTERNAL MEDICINE

## 2024-10-18 PROCEDURE — 84100 ASSAY OF PHOSPHORUS: CPT | Performed by: INTERNAL MEDICINE

## 2024-10-18 PROCEDURE — 80048 BASIC METABOLIC PNL TOTAL CA: CPT | Performed by: INTERNAL MEDICINE

## 2024-10-18 PROCEDURE — 87449 NOS EACH ORGANISM AG IA: CPT | Performed by: INTERNAL MEDICINE

## 2024-10-18 PROCEDURE — 80170 ASSAY OF GENTAMICIN: CPT | Performed by: INTERNAL MEDICINE

## 2024-10-18 PROCEDURE — 63600175 PHARM REV CODE 636 W HCPCS: Performed by: INTERNAL MEDICINE

## 2024-10-18 PROCEDURE — 25000003 PHARM REV CODE 250: Performed by: INTERNAL MEDICINE

## 2024-10-18 PROCEDURE — 36415 COLL VENOUS BLD VENIPUNCTURE: CPT | Performed by: INTERNAL MEDICINE

## 2024-10-18 PROCEDURE — 80202 ASSAY OF VANCOMYCIN: CPT | Performed by: INTERNAL MEDICINE

## 2024-10-18 PROCEDURE — S0179 MEGESTROL 20 MG: HCPCS | Performed by: INTERNAL MEDICINE

## 2024-10-18 PROCEDURE — 97161 PT EVAL LOW COMPLEX 20 MIN: CPT

## 2024-10-18 PROCEDURE — 85025 COMPLETE CBC W/AUTO DIFF WBC: CPT | Performed by: INTERNAL MEDICINE

## 2024-10-18 PROCEDURE — 83735 ASSAY OF MAGNESIUM: CPT | Performed by: INTERNAL MEDICINE

## 2024-10-18 RX ORDER — MUPIROCIN 20 MG/G
OINTMENT TOPICAL 2 TIMES DAILY
Status: DISCONTINUED | OUTPATIENT
Start: 2024-10-18 | End: 2024-10-18 | Stop reason: HOSPADM

## 2024-10-18 RX ORDER — IPRATROPIUM BROMIDE 42 UG/1
1 SPRAY, METERED NASAL 2 TIMES DAILY
Qty: 15 ML | Refills: 0 | Status: SHIPPED | OUTPATIENT
Start: 2024-10-18

## 2024-10-18 RX ORDER — LEVOFLOXACIN 500 MG/1
500 TABLET, FILM COATED ORAL DAILY
Status: DISCONTINUED | OUTPATIENT
Start: 2024-10-18 | End: 2024-10-18 | Stop reason: HOSPADM

## 2024-10-18 RX ORDER — HYDROXYZINE PAMOATE 25 MG/1
25 CAPSULE ORAL EVERY 8 HOURS PRN
Qty: 30 CAPSULE | Refills: 0 | Status: SHIPPED | OUTPATIENT
Start: 2024-10-18

## 2024-10-18 RX ORDER — HYDROCODONE BITARTRATE AND ACETAMINOPHEN 10; 325 MG/1; MG/1
1 TABLET ORAL EVERY 6 HOURS PRN
Qty: 60 TABLET | Refills: 0 | Status: SHIPPED | OUTPATIENT
Start: 2024-10-18

## 2024-10-18 RX ORDER — PROMETHAZINE HYDROCHLORIDE 25 MG/1
25 TABLET ORAL
Qty: 30 TABLET | Refills: 5 | Status: SHIPPED | OUTPATIENT
Start: 2024-10-18

## 2024-10-18 RX ORDER — METHYLPREDNISOLONE 4 MG/1
TABLET ORAL
Qty: 21 EACH | Refills: 0 | Status: SHIPPED | OUTPATIENT
Start: 2024-10-18 | End: 2024-11-08

## 2024-10-18 RX ORDER — LEVOFLOXACIN 500 MG/1
500 TABLET, FILM COATED ORAL DAILY
Qty: 10 TABLET | Refills: 0 | Status: SHIPPED | OUTPATIENT
Start: 2024-10-18

## 2024-10-18 RX ADMIN — METHYLPREDNISOLONE SODIUM SUCCINATE 60 MG: 40 INJECTION, POWDER, FOR SOLUTION INTRAMUSCULAR; INTRAVENOUS at 11:10

## 2024-10-18 RX ADMIN — FINASTERIDE 10 MG: 5 TABLET, FILM COATED ORAL at 09:10

## 2024-10-18 RX ADMIN — LEVOFLOXACIN 500 MG: 500 TABLET, FILM COATED ORAL at 11:10

## 2024-10-18 RX ADMIN — METHYLPREDNISOLONE SODIUM SUCCINATE 60 MG: 40 INJECTION, POWDER, FOR SOLUTION INTRAMUSCULAR; INTRAVENOUS at 05:10

## 2024-10-18 RX ADMIN — ENOXAPARIN SODIUM 40 MG: 40 INJECTION SUBCUTANEOUS at 09:10

## 2024-10-18 RX ADMIN — MAGNESIUM OXIDE 400 MG: 400 TABLET ORAL at 09:10

## 2024-10-18 RX ADMIN — MEGESTROL ACETATE 400 MG: 40 SUSPENSION ORAL at 09:10

## 2024-10-18 RX ADMIN — TAMSULOSIN HYDROCHLORIDE 0.4 MG: 0.4 CAPSULE ORAL at 09:10

## 2024-10-18 NOTE — PLAN OF CARE
Problem: Adult Inpatient Plan of Care  Goal: Plan of Care Review  Outcome: Met  Goal: Patient-Specific Goal (Individualized)  Outcome: Met  Goal: Absence of Hospital-Acquired Illness or Injury  Outcome: Met  Goal: Optimal Comfort and Wellbeing  Outcome: Met  Goal: Readiness for Transition of Care  Outcome: Met     Problem: Pneumonia  Goal: Fluid Balance  Outcome: Met  Goal: Resolution of Infection Signs and Symptoms  Outcome: Met  Goal: Effective Oxygenation and Ventilation  Outcome: Met     Problem: Infection  Goal: Absence of Infection Signs and Symptoms  Outcome: Met

## 2024-10-18 NOTE — TELEPHONE ENCOUNTER
----- Message from [place] sent at 10/18/2024 10:30 AM CDT -----  Type:  Sooner Appointment Request    Caller is requesting a sooner appointment.  Caller declined first available appointment listed below.  Caller will not accept being placed on the waitlist and is requesting a message be sent to doctor.    Name of Caller:[facility]    When is the first available appointment?na    Symptoms:hospital follow up -  pneumonia     Would the patient rather a call back or a response via [facility]? Call back    Best Call Back Number:[phone]      Additional Information: needs to be scheduled with in 5-7 days of discharge     Please call Back to advise. Thanks!

## 2024-10-18 NOTE — PROGRESS NOTES
Pharmacokinetic Assessment Follow Up: IV Vancomycin    Vancomycin serum concentration assessment(s):    The random level was drawn correctly and can be used to guide therapy at this time. The measurement is below the desired definitive target range of 15 to 20 mcg/mL.    Vancomycin Regimen Plan:    Continue regimen to Vancomycin 2000 mg IV every 24 hours with next serum trough concentration measured at 16:30 prior to third dose on 10/19/24.    Drug levels (last 3 results):  Recent Labs   Lab Result Units 10/18/24  0803   Vancomycin, Random ug/mL 14.3       Pharmacy will continue to follow and monitor vancomycin.    Please contact pharmacy at extension [phone] for questions regarding this assessment.    Thank you for the consult,   Markos Young, JerryD       Patient brief summary:  Akil Guzman is a 79 y.o. male initiated on antimicrobial therapy with IV Vancomycin for treatment of  pneumonia.    Drug Allergies:   Review of patient's allergies indicates:   Allergen Reactions    Penicillins      Other reaction(s): Rash  50 YEARS AGO         Actual Body Weight:   69.9 kg    Renal Function:   Estimated Creatinine Clearance: 66.9 mL/min (based on SCr of 0.9 mg/dL).    Dialysis Method (if applicable):  N/A    CBC (last 72 hours):  Recent Labs   Lab Result Units 10/17/24  1308 10/18/24  0343   WBC K/uL 13.12* 7.29   Hemoglobin g/dL 10.4* 9.8*   Hematocrit % 30.5* 28.8*   Platelets K/uL 402 408   Gran % % 76.5* 89.2*   Lymph % % 6.3* 9.1*   Mono % % 15.6* 1.1*   Eosinophil % % 0.5 0.0   Basophil % % 0.3 0.1   Differential Method  Automated Automated       Metabolic Panel (last 72 hours):  Recent Labs   Lab Result Units 10/17/24  1308 10/18/24  0343 10/18/24  0344   Sodium mmol/L 135* 133*  --    Potassium mmol/L 3.7 4.6  --    Chloride mmol/L 102 104  --    CO2 mmol/L 19* 17*  --    Glucose mg/dL 122* 141*  --    Glucose, UA   --   --  Negative   BUN mg/dL 24* 19  --    Creatinine mg/dL 1.0 0.9  --    Albumin g/dL 2.9*  --    --    Total Bilirubin mg/dL 0.3  --   --    Alkaline Phosphatase U/L 75  --   --    AST U/L 15  --   --    ALT U/L 12  --   --    Magnesium mg/dL  --  1.7  --    Phosphorus mg/dL  --  3.8  --        Vancomycin Administrations:  vancomycin given in the last 96 hours                     vancomycin 2 g in 0.9% sodium chloride 500 mL IVPB (mg) 2,000 mg New Bag 10/17/24 1916                    Microbiologic Results:  Microbiology Results (last 7 days)       Procedure Component Value Units Date/Time    Blood culture (site 2) [[id number]] Collected: 10/17/24 1836    Order Status: Completed Specimen: Blood Updated: 10/18/24 0345     Blood Culture, Routine No Growth to date    Narrative:      Site # 2, aerobic only    Influenza A & B by Molecular [[id number]] Collected: 10/17/24 1421    Order Status: Completed Specimen: Nasopharyngeal Swab Updated: 10/17/24 1449     Influenza A, Molecular Negative     Influenza B, Molecular Negative     Flu A & B Source Nasal Swab

## 2024-10-18 NOTE — PT/OT/SLP EVAL
Physical Therapy Evaluation     Patient Name: Akil Guzman   MRN: [id number]  Recent Surgery: * No surgery found *      Recommendations:     Discharge Recommendations: No Therapy Indicated   Discharge Equipment Recommendations: none   Barriers to discharge: None    Assessment:     Akil Guzman is a 79 y.o. male admitted with a medical diagnosis of Community acquired pneumonia of right upper lobe of lung. He presents with the following impairments/functional limitations: None    Rehab Prognosis: Good; patient would benefit from acute PT services to address these deficits and reach maximum level of function.    Plan:     During this hospitalization, patient to be seen  (Evaluation only) to address the above listed problems via      Plan of Care Expires: 10/18/24    Subjective     Chief Complaint: The patient reports he is ready to go home.  Patient Comments/Goals: Return to independent mobility.  Pain/Comfort:  Pain Rating 1: 0/10    Social History:  Living Environment: Patient lives with their spouse in a single story home with  no steps to enter the residence.  Prior Level of Function: Prior to admission, patient was independent  Equipment Used at Home: none  DME owned (not currently used): none  Assistance Upon Discharge: significant other    Objective:     Communicated with nurse prior to session. Patient found supine with telemetry, pulse ox (continuous), peripheral IV (port-a-cath) upon PT entry to room.    General Precautions: Standard,     Orthopedic Precautions: N/A   Braces: N/A    Respiratory Status: Nasal cannula, flow 2 L/min    Exams:  Cognition: Patient is oriented to Person, Place, Time, Situation  RLE ROM: WNL  RLE Strength: WNL  LLE ROM: WNL  LLE Strength: WNL  Sensation:    -       Intact  light/touch x 4 extremities  RUE ROM: WNL  RUE Strength: WNL  LUE ROM: WNL  LUE Strength: WNL    Functional Mobility:  Gait belt applied - No  Bed Mobility  Rolling - Independent  Scooting - Independent  Supine <>  sit - Independent  Transfers  Sit to Stand: independence with no AD  Balance  Sitting: independence  Standing: independence    Therapeutic Activities and Exercises:   The patient is independent with functional mobility.  He does not require physical therapy services at this time.    AM-PAC 6 CLICK MOBILITY  Total Score:21    Patient left supine with all lines intact, call button in reach, RN notified, bed alarm on, and significant other present.    GOALS:   Multidisciplinary Problems       Physical Therapy Goals          Problem: Physical Therapy    Goal Priority Disciplines Outcome Interventions   Physical Therapy Goal     PT, PT/OT     Description: Goals    Patient to be independent with transfers.  Patient to be independent with ambulation.                       History:     Past Medical History:   Diagnosis Date    Allergy     SEASON    Basal cell carcinoma 2009    L ear    Cancer     Hx colon     Colon cancer     states dx in 1999    Colon polyp     Pneumonia, unspecified organism 07/2023       Past Surgical History:   Procedure Laterality Date    CATARACT EXTRACTION Bilateral 2022    COLON SURGERY  2000    1ft. sigmoid removed    COLONOSCOPY N/A 09/28/2020    Procedure: COLONOSCOPY;  Surgeon: Ty Pollock MD;  Location: [facility];  Service: General;  Laterality: N/A;    INSERTION OF TUNNELED CENTRAL VENOUS CATHETER (CVC) WITH SUBCUTANEOUS PORT N/A 09/11/2023    Procedure: [id number];  Surgeon: Antwon Peres Jr., MD;  Location: [facility] OR;  Service: General;  Laterality: N/A;       Time Tracking:     PT Received On: 10/18/24  PT Start Time: 1005  PT Stop Time: 1025  PT Total Time (min): 20 min     Billable Minutes: Evaluation 20    10/18/2024

## 2024-10-18 NOTE — PROGRESS NOTES
Pharmacokinetic Initial Assessment: Gentamicin    Assessment:  Weight utilized for dose calculation: Actual Body Weight  Dosing method utilized: Dosing by random level    Plan: Extended interval dosing regimen: Gentamicin 210 mg IV once, followed by a random level to be drawn on 10/18 at 0400, 8-12 hours after the first dose.    Pharmacy will continue to monitor.    Please contact pharmacy at extension [phone] with any questions regarding this assessment.    Thank you for the consult,    Gabriel Carr       Patient brief summary:  Akil Guzman is a 79 y.o. male initiated on aminoglycoside therapy for treatment of suspected lower respiratory infection    Drug Allergies:   Review of patient's allergies indicates:   Allergen Reactions    Penicillins      Other reaction(s): Rash  50 YEARS AGO         Actual Body Weight:   70 Kg    Adjust Body Weight:   70 Kg    Ideal Body Weight:  89.1 Kg    Renal Function:   Estimated Creatinine Clearance: 60.3 mL/min (based on SCr of 1 mg/dL).,     Dialysis Method (if applicable):  N/A    CBC (last 72 hours):  Recent Labs   Lab Result Units 10/17/24  1308   WBC K/uL 13.12*   Hemoglobin g/dL 10.4*   Hematocrit % 30.5*   Platelets K/uL 402   Gran % % 76.5*   Lymph % % 6.3*   Mono % % 15.6*   Eosinophil % % 0.5   Basophil % % 0.3   Differential Method  Automated       Metabolic Panel (last 72 hours):  Recent Labs   Lab Result Units 10/17/24  1308   Sodium mmol/L 135*   Potassium mmol/L 3.7   Chloride mmol/L 102   CO2 mmol/L 19*   Glucose mg/dL 122*   BUN mg/dL 24*   Creatinine mg/dL 1.0   Albumin g/dL 2.9*   Total Bilirubin mg/dL 0.3   Alkaline Phosphatase U/L 75   AST U/L 15   ALT U/L 12       Microbiologic Results:  Microbiology Results (last 7 days)       Procedure Component Value Units Date/Time    Blood culture (site 2) [[id number]] Collected: 10/17/24 1836    Order Status: Sent Specimen: Blood     Influenza A & B by Molecular [[id number]] Collected: 10/17/24 1421    Order Status:  Completed Specimen: Nasopharyngeal Swab Updated: 10/17/24 1449     Influenza A, Molecular Negative     Influenza B, Molecular Negative     Flu A & B Source Nasal Swab

## 2024-10-18 NOTE — PLAN OF CARE
[facility] Care Unit  Discharge Final Note    Primary Care Provider: Nola Ervin MD    Expected Discharge Date: 10/18/2024    Patient notified that someone will call with his follow up appointments. Oxygen ordered & delivered from [facility]. His wife is at bedside & will bring him home once he is ready for discharge. Denies any other needs at this time. OK for DC from CM standpoint.    Final Discharge Note (most recent)       Final Note - 10/18/24 1310          Final Note    Assessment Type Final Discharge Note     Anticipated Discharge Disposition Home or Self Care     What phone number can be called within the next 1-3 days to see how you are doing after discharge? [phone]     Hospital Resources/Appts/Education Provided Appointments scheduled and added to AVS;Post-Acute resouces added to AVS        Post-Acute Status    Post-Acute Authorization HME     HME Status Set-up Complete/Auth obtained     Discharge Delays None known at this time                     Important Message from Medicare  Important Message from Medicare regarding Discharge Appeal Rights: Given to patient/caregiver, Explained to patient/caregiver, Signed/date by patient/caregiver     Date IMM was signed: 10/18/24  Time IMM was signed: 1019        Contact Info       Nola Ervin MD   Specialty: Family Medicine   Relationship: PCP - General    [address] [place] [address]  [address]  [place] [address]   Phone: [phone]       Next Steps: Follow up in 1 week(s)    Instructions: Notified [place] Office:  Dr. Centeno office will call patient to get scheduled.   Per Silke Trinidad MD   Specialty: Pulmonary Disease, Critical Care Medicine    [address] [facility]  [address] [place] [address]   Phone: [phone]       Next Steps: Follow up    Instructions: Office will call patient to schedule within the next 5-7 days for a follow up.    [facility]    [address]  [place] [address]   Phone: [phone]       Next Steps:  Follow up    Instructions: will provide oxygen

## 2024-10-18 NOTE — TELEPHONE ENCOUNTER
Explained to pt that I was trying to get him scheduled but it showed that he preferred to see a male provider  pt stated that he never chose that and he really liked Dr Hill  advised that I would correct    Pt scheduled for Wednesday 10/23 at 1:20 pm with Dr Hill  pt v/u

## 2024-10-18 NOTE — PROGRESS NOTES
"Pharmacokinetic Initial Assessment: IV Vancomycin    Assessment/Plan:    Initiate intravenous vancomycin with loading dose of 2000 mg once followed by a maintenance dose of vancomycin 2000 mg IV every 24 hours  Desired empiric serum trough concentration is 15 to 20 mcg/mL  Draw vancomycin random level on 10/18 at 0800.  Pharmacy will continue to follow and monitor vancomycin.      Please contact pharmacy at extension [phone] with any questions regarding this assessment.     Thank you for the consult,   Gabriel Gonge       Patient brief summary:  Akil Guzman is a 79 y.o. male initiated on antimicrobial therapy with IV Vancomycin for treatment of suspected lower respiratory infection    Drug Allergies:   Review of patient's allergies indicates:   Allergen Reactions    Penicillins      Other reaction(s): Rash  50 YEARS AGO         Actual Body Weight:   70 Kg    Renal Function:   Estimated Creatinine Clearance: 60.3 mL/min (based on SCr of 1 mg/dL).,     Dialysis Method (if applicable):  N/A    CBC (last 72 hours):  Recent Labs   Lab Result Units 10/17/24  1308   WBC K/uL 13.12*   Hemoglobin g/dL 10.4*   Hematocrit % 30.5*   Platelets K/uL 402   Gran % % 76.5*   Lymph % % 6.3*   Mono % % 15.6*   Eosinophil % % 0.5   Basophil % % 0.3   Differential Method  Automated       Metabolic Panel (last 72 hours):  Recent Labs   Lab Result Units 10/17/24  1308   Sodium mmol/L 135*   Potassium mmol/L 3.7   Chloride mmol/L 102   CO2 mmol/L 19*   Glucose mg/dL 122*   BUN mg/dL 24*   Creatinine mg/dL 1.0   Albumin g/dL 2.9*   Total Bilirubin mg/dL 0.3   Alkaline Phosphatase U/L 75   AST U/L 15   ALT U/L 12       Drug levels (last 3 results):  No results for input(s): "VANCOMYCINRA", "VANCORANDOM", "VANCOMYCINPE", "VANCOPEAK", "VANCOMYCINTR", "VANCOTROUGH" in the last 72 hours.    Microbiologic Results:  Microbiology Results (last 7 days)       Procedure Component Value Units Date/Time    Blood culture (site 2) [[id number]] " Collected: 10/17/24 1836    Order Status: Sent Specimen: Blood     Influenza A & B by Molecular [[id number]] Collected: 10/17/24 1421    Order Status: Completed Specimen: Nasopharyngeal Swab Updated: 10/17/24 1449     Influenza A, Molecular Negative     Influenza B, Molecular Negative     Flu A & B Source Nasal Swab

## 2024-10-18 NOTE — NURSING
New medications and follow up appointments gone over with patient and spouse who both verbalized understanding. IV taken out with cath still intact, patient tolerated well. Belongings gathered, patient walked off unit with spouse

## 2024-10-18 NOTE — PLAN OF CARE
10/18/24 1019   Medicare Message   Important Message from Medicare regarding Discharge Appeal Rights Given to patient/caregiver;Explained to patient/caregiver;Signed/date by patient/caregiver   Date IMM was signed 10/18/24   Time IMM was signed 1019

## 2024-10-18 NOTE — TELEPHONE ENCOUNTER
----- Message from [place] sent at 10/18/2024 10:23 AM CDT -----  Type:  Sooner Appointment Request    Caller is requesting a sooner appointment.  Caller declined first available appointment listed below.  Caller will not accept being placed on the waitlist and is requesting a message be sent to doctor.    Name of Caller:[facility]    When is the first available appointment?na    Symptoms:pneumonia of left lobe of the lung    Would the patient rather a call back or a response via [facility]? Call back    Best Call Back Number: [phone]      Additional Information: needs to be seen within 5-7 days     Please call Back to advise. Thanks!

## 2024-10-18 NOTE — DISCHARGE SUMMARY
[facility] Medicine  Discharge Summary      Patient Name: Akil Guzman  MRN: [id number]  Admission Date: 10/17/2024  Hospital Length of Stay: 1 days  Discharge Date and Time:  10/18/2024 10:55 AM  Attending Physician: Abel Sabillon DO   Discharging Provider: Abel Sabillon DO  Discharge Provider Team: Networked reference to record PCT   Primary Care Provider: Nola Ervin MD        HPI:     Shortness of Breath        Shortness of breath worse over the last 2 days. Hx of lung CA. Last chemo mid September.         HPI:  79-year-old male who has noted over the past few days of increasing shortness of breath patient has a diagnosis of stage IV metastatic lung cancer.  And has been taking chemotherapy for a total of 14 months recently has been diagnosed with a pathological fracture in the lumbar spine.  And was planned for radiation therapy today past medical history includes basal cell carcinoma history of colon cancer back in 1999 ,stage IVB adenocarcinoma of the lung has been treated with carbo platinum since 09/13/2023 recent PET scan showed malignant neoplasm of the upper lobe of the left lung patient has had ongoing progression it was being evaluated for possible liquid biopsy for any actionable mutations recent fracture of L1.  His planning for radiation therapy currently on oral analgesics.  Workup in the emergency room revealed     WBCs at 13.12, hemoglobin 10.4 platelets of 402 sodium 135 potassium 3.7 CO2 of 19 anion gap of 14 BUN 24 creatinine of 1 troponins were negative     CTA of the chestWorsening multifocal consolidation in the right lung, most pronounced in the upper lobe.  Differential possibilities include atypical infection as well as drug related pneumonitis in this patient receiving immunotherapy for lung carcinoma.     Findings in the left lung are not significantly changed, and may be due to a combination of tumor and pneumonitis.  There is no  history of radiation to the thorax to account for these findings.     Pathologic compression fracture at L1.   Influenza a and B was obtained.     Case discussed with the patient and wife.  Offered them treatment here this hospital treat underlying pneumonia.  Oxygen therapy discussed if this was a worsening cancer related.  Also discussed that we did not have radiation to perform there.  But our goal was to treat underlying infection stabilized.  And inflammation patient was accepted admission to the hospital.  We will treat his stated as abov    * No surgery found *      Hospital Course:  Very pleasant man admitted in the hospital with worsening deteriorating symptoms of shortness of breath worse over past few days but going on for a month he has metastatic adenocarcinoma of the lungs.  Is currently under treatment.  He presents.  With inflammation and suggestion of community-acquired pneumonia on top of the immunosuppression.  He was hypoxic with ambulation at 85% so initiated on oxygen.  He was started on steroids and IV antibiotics and clinically feels like he is back to his baseline.  We will discharge him home on Levaquin 500 mg q.day for another 10 days as well as a weaning dose Medrol Dosepak.  Qualifies for oxygen so we will be sent home with a home concentrator at 2 L. and to use with any ambulation.  Or if short of breath at rest.  Patient was clinically doing better leukocytosis has improved.  Patient remains afebrile and vital is stable.  Workup included CTA of the chest    No evidence of a pulmonary embolus.     Worsening multifocal consolidation in the right lung, most pronounced in the upper lobe.  Differential possibilities include atypical infection as well as drug related pneumonitis in this patient receiving immunotherapy for lung carcinoma.     Findings in the left lung are not significantly changed, and may be due to a combination of tumor and pneumonitis.  There is no history of radiation to  "the thorax to account for these findings.     Pathologic compression fracture at L1.     Echocardiogram is pending and discharge will be held until further review make sure there was no evidence of a malignant pericardial effusion or dilation from chemotherapy    Sodium 133 CO2 of 17 hemoglobin 9.8 potassium 4.6 BUN of 19 magnesium of 1.9 creatinine 0.9 white cells of 7.29 vital signs remained stable patient was afebrile  Consults:   Consults (From admission, onward)          Status Ordering Provider     Pharmacy to dose Aminoglycosides consult  Once        Provider:  (Not yet assigned)   Placed in "And" Linked Group    Acknowledged KEISHA FUNG     Pharmacy to dose Vancomycin consult  Once        Provider:  (Not yet assigned)   Placed in "And" Linked Group    Acknowledged KEISHA FUNG            Final Active Diagnoses:    Diagnosis Date Noted POA    PRINCIPAL PROBLEM:  Community acquired pneumonia of right upper lobe of lung [J18.9] 10/17/2024 Yes    Acute hypoxic respiratory failure [J96.01] 10/17/2024 Yes    Cancer associated pain [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    Specialty: Family Medicine  Why: Notified [place] Office:  Dr. Centeno office will call patient to get scheduled.   Per [place]  Contact information:  [address] [facility]  [address]  [place] [address] [phone]               Silke Hill MD Follow up.    Specialties: Pulmonary Disease, Critical Care Medicine  Why: Office will call patient to schedule within the next 5-7 days for a follow " "up.  Contact information:  [address] [facility]  [address]  [place] [address]  [phone]               [facility] Follow up.    Why: will provide oxygen  Contact information:  [address]  [place] [id number] [phone]                           Patient Instructions:      OXYGEN FOR HOME USE     Order Specific Question Answer Comments   Liter Flow 2    Duration With activity    Qualifying Test Performed at: Activity    Oxygen saturation at rest 95    Oxygen saturation with activity 85    Oxygen saturation with activity on oxygen 94    Portable mode: continuous    Route nasal cannula    Device: home concentrator with portable concentrator    Length of need (in months): 99 mos    Patient condition with qualifying saturation Other - List qualifying diagnosis and code    Select a diagnosis & list the code in the comments Lung cancer [626252]    Select a diagnosis & list the code in the comments Pneumonia [016055]    Select a diagnosis & list the code in the comments SOB (shortness of breath) [192345]    Height: 6' 5" (1.956 m)    Weight: 70 kg (154 lb 6.4 oz)    Alternative treatment measures have been tried or considered and deemed clinically ineffective. Yes      OXYGEN FOR HOME USE     Order Specific Question Answer Comments   Liter Flow 2    Duration Continuous    Qualifying Test Performed at: Activity    Oxygen saturation at rest 95    Oxygen saturation with activity 85    Oxygen saturation with activity on oxygen 94    Portable mode: continuous    Route nasal cannula    Device: home concentrator with portable concentrator    Length of need (in months): 99 mos    Patient condition with qualifying saturation Other - List qualifying diagnosis and code lung cancer   Select a diagnosis & list the code in the comments SOB (shortness of breath) [037371]    Height: 6' 5" (1.956 m)    Weight: 69.9 kg (154 lb)    Alternative treatment measures have been tried or considered and deemed clinically ineffective. Yes  "     Medications:  Reconciled Home Medications:      Medication List        START taking these medications      levoFLOXacin 500 MG tablet  Commonly known as: LEVAQUIN  Take 1 tablet (500 mg total) by mouth once daily.     methylPREDNISolone 4 mg tablet  Commonly known as: MEDROL DOSEPACK  use as directed            CONTINUE taking these medications      * albuterol 90 mcg/actuation inhaler  Commonly known as: VENTOLIN HFA  Inhale 2 puffs into the lungs every 6 (six) hours as needed for Wheezing. Rescue     * albuterol 2.5 mg /3 mL (0.083 %) nebulizer solution  Commonly known as: PROVENTIL  Take 3 mLs (2.5 mg total) by nebulization every 6 (six) hours as needed for Wheezing. Rescue     ammonium lactate 12 % lotion  Commonly known as: LAC-HYDRIN  Apply topically every evening.     clobetasoL 0.05 % external solution  Commonly known as: TEMOVATE  Mix into jar of cerave and apply twice daily to AA     cyanocobalamin 1,000 mcg/mL injection  Inject 1 mL (1,000 mcg total) into the skin every 30 days.     finasteride 5 mg tablet  Commonly known as: PROSCAR  TAKE 1 TABLET BY MOUTH EVERY DAY FOR PROSTATE     HYDROcodone-acetaminophen  mg per tablet  Commonly known as: NORCO  Take 1 tablet by mouth every 6 (six) hours as needed for Pain.     hydrOXYzine pamoate 25 MG Cap  Commonly known as: VISTARIL  Take 1 capsule (25 mg total) by mouth every 8 (eight) hours as needed (itching).     ipratropium 42 mcg (0.06 %) nasal spray  Commonly known as: ATROVENT  1 spray by Each Nostril route 2 (two) times daily.     magnesium oxide 400 mg (241.3 mg magnesium) tablet  Commonly known as: MAG-OX  Take 1 tablet (400 mg total) by mouth 2 (two) times daily.     megestroL 400 mg/10 mL (40 mg/mL) Susp  Commonly known as: MEGACE  Take 10 mLs (400 mg total) by mouth 2 (two) times daily.     promethazine 25 MG tablet  Commonly known as: PHENERGAN  Take 1 tablet (25 mg total) by mouth every 4 to 6 hours as needed.     tamsulosin 0.4 mg  Cap  Commonly known as: FLOMAX  Take 1 capsule (0.4 mg total) by mouth once daily.     temazepam 30 mg capsule  Commonly known as: RESTORIL  Take 1 capsule (30 mg total) by mouth nightly as needed for Insomnia. TAKE NIGHTLY AS NEEDED           * This list has 2 medication(s) that are the same as other medications prescribed for you. Read the directions carefully, and ask your doctor or other care provider to review them with you.                  Significant Diagnostic Studies: Labs: BMP:   Recent Labs   Lab 10/17/24  1308 10/18/24  0343   * 141*   * 133*   K 3.7 4.6    104   CO2 19* 17*   BUN 24* 19   CREATININE 1.0 0.9   CALCIUM 9.8 9.4   MG  --  1.7   , CMP   Recent Labs   Lab 10/17/24  1308 10/18/24  0343   * 133*   K 3.7 4.6    104   CO2 19* 17*   * 141*   BUN 24* 19   CREATININE 1.0 0.9   CALCIUM 9.8 9.4   PROT 6.8  --    ALBUMIN 2.9*  --    BILITOT 0.3  --    ALKPHOS 75  --    AST 15  --    ALT 12  --    ANIONGAP 14 12   , CBC   Recent Labs   Lab 10/17/24  1308 10/18/24  0343   WBC 13.12* 7.29   HGB 10.4* 9.8*   HCT 30.5* 28.8*    408   , and Troponin   Recent Labs   Lab 10/17/24  1308   TROPONINI <0.006     Microbiology: Blood Culture   Lab Results   Component Value Date    LABBLOO No Growth to date 10/17/2024     Radiology: X-Ray: CXR: X-Ray Chest 1 View (CXR): No results found for this visit on 10/17/24.    Pending Diagnostic Studies:       Procedure Component Value Units Date/Time    EKG 12-LEAD [[id number]]     Order Status: Sent Lab Status: No result     Legionella antigen, urine [[id number]] Collected: 10/18/24 0349    Order Status: Sent Lab Status: In process Updated: 10/18/24 1738    Specimen: Urine, Clean Catch           Indwelling Lines/Drains at time of discharge:   Lines/Drains/Airways       Central Venous Catheter Line  Duration             Port A Cath Single Lumen Subclavian Left -- days                    Time spent on the discharge of patient: 41  minutes     Still await results of echocardiogram.  Patient will be set up to discharge home currently stable and improved condition from admission    Abel Sabillon DO  Department of Hospital Medicine  [place] [facility]

## 2024-10-18 NOTE — PLAN OF CARE
Patient will follow up with his Doctors Dr. Schroeder in [place] and His Pulmonologist. All appointments are in AVS. Pt has no further case management needs at this time.     10/18/24 1247   Final Note   Assessment Type Final Discharge Note   Anticipated Discharge Disposition Home   Post-Acute Status   Post-Acute Authorization HME   Discharge Delays None known at this time

## 2024-10-18 NOTE — PROGRESS NOTES
Pharmacokinetic Initial Assessment: Gentamicin    Assessment:  Weight utilized for dose calculation: Actual Body Weight  Dosing method utilized: Gentamicin dosing for synergy for gram positive organisms     Plan: Gentamicin dosing for synergy for gram positive organisms: Gentamicin 210 mg IV every 24 hours, trough level to be drawn on 10/18 at 0400 prior to the third dose.    Pharmacy will continue to monitor.    Please contact pharmacy at extension [phone] with any questions regarding this assessment.    Thank you for the consult,    Gabriel Gonge       Patient brief summary:  Akil Guzman is a 79 y.o. male initiated on aminoglycoside therapy for treatment of suspected lower respiratory infection    Drug Allergies:   Review of patient's allergies indicates:   Allergen Reactions    Penicillins      Other reaction(s): Rash  50 YEARS AGO         Actual Body Weight:   70 Kg    Adjust Body Weight:   70 Kg    Ideal Body Weight:  89.1 Kg    Renal Function:   Estimated Creatinine Clearance: 60.3 mL/min (based on SCr of 1 mg/dL).,     Dialysis Method (if applicable):  N/A    CBC (last 72 hours):  Recent Labs   Lab Result Units 10/17/24  1308   WBC K/uL 13.12*   Hemoglobin g/dL 10.4*   Hematocrit % 30.5*   Platelets K/uL 402   Gran % % 76.5*   Lymph % % 6.3*   Mono % % 15.6*   Eosinophil % % 0.5   Basophil % % 0.3   Differential Method  Automated       Metabolic Panel (last 72 hours):  Recent Labs   Lab Result Units 10/17/24  1308   Sodium mmol/L 135*   Potassium mmol/L 3.7   Chloride mmol/L 102   CO2 mmol/L 19*   Glucose mg/dL 122*   BUN mg/dL 24*   Creatinine mg/dL 1.0   Albumin g/dL 2.9*   Total Bilirubin mg/dL 0.3   Alkaline Phosphatase U/L 75   AST U/L 15   ALT U/L 12       Microbiologic Results:  Microbiology Results (last 7 days)       Procedure Component Value Units Date/Time    Blood culture (site 2) [[id number]] Collected: 10/17/24 8784    Order Status: Sent Specimen: Blood     Influenza A & B by Molecular  [[id number]] Collected: 10/17/24 1421    Order Status: Completed Specimen: Nasopharyngeal Swab Updated: 10/17/24 1449     Influenza A, Molecular Negative     Influenza B, Molecular Negative     Flu A & B Source Nasal Swab

## 2024-10-18 NOTE — PLAN OF CARE
[facility]  Initial Discharge Assessment       Primary Care Provider: Nola Ervin MD    Admission Diagnosis: Shortness of breath [R06.02]  Pneumonitis [J98.4]  SOB (shortness of breath) [R06.02]  Low oxygen saturation [R79.81]  Malignant neoplasm of lung, unspecified laterality, unspecified part of lung [C34.90]  Community acquired pneumonia of right upper lobe of lung [J18.9]    Admission Date: 10/17/2024  Expected Discharge Date: 10/18/2024    Transition of Care Barriers: None    Patient alert & oriented lives at home with his spouse who is at bedside. He is independent at home. He denies using or needing any equipment but his wife thinks he will get a cane in the near future. He does have a nebulizer he uses. He will need oxygen upon discharge. He does not have a preference. Preference form signed & referral sent to [facility]. He uses Dr Ervin for PCP & Dr Torres for oncology. Plan will be to return home with wife once oxygen is set up. Denies any other needs at this time. Will continue to follow.    Payor: MEDICARE / Plan: MEDICARE PART A & B / Product Type: Government /     Extended Emergency Contact Information  Primary Emergency Contact: Priyanka Guerin  Address: [address] [place] [address] [place]  Home Phone: [phone]  Work Phone: [phone]  Mobile Phone: [phone]  Relation: Spouse  Preferred language: English    Discharge Plan A: Home         [facility] [place] [address]  [address] [place] [address]  Phone: [phone] Fax: [phone]      Initial Assessment (most recent)       Adult Discharge Assessment - 10/18/24 1019          Discharge Assessment    Assessment Type Discharge Planning Assessment     Confirmed/corrected address, phone number and insurance Yes     Source of Information patient     When was your last doctors appointment? 10/03/24     Communicated DAVY with  patient/caregiver Yes     Reason For Admission shortness of breath     People in Home spouse     Do you expect to return to your current living situation? Yes     Do you have help at home or someone to help you manage your care at home? Yes     Who are your caregiver(s) and their phone number(s)? Priyanka Guzman spouse [phone]     Prior to hospitilization cognitive status: Alert/Oriented     Current cognitive status: Alert/Oriented     Walking or Climbing Stairs Difficulty no     Dressing/Bathing Difficulty no     Home Layout Able to live on 1st floor     Equipment Currently Used at Home nebulizer     Readmission within 30 days? No     Patient currently being followed by outpatient case management? No     Do you currently have service(s) that help you manage your care at home? No     Do you take prescription medications? Yes     Do you have prescription coverage? Yes     Coverage Medicare     Do you have any problems affording any of your prescribed medications? No     Is the patient taking medications as prescribed? yes     Who is going to help you get home at discharge? [facility]     How do you get to doctors appointments? car, drives self     Are you on dialysis? No     Do you take coumadin? No     Discharge Plan A Home     DME Needed Upon Discharge  none     Discharge Plan discussed with: Patient     Transition of Care Barriers None        Physical Activity    On average, how many days per week do you engage in moderate to strenuous exercise (like a brisk walk)? 0 days     On average, how many minutes do you engage in exercise at this level? 0 min        Financial Resource Strain    How hard is it for you to pay for the very basics like food, housing, medical care, and heating? Not hard at all        Housing Stability    In the last 12 months, was there a time when you were not able to pay the mortgage or rent on time? No     At any time in the past 12 months, were you homeless or living in a shelter  (including now)? No        Transportation Needs    Has the lack of transportation kept you from medical appointments, meetings, work or from getting things needed for daily living? No        Food Insecurity    Within the past 12 months, you worried that your food would run out before you got the money to buy more. Never true     Within the past 12 months, the food you bought just didn't last and you didn't have money to get more. Never true        Stress    Do you feel stress - tense, restless, nervous, or anxious, or unable to sleep at night because your mind is troubled all the time - these days? To some extent        Social Isolation    How often do you feel lonely or isolated from those around you?  Sometimes        Alcohol Use    Q1: How often do you have a drink containing alcohol? Never     Q2: How many drinks containing alcohol do you have on a typical day when you are drinking? Patient does not drink     Q3: How often do you have six or more drinks on one occasion? Never        Utilities    In the past 12 months has the electric, gas, oil, or water company threatened to shut off services in your home? No        Health Literacy    How often do you need to have someone help you when you read instructions, pamphlets, or other written material from your doctor or pharmacy? Never        OTHER    Name(s) of People in Home Priyanka Guzman spouse [phone]

## 2024-10-21 LAB
L PNEUMO AG UR QL IA: NEGATIVE
OHS QRS DURATION: 88 MS
OHS QTC CALCULATION: 474 MS

## 2024-10-22 ENCOUNTER — PATIENT OUTREACH (OUTPATIENT)
Dept: ADMINISTRATIVE | Facility: CLINIC | Age: 79
End: 2024-10-22
Payer: MEDICARE

## 2024-10-22 DIAGNOSIS — K21.9 GASTROESOPHAGEAL REFLUX DISEASE, UNSPECIFIED WHETHER ESOPHAGITIS PRESENT: Primary | ICD-10-CM

## 2024-10-22 LAB — BACTERIA BLD CULT: NORMAL

## 2024-10-22 RX ORDER — DIPHENHYDRAMINE HYDROCHLORIDE 50 MG/ML
50 INJECTION INTRAMUSCULAR; INTRAVENOUS ONCE AS NEEDED
OUTPATIENT
Start: 2024-10-24

## 2024-10-22 RX ORDER — EPINEPHRINE 0.3 MG/.3ML
0.3 INJECTION SUBCUTANEOUS ONCE AS NEEDED
OUTPATIENT
Start: 2024-10-24

## 2024-10-22 RX ORDER — SODIUM CHLORIDE 0.9 % (FLUSH) 0.9 %
10 SYRINGE (ML) INJECTION
OUTPATIENT
Start: 2024-10-24

## 2024-10-22 RX ORDER — PROCHLORPERAZINE EDISYLATE 5 MG/ML
5 INJECTION INTRAMUSCULAR; INTRAVENOUS ONCE AS NEEDED
OUTPATIENT
Start: 2024-10-24

## 2024-10-22 RX ORDER — OMEPRAZOLE 20 MG/1
20 CAPSULE, DELAYED RELEASE ORAL DAILY
Qty: 90 CAPSULE | Refills: 3 | Status: SHIPPED | OUTPATIENT
Start: 2024-10-22 | End: 2025-10-22

## 2024-10-22 RX ORDER — HEPARIN 100 UNIT/ML
500 SYRINGE INTRAVENOUS
OUTPATIENT
Start: 2024-10-24

## 2024-10-23 ENCOUNTER — OFFICE VISIT (OUTPATIENT)
Dept: PULMONOLOGY | Facility: CLINIC | Age: 79
End: 2024-10-23
Payer: MEDICARE

## 2024-10-23 VITALS
BODY MASS INDEX: 17.98 KG/M2 | HEART RATE: 87 BPM | SYSTOLIC BLOOD PRESSURE: 129 MMHG | DIASTOLIC BLOOD PRESSURE: 71 MMHG | WEIGHT: 152.25 LBS | OXYGEN SATURATION: 93 % | HEIGHT: 77 IN

## 2024-10-23 DIAGNOSIS — C34.12 MALIGNANT NEOPLASM OF UPPER LOBE OF LEFT LUNG: ICD-10-CM

## 2024-10-23 DIAGNOSIS — J47.9 BRONCHIECTASIS WITHOUT COMPLICATION: ICD-10-CM

## 2024-10-23 DIAGNOSIS — J96.11 CHRONIC HYPOXEMIC RESPIRATORY FAILURE: ICD-10-CM

## 2024-10-23 DIAGNOSIS — J91.0 MALIGNANT PLEURAL EFFUSION: ICD-10-CM

## 2024-10-23 DIAGNOSIS — J18.9 COMMUNITY ACQUIRED PNEUMONIA OF RIGHT UPPER LOBE OF LUNG: Primary | ICD-10-CM

## 2024-10-23 PROBLEM — J96.01 ACUTE HYPOXIC RESPIRATORY FAILURE: Status: RESOLVED | Noted: 2024-10-17 | Resolved: 2024-10-23

## 2024-10-23 PROCEDURE — 99214 OFFICE O/P EST MOD 30 MIN: CPT | Mod: S$PBB,,, | Performed by: INTERNAL MEDICINE

## 2024-10-23 PROCEDURE — 99213 OFFICE O/P EST LOW 20 MIN: CPT | Mod: PBBFAC,PO | Performed by: INTERNAL MEDICINE

## 2024-10-23 PROCEDURE — 99999 PR PBB SHADOW E&M-EST. PATIENT-LVL III: CPT | Mod: PBBFAC,,, | Performed by: INTERNAL MEDICINE

## 2024-10-23 NOTE — PATIENT INSTRUCTIONS
Cancer progressing in the lungs, may have pneumonitis (lung inflammation) or pneumonia on top of this  Continue and finish antibiotic, steroid pack  Will reevaluate the lungs and see if I see any improvement   Get chest xray  I will reach out to your oncologist to discuss if we can offer anything different for the lungs  Continue oxygen to keep sats >92%  Consider seeing palliative care

## 2024-10-23 NOTE — PROGRESS NOTES
10/23/2024    Akil Guzman  Follow up    No chief complaint on file.      HPI:  10/23/2024- pt admitted at [facility] last week, dx with pneumonia vs pneumonitis vs progression of lung cancer and new O2 reqt- dc with home O2. Pt sent for follow up   Rad onc note reviewed (10/14)- he is receiving radiation to L1 lesion  Dr Torres note reviewed (10/3/24)- he has undergone carbo/taxol tx in the past and most recently receiving Keytruda  Pt here today with spouse who assists w/ history. He has started on home O2 2L  He is feeling very weak. Last thurs he went to ER for evaluation of this and found to be significantly hypoxic sat 75%. He is taking levaquin, unclear if it is helping.  He denies cough but brings up clear phlegm.   Has gradual progressive ABRAHAM- sometimes uses albuterol inhaler and feels it helps a little.    09/14/2023-   Fluid around the left lung is still moderate- due to cancer- may persist or may improve with treatment  If you are short of breath we can drain the pleural effusion again  Continue chemotherapy per Dr. 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 Has had port a cath and established with Dr. Torres.  He has cough/ light green phlegm occasionally. He denies chest pains, SOB, fevers.  He started chemo yesterday, tolerated well.    08/09/2023-   Left pleural effusion may be due to infection, need to rule out possible underlying lung cancer  Continue levaquin - will extend course to 14 days total  Get thoracentesis - outpatient procedure to drain fluid from chest, studies will be sent to the lab to further evaluate  After thoracentesis get repeat CT chest with contrast  Get blood work on same day as your procedure  If feeling worse go to the ER for further evaluation  Pt is a 79 yo male with HTN, HLD, hx colon cancer presenting for new evaluation.  Pt feeling tired, hard to sleep, SOB, pain left flank  for about 2 mos gradual worsening.  He was treated for pna last month with 5d levaquin, now taking another course of levaquin started yesterday. He has had minimal cough but spits out mucous, clear this week and green last week. He denies fevers, chills, sweats, denies recent URI symptoms.  Pain L side is achy, constant, takes ibuprofen which helps a little, 6-7/10.  He had colon ca surgery in 2000. No treatment was needed  He has past smoking hx, about 13 yrs, quit last year.  Denies pulmonary exposures, denies FH lung disease    The chief complaint problem is New to me    PFSH:  Past Medical History:   Diagnosis Date    Allergy     SEASON    Basal cell carcinoma 2009    L ear    Cancer     Hx colon     Colon cancer     states dx in 1999    Colon polyp     Pneumonia, unspecified organism 07/2023         Past Surgical History:   Procedure Laterality Date    CATARACT EXTRACTION Bilateral 2022    COLON SURGERY  2000    1ft. sigmoid removed    COLONOSCOPY N/A 09/28/2020    Procedure: COLONOSCOPY;  Surgeon: Ty Pollock MD;  Location: [facility] ENDO;  Service: General;  Laterality: N/A;    INSERTION OF TUNNELED CENTRAL VENOUS CATHETER (CVC) WITH SUBCUTANEOUS PORT N/A 09/11/2023    Procedure: [id number];  Surgeon: Antwon Peres Jr., MD;  Location: [facility] OR;  Service: General;  Laterality: N/A;     Social History     Tobacco Use    Smoking status: Former     Current packs/day: 0.25     Average packs/day: 0.3 packs/day for 16.1 years (4.0 ttl pk-yrs)     Types: Cigarettes     Start date: 9/6/2008    Smokeless tobacco: Never   Substance Use Topics    Alcohol use: Yes     Alcohol/week: 2.0 standard drinks of alcohol     Types: 2 Drinks containing 0.5 oz of alcohol per week     Comment: 2 mixed drinks WEEK    Drug use: No     Family History   Problem Relation Name Age of Onset    Cancer Mother      Brain cancer Mother      Cancer Brother      Macular degeneration Brother      Pancreatic cancer Brother    "   Prostate cancer Brother      Melanoma Neg Hx      Colon cancer Neg Hx       Review of patient's allergies indicates:   Allergen Reactions    Penicillins      Other reaction(s): Rash  50 YEARS AGO         Performance Status:The patient's activity level is homebound    Review of Systems:  a review of eleven systems covering constitutional, Eye, HEENT, Psych, Respiratory, Cardiac, GI, , Musculoskeletal, Endocrine, Dermatologic was negative except for pertinent findings as listed ABOVE and below:  Wt loss 35#   Feeling anxious  Feeling weak  Appetite minimal       Exam:Comprehensive exam done. /71 (BP Location: Left arm, Patient Position: Sitting)   Pulse 87   Ht 6' 5" (1.956 m)   Wt 69 kg (152 lb 3.6 oz)   SpO2 (!) 93% Comment: 2L  BMI 18.05 kg/m²   Exam included Vitals as listed, and patient's appearance and affect and alertness and mood, oral exam for yeast and hygiene and pharynx lesions and Mallapatti (M) score, neck with inspection for jvd and masses and thyroid abnormalities and lymph nodes (supraclavicular and infraclavicular nodes and axillary also examined and noted if abn), chest exam included symmetry and effort and fremitus and percussion and auscultation, cardiac exam included rhythm and gallops and murmur and rubs and jvd and edema, abdominal exam for mass and hepatosplenomegaly and tenderness and hernias and bowel sounds, Musculoskeletal exam with muscle tone and posture and mobility/gait and  strength, and skin for rashes and cyanosis and pallor and turgor, extremity for clubbing.  Findings were normal except for pertinent findings listed below:  M1, oropharynx clear, dentition intact  HR regular  Breath sounds clear bilaterally  No edema/clubbing  Cachectic, muscle wasting    Radiographs (ct chest and cxr) reviewed: view by direct vision and interpretation as below   CTA chest 10/17/24- L lower lung now densely opacified- looks worse, R and left sided multifocal consolidations which " are new compared to prior imaging 7/2024, bilat bronchiectasis  PET scan 10/2/24- bilat multifocal consolidations +PET avidity  CXR 9/11/23- small to moderate L pleural effusion persists  CT chest 8/7/23- large L pleural effusion, L hilar mass    TTE 10/18/24-     Left Ventricle: The left ventricle is normal in size. Normal wall thickness. There is low normal systolic function with a visually estimated ejection fraction of 50 - 55%. Ejection fraction is approximately 53%. Global longitudinal strain is -19.0%.    Right Ventricle: Normal right ventricular cavity size. Systolic function is normal. TAPSE is 2.50 cm.    Mitral Valve: There is mild regurgitation.    IVC/SVC: Normal venous pressure at 3 mmHg.    Agitated saline (bubble) used. Study is negative for shunt.    Labs reviewed        Latest Reference Range & Units 08/17/23 13:04   Fluid Color  Yellow   Fluid Appearance  Hazy   WBC, Body Fluid /cu mm 1684   Body Fluid Type  Pleural Fluid, Left   Segs, Fluid % 60   Lymphs, Fluid % 26   Eos, Fluid % 1   Mesothelial Cells, Fluid % 12   Other Cells, Fluid 0 % 1 !      Latest Reference Range & Units 08/17/23 13:56   Glucose, Fluid See Comment mg/dL 101   LD, Fluid See Comment U/L 200   Protein, Fluid See Comment g/dL 4.0     Lab Results   Component Value Date    WBC 7.29 10/18/2024    HGB 9.8 (L) 10/18/2024    HCT 28.8 (L) 10/18/2024    MCV 90 10/18/2024     10/18/2024       CMP  Sodium   Date Value Ref Range Status   10/18/2024 133 (L) 136 - 145 mmol/L Final     Potassium   Date Value Ref Range Status   10/18/2024 4.6 3.5 - 5.1 mmol/L Final     Chloride   Date Value Ref Range Status   10/18/2024 104 95 - 110 mmol/L Final     CO2   Date Value Ref Range Status   10/18/2024 17 (L) 23 - 29 mmol/L Final     Glucose   Date Value Ref Range Status   10/18/2024 141 (H) 70 - 110 mg/dL Final     BUN   Date Value Ref Range Status   10/18/2024 19 8 - 23 mg/dL Final     Creatinine   Date Value Ref Range Status   10/18/2024  0.9 0.5 - 1.4 mg/dL Final     Calcium   Date Value Ref Range Status   10/18/2024 9.4 8.7 - 10.5 mg/dL Final     Total Protein   Date Value Ref Range Status   10/17/2024 6.8 6.0 - 8.4 g/dL Final     Albumin   Date Value Ref Range Status   10/17/2024 2.9 (L) 3.5 - 5.2 g/dL Final     Total Bilirubin   Date Value Ref Range Status   10/17/2024 0.3 0.1 - 1.0 mg/dL Final     Comment:     For infants and newborns, interpretation of results should be based  on gestational age, weight and in agreement with clinical  observations.    Premature Infant recommended reference ranges:  Up to 24 hours.............<8.0 mg/dL  Up to 48 hours............<12.0 mg/dL  3-5 days..................<15.0 mg/dL  6-29 days.................<15.0 mg/dL       Alkaline Phosphatase   Date Value Ref Range Status   10/17/2024 75 40 - 150 U/L Final     AST   Date Value Ref Range Status   10/17/2024 15 10 - 40 U/L Final     ALT   Date Value Ref Range Status   10/17/2024 12 10 - 44 U/L Final     Anion Gap   Date Value Ref Range Status   10/18/2024 12 8 - 16 mmol/L Final     eGFR   Date Value Ref Range Status   10/18/2024 >60.0 >60 mL/min/1.73 m^2 Final         PFT was not done      Plan:  Clinical impression is reasonably certain and repeated evaluation prn +/- follow up will be needed as below.   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    Discussed with patient above for education the following:      Patient Instructions   Cancer  progressing in the lungs, may have pneumonitis (lung inflammation) or pneumonia on top of this  Continue and finish antibiotic, steroid pack  Will reevaluate the lungs and see if I see any improvement   Get chest xray  I will reach out to your oncologist to discuss if we can offer anything different for the lungs  Continue oxygen to keep sats >92%  Consider seeing palliative care

## 2024-10-24 ENCOUNTER — HOSPITAL ENCOUNTER (OUTPATIENT)
Dept: RADIOLOGY | Facility: HOSPITAL | Age: 79
Discharge: HOME | End: 2024-10-24
Attending: INTERNAL MEDICINE
Payer: MEDICARE

## 2024-10-24 DIAGNOSIS — J18.9 COMMUNITY ACQUIRED PNEUMONIA OF RIGHT UPPER LOBE OF LUNG: ICD-10-CM

## 2024-10-24 PROCEDURE — 71046 X-RAY EXAM CHEST 2 VIEWS: CPT | Mod: TC

## 2024-10-24 PROCEDURE — 71046 X-RAY EXAM CHEST 2 VIEWS: CPT | Mod: 26,,, | Performed by: RADIOLOGY

## 2024-10-28 ENCOUNTER — LAB VISIT (OUTPATIENT)
Dept: LAB | Facility: HOSPITAL | Age: 79
End: 2024-10-28
Attending: NURSE PRACTITIONER
Payer: MEDICARE

## 2024-10-28 DIAGNOSIS — J18.9 COMMUNITY ACQUIRED PNEUMONIA OF RIGHT UPPER LOBE OF LUNG: Primary | ICD-10-CM

## 2024-10-28 DIAGNOSIS — C34.12 MALIGNANT NEOPLASM OF UPPER LOBE OF LEFT LUNG: ICD-10-CM

## 2024-10-28 DIAGNOSIS — C34.92 NSCLC OF LEFT LUNG: ICD-10-CM

## 2024-10-28 DIAGNOSIS — Z79.899 PATIENT ON ANTINEOPLASTIC CHEMOTHERAPY REGIMEN: ICD-10-CM

## 2024-10-28 LAB — TSH SERPL DL<=0.005 MIU/L-ACNC: 0.42 UIU/ML (ref 0.4–4)

## 2024-10-28 PROCEDURE — 84443 ASSAY THYROID STIM HORMONE: CPT | Performed by: NURSE PRACTITIONER

## 2024-10-28 PROCEDURE — 36415 COLL VENOUS BLD VENIPUNCTURE: CPT | Performed by: NURSE PRACTITIONER

## 2024-10-28 RX ORDER — TEMAZEPAM 30 MG/1
CAPSULE ORAL
Qty: 30 CAPSULE | Refills: 2 | Status: SHIPPED | OUTPATIENT
Start: 2024-10-28

## 2024-10-30 ENCOUNTER — PATIENT MESSAGE (OUTPATIENT)
Facility: CLINIC | Age: 79
End: 2024-10-30
Payer: MEDICARE

## 2024-10-30 ENCOUNTER — INFUSION (OUTPATIENT)
Dept: INFUSION THERAPY | Facility: HOSPITAL | Age: 79
End: 2024-10-30
Attending: INTERNAL MEDICINE
Payer: MEDICARE

## 2024-10-30 VITALS
DIASTOLIC BLOOD PRESSURE: 48 MMHG | BODY MASS INDEX: 18.13 KG/M2 | WEIGHT: 153.5 LBS | SYSTOLIC BLOOD PRESSURE: 106 MMHG | TEMPERATURE: 97 F | HEIGHT: 77 IN | HEART RATE: 100 BPM | OXYGEN SATURATION: 93 % | RESPIRATION RATE: 20 BRPM

## 2024-10-30 PROCEDURE — G0463 HOSPITAL OUTPT CLINIC VISIT: HCPCS

## 2024-10-31 ENCOUNTER — HOSPITAL ENCOUNTER (OUTPATIENT)
Dept: RADIOLOGY | Facility: HOSPITAL | Age: 79
Discharge: HOME | End: 2024-10-31
Attending: INTERNAL MEDICINE
Payer: MEDICARE

## 2024-10-31 DIAGNOSIS — J18.9 COMMUNITY ACQUIRED PNEUMONIA OF RIGHT UPPER LOBE OF LUNG: ICD-10-CM

## 2024-10-31 PROCEDURE — 71046 X-RAY EXAM CHEST 2 VIEWS: CPT | Mod: 26,,, | Performed by: RADIOLOGY

## 2024-10-31 PROCEDURE — 71046 X-RAY EXAM CHEST 2 VIEWS: CPT | Mod: TC,PN

## 2024-11-01 ENCOUNTER — TELEPHONE (OUTPATIENT)
Dept: PULMONOLOGY | Facility: CLINIC | Age: 79
End: 2024-11-01
Payer: MEDICARE

## 2024-11-01 ENCOUNTER — PATIENT MESSAGE (OUTPATIENT)
Dept: PULMONOLOGY | Facility: CLINIC | Age: 79
End: 2024-11-01
Payer: MEDICARE

## 2024-11-01 ENCOUNTER — TREATMENT (OUTPATIENT)
Dept: RADIATION ONCOLOGY | Facility: CLINIC | Age: 79
End: 2024-11-01
Payer: MEDICARE

## 2024-11-01 PROCEDURE — G6013 RADIATION TREATMENT DELIVERY: HCPCS | Mod: S$GLB,,, | Performed by: RADIOLOGY

## 2024-11-01 PROCEDURE — G6002 STEREOSCOPIC X-RAY GUIDANCE: HCPCS | Mod: S$GLB,,, | Performed by: RADIOLOGY

## 2024-11-01 PROCEDURE — 77336 RADIATION PHYSICS CONSULT: CPT | Mod: S$GLB,,, | Performed by: RADIOLOGY

## 2024-11-04 DIAGNOSIS — C34.92 NSCLC OF LEFT LUNG: Primary | ICD-10-CM

## 2024-11-06 ENCOUNTER — TELEPHONE (OUTPATIENT)
Dept: PULMONOLOGY | Facility: CLINIC | Age: 79
End: 2024-11-06
Payer: MEDICARE

## 2024-11-06 NOTE — TELEPHONE ENCOUNTER
"----- Message from Reynaldo Torres MD sent at 11/6/2024 12:53 PM CST -----  Regarding: RE: resp failure, cancer progression  Thanks Silke,  I will get him in to discuss.  I'll let you know about the bronch.  ----- Message -----  From: Marce Morgan MA  Sent: 11/1/2024  11:09 AM CST  To: Reynaldo Torres MD  Subject: FW: resp failure, cancer progression               ----- Message -----  From: Silke Hill MD  Sent: 10/23/2024   2:01 PM CDT  To: [facility] Staff  Subject: resp failure, cancer progression                 Hi Edilson, I am seeing this pt after he was in the ER last week, treated as "pneumonia" and started on home O2. On review of all the testing it looks to me like he likely has cancer progression explaining the new imaging findings. Other possibilities would be pneumonitis related to keytruda or infection. I could bronch him if you feel it will be helpful.  Thank you,  Silke Hill MD  Pulmonary & Critical Care Medicine  "

## 2024-11-07 ENCOUNTER — PATIENT MESSAGE (OUTPATIENT)
Dept: PULMONOLOGY | Facility: CLINIC | Age: 79
End: 2024-11-07
Payer: MEDICARE

## 2024-11-07 ENCOUNTER — HOSPITAL ENCOUNTER (OUTPATIENT)
Dept: RADIOLOGY | Facility: HOSPITAL | Age: 79
Discharge: HOME | End: 2024-11-07
Attending: INTERNAL MEDICINE
Payer: MEDICARE

## 2024-11-07 DIAGNOSIS — C34.92 NSCLC OF LEFT LUNG: ICD-10-CM

## 2024-11-07 PROCEDURE — 71046 X-RAY EXAM CHEST 2 VIEWS: CPT | Mod: TC,PN

## 2024-11-07 PROCEDURE — 71046 X-RAY EXAM CHEST 2 VIEWS: CPT | Mod: 26,,, | Performed by: RADIOLOGY

## 2024-11-11 ENCOUNTER — TELEPHONE (OUTPATIENT)
Facility: CLINIC | Age: 79
End: 2024-11-11
Payer: MEDICARE

## 2024-11-11 NOTE — TELEPHONE ENCOUNTER
----- Message from Elena sent at 11/8/2024 11:07 AM CST -----  Pt needs to see Dr. Torres as soon as possible. A mass was spotted on the Pt's lung initially thought it was pneumonia.     Priyanka Guzman, Pt's wife's work CB# [phone]  Ms.Thomas's CB cell# [phone]

## 2024-11-12 ENCOUNTER — PATIENT MESSAGE (OUTPATIENT)
Facility: CLINIC | Age: 79
End: 2024-11-12
Payer: MEDICARE

## 2024-11-12 ENCOUNTER — HOSPITAL ENCOUNTER (EMERGENCY)
Facility: HOSPITAL | Age: 79
Discharge: HOME | End: 2024-11-12
Attending: STUDENT IN AN ORGANIZED HEALTH CARE EDUCATION/TRAINING PROGRAM
Payer: MEDICARE

## 2024-11-12 VITALS
RESPIRATION RATE: 20 BRPM | BODY MASS INDEX: 18.07 KG/M2 | WEIGHT: 153 LBS | HEART RATE: 82 BPM | SYSTOLIC BLOOD PRESSURE: 115 MMHG | TEMPERATURE: 98 F | HEIGHT: 77 IN | OXYGEN SATURATION: 99 % | DIASTOLIC BLOOD PRESSURE: 68 MMHG

## 2024-11-12 DIAGNOSIS — R06.02 SOB (SHORTNESS OF BREATH): ICD-10-CM

## 2024-11-12 DIAGNOSIS — Z85.118 HISTORY OF LUNG CANCER: ICD-10-CM

## 2024-11-12 DIAGNOSIS — R06.09 DYSPNEA ON EXERTION: Primary | ICD-10-CM

## 2024-11-12 LAB
ANION GAP SERPL CALC-SCNC: 10 MMOL/L (ref 8–16)
BASOPHILS # BLD AUTO: 0.03 K/UL (ref 0–0.2)
BASOPHILS NFR BLD: 0.4 % (ref 0–1.9)
BUN SERPL-MCNC: 16 MG/DL (ref 8–23)
CALCIUM SERPL-MCNC: 9.8 MG/DL (ref 8.7–10.5)
CHLORIDE SERPL-SCNC: 106 MMOL/L (ref 95–110)
CO2 SERPL-SCNC: 22 MMOL/L (ref 23–29)
CREAT SERPL-MCNC: 0.8 MG/DL (ref 0.5–1.4)
DIFFERENTIAL METHOD BLD: ABNORMAL
EOSINOPHIL # BLD AUTO: 0.1 K/UL (ref 0–0.5)
EOSINOPHIL NFR BLD: 1.3 % (ref 0–8)
ERYTHROCYTE [DISTWIDTH] IN BLOOD BY AUTOMATED COUNT: 14.6 % (ref 11.5–14.5)
EST. GFR  (NO RACE VARIABLE): >60 ML/MIN/1.73 M^2
GLUCOSE SERPL-MCNC: 103 MG/DL (ref 70–110)
HCT VFR BLD AUTO: 32 % (ref 40–54)
HGB BLD-MCNC: 10.8 G/DL (ref 14–18)
IMM GRANULOCYTES # BLD AUTO: 0.03 K/UL (ref 0–0.04)
IMM GRANULOCYTES NFR BLD AUTO: 0.4 % (ref 0–0.5)
LYMPHOCYTES # BLD AUTO: 1 K/UL (ref 1–4.8)
LYMPHOCYTES NFR BLD: 12.5 % (ref 18–48)
MCH RBC QN AUTO: 31 PG (ref 27–31)
MCHC RBC AUTO-ENTMCNC: 33.8 G/DL (ref 32–36)
MCV RBC AUTO: 92 FL (ref 82–98)
MONOCYTES # BLD AUTO: 1.5 K/UL (ref 0.3–1)
MONOCYTES NFR BLD: 19.3 % (ref 4–15)
NEUTROPHILS # BLD AUTO: 5 K/UL (ref 1.8–7.7)
NEUTROPHILS NFR BLD: 66.1 % (ref 38–73)
NRBC BLD-RTO: 0 /100 WBC
PLATELET # BLD AUTO: 299 K/UL (ref 150–450)
PMV BLD AUTO: 8.9 FL (ref 9.2–12.9)
POTASSIUM SERPL-SCNC: 3.6 MMOL/L (ref 3.5–5.1)
RBC # BLD AUTO: 3.48 M/UL (ref 4.6–6.2)
SODIUM SERPL-SCNC: 138 MMOL/L (ref 136–145)
WBC # BLD AUTO: 7.61 K/UL (ref 3.9–12.7)

## 2024-11-12 PROCEDURE — 85025 COMPLETE CBC W/AUTO DIFF WBC: CPT | Performed by: STUDENT IN AN ORGANIZED HEALTH CARE EDUCATION/TRAINING PROGRAM

## 2024-11-12 PROCEDURE — 80048 BASIC METABOLIC PNL TOTAL CA: CPT | Performed by: STUDENT IN AN ORGANIZED HEALTH CARE EDUCATION/TRAINING PROGRAM

## 2024-11-12 PROCEDURE — 99285 EMERGENCY DEPT VISIT HI MDM: CPT | Mod: 25

## 2024-11-12 PROCEDURE — 71046 X-RAY EXAM CHEST 2 VIEWS: CPT | Mod: 26,,, | Performed by: RADIOLOGY

## 2024-11-12 PROCEDURE — 93010 ELECTROCARDIOGRAM REPORT: CPT | Mod: ,,, | Performed by: INTERNAL MEDICINE

## 2024-11-12 PROCEDURE — 93005 ELECTROCARDIOGRAM TRACING: CPT

## 2024-11-12 PROCEDURE — 71046 X-RAY EXAM CHEST 2 VIEWS: CPT | Mod: TC

## 2024-11-12 NOTE — ED PROVIDER NOTES
"CHIEF COMPLAINT   Chief Complaint   Patient presents with    Shortness of Breath     Pt. C/o SOB "for weeks". States he has had episodes of worsening SOB off and on since then.         HPI   Akil Guzman is a 79 y.o. male who presents with shortness of breath.  Patient reports he has been having exertional shortness of breath the past week.  Yesterday was contacted by his pulmonologist who told him to come to an appointment today after reviewing his x-ray, he has appointment scheduled for 11 a.m. however wife got concerned when pulse ox at home was shown 88.  Patient reports while using supplemental oxygen at 4 L which is baseline for him for past several days he feels okay.  He denies chest pain, nausea, vomiting, fever chills cough or congestion.  He reports recently he was diagnosed with pneumonia, the Z-[place] but later on was told that likely not pneumonia.  History source chart review, the patient, and significant other      OTHER RECORDS REVIEWED  Patient was contacted yesterday by oncology office pulmonology Dr. Ball is supposed to see him today at 11  X-ray from November 7th is reviewed by me, pertinent for consolidation and left lower lung, official read is pertinent for extensive abnormalities in the lung which may be due to combination of tumor pneumonia, worsening appearance of left upper lobe as well.       CURRENT MEDICATIONS   Previous Medications    ALBUTEROL (PROVENTIL) 2.5 MG /3 ML (0.083 %) NEBULIZER SOLUTION    Take 3 mLs (2.5 mg total) by nebulization every 6 (six) hours as needed for Wheezing. Rescue    ALBUTEROL (VENTOLIN HFA) 90 MCG/ACTUATION INHALER    Inhale 2 puffs into the lungs every 6 (six) hours as needed for Wheezing. Rescue    AMMONIUM LACTATE (LAC-HYDRIN) 12 % LOTION    Apply topically every evening.    CLOBETASOL (TEMOVATE) 0.05 % EXTERNAL SOLUTION    Mix into jar of cerave and apply twice daily to AA    CYANOCOBALAMIN 1,000 MCG/ML INJECTION    Inject 1 mL (1,000 mcg total) into " the skin every 30 days.    FINASTERIDE (PROSCAR) 5 MG TABLET    TAKE 1 TABLET BY MOUTH EVERY DAY FOR PROSTATE    HYDROCODONE-ACETAMINOPHEN (NORCO)  MG PER TABLET    Take 1 tablet by mouth every 6 (six) hours as needed for Pain.    HYDROXYZINE PAMOATE (VISTARIL) 25 MG CAP    Take 1 capsule (25 mg total) by mouth every 8 (eight) hours as needed (itching).    IPRATROPIUM (ATROVENT) 42 MCG (0.06 %) NASAL SPRAY    1 spray by Each Nostril route 2 (two) times daily.    LEVOFLOXACIN (LEVAQUIN) 500 MG TABLET    Take 1 tablet (500 mg total) by mouth once daily.    MAGNESIUM OXIDE (MAG-OX) 400 MG (241.3 MG MAGNESIUM) TABLET    Take 1 tablet (400 mg total) by mouth 2 (two) times daily.    MEGESTROL (MEGACE) 400 MG/10 ML (40 MG/ML) SUSP    Take 10 mLs (400 mg total) by mouth 2 (two) times daily.    OMEPRAZOLE (PRILOSEC) 20 MG CAPSULE    Take 1 capsule (20 mg total) by mouth once daily.    PROMETHAZINE (PHENERGAN) 25 MG TABLET    Take 1 tablet (25 mg total) by mouth every 4 to 6 hours as needed.    TAMSULOSIN (FLOMAX) 0.4 MG CAP    Take 1 capsule (0.4 mg total) by mouth once daily.    TEMAZEPAM (RESTORIL) 30 MG CAPSULE    TAKE 1 CAPSULE BY MOUTH AT BEDTIME AS NEEDED FOR INSOMNIA *THANK YOU!*        ALLERGIES   Review of patient's allergies indicates:   Allergen Reactions    Penicillins      Other reaction(s): Rash  50 YEARS AGO          PAST MEDICAL HISTORY  Past Medical History:   Diagnosis Date    Allergy     SEASON    Basal cell carcinoma 2009    L ear    Cancer     Hx colon     Colon cancer     states dx in 1999    Colon polyp     Pneumonia, unspecified organism 07/2023        SURGICAL HISTORY   Past Surgical History:   Procedure Laterality Date    CATARACT EXTRACTION Bilateral 2022    COLON SURGERY  2000    1ft. sigmoid removed    COLONOSCOPY N/A 09/28/2020    Procedure: COLONOSCOPY;  Surgeon: Ty Pollock MD;  Location: [facility];  Service: General;  Laterality: N/A;    INSERTION OF TUNNELED CENTRAL VENOUS  CATHETER (CVC) WITH SUBCUTANEOUS PORT N/A 09/11/2023    Procedure: [id number];  Surgeon: Antwon Peres Jr., MD;  Location: [facility];  Service: General;  Laterality: N/A;        SOCIAL HISTORY   Social History     Socioeconomic History    Marital status:    Tobacco Use    Smoking status: Former     Current packs/day: 0.25     Average packs/day: 0.3 packs/day for 16.2 years (4.0 ttl pk-yrs)     Types: Cigarettes     Start date: 9/6/2008    Smokeless tobacco: Never   Substance and Sexual Activity    Alcohol use: Yes     Alcohol/week: 2.0 standard drinks of alcohol     Types: 2 Drinks containing 0.5 oz of alcohol per week     Comment: 2 mixed drinks WEEK    Drug use: No    Sexual activity: Yes     Partners: Female     Social Drivers of Health     Financial Resource Strain: Low Risk  (10/18/2024)    Overall Financial Resource Strain (CARDIA)     Difficulty of Paying Living Expenses: Not hard at all   Food Insecurity: No Food Insecurity (10/18/2024)    Hunger Vital Sign     Worried About Running Out of Food in the Last Year: Never true     Ran Out of Food in the Last Year: Never true   Transportation Needs: No Transportation Needs (10/18/2024)    TRANSPORTATION NEEDS     Transportation : No   Physical Activity: Inactive (10/18/2024)    Exercise Vital Sign     Days of Exercise per Week: 0 days     Minutes of Exercise per Session: 0 min   Stress: Stress Concern Present (10/18/2024)    [employer] - Occupational Stress Questionnaire     Feeling of Stress : To some extent   Housing Stability: Low Risk  (10/18/2024)    Housing Stability Vital Sign     Unable to Pay for Housing in the Last Year: No     Homeless in the Last Year: No        FAMILY HISTORY   Family History   Problem Relation Name Age of Onset    Cancer Mother      Brain cancer Mother      Cancer Brother      Macular degeneration Brother      Pancreatic cancer Brother      Prostate cancer Brother      Melanoma  "Neg Hx      Colon cancer Neg Hx            REVIEW OF SYSTEMS   Review of Systems   Constitutional:  Negative for chills, diaphoresis and fever.   HENT:  Negative for congestion, ear discharge, hearing loss, nosebleeds and sore throat.    Eyes:  Negative for blurred vision, double vision, discharge and redness.   Respiratory:  Positive for shortness of breath. Negative for cough, sputum production, wheezing and stridor.    Cardiovascular:  Negative for chest pain, palpitations and leg swelling.   Gastrointestinal:  Negative for abdominal pain, constipation, diarrhea, nausea and vomiting.   Genitourinary:  Negative for dysuria, frequency, hematuria and urgency.   Musculoskeletal:  Negative for joint pain and myalgias.   Skin:  Negative for rash.   Neurological:  Negative for dizziness, sensory change, speech change, focal weakness, seizures, loss of consciousness, weakness and headaches.        PHYSICAL EXAM   VITAL SIGNS: BP (!) 120/58   Pulse 86   Temp 97.7 °F (36.5 °C) (Oral)   Resp 18   Ht 6' 5" (1.956 m)   Wt 69.4 kg (153 lb)   SpO2 99%   BMI 18.14 kg/m²     Physical Exam  Vitals and nursing note reviewed.   Constitutional:       General: He is not in acute distress.     Appearance: Normal appearance. He is normal weight. He is not ill-appearing, toxic-appearing or diaphoretic.   HENT:      Head: Normocephalic and atraumatic.      Right Ear: External ear normal.      Left Ear: External ear normal.      Nose: Nose normal.   Eyes:      Extraocular Movements: Extraocular movements intact.      Conjunctiva/sclera: Conjunctivae normal.      Pupils: Pupils are equal, round, and reactive to light.   Cardiovascular:      Rate and Rhythm: Normal rate and regular rhythm.      Pulses: Normal pulses.      Heart sounds: Normal heart sounds.   Pulmonary:      Effort: Pulmonary effort is normal. No respiratory distress.      Breath sounds: No stridor. Examination of the left-lower field reveals rhonchi. Rhonchi present. " No decreased breath sounds, wheezing or rales.      Comments: On supplemental oxygen nasal cannula 4 L  Abdominal:      General: Abdomen is flat. There is no distension.      Palpations: Abdomen is soft. There is no mass.      Tenderness: There is no abdominal tenderness. There is no right CVA tenderness, left CVA tenderness, guarding or rebound.      Hernia: No hernia is present.   Musculoskeletal:         General: No swelling, tenderness, deformity or signs of injury. Normal range of motion.      Cervical back: Normal range of motion and neck supple.      Right lower leg: No edema.      Left lower leg: No edema.   Skin:     General: Skin is dry.      Coloration: Skin is not jaundiced or pale.      Findings: No bruising, erythema, lesion or rash.   Neurological:      General: No focal deficit present.      Mental Status: He is alert and oriented to person, place, and time. Mental status is at baseline.      Cranial Nerves: No cranial nerve deficit.      Sensory: No sensory deficit.      Motor: No weakness.      Coordination: Coordination normal.      Gait: Gait normal.   Psychiatric:         Mood and Affect: Mood normal.         Behavior: Behavior normal.         Thought Content: Thought content normal.         Judgment: Judgment normal.                ED COURSE & MEDICAL DECISION MAKING  Akil Guzman is a 79 y.o. male with pertinent PMH of lung cancer and pneumonia presents with progressive exertional shortness of breath concerning for pneumonia, progression of lung cancer, pneumothorax, hemothorax, ACS, PE or other pathology?.     Vitals: Normotensive, regular rate, afebrile, saturating on% on 4 L nasal cannula ?  Exam: As above? ?  Initial Workup:  Chest x-ray, CBC, BNP ?  Initial Therapy:  None    EKG:Independently interpreted by me  Normal Sinus rhythm with normal heart rate.  No axis deviation. PA, QRS and QT intervals within normal limits. No ST depressions or elevations.    We will contact Dr. Ball  office     ED TIMELINE  ED Course as of 11/12/24 1046   Tue Nov 12, 2024   1021 WBC: 7.61 [IL]   1021 Hemoglobin(!): 10.8  baseline [IL]   1021 Platelet Count: 299 [IL]   1039 X-Ray Chest PA And Lateral  My independent interpretation of chest x-ray is pertinent for consolidation left lower lung, unchanged from previous [IL]   1045 Sodium: 138 [IL]   1045 Potassium: 3.6 [IL]   1045 Glucose: 103 [IL]   1045 Creatinine: 0.8 [IL]   1045 On re-evaluation patient no acute distress, saturating 100% while on supplemental oxygen with heart rate of 86    Joint decision with the patient and his significant other and made to discharge the patient so he can go see his pulmonologist today.  Patient will have updated x-ray from today [IL]      ED Course User Index  [IL] Yefri Zhang MD       Imaging Results              X-Ray Chest PA And Lateral (In process)                      LABS (CONSIDERED BUT NOT PERFORMED)  Dimer, troponin      RADIOLOGY (CONSIDERED BUT NOT PERFORMED)  CT PE     RADIOLOGICAL STUDIES INDEPENDENTLY INTERPRETED FOR INITIATING CLINICAL CARE BEFORE THE OFFICIAL RADIOLOGICAL READ WAS AVAILABLE  See ED timeline      PROCEDURE  Procedures  None     ED Medactions Given  Medications - No data to display  Treatment considered but not performed:  Antibiotics     CRITICAL CARE  None      DISPOSITION  Hospitalization was considered, but after discussion with the patient and care giver and joint decision was made to trial discharge with strict return precautions. Patient underwent testing and reevaluation as listed above and it was determined no emergent intervention is further required. Patient was consulted on daily habits that can be changed to improve health. Patient will follow up with primary care that he/she has , pulmonologist.       Prescriptions for D/C - see below.  Prescriptions considered but not written:  Antibiotics      At this time, I feel that patient has reached maximum benefit from ED workup,  management and observation. Discussed results of labs/imaging with the patient and significant other and updated them on plan of action, they voiced agreement and understanding. I also informed them that our evaluation in the emergency department today is an evaluation of his condition in one moment in time Patient encouraged to return to the emergency department for fever/chills, shortness of breath, chest pain, inability to tolerate food or water, new numbness/weakness/tinlging or any new/worsening/concerning symptoms. Encouraged follow up with a primary care provider for symptom recheck and to address routine health maintenance.    This dictation has been generated using Dragon Dictation some phonetic errors may occur.         PLAN   ED Disposition Condition    Discharge Stable            Final diagnoses:  [R06.02] SOB (shortness of breath)  [R06.09] Dyspnea on exertion (Primary)  [Z85.118] History of lung cancer           Yefri Zhang MD  11/12/24 [id number]

## 2024-11-12 NOTE — DISCHARGE INSTRUCTIONS
Discharge Directions:    Dear Akil Guzman,  Thank you for choosing [facility] for your emergency care. It was my pleasure treating you today.      You have been diagnosed with exertional shortness of breath associated with left lower lung consolidation with past medical history of lung cancer     Please follow up with your pulmonologist       Please see your primary care physician in 2-3 days or whenever you are able to schedule a follow-up appointment. Your evaluation in the Emergency Department is focused and limited to evaluating life threatening conditions. Continuing to see your primary care physician is a critical step in maintaining good health now and in the future.      Please return to the emergency department if your condition worsens in any way. Your condition may change over time, and our evaluation in the ED today is an evaluation of your condition at one moment in time. If there is any worsening in your condition or new concerning symptoms develop, please follow up as soon as possible with your regular doctor or return to the ER immediately.     Sincerely,     Yefri Zhang M.D.

## 2024-11-13 ENCOUNTER — TELEPHONE (OUTPATIENT)
Facility: CLINIC | Age: 79
End: 2024-11-13
Payer: MEDICARE

## 2024-11-13 LAB
OHS QRS DURATION: 78 MS
OHS QTC CALCULATION: 486 MS

## 2024-11-13 NOTE — TELEPHONE ENCOUNTER
Called Ms. Ann Leon from [facility]. Ms. Leon had a conversation with patient on regard to transition to Hospice and patient stated he was agreeable with it. Ms. Leon will contact patient again to explain hospice care and corroborate understanding and admission, and will contact this office to communicate patient's decision.

## 2024-11-13 NOTE — TELEPHONE ENCOUNTER
----- Message from Elena sent at 11/13/2024 11:20 AM CST -----  Ann Leon w/[facility] in [place] calling they received a referral from the [facility] palliative medicine and Ms. Juarez visited the patient and he said he is still having the maintenance treatment every 6 wks., but missed the last one because he had pneumonia. The pt is receptive about starting hospice. Ms. Leon is asking for staff to contact her.        Annpia puente/ [facility]# [phone]

## 2024-11-14 ENCOUNTER — PATIENT MESSAGE (OUTPATIENT)
Facility: CLINIC | Age: 79
End: 2024-11-14
Payer: MEDICARE

## 2024-11-18 ENCOUNTER — PATIENT MESSAGE (OUTPATIENT)
Dept: PULMONOLOGY | Facility: CLINIC | Age: 79
End: 2024-11-18
Payer: MEDICARE

## 2024-11-18 ENCOUNTER — TELEPHONE (OUTPATIENT)
Facility: CLINIC | Age: 79
End: 2024-11-18
Payer: MEDICARE

## 2024-11-18 ENCOUNTER — PATIENT MESSAGE (OUTPATIENT)
Facility: CLINIC | Age: 79
End: 2024-11-18
Payer: MEDICARE

## 2024-11-18 NOTE — TELEPHONE ENCOUNTER
----- Message from Elena sent at 11/18/2024 10:33 AM CST -----  Ms. Guzman returning your call.     # [phone]

## 2024-11-20 ENCOUNTER — PATIENT MESSAGE (OUTPATIENT)
Facility: CLINIC | Age: 79
End: 2024-11-20
Payer: MEDICARE

## 2024-11-22 DIAGNOSIS — J98.4 PNEUMONITIS: Primary | ICD-10-CM

## 2024-11-22 LAB
OHS QRS DURATION: 80 MS
OHS QTC CALCULATION: 484 MS

## 2024-11-22 RX ORDER — PREDNISONE 20 MG/1
40 TABLET ORAL DAILY
Qty: 60 TABLET | Refills: 1 | Status: SHIPPED | OUTPATIENT
Start: 2024-11-22

## 2024-11-22 NOTE — PROGRESS NOTES
I called and spoke to Mr. Guzman on the phone today.  He has had progressively worsening sob and states he is too weak to even come in for a visit and had to cancel his appointment with us yesterday.  He is now in hospice care who has started him on oxygen and I discussed the use of steroids which may help.  This could be worsening copd or pneumonitis and I discussed this.  I sent in prednisone 40mg daily today and will see how he does over the weekend with this and check back in with him on Monday.  He has also been stated on abx by hospice and instructed him to continue forward with this.

## 2024-11-26 ENCOUNTER — CLINICAL SUPPORT (OUTPATIENT)
Dept: RADIATION ONCOLOGY | Facility: CLINIC | Age: 79
End: 2024-11-26
Payer: MEDICARE

## 2024-11-26 DIAGNOSIS — C34.12 MALIGNANT NEOPLASM OF UPPER LOBE OF LEFT LUNG: Primary | ICD-10-CM

## 2024-11-26 PROCEDURE — 99499 UNLISTED E&M SERVICE: CPT | Mod: 95,,, | Performed by: RADIOLOGY

## 2024-11-26 NOTE — PROGRESS NOTES
DIAGNOSIS: Stage IVB NSCLC    TREATMENT      Contacted patient today for 3 week checkup.  He did not answer and no vmail picked up.  Pt has entered hospice care per EMR.    A/P  1.  Pt now on hospice  2.  Follow-up with Dr. Torres as directed  3.  Return to clinic prn for palliative needs per [facility] ref

## 2024-12-04 NOTE — TELEPHONE ENCOUNTER
Yes terrence. He is on Hospice at this time. He does not want any further treatment. I also had to cancel his appt with you. He is not able to follow up on hospice.    Allie

## 2024-12-05 ENCOUNTER — TELEPHONE (OUTPATIENT)
Dept: PULMONOLOGY | Facility: CLINIC | Age: 79
End: 2024-12-05
Payer: MEDICARE

## 2025-01-15 ENCOUNTER — PATIENT MESSAGE (OUTPATIENT)
Dept: PULMONOLOGY | Facility: CLINIC | Age: 80
End: 2025-01-15
Payer: MEDICARE

## 2025-01-23 ENCOUNTER — TELEPHONE (OUTPATIENT)
Dept: PULMONOLOGY | Facility: CLINIC | Age: 80
End: 2025-01-23
Payer: MEDICARE

## 2025-01-23 DIAGNOSIS — J18.9 COMMUNITY ACQUIRED PNEUMONIA OF RIGHT UPPER LOBE OF LUNG: Primary | ICD-10-CM

## 2025-01-24 ENCOUNTER — HOSPITAL ENCOUNTER (OUTPATIENT)
Dept: RADIOLOGY | Facility: HOSPITAL | Age: 80
Discharge: HOME | End: 2025-01-24
Attending: INTERNAL MEDICINE
Payer: MEDICARE

## 2025-01-24 ENCOUNTER — OFFICE VISIT (OUTPATIENT)
Dept: PULMONOLOGY | Facility: CLINIC | Age: 80
End: 2025-01-24
Payer: MEDICARE

## 2025-01-24 VITALS
BODY MASS INDEX: 18.9 KG/M2 | HEART RATE: 104 BPM | SYSTOLIC BLOOD PRESSURE: 149 MMHG | DIASTOLIC BLOOD PRESSURE: 84 MMHG | OXYGEN SATURATION: 96 % | HEIGHT: 77 IN | WEIGHT: 160.06 LBS

## 2025-01-24 DIAGNOSIS — J91.0 MALIGNANT PLEURAL EFFUSION: Primary | ICD-10-CM

## 2025-01-24 DIAGNOSIS — J18.9 COMMUNITY ACQUIRED PNEUMONIA OF RIGHT UPPER LOBE OF LUNG: ICD-10-CM

## 2025-01-24 DIAGNOSIS — J47.9 BRONCHIECTASIS WITHOUT COMPLICATION: ICD-10-CM

## 2025-01-24 DIAGNOSIS — J96.11 CHRONIC HYPOXEMIC RESPIRATORY FAILURE: ICD-10-CM

## 2025-01-24 PROCEDURE — 71046 X-RAY EXAM CHEST 2 VIEWS: CPT | Mod: 26,,, | Performed by: RADIOLOGY

## 2025-01-24 PROCEDURE — 71046 X-RAY EXAM CHEST 2 VIEWS: CPT | Mod: TC,PN

## 2025-01-24 PROCEDURE — 99213 OFFICE O/P EST LOW 20 MIN: CPT | Mod: PBBFAC,25,PO | Performed by: INTERNAL MEDICINE

## 2025-01-24 PROCEDURE — 99999 PR PBB SHADOW E&M-EST. PATIENT-LVL III: CPT | Mod: PBBFAC,,, | Performed by: INTERNAL MEDICINE

## 2025-01-24 PROCEDURE — 99214 OFFICE O/P EST MOD 30 MIN: CPT | Mod: S$PBB,,, | Performed by: INTERNAL MEDICINE

## 2025-01-24 RX ORDER — LEVOCETIRIZINE DIHYDROCHLORIDE 5 MG/1
5 TABLET, FILM COATED ORAL
COMMUNITY
Start: 2025-01-23

## 2025-01-24 RX ORDER — PANTOPRAZOLE SODIUM 40 MG/1
40 TABLET, DELAYED RELEASE ORAL
COMMUNITY
Start: 2025-01-23

## 2025-01-24 RX ORDER — LORAZEPAM 1 MG/1
1 TABLET ORAL EVERY 4 HOURS PRN
COMMUNITY
Start: 2024-12-23

## 2025-01-24 NOTE — PROGRESS NOTES
1/24/2025    Akil Guzman  Follow up    No chief complaint on file.      HPI:  01/24/2025- pt went on hospice for incurable lung cancer and now returning for evaluation. There was concern for possible pneumothorax- no evidence of this on cxr today. He perceives there was no communication between specialists however I had been in communication with his oncologist and involved in discussions of hospice. He has been taking Fenbendazole (dog deworming medicine) and finds it helpful. He notices a little shortness of breath but feels it is improved- now just sob if tries to move too fast. He is off O2 now. The home health nurse stated she thought he had pneumothorax 2 wks ago- this was based on listening with stethoscope.  He is eating 3 meals per day. He wants to join a gym to use the pool. He has been able to regain about 15 pounds.  He is thinking about revoking hospice because he is doing so much better and wants to see specialists. He is not very interested at this point in having further cancer treatments with oncology.  His spouse wanted to be present today but has to work. Tried to call her- went to .    10/23/2024-   Cancer progressing in the lungs, may have pneumonitis (lung inflammation) or pneumonia on top of this  Continue and finish antibiotic, steroid pack  Will reevaluate the lungs and see if I see any improvement   Get chest xray  I will reach out to your oncologist to discuss if we can offer anything different for the lungs  Continue oxygen to keep sats >92%  Consider seeing palliative care    pt admitted at [address] last week, dx with pneumonia vs pneumonitis vs progression of lung cancer and new O2 reqt- dc with home O2. Pt sent for follow up   Rad onc note reviewed (10/14)- he is receiving radiation to L1 lesion  Dr Torres note reviewed (10/3/24)- he has undergone carbo/taxol tx in the past and most recently receiving Keytruda  Pt here today with spouse who assists w/ history. He has started on  home O2 2L  He is feeling very weak. Last thurs he went to ER for evaluation of this and found to be significantly hypoxic sat 75%. He is taking levaquin, unclear if it is helping.  He denies cough but brings up clear phlegm.   Has gradual progressive ABRAHAM- sometimes uses albuterol inhaler and feels it helps a little.    09/14/2023-   Fluid around the left lung is still moderate- due to cancer- may persist or may improve with treatment  If you are short of breath we can drain the pleural effusion again  Continue chemotherapy per Dr. 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 Has had port a cath and established with Dr. Torres.  He has cough/ light green phlegm occasionally. He denies chest pains, SOB, fevers.  He started chemo yesterday, tolerated well.    08/09/2023-   Left pleural effusion may be due to infection, need to rule out possible underlying lung cancer  Continue levaquin - will extend course to 14 days total  Get thoracentesis - outpatient procedure to drain fluid from chest, studies will be sent to the lab to further evaluate  After thoracentesis get repeat CT chest with contrast  Get blood work on same day as your procedure  If feeling worse go to the ER for further evaluation  Pt is a 77 yo male with HTN, HLD, hx colon cancer presenting for new evaluation.  Pt feeling tired, hard to sleep, SOB, pain left flank for about 2 mos gradual worsening.  He was treated for pna last month with 5d levaquin, now taking another course of levaquin started yesterday. He has had minimal cough but spits out mucous, clear this week and green last week. He denies fevers, chills, sweats, denies recent URI symptoms.  Pain L side is achy, constant, takes ibuprofen which helps a little, 6-7/10.  He had colon ca surgery in 2000. No treatment was needed  He has past smoking hx, about 13 yrs, quit last year.  Denies pulmonary  exposures, denies FH lung disease    The chief complaint problem is improved    PFSH:  Past Medical History:   Diagnosis Date    Allergy     SEASON    Basal cell carcinoma 2009    L ear    Cancer     Hx colon     Colon cancer     states dx in 1999    Colon polyp     Pneumonia, unspecified organism 07/2023         Past Surgical History:   Procedure Laterality Date    CATARACT EXTRACTION Bilateral 2022    COLON SURGERY  2000    1ft. sigmoid removed    COLONOSCOPY N/A 09/28/2020    Procedure: COLONOSCOPY;  Surgeon: Ty Pollock MD;  Location: [facility] ENDO;  Service: General;  Laterality: N/A;    INSERTION OF TUNNELED CENTRAL VENOUS CATHETER (CVC) WITH SUBCUTANEOUS PORT N/A 09/11/2023    Procedure: [id number];  Surgeon: Antwon Peres Jr., MD;  Location[facility] OR;  Service: General;  Laterality: N/A;     Social History     Tobacco Use    Smoking status: Former     Current packs/day: 0.25     Average packs/day: 0.3 packs/day for 16.4 years (4.1 ttl pk-yrs)     Types: Cigarettes     Start date: 9/6/2008    Smokeless tobacco: Never   Substance Use Topics    Alcohol use: Yes     Alcohol/week: 2.0 standard drinks of alcohol     Types: 2 Drinks containing 0.5 oz of alcohol per week     Comment: 2 mixed drinks WEEK    Drug use: No     Family History   Problem Relation Name Age of Onset    Cancer Mother      Brain cancer Mother      Cancer Brother      Macular degeneration Brother      Pancreatic cancer Brother      Prostate cancer Brother      Melanoma Neg Hx      Colon cancer Neg Hx       Review of patient's allergies indicates:   Allergen Reactions    Penicillins      Other reaction(s): Rash  50 YEARS AGO         Performance Status:The patient's activity level is homebound    Review of Systems:  a review of eleven systems covering constitutional, Eye, HEENT, Psych, Respiratory, Cardiac, GI, , Musculoskeletal, Endocrine, Dermatologic was negative except for pertinent findings as listed ABOVE and  "below:  Cough mucous occasionally- clear, better with inhalers       Exam:Comprehensive exam done. BP (!) 149/84 (BP Location: Right arm, Patient Position: Sitting)   Pulse 104   Ht 6' 5" (1.956 m)   Wt 72.6 kg (160 lb 0.9 oz)   SpO2 96%   BMI 18.98 kg/m²   Exam included Vitals as listed, and patient's appearance and affect and alertness and mood, oral exam for yeast and hygiene and pharynx lesions and Mallapatti (M) score, neck with inspection for jvd and masses and thyroid abnormalities and lymph nodes (supraclavicular and infraclavicular nodes and axillary also examined and noted if abn), chest exam included symmetry and effort and fremitus and percussion and auscultation, cardiac exam included rhythm and gallops and murmur and rubs and jvd and edema, abdominal exam for mass and hepatosplenomegaly and tenderness and hernias and bowel sounds, Musculoskeletal exam with muscle tone and posture and mobility/gait and  strength, and skin for rashes and cyanosis and pallor and turgor, extremity for clubbing.  Findings were normal except for pertinent findings listed below:  M1, oropharynx clear, dentition intact  HR regular  Breath sounds clear bilaterally  No edema/clubbing  Cachectic, muscle wasting    Radiographs (ct chest and cxr) reviewed: view by direct vision and interpretation as below   CXR 1/24/25- improved LLL and RUL airspace disease compared to 11/2024 films- reviewed with patient  CTA chest 10/17/24- L lower lung now densely opacified- looks worse, R and left sided multifocal consolidations which are new compared to prior imaging 7/2024, bilat bronchiectasis  PET scan 10/2/24- bilat multifocal consolidations +PET avidity  CXR 9/11/23- small to moderate L pleural effusion persists  CT chest 8/7/23- large L pleural effusion, L hilar mass    TTE 10/18/24-     Left Ventricle: The left ventricle is normal in size. Normal wall thickness. There is low normal systolic function with a visually estimated " ejection fraction of 50 - 55%. Ejection fraction is approximately 53%. Global longitudinal strain is -19.0%.    Right Ventricle: Normal right ventricular cavity size. Systolic function is normal. TAPSE is 2.50 cm.    Mitral Valve: There is mild regurgitation.    IVC/SVC: Normal venous pressure at 3 mmHg.    Agitated saline (bubble) used. Study is negative for shunt.    Labs reviewed        Latest Reference Range & Units 08/17/23 13:04   Fluid Color  Yellow   Fluid Appearance  Hazy   WBC, Body Fluid /cu mm 1684   Body Fluid Type  Pleural Fluid, Left   Segs, Fluid % 60   Lymphs, Fluid % 26   Eos, Fluid % 1   Mesothelial Cells, Fluid % 12   Other Cells, Fluid 0 % 1 !      Latest Reference Range & Units 08/17/23 13:56   Glucose, Fluid See Comment mg/dL 101   LD, Fluid See Comment U/L 200   Protein, Fluid See Comment g/dL 4.0     Lab Results   Component Value Date    WBC 7.61 11/12/2024    HGB 10.8 (L) 11/12/2024    HCT 32.0 (L) 11/12/2024    MCV 92 11/12/2024     11/12/2024       CMP  Sodium   Date Value Ref Range Status   11/12/2024 138 136 - 145 mmol/L Final     Potassium   Date Value Ref Range Status   11/12/2024 3.6 3.5 - 5.1 mmol/L Final     Chloride   Date Value Ref Range Status   11/12/2024 106 95 - 110 mmol/L Final     CO2   Date Value Ref Range Status   11/12/2024 22 (L) 23 - 29 mmol/L Final     Glucose   Date Value Ref Range Status   11/12/2024 103 70 - 110 mg/dL Final     BUN   Date Value Ref Range Status   11/12/2024 16 8 - 23 mg/dL Final     Creatinine   Date Value Ref Range Status   11/12/2024 0.8 0.5 - 1.4 mg/dL Final     Calcium   Date Value Ref Range Status   11/12/2024 9.8 8.7 - 10.5 mg/dL Final     Total Protein   Date Value Ref Range Status   10/28/2024 6.3 6.0 - 8.4 g/dL Final     Albumin   Date Value Ref Range Status   10/28/2024 2.8 (L) 3.5 - 5.2 g/dL Final     Total Bilirubin   Date Value Ref Range Status   10/28/2024 0.4 0.1 - 1.0 mg/dL Final     Comment:     For infants and newborns,  interpretation of results should be based  on gestational age, weight and in agreement with clinical  observations.    Premature Infant recommended reference ranges:  Up to 24 hours.............<8.0 mg/dL  Up to 48 hours............<12.0 mg/dL  3-5 days..................<15.0 mg/dL  6-29 days.................<15.0 mg/dL       Alkaline Phosphatase   Date Value Ref Range Status   10/28/2024 62 40 - 150 U/L Final     AST   Date Value Ref Range Status   10/28/2024 14 10 - 40 U/L Final     ALT   Date Value Ref Range Status   10/28/2024 12 10 - 44 U/L Final     Anion Gap   Date Value Ref Range Status   11/12/2024 10 8 - 16 mmol/L Final     eGFR   Date Value Ref Range Status   11/12/2024 >60.0 >60 mL/min/1.73 m^2 Final         PFT was not done      Plan:  Clinical impression is reasonably certain and repeated evaluation prn +/- follow up will be needed as below.   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    Discussed with patient above for education the following:      Patient Instructions   If you decide to revoke hospice let me know and I can take the steps to reorder your home oxygen  Could consider low dose morphine in future if needed for shortness of breath  Clinically suspect keytruda pneumonitis which has gotten better gradually with avoidance of keytruda  Agree further chemo/immunotherapy may have more risk than benefit for you  Nebulizer, inhalers as needed for cough/mucous  Deworming medicine not recommended- may have risk to you and not proven to help with cancer

## 2025-01-24 NOTE — PATIENT INSTRUCTIONS
If you decide to revoke hospice let me know and I can take the steps to reorder your home oxygen  Could consider low dose morphine in future if needed for shortness of breath  Clinically suspect keytruda pneumonitis which has gotten better gradually with avoidance of keytruda  Agree further chemo/immunotherapy may have more risk than benefit for you  Nebulizer, inhalers as needed for cough/mucous  Deworming medicine not recommended- may have risk to you and not proven to help with cancer

## 2025-01-25 ENCOUNTER — PATIENT MESSAGE (OUTPATIENT)
Dept: PULMONOLOGY | Facility: CLINIC | Age: 80
End: 2025-01-25
Payer: MEDICARE

## 2025-02-03 DIAGNOSIS — R53.1 WEAKNESS: Primary | ICD-10-CM

## 2025-02-03 DIAGNOSIS — C34.90 MALIGNANT NEOPLASM OF UNSPECIFIED PART OF UNSPECIFIED BRONCHUS OR LUNG: ICD-10-CM

## 2025-02-12 ENCOUNTER — CLINICAL SUPPORT (OUTPATIENT)
Dept: REHABILITATION | Facility: HOSPITAL | Age: 80
End: 2025-02-12
Attending: STUDENT IN AN ORGANIZED HEALTH CARE EDUCATION/TRAINING PROGRAM
Payer: MEDICARE

## 2025-02-12 VITALS — OXYGEN SATURATION: 98 %

## 2025-02-12 DIAGNOSIS — M62.81 MUSCLE WEAKNESS: Primary | ICD-10-CM

## 2025-02-12 DIAGNOSIS — Z74.09 IMPAIRED FUNCTIONAL MOBILITY, BALANCE, GAIT, AND ENDURANCE: ICD-10-CM

## 2025-02-12 PROBLEM — M17.12 ARTHRITIS OF LEFT KNEE: Status: RESOLVED | Noted: 2022-02-17 | Resolved: 2025-02-12

## 2025-02-12 PROCEDURE — 97163 PT EVAL HIGH COMPLEX 45 MIN: CPT | Mod: PN

## 2025-02-12 PROCEDURE — 97110 THERAPEUTIC EXERCISES: CPT | Mod: PN

## 2025-02-12 NOTE — LETTER
February 12, 2025  George Vanegas DO  [id number]  [place] [address]    To whom it may concern,     The attached plan of care is being sent to you for review and reference.    You may indicate your approval by signing the document electronically, or by faxing/mailing a signed copy of the final page of this document back to the attention of Bee Soto, PT:         Plan of Care 2/12/25   Effective from: 2/12/2025  Effective to: 5/13/2025    Plan ID: [id number]            Participants as of Finalize on 2/12/2025    Name Type Comments Contact Info    George Vanegas DO Referring Provider  [phone]    Bee Soto, PT Physical Therapist         Last Plan Note     Author: Bee Soto, PT Status: Incomplete Last edited: 2/12/2025  1:30 PM         Outpatient Rehab    Physical Therapy Evaluation    Patient Name: Akil Guzman  MRN: [id number]  YOB: 1945  Today's Date: 2/12/2025    Therapy Diagnosis:   Encounter Diagnoses   Name Primary?    Muscle weakness Yes    Impaired functional mobility, balance, gait, and endurance      Physician: George Vanegas DO    Physician Orders: Eval and Treat       Time In: 1330   Time Out: 1430  Total Time: 60   Total Billable Time: 60    Intake Outcome Measure for FOTO Survey    Therapist reviewed FOTO scores for Akil Guzman on 2/12/2025.   FOTO report - see Media section or FOTO account episode details.     Intake Score:  %         Subjective  History of Present Illness  Akil is a 79 y.o. male who reports to physical therapy with a chief concern of weakness. According to the patient's chart, Akil has a past medical history of Allergy, Basal cell carcinoma, Cancer, Colon cancer, Colon polyp, and Pneumonia, unspecified organism. Akil has a past surgical history that includes Colon surgery (2000); Colonoscopy (N/A, 09/28/2020); Cataract extraction (Bilateral, 2022); and Insertion of tunneled central venous catheter (CVC)  with subcutaneous port (N/A, 09/11/2023).    The patient reports a medical diagnosis of muscle weakness, lung cancer.            History of Present Condition/Illness: Patient was diagnosed with Stage IV lung cancer in July 2023. He underwent chemotherapy until Oct 2024. He then came down with pneumonia and was treated for that and went on hospice. He is no longer on hospice and no longer uses oxygen. Patient states at its worst he was on 6.5 liters of O2 to maintain healthy saturation level. He follows up with an oncologist on 2/28/2025. Patient states he walks around the circular driveway 3 times per week for exercise. He tries to perform breathing exercises but finds himself breathing through his mouth a lot.    Activities of Daily Living  Social history was obtained from Spouse.               Previously independent with activities of daily living? Yes     Currently independent with activities of daily living? Yes              Pain  No Pain Reported: Yes                 Review of Systems  Patient reports: Cancer History        Living Arrangements  Living Situation  Housing: Home independently  Living Arrangements: Spouse/significant other    Home Setup  Number of Levels in Home: One level        Employment  Employment Status: Retired          Past Medical History/Physical Systems Review:   Akil Guzman  has a past medical history of Allergy, Basal cell carcinoma, Cancer, Colon cancer, Colon polyp, and Pneumonia, unspecified organism.    Akil Guzman  has a past surgical history that includes Colon surgery (2000); Colonoscopy (N/A, 09/28/2020); Cataract extraction (Bilateral, 2022); and Insertion of tunneled central venous catheter (CVC) with subcutaneous port (N/A, 09/11/2023).    Akil has a current medication list which includes the following prescription(s): albuterol, albuterol, ammonium lactate, clobetasol, cyanocobalamin, finasteride, hydrocodone-acetaminophen, hydroxyzine pamoate, ipratropium,  "levocetirizine, levofloxacin, lorazepam, magnesium oxide, megestrol, omeprazole, pantoprazole, prednisone, promethazine, tamsulosin, temazepam, and [DISCONTINUED] dutasteride, and the following Facility-Administered Medications: heparin, porcine (pf) and sodium chloride 0.9%.    Review of patient's allergies indicates:   Allergen Reactions    Penicillins      Other reaction(s): Rash  50 YEARS AGO          Objective  Vital Signs  SpO2 98%                   Posture  Patient presents with a Forward head position. Flat lumbar spine is observed.                       Hip Strength - Planes of Motion   Right Strength Right Pain Left Strength Left  Pain   Flexion (L2) 3   3     Extension 3   3     ABduction 3   3     ADduction 3   3     Internal Rotation 3   3     External Rotation 3   3         Knee Strength   Right Strength Right Pain Left Strength Left  Pain   Flexion (S2) 3+   3+     Prone Flexion 3+   3+     Extension (L3) 3+   3+            Ankle/Foot Strength - Planes of Motion   Right Strength Right Pain Left Strength Left  Pain   Dorsiflexion (L4) 4   4     Plantar Flexion (S1) 3+   3+     Inversion           Eversion           Great Toe Flexion           Great Toe Extension (L5)           Lesser Toes Flexion           Lesser Toes Extension                     Fall Risk  Functional mobility test results suggest the patient is: At Risk for Falls  Four Stage Balance Test  Narrow Base of Support: 10 sec        Semi-Tandem Stand - Right Foot in Front: 10 sec  Semi-Tandem Stand - Left Foot in Front: 10 sec  Single Leg Stand - Right Foot: 2 sec  Single Leg Stand - Left Foot: 2 sec       Sit to Stand Testing      The patient completed 3 repetitions of a sit to stand transfer in 30 seconds. from hi/low mat raised 56" from floor to bottom of mat cushion         Ambulation Assistance Required  Surface With  Assistive Device Without Assistive Device Details   Level   Independent      Uneven   Independent     Curb       " "    Ambulation Details    2 min walk test in clinic: 186 feet with no rest break    Gait Analysis  Walking Speed: Decreased    Right Side Walking Observations  Decreased: Stance Time, Swing Time, and Step Length  Right Foot Contact Pattern: Flat foot    Left Side Walking Observations  Decreased: Stance Time, Swing Time, and Step Length  Left Foot Contact Pattern: Flat foot         Treatment:  Therapeutic Exercise  Therapeutic Exercise Activity 1: LAQ 2 x 5 ea  Therapeutic Exercise Activity 2: Nustep L4 x 10 min  Therapeutic Exercise Activity 3: standing heel raise x 20         Balance/Neuromuscular Re-Education  Balance/Neuromuscular Re-Education Activity 1: static standing, feet together, Eyes Open 20", Eyes closed 10"  Balance/Neuromuscular Re-Education Activity 2: stanidng hip abduction with 3"H, use of handrails for balance, cuing for postural control  Balance/Neuromuscular Re-Education Activity 3: seated ball squeeze 3"H x 2 min              Assessment & Plan  Assessment  Akil presents with a condition of High complexity.   Will Comorbidities Impact Care: Yes  Stage IV cancer    Functional Limitations: Activity tolerance, Ambulating on uneven surfaces, Carrying objects, Completing self-care activities, Decreased ambulation distance/endurance, Maintaining balance, Increased risk of fall, Gait limitations, Functional mobility, Getting off the floor, Performing household chores, Transfers, Reaching  Impairments: Abnormal gait, Activity intolerance, Impaired balance, Impaired physical strength  Personal Factors Affecting Prognosis: Physical limitations    Patient Goal for Therapy (PT): improve activity level  Prognosis: Fair  Prognosis Details: Patient with decreased strength and activity tolerance affecting ability to perform daily tasks, ambulation, and transfers.     Plan  From a physical therapy perspective, the patient would benefit from: Skilled Rehab Services    Planned therapy interventions include: " Therapeutic exercise, Therapeutic activities, Neuromuscular re-education, Manual therapy, and Gait training.            Visit Frequency: 2 times Per Week for 6 Weeks.       This plan was discussed with Patient and Therapy assistant.   Discussion participants: Agreed Upon Plan of Care             Patient's spiritual, cultural, and educational needs considered and patient agreeable to plan of care and goals.     Education  Education was done with Patient. The patient's learning style includes Listening. The patient Verbalizes understanding.                 Goals:   Active       Physical Therapy       Physical Therapy Goal       Start:  02/12/25    Expected End:  05/13/25       Short Term Goals: 3 weeks  Demonstrate improvement in recent symptoms to progress toward long term goals  Correct postural deviations in sitting and standing to decrease pain and promote postural awareness for injury prevention.  Demonstrate compliance with initial exercise program    Long Term Goals: 6 weeks  Perform usual household tasks with minimal limitation  Increase 2 minute walk test to 250 feet  Ambulate indoor and/or outdoor surfaces for 5 minutes without rest  Ascend/descend 4 steps with minimal limitation, reciprocal pattern, handrail for safety  Transfer from sit to stand from standard chair with minimal upper extremity assist  Demonstrate independence with home exercise program to maintain gains made in therapy.               Bee Soto PT           Current Participants as of 2/12/2025    Name Type Comments Contact Info    George Vanegas DO Referring Provider  [phone]    Signature pending    Bee Soto PT Physical Therapist                  Sincerely,      Bee Soto PT  [facility]                                                            Dear Bee Soto PT,    RE: Mr. Akil Guzman, MRN: [id number]    I certify that I have reviewed the attached plan of care and agree to the details  within.        ___________________________  ___________________________  Patient Printed Name    Patient Signed Name      ___________________________  Date and Time

## 2025-02-12 NOTE — PROGRESS NOTES
Outpatient Rehab    Physical Therapy Evaluation    Patient Name: Akil Guzman  MRN: [id number]  YOB: 1945  Today's Date: 2/12/2025    Therapy Diagnosis:   Encounter Diagnoses   Name Primary?    Muscle weakness Yes    Impaired functional mobility, balance, gait, and endurance      Physician: George Vanegas DO    Physician Orders: Eval and Treat       Time In: 1330   Time Out: 1430  Total Time: 60   Total Billable Time: 60    Intake Outcome Measure for FOTO Survey    Therapist reviewed FOTO scores for Akil Guzman on 2/12/2025.   FOTO report - see Media section or FOTO account episode details.     Intake Score:  %         Subjective   History of Present Illness  Akil is a 79 y.o. male who reports to physical therapy with a chief concern of weakness. According to the patient's chart, Akil has a past medical history of Allergy, Basal cell carcinoma, Cancer, Colon cancer, Colon polyp, and Pneumonia, unspecified organism. Akil has a past surgical history that includes Colon surgery (2000); Colonoscopy (N/A, 09/28/2020); Cataract extraction (Bilateral, 2022); and Insertion of tunneled central venous catheter (CVC) with subcutaneous port (N/A, 09/11/2023).    The patient reports a medical diagnosis of muscle weakness, lung cancer.            History of Present Condition/Illness: Patient was diagnosed with Stage IV lung cancer in July 2023. He underwent chemotherapy until Oct 2024. He then came down with pneumonia and was treated for that and went on hospice. He is no longer on hospice and no longer uses oxygen. Patient states at its worst he was on 6.5 liters of O2 to maintain healthy saturation level. He follows up with an oncologist on 2/28/2025. Patient states he walks around the circular driveway 3 times per week for exercise. He tries to perform breathing exercises but finds himself breathing through his mouth a lot.    Activities of Daily Living  Social history was obtained from Spouse.                Previously independent with activities of daily living? Yes     Currently independent with activities of daily living? Yes              Pain  No Pain Reported: Yes                 Review of Systems  Patient reports: Cancer History        Living Arrangements  Living Situation  Housing: Home independently  Living Arrangements: Spouse/significant other    Home Setup  Number of Levels in Home: One level        Employment  Employment Status: Retired          Past Medical History/Physical Systems Review:   Akil Guzman  has a past medical history of Allergy, Basal cell carcinoma, Cancer, Colon cancer, Colon polyp, and Pneumonia, unspecified organism.    Akil Guzman  has a past surgical history that includes Colon surgery (2000); Colonoscopy (N/A, 09/28/2020); Cataract extraction (Bilateral, 2022); and Insertion of tunneled central venous catheter (CVC) with subcutaneous port (N/A, 09/11/2023).    Akil has a current medication list which includes the following prescription(s): albuterol, albuterol, ammonium lactate, clobetasol, cyanocobalamin, finasteride, hydrocodone-acetaminophen, hydroxyzine pamoate, ipratropium, levocetirizine, levofloxacin, lorazepam, magnesium oxide, megestrol, omeprazole, pantoprazole, prednisone, promethazine, tamsulosin, temazepam, and [DISCONTINUED] dutasteride, and the following Facility-Administered Medications: heparin, porcine (pf) and sodium chloride 0.9%.    Review of patient's allergies indicates:   Allergen Reactions    Penicillins      Other reaction(s): Rash  50 YEARS AGO          Objective   Vital Signs  SpO2 98%                   Posture  Patient presents with a Forward head position. Flat lumbar spine is observed.                       Hip Strength - Planes of Motion   Right Strength Right Pain Left Strength Left  Pain   Flexion (L2) 3   3     Extension 3   3     ABduction 3   3     ADduction 3   3     Internal Rotation 3   3     External Rotation 3   3         Knee  "Strength   Right Strength Right Pain Left Strength Left  Pain   Flexion (S2) 3+   3+     Prone Flexion 3+   3+     Extension (L3) 3+   3+            Ankle/Foot Strength - Planes of Motion   Right Strength Right Pain Left Strength Left  Pain   Dorsiflexion (L4) 4   4     Plantar Flexion (S1) 3+   3+     Inversion           Eversion           Great Toe Flexion           Great Toe Extension (L5)           Lesser Toes Flexion           Lesser Toes Extension                     Fall Risk  Functional mobility test results suggest the patient is: At Risk for Falls  Four Stage Balance Test  Narrow Base of Support: 10 sec        Semi-Tandem Stand - Right Foot in Front: 10 sec  Semi-Tandem Stand - Left Foot in Front: 10 sec  Single Leg Stand - Right Foot: 2 sec  Single Leg Stand - Left Foot: 2 sec       Sit to Stand Testing      The patient completed 3 repetitions of a sit to stand transfer in 30 seconds. from hi/low mat raised 56" from floor to bottom of mat cushion         Ambulation Assistance Required  Surface With  Assistive Device Without Assistive Device Details   Level   Independent      Uneven   Independent     Curb           Ambulation Details    2 min walk test in clinic: 186 feet with no rest break    Gait Analysis  Walking Speed: Decreased    Right Side Walking Observations  Decreased: Stance Time, Swing Time, and Step Length  Right Foot Contact Pattern: Flat foot    Left Side Walking Observations  Decreased: Stance Time, Swing Time, and Step Length  Left Foot Contact Pattern: Flat foot         Treatment:  Therapeutic Exercise  Therapeutic Exercise Activity 1: LAQ 2 x 5 ea  Therapeutic Exercise Activity 2: Nustep L4 x 10 min  Therapeutic Exercise Activity 3: standing heel raise x 20         Balance/Neuromuscular Re-Education  Balance/Neuromuscular Re-Education Activity 1: static standing, feet together, Eyes Open 20", Eyes closed 10"  Balance/Neuromuscular Re-Education Activity 2: stanidng hip abduction with 3"H, " "use of handrails for balance, cuing for postural control  Balance/Neuromuscular Re-Education Activity 3: seated ball squeeze 3"H x 2 min              Assessment & Plan   Assessment  Akil presents with a condition of High complexity.   Will Comorbidities Impact Care: Yes  Stage IV cancer    Functional Limitations: Activity tolerance, Ambulating on uneven surfaces, Carrying objects, Completing self-care activities, Decreased ambulation distance/endurance, Maintaining balance, Increased risk of fall, Gait limitations, Functional mobility, Getting off the floor, Performing household chores, Transfers, Reaching  Impairments: Abnormal gait, Activity intolerance, Impaired balance, Impaired physical strength  Personal Factors Affecting Prognosis: Physical limitations    Patient Goal for Therapy (PT): improve activity level  Prognosis: Fair  Prognosis Details: Patient with decreased strength and activity tolerance affecting ability to perform daily tasks, ambulation, and transfers.     Plan  From a physical therapy perspective, the patient would benefit from: Skilled Rehab Services    Planned therapy interventions include: Therapeutic exercise, Therapeutic activities, Neuromuscular re-education, Manual therapy, and Gait training.            Visit Frequency: 2 times Per Week for 6 Weeks.       This plan was discussed with Patient and Therapy assistant.   Discussion participants: Agreed Upon Plan of Care             Patient's spiritual, cultural, and educational needs considered and patient agreeable to plan of care and goals.     Education  Education was done with Patient. The patient's learning style includes Listening. The patient Verbalizes understanding.                 Goals:   Active       Physical Therapy       Physical Therapy Goal       Start:  02/12/25    Expected End:  05/13/25       Short Term Goals: 3 weeks  Demonstrate improvement in recent symptoms to progress toward long term goals  Correct postural " deviations in sitting and standing to decrease pain and promote postural awareness for injury prevention.  Demonstrate compliance with initial exercise program    Long Term Goals: 6 weeks  Perform usual household tasks with minimal limitation  Increase 2 minute walk test to 250 feet  Ambulate indoor and/or outdoor surfaces for 5 minutes without rest  Ascend/descend 4 steps with minimal limitation, reciprocal pattern, handrail for safety  Transfer from sit to stand from standard chair with minimal upper extremity assist  Demonstrate independence with home exercise program to maintain gains made in therapy.               Bee Soto, PT

## 2025-02-17 ENCOUNTER — CLINICAL SUPPORT (OUTPATIENT)
Dept: REHABILITATION | Facility: HOSPITAL | Age: 80
End: 2025-02-17
Payer: MEDICARE

## 2025-02-17 DIAGNOSIS — Z74.09 IMPAIRED FUNCTIONAL MOBILITY, BALANCE, GAIT, AND ENDURANCE: ICD-10-CM

## 2025-02-17 DIAGNOSIS — M62.81 MUSCLE WEAKNESS: Primary | ICD-10-CM

## 2025-02-17 PROCEDURE — 97530 THERAPEUTIC ACTIVITIES: CPT | Mod: PN,CQ

## 2025-02-17 PROCEDURE — 97112 NEUROMUSCULAR REEDUCATION: CPT | Mod: PN,CQ

## 2025-02-17 PROCEDURE — 97110 THERAPEUTIC EXERCISES: CPT | Mod: PN,CQ

## 2025-02-17 NOTE — PROGRESS NOTES
"  Outpatient Rehab    Physical Therapy Visit    Patient Name: Akil Guzman  MRN: [id number]  YOB: 1945  Today's Date: 2/17/2025    Therapy Diagnosis:   Encounter Diagnoses   Name Primary?    Muscle weakness Yes    Impaired functional mobility, balance, gait, and endurance      Physician: George Vanegas DO    Physician Orders: Eval and Treat  Medical Diagnosis: Weakness [R53.1]     Visit # / Visits Authorized:  1 / 12   Date of Evaluation:  2/12/2025  Insurance Authorization Period: 2/12/2025 to 12/31/2025  Plan of Care Certification:  2/12/2025 to 5/12/2025      Time In:   7:00 AM  Time Out:  8:00 AM  Total Time:   60 Minutes  Total Billable Time: 55 Minutes    FOTO:  Intake Score:  %  Survey Score 1:  %  Survey Score 2:  %         Subjective   Patient reports feeling good; not having any pain at this time..         Objective            Treatment:  30 Minutes  Therapeutic Exercise  Therapeutic Exercise Activity 1: LAQ x 15  Therapeutic Exercise Activity 2: Nustep L1 x 15 min  Therapeutic Exercise Activity 3: Heel Raises x 20  Therapeutic Exercise Activity 4: Bridges x 10  Therapeutic Exercise Activity 5: Supine Knee fallouts x 10  Therapeutic Exercise Activity 6: Supine Clams x 10  Therapeutic Exercise Activity 7: SLR x 10  Therapeutic Exercise Activity 8: SAQ on small gray roll x 2 min    10 Minutes  Balance/Neuromuscular Re-Education  Balance/Neuromuscular Re-Education Activity 1: Static standing, feet together, EO/EC x 30 sec  Balance/Neuromuscular Re-Education Activity 2: stanidng hip abduction with 3"H, use of handrails for balance, cuing for postural control  Balance/Neuromuscular Re-Education Activity 3: Ball squeeze x 2 min  Balance/Neuromuscular Re-Education Activity 4: Sit to Stand x 10    15 Minutes  Therapeutic Activity  Therapeutic Activity 1: Toe Taps on 6" step x 2 min  Therapeutic Activity 2: FSU on 6" step x 10  Therapeutic Activity 3: LSU on 6" step x 10  Therapeutic Activity 4: " Standing Hip Flex/Abd/Ext x 10  Therapeutic Activity 5: Heel Cord stretch on wedge 3 x 30 sec    Assessment & Plan   Assessment:  Patient did well with exercises; general BLE weakness noted.       Patient will continue to benefit from skilled outpatient physical therapy to address the deficits listed in the problem list box on initial evaluation, provide pt/family education and to maximize pt's level of independence in the home and community environment.     Patient's spiritual, cultural, and educational needs considered and patient agreeable to plan of care and goals.           Plan:  Continue per POC and progress with strength/mobility exercises as patient tolerates.     Goals:   Active       Physical Therapy       Physical Therapy Goal (Progressing)       Start:  02/12/25    Expected End:  05/13/25       Short Term Goals: 3 weeks  Demonstrate improvement in recent symptoms to progress toward long term goals  Correct postural deviations in sitting and standing to decrease pain and promote postural awareness for injury prevention.  Demonstrate compliance with initial exercise program    Long Term Goals: 6 weeks  Perform usual household tasks with minimal limitation  Increase 2 minute walk test to 250 feet  Ambulate indoor and/or outdoor surfaces for 5 minutes without rest  Ascend/descend 4 steps with minimal limitation, reciprocal pattern, handrail for safety  Transfer from sit to stand from standard chair with minimal upper extremity assist  Demonstrate independence with home exercise program to maintain gains made in therapy.               Jonathan Favre, PTA

## 2025-02-21 DIAGNOSIS — N52.01 ERECTILE DYSFUNCTION DUE TO ARTERIAL INSUFFICIENCY: ICD-10-CM

## 2025-02-21 RX ORDER — SILDENAFIL 100 MG/1
100 TABLET, FILM COATED ORAL DAILY PRN
Qty: 30 TABLET | Refills: 0 | OUTPATIENT
Start: 2025-02-21

## 2025-02-25 ENCOUNTER — CLINICAL SUPPORT (OUTPATIENT)
Dept: REHABILITATION | Facility: HOSPITAL | Age: 80
End: 2025-02-25
Payer: MEDICARE

## 2025-02-25 DIAGNOSIS — Z74.09 IMPAIRED FUNCTIONAL MOBILITY, BALANCE, GAIT, AND ENDURANCE: ICD-10-CM

## 2025-02-25 DIAGNOSIS — M62.81 MUSCLE WEAKNESS: Primary | ICD-10-CM

## 2025-02-25 PROCEDURE — 97110 THERAPEUTIC EXERCISES: CPT | Mod: PN

## 2025-02-25 PROCEDURE — 97112 NEUROMUSCULAR REEDUCATION: CPT | Mod: PN

## 2025-02-25 PROCEDURE — 97530 THERAPEUTIC ACTIVITIES: CPT | Mod: PN

## 2025-02-25 NOTE — PROGRESS NOTES
"  Outpatient Rehab    Physical Therapy Visit    Patient Name: Akil Guzman  MRN: [id number]  YOB: 1945  Encounter Date: 2/25/2025    Therapy Diagnosis:   Encounter Diagnoses   Name Primary?    Muscle weakness Yes    Impaired functional mobility, balance, gait, and endurance      Physician: George Vanegas DO    Physician Orders: Eval and Treat     Time In: 1330   Time Out: 1430  Total Time: 60   Total Billable Time: 55    FOTO:  Intake Score:  %  Survey Score 1:  %  Survey Score 2:  %         Subjective   Didn't sleep well last night.         Objective            Treatment:  Therapeutic Exercise  Therapeutic Exercise Activity 1: LAQ x 15  Therapeutic Exercise Activity 2: Nustep L1 x 15 min  Therapeutic Exercise Activity 3: Heel Raises x 50  Therapeutic Exercise Activity 6: Supine Clams x 10- seated today, GTB         Balance/Neuromuscular Re-Education  Balance/Neuromuscular Re-Education Activity 1: Static standing, feet together, EO/EC x 30 sec  Balance/Neuromuscular Re-Education Activity 2: stanidng hip abduction with 3"H, use of handrails for balance, cuing for postural control  Balance/Neuromuscular Re-Education Activity 3: Ball squeeze x 2 min- seated today  Balance/Neuromuscular Re-Education Activity 4: Sit to Stand x 10- from hi/low mat    Therapeutic Activity  Therapeutic Activity 1: Toe Taps on 6" step x 2 min  Therapeutic Activity 2: FSU on 4" step x 20  Therapeutic Activity 3: LSU on 4" step x 20  Therapeutic Activity 4: Standing Hip Flex/Abd/Ext x 10                   Assessment & Plan   Assessment: Patient struggling more today. He was notably fatigued following standing activities this date. He required extra time to complete exercises due to onset of fatigue and increased seated and standing rest breaks. Patient unable to complete all exercises and activities in the allotted time.  Evaluation/Treatment Tolerance: Patient limited by fatigue    Patient will continue to benefit from skilled " outpatient physical therapy to address the deficits listed in the problem list box on initial evaluation, provide pt/family education and to maximize pt's level of independence in the home and community environment.     Patient's spiritual, cultural, and educational needs considered and patient agreeable to plan of care and goals.     Education  Education was done with Patient. The patient's learning style includes Listening and Pictures/video. The patient Verbalizes understanding.         Updated HEP       Plan: Continue to advance patient as tolerated per POC for progress toward LTGs    Goals:   Active       Physical Therapy       Physical Therapy Goal (Progressing)       Start:  02/12/25    Expected End:  05/13/25       Short Term Goals: 3 weeks  Demonstrate improvement in recent symptoms to progress toward long term goals  Correct postural deviations in sitting and standing to decrease pain and promote postural awareness for injury prevention.  Demonstrate compliance with initial exercise program    Long Term Goals: 6 weeks  Perform usual household tasks with minimal limitation  Increase 2 minute walk test to 250 feet  Ambulate indoor and/or outdoor surfaces for 5 minutes without rest  Ascend/descend 4 steps with minimal limitation, reciprocal pattern, handrail for safety  Transfer from sit to stand from standard chair with minimal upper extremity assist  Demonstrate independence with home exercise program to maintain gains made in therapy.               Bee Soto, PT

## 2025-02-27 ENCOUNTER — CLINICAL SUPPORT (OUTPATIENT)
Dept: REHABILITATION | Facility: HOSPITAL | Age: 80
End: 2025-02-27
Payer: MEDICARE

## 2025-02-27 DIAGNOSIS — M62.81 MUSCLE WEAKNESS: Primary | ICD-10-CM

## 2025-02-27 DIAGNOSIS — Z74.09 IMPAIRED FUNCTIONAL MOBILITY, BALANCE, GAIT, AND ENDURANCE: ICD-10-CM

## 2025-02-27 PROCEDURE — 97110 THERAPEUTIC EXERCISES: CPT | Mod: PN

## 2025-02-27 PROCEDURE — 97112 NEUROMUSCULAR REEDUCATION: CPT | Mod: PN

## 2025-02-27 PROCEDURE — 97530 THERAPEUTIC ACTIVITIES: CPT | Mod: PN

## 2025-02-27 NOTE — PROGRESS NOTES
"  Outpatient Rehab    Physical Therapy Visit    Patient Name: Akil Guzman  MRN: [id number]  YOB: 1945  Encounter Date: 2/27/2025    Therapy Diagnosis:   Encounter Diagnoses   Name Primary?    Muscle weakness Yes    Impaired functional mobility, balance, gait, and endurance      Physician: George Vanegas DO    Physician Orders: Eval and Treat   Medical Diagnosis: Weakness [R53.1]      Visit # / Visits Authorized:  3 / 12   Date of Evaluation:  2/12/2025  Insurance Authorization Period: 2/12/2025 to 12/31/2025  Plan of Care Certification:  2/12/2025 to 5/12/2025   Time In: 1330   Time Out: 1423  Total Time: 53   Total Billable Time: 53    FOTO:  Intake Score:  %  Survey Score 1:  %  Survey Score 2:  %         Subjective   Feels more energetic than last visit. No reports of pain.         Objective            Treatment:  Therapeutic Exercise  Therapeutic Exercise Activity 1: LAQ x 20; ea. leg  Therapeutic Exercise Activity 2: Nustep L5 x 15 min  Therapeutic Exercise Activity 3: Heel Raises x 20    Balance/Neuromuscular Re-Education  Balance/Neuromuscular Re-Education Activity 1: Static standing, feet together, EO/EC x 30 sec  Balance/Neuromuscular Re-Education Activity 2: stanidng hip abduction with 3"H, use of handrails for balance, cuing for postural control x 5 ea  Balance/Neuromuscular Re-Education Activity 3: Ball squeeze x 2 min- seated today  Balance/Neuromuscular Re-Education Activity 4: Sit to Stand x 10- from hi/low mat    Therapeutic Activity  Therapeutic Activity 1: Toe Taps on 6" step x 2 min  Therapeutic Activity 2: FSU on 4" step x 20; ea leg  Therapeutic Activity 3: LSU on 4" step x 20  Therapeutic Activity 4: Standing Hip Flex/Abd/Ext x 10  Therapeutic Activity 5: Heel Cord stretch on wedge 3 x 30 sec    Assessment & Plan   Assessment: Pt was slow performing exercises and needed VC to perform correctly. He took rest breaks as needed.       Patient will continue to benefit from skilled " outpatient physical therapy to address the deficits listed in the problem list box on initial evaluation, provide pt/family education and to maximize pt's level of independence in the home and community environment.     Patient's spiritual, cultural, and educational needs considered and patient agreeable to plan of care and goals.           Plan: Continue to advance patient as tolerated per POC for progress toward LTGs    Goals:   Active       Physical Therapy       Physical Therapy Goal (Progressing)       Start:  02/12/25    Expected End:  05/13/25       Short Term Goals: 3 weeks  Demonstrate improvement in recent symptoms to progress toward long term goals  Correct postural deviations in sitting and standing to decrease pain and promote postural awareness for injury prevention.  Demonstrate compliance with initial exercise program    Long Term Goals: 6 weeks  Perform usual household tasks with minimal limitation  Increase 2 minute walk test to 250 feet  Ambulate indoor and/or outdoor surfaces for 5 minutes without rest  Ascend/descend 4 steps with minimal limitation, reciprocal pattern, handrail for safety  Transfer from sit to stand from standard chair with minimal upper extremity assist  Demonstrate independence with home exercise program to maintain gains made in therapy.               Geetha Johnson, PT

## 2025-08-20 ENCOUNTER — PATIENT MESSAGE (OUTPATIENT)
Dept: ADMINISTRATIVE | Facility: HOSPITAL | Age: 80
End: 2025-08-20
Payer: MEDICARE

## (undated) DEVICE — GLOVE BIOGEL PI  GOLD SZ 7

## (undated) DEVICE — FORCEP BIOSY RJ 4 HOT 2.2X240

## (undated) DEVICE — TRAY MINOR SLIDELL MEMORIAL HOSPITAL

## (undated) DEVICE — SOL WATER STRL IRR 1000ML

## (undated) DEVICE — SUTURE VICRYL 3-0 SH 27 VCP416H

## (undated) DEVICE — KIT ENDO CARRY-ON PROC 100310

## (undated) DEVICE — CANISTER SUCTION 3000CC

## (undated) DEVICE — BLADE SCALPEL #11 371111

## (undated) DEVICE — SYRINGE 5ML 309646

## (undated) DEVICE — GLOVE PI ULTRA TOUCH G SURGEON

## (undated) DEVICE — PAD BOVIE ADULT

## (undated) DEVICE — TIP BOVIE TEFLON E1450X

## (undated) DEVICE — SOLUTION IRRI NS BOTTLE 1000ML R5200-01

## (undated) DEVICE — DRAPE C-ARM (FITS NEW C-ARM) 07-CA108

## (undated) DEVICE — SNARE CAPTIVATOR II 25MM ROUND

## (undated) DEVICE — DRAPE NAVIGATOR GAMMA PROBE 098004

## (undated) DEVICE — ADHESIVE TISSUE EXOFIN

## (undated) DEVICE — NEEDLE SAFETY ECLIPSE 25G 1-1/2IN 305767

## (undated) DEVICE — SEE MEDLINE ITEM 146416

## (undated) DEVICE — PREP CHLORA 10.5ML ORANGE

## (undated) DEVICE — SUTURE MONOCRYL 4-0 PS-2 27 MCP426H

## (undated) DEVICE — BAG DECANTER 10-102

## (undated) DEVICE — DRAPE T THYROID W/ARMBOARD COVER